# Patient Record
Sex: FEMALE | Race: WHITE | NOT HISPANIC OR LATINO | Employment: UNEMPLOYED | URBAN - NONMETROPOLITAN AREA
[De-identification: names, ages, dates, MRNs, and addresses within clinical notes are randomized per-mention and may not be internally consistent; named-entity substitution may affect disease eponyms.]

---

## 2023-09-11 ENCOUNTER — PREP FOR SURGERY (OUTPATIENT)
Dept: OTHER | Facility: HOSPITAL | Age: 78
End: 2023-09-11
Payer: MEDICARE

## 2023-09-11 ENCOUNTER — HOSPITAL ENCOUNTER (INPATIENT)
Facility: HOSPITAL | Age: 78
DRG: 056 | End: 2023-09-11
Attending: INTERNAL MEDICINE | Admitting: INTERNAL MEDICINE
Payer: MEDICARE

## 2023-09-11 PROBLEM — I63.9 CVA (CEREBRAL VASCULAR ACCIDENT): Status: ACTIVE | Noted: 2023-09-11

## 2023-09-11 LAB — GLUCOSE BLDC GLUCOMTR-MCNC: 140 MG/DL (ref 70–130)

## 2023-09-11 PROCEDURE — 25010000002 MEROPENEM PER 100 MG: Performed by: INTERNAL MEDICINE

## 2023-09-11 PROCEDURE — 99223 1ST HOSP IP/OBS HIGH 75: CPT | Performed by: INTERNAL MEDICINE

## 2023-09-11 PROCEDURE — 82948 REAGENT STRIP/BLOOD GLUCOSE: CPT

## 2023-09-11 RX ORDER — OXYBUTYNIN CHLORIDE 5 MG/1
10 TABLET, EXTENDED RELEASE ORAL DAILY
Status: DISPENSED | OUTPATIENT
Start: 2023-09-12

## 2023-09-11 RX ORDER — PRAVASTATIN SODIUM 40 MG
40 TABLET ORAL DAILY
Status: CANCELLED | OUTPATIENT
Start: 2023-09-12

## 2023-09-11 RX ORDER — FUROSEMIDE 20 MG/1
20 TABLET ORAL DAILY
Status: ON HOLD | COMMUNITY

## 2023-09-11 RX ORDER — PRAVASTATIN SODIUM 40 MG
40 TABLET ORAL DAILY
Status: ON HOLD | COMMUNITY

## 2023-09-11 RX ORDER — GLIPIZIDE 5 MG/1
5 TABLET ORAL
Status: DISCONTINUED | OUTPATIENT
Start: 2023-09-12 | End: 2023-09-13

## 2023-09-11 RX ORDER — MONTELUKAST SODIUM 10 MG/1
10 TABLET ORAL NIGHTLY
Status: DISPENSED | OUTPATIENT
Start: 2023-09-12

## 2023-09-11 RX ORDER — GLUCAGON 1 MG/ML
1 KIT INJECTION
Status: ACTIVE | OUTPATIENT
Start: 2023-09-11

## 2023-09-11 RX ORDER — LANSOPRAZOLE 30 MG/1
30 CAPSULE, DELAYED RELEASE ORAL DAILY
Status: ON HOLD | COMMUNITY

## 2023-09-11 RX ORDER — PANTOPRAZOLE SODIUM 40 MG/1
40 TABLET, DELAYED RELEASE ORAL
Status: CANCELLED | OUTPATIENT
Start: 2023-09-12

## 2023-09-11 RX ORDER — ISOSORBIDE MONONITRATE 30 MG/1
30 TABLET, EXTENDED RELEASE ORAL
Status: CANCELLED | OUTPATIENT
Start: 2023-09-12

## 2023-09-11 RX ORDER — METOPROLOL SUCCINATE 25 MG/1
25 TABLET, EXTENDED RELEASE ORAL
Status: DISPENSED | OUTPATIENT
Start: 2023-09-12

## 2023-09-11 RX ORDER — VENLAFAXINE HYDROCHLORIDE 75 MG/1
75 CAPSULE, EXTENDED RELEASE ORAL
Status: CANCELLED | OUTPATIENT
Start: 2023-09-12

## 2023-09-11 RX ORDER — BUPROPION HYDROCHLORIDE 150 MG/1
150 TABLET ORAL DAILY
Status: ON HOLD | COMMUNITY

## 2023-09-11 RX ORDER — VENLAFAXINE HYDROCHLORIDE 75 MG/1
225 CAPSULE, EXTENDED RELEASE ORAL DAILY
Status: ON HOLD | COMMUNITY

## 2023-09-11 RX ORDER — ACETAMINOPHEN 325 MG/1
650 TABLET ORAL EVERY 4 HOURS PRN
Status: CANCELLED | OUTPATIENT
Start: 2023-09-11

## 2023-09-11 RX ORDER — FLUTICASONE PROPIONATE 50 MCG
2 SPRAY, SUSPENSION (ML) NASAL DAILY PRN
Status: ON HOLD | COMMUNITY

## 2023-09-11 RX ORDER — ACETAMINOPHEN 325 MG/1
650 TABLET ORAL EVERY 4 HOURS PRN
Status: DISPENSED | OUTPATIENT
Start: 2023-09-11

## 2023-09-11 RX ORDER — ISOSORBIDE MONONITRATE 30 MG/1
30 TABLET, EXTENDED RELEASE ORAL
Status: DISPENSED | OUTPATIENT
Start: 2023-09-12

## 2023-09-11 RX ORDER — NICOTINE POLACRILEX 4 MG
15 LOZENGE BUCCAL
Status: CANCELLED | OUTPATIENT
Start: 2023-09-11

## 2023-09-11 RX ORDER — CETIRIZINE HYDROCHLORIDE 10 MG/1
5 TABLET ORAL DAILY
Status: DISPENSED | OUTPATIENT
Start: 2023-09-12

## 2023-09-11 RX ORDER — METFORMIN HYDROCHLORIDE 500 MG/1
500 TABLET, EXTENDED RELEASE ORAL 2 TIMES DAILY
Status: ON HOLD | COMMUNITY

## 2023-09-11 RX ORDER — NICOTINE POLACRILEX 4 MG
15 LOZENGE BUCCAL
Status: ACTIVE | OUTPATIENT
Start: 2023-09-11

## 2023-09-11 RX ORDER — PRAVASTATIN SODIUM 40 MG
40 TABLET ORAL DAILY
Status: DISPENSED | OUTPATIENT
Start: 2023-09-12

## 2023-09-11 RX ORDER — FUROSEMIDE 20 MG/1
20 TABLET ORAL DAILY
Status: CANCELLED | OUTPATIENT
Start: 2023-09-12

## 2023-09-11 RX ORDER — METOPROLOL SUCCINATE 25 MG/1
25 TABLET, EXTENDED RELEASE ORAL
Status: CANCELLED | OUTPATIENT
Start: 2023-09-12

## 2023-09-11 RX ORDER — GLUCAGON 1 MG/ML
1 KIT INJECTION
Status: CANCELLED | OUTPATIENT
Start: 2023-09-11

## 2023-09-11 RX ORDER — GLIPIZIDE 5 MG/1
5 TABLET ORAL
Status: CANCELLED | OUTPATIENT
Start: 2023-09-12

## 2023-09-11 RX ORDER — BUPROPION HYDROCHLORIDE 150 MG/1
150 TABLET ORAL DAILY
Status: DISPENSED | OUTPATIENT
Start: 2023-09-12

## 2023-09-11 RX ORDER — DEXTROSE MONOHYDRATE 25 G/50ML
25 INJECTION, SOLUTION INTRAVENOUS
Status: DISPENSED | OUTPATIENT
Start: 2023-09-11

## 2023-09-11 RX ORDER — AMLODIPINE BESYLATE 5 MG/1
5 TABLET ORAL
Status: CANCELLED | OUTPATIENT
Start: 2023-09-12

## 2023-09-11 RX ORDER — GABAPENTIN 800 MG/1
800 TABLET ORAL 2 TIMES DAILY
Status: ON HOLD | COMMUNITY

## 2023-09-11 RX ORDER — ISOSORBIDE MONONITRATE 30 MG/1
30 TABLET, EXTENDED RELEASE ORAL DAILY
Status: ON HOLD | COMMUNITY

## 2023-09-11 RX ORDER — AMLODIPINE BESYLATE 5 MG/1
5 TABLET ORAL
Status: DISCONTINUED | OUTPATIENT
Start: 2023-09-12 | End: 2023-09-14

## 2023-09-11 RX ORDER — POLYETHYLENE GLYCOL 3350 17 G/17G
17 POWDER, FOR SOLUTION ORAL DAILY
Status: DISCONTINUED | OUTPATIENT
Start: 2023-09-12 | End: 2023-09-14

## 2023-09-11 RX ORDER — AZELASTINE 1 MG/ML
2 SPRAY, METERED NASAL 2 TIMES DAILY PRN
Status: ON HOLD | COMMUNITY

## 2023-09-11 RX ORDER — IPRATROPIUM BROMIDE AND ALBUTEROL SULFATE 2.5; .5 MG/3ML; MG/3ML
3 SOLUTION RESPIRATORY (INHALATION) EVERY 6 HOURS PRN
Status: CANCELLED | OUTPATIENT
Start: 2023-09-11

## 2023-09-11 RX ORDER — MONTELUKAST SODIUM 10 MG/1
10 TABLET ORAL NIGHTLY
Status: CANCELLED | OUTPATIENT
Start: 2023-09-12

## 2023-09-11 RX ORDER — ESTRADIOL 1 MG/1
1 TABLET ORAL DAILY
Status: ON HOLD | COMMUNITY

## 2023-09-11 RX ORDER — FUROSEMIDE 20 MG/1
20 TABLET ORAL DAILY
Status: DISCONTINUED | OUTPATIENT
Start: 2023-09-12 | End: 2023-09-12

## 2023-09-11 RX ORDER — GLIPIZIDE 5 MG/1
5 TABLET ORAL
Status: ON HOLD | COMMUNITY

## 2023-09-11 RX ORDER — VENLAFAXINE HYDROCHLORIDE 75 MG/1
75 CAPSULE, EXTENDED RELEASE ORAL
Status: DISPENSED | OUTPATIENT
Start: 2023-09-12

## 2023-09-11 RX ORDER — BUPROPION HYDROCHLORIDE 150 MG/1
150 TABLET ORAL DAILY
Status: CANCELLED | OUTPATIENT
Start: 2023-09-12

## 2023-09-11 RX ORDER — POLYETHYLENE GLYCOL 3350 17 G/17G
17 POWDER, FOR SOLUTION ORAL DAILY
Status: CANCELLED | OUTPATIENT
Start: 2023-09-11

## 2023-09-11 RX ORDER — CETIRIZINE HYDROCHLORIDE 10 MG/1
5 TABLET ORAL DAILY
Status: CANCELLED | OUTPATIENT
Start: 2023-09-12

## 2023-09-11 RX ORDER — IPRATROPIUM BROMIDE AND ALBUTEROL SULFATE 2.5; .5 MG/3ML; MG/3ML
3 SOLUTION RESPIRATORY (INHALATION) EVERY 6 HOURS PRN
Status: DISCONTINUED | OUTPATIENT
Start: 2023-09-11 | End: 2023-09-18

## 2023-09-11 RX ORDER — DEXTROSE MONOHYDRATE 25 G/50ML
25 INJECTION, SOLUTION INTRAVENOUS
Status: CANCELLED | OUTPATIENT
Start: 2023-09-11

## 2023-09-11 RX ORDER — OXYBUTYNIN CHLORIDE 5 MG/1
10 TABLET, EXTENDED RELEASE ORAL DAILY
Status: CANCELLED | OUTPATIENT
Start: 2023-09-12

## 2023-09-11 RX ORDER — PANTOPRAZOLE SODIUM 40 MG/1
40 TABLET, DELAYED RELEASE ORAL
Status: DISPENSED | OUTPATIENT
Start: 2023-09-12

## 2023-09-11 RX ORDER — ALBUTEROL SULFATE 90 UG/1
2 AEROSOL, METERED RESPIRATORY (INHALATION) EVERY 4 HOURS PRN
Status: ON HOLD | COMMUNITY

## 2023-09-11 RX ORDER — METOPROLOL SUCCINATE 25 MG/1
25 TABLET, EXTENDED RELEASE ORAL DAILY
Status: ON HOLD | COMMUNITY

## 2023-09-11 RX ADMIN — MEROPENEM 1000 MG: 1 INJECTION, POWDER, FOR SOLUTION INTRAVENOUS at 21:30

## 2023-09-11 NOTE — H&P
[facility]    Patient Identification:  Name:  Ofelia Knox  Age:  78 y.o.  Sex:  female  :  1945  MRN:  [id number]   Visit Number:  [id number]  Room number:  102/2S  Primary Care Physician:  Provider, No Known     Subjective       Chief complaint: Follow-up on CVA      History of presenting illness:  78 y.o. female  This pt is seen today for post admission physician evaluation to the inpatient rehabilitation facility.  Patient is 78-year-old female with history of diabetes hypertension and home O2 dependent COPD was brought to emergency room with altered mental status.  This history was obtained from the patient's son as patient really cannot give a good history.  I did call the son on the phone.  Since the patient was  12 years ago, the patient's son calls her every day.  On a phone call on  in the evening patient's son states that the patient was somewhat confused on the phone and disoriented but thought she may just be tired.  He called back Friday and she was about the same.  On Saturday which would have been the th patient did not answer her phone all day which is very unusual for the patient.  He went over  morning after she did not answer and after pounding on the door she finally came to the door.  He states she was wearing a yellow shirt but no bottoms, she was confused, they walked through the house and he noted that she had defecated and urinated all over.  An ambulance was called at that point.  He states normally his mother is very oriented and memory is very good.  CT head done in work-up showed a hypoattenuation in the left periventricular basal ganglia white matter.  Subsequent MRI showed acute infarct in the left putamen and anterior limb of the internal capsule.  CTA head and neck was without significant stenosis.  She was started on high-dose statin and aspirin.  Apparently still has significant aphasia.  Reportedly  her diet per our  is regular thin.  She became increasingly confused while there and is found to have UTI, reportedly this is ESBL and she is to be on meropenem through .  Her confusion progressed to the point where she could not identify her children.  As of this morning apparently however she was able to remember their names reportedly.    Patient continued to progress medically.  Physical therapy and Occupational therapy evaluations were completed with recommended acute inpatient rehabilitation referral for continued functional mobility intervention and reeducation.  Acute rehab referral ordered for continued medical monitoring and management prolonged hospitalization, continued respiratory status monitoring with her home O2 dependency, lab monitoring, pain mgt needs, bowel/bladder care with any new medication education, skin monitoring and breakdown prevention along with ongoing medical comorbidities that require ongoing care.    The preadmission mini-FIM score as assessed by the referring facility as follows: Eating 6, grooming 3, bathing 2, upper body dressing 3, lower body dressing 2, toileting hygiene 3, transfers 3, toilet transfer 3, locomotion 0, bladder management 3, bowel management 3, comprehension 5, expression 5, social interaction 5, problem-solving 5, memory 5.    ---------------------------------------------------------------------------------------------------------------------   PMH:  Diabetes  Hypertension  Home O2 dependent COPD  Hysterectomy      Family history: Unobtainable at this time as patient is disoriented      Review of Systems: Unable to obtain but patient and son states she really has not had complaints up until the events above.    -------------------------------------------------------------------------------------------------------  PMH    Social History     Socioeconomic History    Marital status:       ---------------------------------------------------------------------------------------------------------------------   Allergies: Levaquin, Augmentin, Keflex, Amoxil, albuterol, I will cause a rash.  Codeine nausea only  -------------------------------------------------------------------------------------------------------         Objective     Vital Signs: 150/90, 20, 65, 98.1, 2 L saturation 90 to 91%.       ---------------------------------------------------------------------------------------------------------------------     Physical exam: Assisted by RT  Constitutional: Well-developed, no distress on oxygen  HEENT: Hearing appears to be intact, face appears symmetric, pupils equal hearing was intact  Neck:   No JVD or carotid bruit heard  Cardiovascular: Regular rate and rhythm without murmur rub or gallop  Pulmonary/Chest: Bilateral breath sounds are clear without rhonchi rales wheezing diminished at the bases  Abdominal:  .  Soft benign nondistended nontender  Musculoskeletal: Strength is about 4/5 in both arms, she can lift both arms off the bed, she can move her feet well follows commands.  Neurological: She is oriented to person, she knew she lived in [place] but stated she lived with her  who is now .  When asked about the president she acted like she knew what she wanted to stay, started to describe him but then went off on a tangent.  When I asked her to name my watch she could not come up with this word attempted to describe it but was somewhat nonsensical.  She could name a pen.  She stated the year was , when I asked her the month she said .  Skin: Warm and dry   Peripheral vascular: Trace edema  Genitourinary: No Knox catheter present  -------------------------------------------------------------------------------------------------------    --------------------------------------------------------------------------------------------------------------------  Labs: White  count is 12, hemoglobin 14.8, hematocrit 49.  Platelet count 42 sodium 137, potassium 3.6, chloride 101, CO2 30, BUN 37 creatinine 1.11 COVID-negative    Imaging by report as noted above in HPI    Assessment & Plan    Left putamen and internal capsule CVA, patient is on aspirin and statin.  Patient has probably a combination of some cognitive deficit from this as well as some expressive aphasia.  Patient will undergo physical therapy 1 to 2 hours a day 5 to 6 days a week for therapeutic exercise range of motion endurance, gait training, safety, stairs, strengthening.  Patient will undergo occupational therapy 1 to 2 hours a day 5 to 6 days a week for dressing, ADLs, feeding, home skills, safety, toileting techniques.  Patient undergo speech-language pathology 30 to 60 minutes a day 3 to 5 days a week for communication, expression, dysphagia and aspiration needs.  Expected functional improvement for safe discharge will be for the patient to be able to safely perform activities of daily living using adaptive equipment for improved functional mobility.  Be independent and safe with bowel and bladder function.  Be able to safely consume food and fluids without signs of aspiration.  Be able to navigate home and community territory safely without injury or falls.  Patient was previously independent at home, anticipated discharge be home with assistance, anticipated DME would be commode wheelchair and walker.    Diabetes, I am continuing on metformin and Glucotrol at this time, her A1c was 6.8.  Glucoses are controlled at present, will follow and adjust as needed.    Hypertension, patient is on amlodipine and metoprolol.  Follow for 24 more hours, if she remains at 150 systolic we will make further adjustments.    Constipation reported by son, continue senna and MiraLAX, adjust cathartics as needed    History of edema, she really did not have much edema, she is on Lasix, chronically, I did not have an old creatinine but  creatinine is borderline elevated now and I am going to hold her Lasix and will follow.  EF is unknown, will check echo.    Reported COPD, continue Spiriva, lungs are clear, it is interesting patient has never smoked,  has never smoked as well.    Mild polycythemia probably related to Lasix therapy, as above ongoing hold Lasix, will follow intermittently.    ESBL UTI, isolation, complete meropenem course.      VTE Prophylaxis:   Mechanical Order History:       SCUDs          Pharmalogical Order History:       None            Falls Risk Assessment with some confusion and history of CVA high risk of falls    Discussed with patient's son over the phone and entertained questions.  Discussed rehab process.      Jim Appiah MD  [facility] Hospitalist  09/11/23  16:12 EDT

## 2023-09-12 LAB
ALBUMIN SERPL-MCNC: 3.3 G/DL (ref 3.5–5.2)
ALBUMIN/GLOB SERPL: 0.9 G/DL
ALP SERPL-CCNC: 85 U/L (ref 39–117)
ALT SERPL W P-5'-P-CCNC: 10 U/L (ref 1–33)
ANION GAP SERPL CALCULATED.3IONS-SCNC: 7.2 MMOL/L (ref 5–15)
AST SERPL-CCNC: 13 U/L (ref 1–32)
BASOPHILS # BLD AUTO: 0.08 10*3/MM3 (ref 0–0.2)
BASOPHILS NFR BLD AUTO: 0.7 % (ref 0–1.5)
BILIRUB SERPL-MCNC: 0.5 MG/DL (ref 0–1.2)
BUN SERPL-MCNC: 29 MG/DL (ref 8–23)
BUN/CREAT SERPL: 28.4 (ref 7–25)
CALCIUM SPEC-SCNC: 9.2 MG/DL (ref 8.6–10.5)
CHLORIDE SERPL-SCNC: 98 MMOL/L (ref 98–107)
CO2 SERPL-SCNC: 29.8 MMOL/L (ref 22–29)
CREAT SERPL-MCNC: 1.02 MG/DL (ref 0.57–1)
DEPRECATED RDW RBC AUTO: 50.4 FL (ref 37–54)
EGFRCR SERPLBLD CKD-EPI 2021: 57.1 ML/MIN/1.73
EOSINOPHIL # BLD AUTO: 0.16 10*3/MM3 (ref 0–0.4)
EOSINOPHIL NFR BLD AUTO: 1.3 % (ref 0.3–6.2)
ERYTHROCYTE [DISTWIDTH] IN BLOOD BY AUTOMATED COUNT: 14.6 % (ref 12.3–15.4)
GLOBULIN UR ELPH-MCNC: 3.5 GM/DL
GLUCOSE BLDC GLUCOMTR-MCNC: 105 MG/DL (ref 70–130)
GLUCOSE BLDC GLUCOMTR-MCNC: 116 MG/DL (ref 70–130)
GLUCOSE BLDC GLUCOMTR-MCNC: 125 MG/DL (ref 70–130)
GLUCOSE BLDC GLUCOMTR-MCNC: 127 MG/DL (ref 70–130)
GLUCOSE SERPL-MCNC: 121 MG/DL (ref 65–99)
HBA1C MFR BLD: 6.8 % (ref 4.8–5.6)
HCT VFR BLD AUTO: 50.8 % (ref 34–46.6)
HGB BLD-MCNC: 14.9 G/DL (ref 12–15.9)
IMM GRANULOCYTES # BLD AUTO: 0.03 10*3/MM3 (ref 0–0.05)
IMM GRANULOCYTES NFR BLD AUTO: 0.3 % (ref 0–0.5)
LYMPHOCYTES # BLD AUTO: 2.89 10*3/MM3 (ref 0.7–3.1)
LYMPHOCYTES NFR BLD AUTO: 24.1 % (ref 19.6–45.3)
MCH RBC QN AUTO: 27.4 PG (ref 26.6–33)
MCHC RBC AUTO-ENTMCNC: 29.3 G/DL (ref 31.5–35.7)
MCV RBC AUTO: 93.6 FL (ref 79–97)
MONOCYTES # BLD AUTO: 1.43 10*3/MM3 (ref 0.1–0.9)
MONOCYTES NFR BLD AUTO: 11.9 % (ref 5–12)
NEUTROPHILS NFR BLD AUTO: 61.7 % (ref 42.7–76)
NEUTROPHILS NFR BLD AUTO: 7.38 10*3/MM3 (ref 1.7–7)
NRBC BLD AUTO-RTO: 0 /100 WBC (ref 0–0.2)
PLATELET # BLD AUTO: 312 10*3/MM3 (ref 140–450)
PMV BLD AUTO: 11.5 FL (ref 6–12)
POTASSIUM SERPL-SCNC: 3.8 MMOL/L (ref 3.5–5.2)
PROT SERPL-MCNC: 6.8 G/DL (ref 6–8.5)
RBC # BLD AUTO: 5.43 10*6/MM3 (ref 3.77–5.28)
SODIUM SERPL-SCNC: 135 MMOL/L (ref 136–145)
T4 FREE SERPL-MCNC: 1.15 NG/DL (ref 0.93–1.7)
TSH SERPL DL<=0.05 MIU/L-ACNC: 1.47 UIU/ML (ref 0.27–4.2)
WBC NRBC COR # BLD: 11.97 10*3/MM3 (ref 3.4–10.8)

## 2023-09-12 PROCEDURE — 82948 REAGENT STRIP/BLOOD GLUCOSE: CPT

## 2023-09-12 PROCEDURE — 97535 SELF CARE MNGMENT TRAINING: CPT | Performed by: OCCUPATIONAL THERAPIST

## 2023-09-12 PROCEDURE — 84443 ASSAY THYROID STIM HORMONE: CPT | Performed by: INTERNAL MEDICINE

## 2023-09-12 PROCEDURE — 97110 THERAPEUTIC EXERCISES: CPT | Performed by: OCCUPATIONAL THERAPIST

## 2023-09-12 PROCEDURE — 92523 SPEECH SOUND LANG COMPREHEN: CPT

## 2023-09-12 PROCEDURE — 83036 HEMOGLOBIN GLYCOSYLATED A1C: CPT | Performed by: INTERNAL MEDICINE

## 2023-09-12 PROCEDURE — 97112 NEUROMUSCULAR REEDUCATION: CPT

## 2023-09-12 PROCEDURE — 97116 GAIT TRAINING THERAPY: CPT

## 2023-09-12 PROCEDURE — 85025 COMPLETE CBC W/AUTO DIFF WBC: CPT | Performed by: INTERNAL MEDICINE

## 2023-09-12 PROCEDURE — 97112 NEUROMUSCULAR REEDUCATION: CPT | Performed by: OCCUPATIONAL THERAPIST

## 2023-09-12 PROCEDURE — 94799 UNLISTED PULMONARY SVC/PX: CPT

## 2023-09-12 PROCEDURE — 94664 DEMO&/EVAL PT USE INHALER: CPT

## 2023-09-12 PROCEDURE — 84439 ASSAY OF FREE THYROXINE: CPT | Performed by: INTERNAL MEDICINE

## 2023-09-12 PROCEDURE — 97161 PT EVAL LOW COMPLEX 20 MIN: CPT

## 2023-09-12 PROCEDURE — 80053 COMPREHEN METABOLIC PANEL: CPT | Performed by: INTERNAL MEDICINE

## 2023-09-12 PROCEDURE — 97167 OT EVAL HIGH COMPLEX 60 MIN: CPT | Performed by: OCCUPATIONAL THERAPIST

## 2023-09-12 PROCEDURE — 25010000002 MEROPENEM PER 100 MG: Performed by: INTERNAL MEDICINE

## 2023-09-12 PROCEDURE — 94640 AIRWAY INHALATION TREATMENT: CPT

## 2023-09-12 PROCEDURE — 97530 THERAPEUTIC ACTIVITIES: CPT

## 2023-09-12 PROCEDURE — 97110 THERAPEUTIC EXERCISES: CPT

## 2023-09-12 RX ORDER — ASPIRIN 81 MG/1
81 TABLET ORAL DAILY
Status: DISPENSED | OUTPATIENT
Start: 2023-09-12

## 2023-09-12 RX ORDER — NYSTATIN 100000 [USP'U]/G
POWDER TOPICAL EVERY 12 HOURS SCHEDULED
Status: DISCONTINUED | OUTPATIENT
Start: 2023-09-13 | End: 2023-09-12

## 2023-09-12 RX ORDER — AMOXICILLIN 250 MG
1 CAPSULE ORAL 2 TIMES DAILY
Status: DISCONTINUED | OUTPATIENT
Start: 2023-09-12 | End: 2023-09-14

## 2023-09-12 RX ORDER — NYSTATIN 100000 U/G
1 CREAM TOPICAL EVERY 12 HOURS SCHEDULED
Status: DISPENSED | OUTPATIENT
Start: 2023-09-12

## 2023-09-12 RX ADMIN — OXYBUTYNIN CHLORIDE 10 MG: 5 TABLET, EXTENDED RELEASE ORAL at 08:23

## 2023-09-12 RX ADMIN — BUPROPION HYDROCHLORIDE 150 MG: 150 TABLET, EXTENDED RELEASE ORAL at 08:22

## 2023-09-12 RX ADMIN — MEROPENEM 1000 MG: 1 INJECTION, POWDER, FOR SOLUTION INTRAVENOUS at 15:09

## 2023-09-12 RX ADMIN — CETIRIZINE HYDROCHLORIDE 5 MG: 10 TABLET, FILM COATED ORAL at 08:22

## 2023-09-12 RX ADMIN — PRAVASTATIN SODIUM 40 MG: 40 TABLET ORAL at 08:23

## 2023-09-12 RX ADMIN — POLYETHYLENE GLYCOL (3350) 17 G: 17 POWDER, FOR SOLUTION ORAL at 08:24

## 2023-09-12 RX ADMIN — DOCUSATE SODIUM 50 MG AND SENNOSIDES 8.6 MG 1 TABLET: 8.6; 5 TABLET, FILM COATED ORAL at 12:16

## 2023-09-12 RX ADMIN — ASPIRIN 81 MG: 81 TABLET, COATED ORAL at 15:08

## 2023-09-12 RX ADMIN — GLIPIZIDE 5 MG: 5 TABLET ORAL at 17:32

## 2023-09-12 RX ADMIN — TIOTROPIUM BROMIDE INHALATION SPRAY 2 PUFF: 3.12 SPRAY, METERED RESPIRATORY (INHALATION) at 07:24

## 2023-09-12 RX ADMIN — METOPROLOL SUCCINATE 25 MG: 25 TABLET, EXTENDED RELEASE ORAL at 08:23

## 2023-09-12 RX ADMIN — AMLODIPINE BESYLATE 5 MG: 5 TABLET ORAL at 08:22

## 2023-09-12 RX ADMIN — GLIPIZIDE 5 MG: 5 TABLET ORAL at 06:36

## 2023-09-12 RX ADMIN — ISOSORBIDE MONONITRATE 30 MG: 30 TABLET, EXTENDED RELEASE ORAL at 08:24

## 2023-09-12 RX ADMIN — METFORMIN HYDROCHLORIDE 500 MG: 500 TABLET ORAL at 08:22

## 2023-09-12 RX ADMIN — VENLAFAXINE HYDROCHLORIDE 75 MG: 75 CAPSULE, EXTENDED RELEASE ORAL at 08:22

## 2023-09-12 RX ADMIN — PANTOPRAZOLE SODIUM 40 MG: 40 TABLET, DELAYED RELEASE ORAL at 06:36

## 2023-09-12 RX ADMIN — DOCUSATE SODIUM 50 MG AND SENNOSIDES 8.6 MG 1 TABLET: 8.6; 5 TABLET, FILM COATED ORAL at 20:03

## 2023-09-12 RX ADMIN — MEROPENEM 1000 MG: 1 INJECTION, POWDER, FOR SOLUTION INTRAVENOUS at 05:08

## 2023-09-12 RX ADMIN — MONTELUKAST SODIUM 10 MG: 10 TABLET, COATED ORAL at 20:03

## 2023-09-12 RX ADMIN — MEROPENEM 1000 MG: 1 INJECTION, POWDER, FOR SOLUTION INTRAVENOUS at 21:30

## 2023-09-12 RX ADMIN — METFORMIN HYDROCHLORIDE 500 MG: 500 TABLET ORAL at 17:32

## 2023-09-12 RX ADMIN — FUROSEMIDE 20 MG: 20 TABLET ORAL at 08:23

## 2023-09-12 RX ADMIN — NYSTATIN 1 APPLICATION: 100000 CREAM TOPICAL at 23:30

## 2023-09-12 NOTE — PLAN OF CARE
Goal Outcome Evaluation:  Plan of Care Reviewed With: patient Admitted to rehab under the services of Dr. Appiah, oriented to call bell and unit.will continue to follow.

## 2023-09-12 NOTE — PROGRESS NOTES
Rehabilitation Nursing  Inpatient Rehabilitation Plan of Care Note    Plan of Care  Copy from [facility]    Toilet Transfers (Active)  Current Status (9/11/2023 7:00:00 PM): PATIENT WILL HAVE NO TRANSFER DIFFICULTY    Weekly Goal: NO DIFFICULT WITH TRANSFERS  Discharge Goal: TRANSFERS INDEPENDENTLY    Pain    Pain Management (Active)  Current Status (9/11/2023 7:00:00 PM): PATIENT WILL VOICE NO PAIN  Weekly Goal: PATIENT WILL BE PAIN FREE  Discharge Goal: FREE FROM PAIN    Safety    Potential for Injury (Active)  Current Status (9/11/2023 7:00:00 PM): PATIENT WILL HAVE NO INJURY THIS STAY  Weekly Goal: NO INJURY  Discharge Goal: PATIENT WILL DISCHARGE    Body Systems    Integumentary (Active)  Current Status (9/11/2023 7:00:00 PM): PATIENT WILL HAVE NO SKIN  BREAKDOWN THIS  STAY  Weekly Goal: SKIN WILL REMAIN INTACT.  Discharge Goal: NO SKIN ISSUES.    Signed by: Carolyn Ramirez RN

## 2023-09-12 NOTE — PROGRESS NOTES
Patient Assessment Instrument  Quality Indicators - Admission FY 2023    Section A. Ethnicity/ Race/Language  Ethnicity:  Race:  Preferred Language:    Section A. Transportation      Section B. Hearing and Vision        Section B. Health Literacy        Section C. Cognitive Patterns      Section C. Signs and Symptoms of Delirium (from CAM)      Section D. Mood      Section D. Social Isolation      Section DN2179. Prior Functioning      Section BP8280. Prior Device Use      Section YS1620. Self Care Performance      Section DU2196. Self Care Discharge Goals      Section GR7232. Mobility Performance     Roll Left and Right: [place] does less than half the effort. [place] lifts, holds  or supports trunk or limbs but provides less than half the effort.   Sit to Lying: [place] does less than half the effort. [place] lifts, holds or  supports trunk or limbs but provides less than half the effort.   Lying to Sitting on Side of Bed: [place] does less than half the effort. [place]  lifts, holds or supports trunk or limbs but provides less than half the effort.   Sit to Stand: [place] does less than half the effort. [place] lifts, holds or  supports trunk or limbs but provides less than half the effort.   Chair/Bed to Chair Transfer: [place] does less than half the effort. [place]  lifts, holds or supports trunk or limbs but provides less than half the effort.   Toilet Transfer [place] does less than half the effort. [place] lifts, holds or  supports trunk or limbs but provides less than half the effort.   Car Transfer: [place] does less than half the effort. [place] lifts, holds or  supports trunk or limbs but provides less than half the effort.   Walk 10 Feet:   [place] provides verbal cues and/or touching/steadying and/or  contact guard assistance as patient completes activity. Assistance may be  provided throughout the activity or intermittently.  Walk 50 Feet with 2 Turns:   [place] provides verbal cues and/or  touching/steadying and/or  contact guard assistance as patient completes  activity. Assistance may be provided throughout the activity or intermittently.  Walk 150 Feet:   [place] provides verbal cues and/or touching/steadying and/or  contact guard assistance as patient completes activity. Assistance may be  provided throughout the activity or intermittently.  Walking 10 Feet on Uneven Surfaces:   Not attempted due to medical or safety  concerns.  1 Step Over Curb or Up/Down Stair:   Not attempted due to medical or safety  concerns.  Picking up an Object:   Not attempted due to medical or safety concerns.  Uses Wheelchair/Scooter: No    Section [id number]. Mobility Discharge Goals     Sit to Stand: [place] provides verbal cues or touching/steadying assistance as  patient completes activity.   Chair/Bed to Chair Transfer: [place] provides verbal cues or touching/steadying  assistance as patient completes activity.   Walk Discharge Goals:   Walk 150 Feet: [place] provides verbal cues or touching/steadying assistance as  patient completes activity.    Section H. Bladder and Bowel      Section I. Active Diagnosis      Section J. Health Conditions      Section J. Health Conditions (Pain)      Section K. Swallowing/Nutritional Status    Nutritional Approaches on Admission:    Section M. Skin Conditions      Section N. Medication        Section O. Special Treatments, Procedures, and Programs    Signed by: Andra Stewart, Physical Therapist

## 2023-09-12 NOTE — PROGRESS NOTES
Rehabilitation Nursing  Inpatient Rehabilitation Plan of Care Note    Plan of Care  Copy from [facility]    Toilet Transfers (Active)  Current Status (9/11/2023 7:00:00 PM): PATIENT WILL HAVE NO TRANSFER DIFFICULTY    Weekly Goal: NO DIFFICULT WITH TRANSFERS  Discharge Goal: TRANSFERS INDEPENDENTLY    Pain    Pain Management (Active)  Current Status (9/11/2023 7:00:00 PM): PATIENT WILL VOICE NO PAIN  Weekly Goal: PATIENT WILL BE PAIN FREE  Discharge Goal: FREE FROM PAIN    Safety    Potential for Injury (Active)  Current Status (9/11/2023 7:00:00 PM): PATIENT WILL HAVE NO INJURY THIS STAY  Weekly Goal: NO INJURY  Discharge Goal: PATIENT WILL DISCHARGE    Body Systems    Integumentary (Active)  Current Status (9/11/2023 7:00:00 PM): PATIENT WILL HAVE NO SKIN  BREAKDOWN THIS  STAY  Weekly Goal: SKIN WILL REMAIN INTACT.  Discharge Goal: NO SKIN ISSUES.    Signed by: Carolyn Ramirez RN

## 2023-09-12 NOTE — PROGRESS NOTES
"Patient Assessment Instrument  Quality Indicators - Admission FY 2023    Section A. Ethnicity/ Race/Language  Ethnicity:  Not of , /a, [place] Origin  Race:  White  Preferred Language:  English  Requests  to Communicate:   No    Section A. Transportation      Section B. Hearing and Vision  Expression of Ideas and Wants: Frequently exhibits difficulty with expressing  needs and ideas.  Understanding Verbal and Non-Verbal Content: Sometimes Understands: Understands  only basic conversations or simple, direct phrases. Frequently requires cues to  understand.  Ability to Hear:  Moderate difficulty - speaker has to increase volume and speak  distinctly  Ability to See in Adequate Light:  Moderately impaired - limited vision; not  able to see newspaper headlines but can identify objects    Section B. Health Literacy  Frequency of Needing Assistance Reading:  Always      Section C. Cognitive Patterns  Brief Interview for Mental Status (BIMS) was conducted.  Repetition of Three Words: None.  Able to report correct year: Missed by more than 5 years or no answer  Able to report correct month: Missed by 6 days to 1 month  Able to report correct day of the week: Incorrect or no answer  Able to recall \"sock\": No, could not recall  Able to recall \"blue\": No, could not recall  Able to recall \"bed\": No, could not recall    BIMS SUMMARY SCORE: 1 Severe impairment Patient was able to complete the Brief  Interview for Mental Status    Section C. Signs and Symptoms of Delirium (from CAM)  Evidence of Acute Change in Mental Status:   Yes  Inattention: Behavior present, fluctuates (comes and goes, changes in severity)  Thinking Disorganized or Incoherent:   Behavior present, fluctuates (comes and  goes, changes in severity)  Altered Level of Consciousness:   Behavior not present    Section D. Mood  Presence of little interest or pleasure in doing things:   No  Frequency of having little interest or pleasure in " doing things:   Never or 1  day  Presence of feeling down, depressed, or hopeless:   No  Frequency of feeling down, depressed, or hopeless:   Never or 1 day   Interview Ended. Above responses do not meet criteria to continue.  Total Severity Score:   00    Section D. Social Isolation  Frequecy of Feeling Lonely or Isolated:  Rarely    Section HA3026. Prior Functioning      Section [id number]. Prior Device Use      Section [id number]. Self Care Performance      Section DA0926. Self Care Discharge Goals      Section AS7836. Mobility Performance      Section IZ2091. Mobility Discharge Goals      Section H. Bladder and Bowel      Section I. Active Diagnosis      Section J. Health Conditions  Patient has had two or more falls, or a fall with injury, in the past year.  Patient has not had major surgery during the 100 days prior to admission.    Section J. Health Conditions (Pain)  Pain Effect on Sleep:   Rarely or not at all  Pain Interference with Therapy Activities:   Frequently  Pain Interference with Day-to-Day Activities:   Almost constantly    Section K. Swallowing/Nutritional Status    Nutritional Approaches on Admission:    Section M. Skin Conditions  Unhealed Pressure Ulcer/Injuries at Stage 1 or Higher on Admission:  No.    Section N. Medication    Potential Clinically Significant Medication Issues: No issues found during  review  Patient is taking medications in the following pharmacological classification:  F. Antibiotic An indication is noted for all medications in the Antibiotic drug  class. J. Hypoglycemic (including insulin) An indication is noted for all  medications in the Hypoglycemic drug class.  Potential Clinically Significant Medication Issues: No issues found during  review    Section O. Special Treatments, Procedures, and Programs    Signed by: Carolyn Ramirez RN

## 2023-09-12 NOTE — PROGRESS NOTES
Rehabilitation Nursing  Inpatient Rehabilitation Plan of Care Note    Plan of Care  Copy from [facility]    Toilet Transfers (Active)  Current Status (9/11/2023 7:00:00 PM): PATIENT WILL HAVE NO TRANSFER DIFFICULTY    Weekly Goal: NO DIFFICULT WITH TRANSFERS  Discharge Goal: TRANSFERS INDEPENDENTLY    Pain    Pain Management (Active)  Current Status (9/11/2023 7:00:00 PM): PATIENT WILL VOICE NO PAIN  Weekly Goal: PATIENT WILL BE PAIN FREE  Discharge Goal: FREE FROM PAIN    Safety    Potential for Injury (Active)  Current Status (9/11/2023 7:00:00 PM): PATIENT WILL HAVE NO INJURY THIS STAY  Weekly Goal: NO INJURY  Discharge Goal: PATIENT WILL DISCHARGE    Body Systems    Integumentary (Active)  Current Status (9/11/2023 7:00:00 PM): PATIENT WILL HAVE NO SKIN  BREAKDOWN THIS  STAY  Weekly Goal: SKIN WILL REMAIN INTACT.  Discharge Goal: NO SKIN ISSUES.    Signed by: Felicity Burden, Nurse

## 2023-09-12 NOTE — THERAPY EVALUATION
Inpatient Rehabilitation - Occupational Therapy Initial Evaluation and Treatment Note    ABENA Richie     Patient Name: Ofelia Knox  : 1945  MRN: [id number]    Today's Date: 2023                 Admit Date: 2023       No diagnosis found.    Patient Active Problem List   Diagnosis    CVA (cerebral vascular accident)       Past Medical History:   Diagnosis Date    AMS (altered mental status)     Aphasia     CKD (chronic kidney disease)     COPD (chronic obstructive pulmonary disease)     CVA (cerebral vascular accident)     DM2 (diabetes mellitus, type 2)     HTN (hypertension)     Restrictive lung disease     Urinary tract infection due to ESBL Klebsiella        Past Surgical History:   Procedure Laterality Date    HYSTERECTOMY      JOINT REPLACEMENT               IRF OT ASSESSMENT FLOWSHEET (last 12 hours)       IRF OT Evaluation and Treatment       Row Name 23 1350          OT Time and Intention    Document Type initial evaluation;daily treatment  -BF     Mode of Treatment occupational therapy  -BF     Patient Effort adequate  -BF     Symptoms Noted During/After Treatment fatigue  -BF       Row Name 23 1357          General Information    Patient Profile Reviewed yes  -BF     Patient/Family/Caregiver Comments/Observations Pt sitting in w/c, agreeable to OT. Pt was brought to ED on 9/3/23 by her son due to AMS and was found to have acute CVA. Pt's family was present for end of OT PM session and reported that pt was oriented prior to CVA.  -BF     Existing Precautions/Restrictions fall  contact isolation-ESBL UTI, O2 prn, aphasia  -BF     Limitations/Impairments safety/cognitive  -BF       Row Name 23 135          Previous Level of Function/Home Environm    BADLs, Premorbid Functional Level independent  -BF       Row Name 23 1351          Living Environment    Current Living Arrangements home  -BF     People in Home alone  -BF       Row Name 23 1352           "Cognition/Psychosocial    Affect/Mental Status (Cognition) --  awake and alert, she appears to have aphasia, difficulty following commands  -     Orientation Status (Cognition) unable/difficult to assess  unable to verbalize; pt nodded when asked if she went by \"Ofelia\"  -BF     Follows Commands (Cognition) verbal cues/prompting required;repetition of directions required;physical/tactile prompts required  -       Row Name 09/12/23 [facility]          Range of Motion (ROM)    Range of Motion bilateral upper extremities  -BF       Row Name 09/12/23 [facility]          Range of Motion Comprehensive    Comment, General Range of Motion BUE shoulders > 75% AROM, remaining BUE AROM WFL  -BF       [facility] Name 09/12/23 [facility]          Strength (Manual Muscle Testing)    Left Hand, Setting 1 (Dynamometer Testing) 20 lbs  -BF     Right Hand, Setting 1 (Dynamometer Testing) 26 lbs  -BF     Additional Documentation Left Hand, Setting 1 (Dynamometer Testing) (Row);Right Hand, Setting 1 (Dynamometer Testing) (Row)  -BF       Row Name 09/12/23 [facility]          Strength Comprehensive (MMT)    Comment, General Manual Muscle Testing (MMT) Assessment BUE grossly 3/5  -[facility] Name 09/12/23 [facility]          Sensory    Additional Documentation Vision Assessment/Intervention (Group)  -       Row Name 09/12/23 [facility]          Vision Assessment/Intervention    Visual Impairment/Limitations corrective lenses for reading  -[facility] Name 09/12/23 [facility]          Basic Activities of Daily Living (BADLs)    Basic Activities of Daily Living upper body dressing;bathing;lower body dressing;toileting;grooming;self-feeding  -BF       Row Name 09/12/23 [facility]          Bathing    Comment (Bathing) Max A  -BF       Row Name 09/12/23 [facility]          Upper Body Dressing    [place] Level (Upper Body Dressing) minimum assist (75% or more patient effort);verbal cues;nonverbal cues (demo/gesture)  -     Comment (Upper Body Dressing) Min A  -BF       Row Name 09/12/23 " 1351          Lower Body Dressing    [place] Level (Lower Body Dressing) maximum assist (25% patient effort);verbal cues;nonverbal cues (demo/gesture)  -BF     Position (Lower Body Dressing) supported sitting  -BF     Comment (Lower Body Dressing) Max A  -BF       Row Name 09/12/23 1351          Grooming    [place] Level (Grooming) minimum assist (75% patient effort);verbal cues;nonverbal cues (demo/gesture)  -BF     Position (Grooming) supported sitting  -BF     Comment (Grooming) Min A  -BF       Row Name 09/12/23 1351          Toileting    Comment (Toileting) Total A  -BF       Row Name 09/12/23 1351          Self-Feeding    Comment (Self-Feeding) Set-up/supervision  -BF       Row Name 09/12/23 1351          Transfer Assessment/Treatment    Transfers chair-bed transfer  -BF       Row Name 09/12/23 1351          Chair-Bed Transfer    Chair-Bed [place] (Transfers) minimum assist (75% patient effort);verbal cues;nonverbal cues (demo/gesture)  -BF     Assistive Device (Chair-Bed Transfers) wheelchair  -BF       Row Name 09/12/23 1351          Motor Skills    Motor Skills coordination;motor control/coordination interventions  -     Coordination --  Unable to follow directions for 9-hole peg, Box and Blocks  -     Motor Control/Coordination Interventions fine motor manipulation/dexterity activities;gross motor coordination activities;therapeutic exercise/ROM  BUE GMC/FMC theract, light strengthening; BUE PRE 3 mins X2; frequent verbal cues required to use R hand for FMC theract  -BF       Row Name 09/12/23 1351          Positioning and Restraints    Post Treatment Position bed  -BF     In Bed call light within reach;encouraged to call for assist;exit alarm on;fowlers  In AM  -BF     In Wheelchair sitting;with family/caregiver  In PM  -BF       Row Name 09/12/23 1351          Therapy Assessment/Plan (OT)    Patient's Goals For Discharge return home;take care of myself at home  -       Row Name  09/12/23 [facility]          Therapy Assessment/Plan (OT)    OT Diagnosis CVA  -BF     Rehab Potential/Prognosis (OT) adequate, monitor progress closely;good, to achieve stated therapy goals  -BF     Frequency of Treatment (OT) 5 times per week  -BF     Estimated Duration of Therapy (OT) 2 weeks  -BF     Problem List (OT) problems related to;balance;cognition;mobility;strength;communication  -BF     Activity Limitations Related to Problem List (OT) unable to transfer safely;BADLs not performed adequately or safely  -BF     Planned Therapy Interventions (OT) activity tolerance training;adaptive equipment training;BADL retraining;neuromuscular control/coordination retraining;ROM/therapeutic exercise;strengthening exercise;transfer/mobility retraining;patient/caregiver education/training  -BF       Row Name 09/12/23 [facility]          Therapy Plan Review/Discharge Plan (OT)    Therapy Plan Review (OT) patient  -BF     Anticipated Equipment Needs At Discharge (OT) --  TBD  -BF     Anticipated Discharge Disposition (OT) --  TBD  -BF       Row Name 09/12/23 [facility]          IRF OT Goals    LB Dressing Goal Selection (OT-IRF) LB dressing, OT goal 1;LB dressing, OT goal 2  -BF     Toileting Goal Selection (OT-IRF) toileting, OT goal 1;toileting, OT goal 2  -BF       Row Name 09/12/23 [facility]          LB Dressing Goal 1 (OT-IRF)    Activity/Device (LB Dressing Goal 1, OT-IRF) lower body dressing  -BF     [place] (LB Dressing Goal 1, OT-IRF) moderate assist (50-74% patient effort)  -BF     Time Frame (LB Dressing Goal 1, OT-IRF) short-term goal (STG);1 week  -BF       Row Name 09/12/23 [facility]          LB Dressing Goal 2 (OT-IRF)    Activity/Device (LB Dressing Goal 2, OT-IRF) lower body dressing  -BF     [place] (LB Dressing Goal 2, OT-IRF) minimum assist (75% or more patient effort)  -BF     Time Frame (LB Dressing Goal 2, OT-IRF) long-term goal (LTG);by discharge  -BF       Row Name 09/12/23 [facility]          Toileting Goal 1 (OT-IRF)     Activity/Device (Toileting Goal 1, OT-IRF) toileting skills, all  -BF     [place] Level (Toileting Goal 1, OT-IRF) maximum assist (25-49% patient effort)  -BF     Time Frame (Toileting Goal 1, OT-IRF) short-term goal (STG);1 week  -BF       Row Name 09/12/23 1351          Toileting Goal 2 (OT-IRF)    Activity/Device (Toileting Goal 2, OT-IRF) toileting skills, all  -BF     [place] Level (Toileting Goal 2, OT-IRF) moderate assist (50-74% patient effort)  -BF     Time Frame (Toileting Goal 2, OT-IRF) long-term goal (LTG);by discharge  -BF               User Key  (r) = Recorded By, (t) = Taken By, (c) = Cosigned By      Initials Name Effective Dates    BF Pam Peck OT 07/11/23 -                              OT Recommendation and Plan    Planned Therapy Interventions (OT): activity tolerance training, adaptive equipment training, BADL retraining, neuromuscular control/coordination retraining, ROM/therapeutic exercise, strengthening exercise, transfer/mobility retraining, patient/caregiver education/training                    Time Calculation:      Time Calculation- OT       Row Name 09/12/23 1408 09/12/23 0915          Time Calculation- OT    OT Start Time 1300  -BF 0915  -BF     OT Stop Time 1355  -BF 1000  -BF     OT Time Calculation (min) 55 min  -BF 45 min  -BF     Total Timed Code Minutes- OT 55 minute(s)  -BF 45 minute(s)  -BF     OT Non-Billable Time (min) -- 30 min  -BF               User Key  (r) = Recorded By, (t) = Taken By, (c) = Cosigned By      Initials Name Provider Type    BF Pam Peck OT Occupational Therapist                  Therapy Charges for Today       Code Description Service Date Service Provider Modifiers Qty    36580617149 HC OT EVAL HIGH COMPLEXITY 3 9/12/2023 Pam Peck OT GO 1    29322343500 HC OT SELF CARE/MGMT/TRAIN EA 15 MIN 9/12/2023 Pam Peck OT GO 1    72329800528 HC OT THER PROC EA 15 MIN 9/12/2023 Pam Peck  Sri OT GO 1    [id number]  OT NEUROMUSC RE EDUCATION EA 15 MIN 9/12/2023 Pam Peck OT GO 2                     Pam Peck OT  9/12/2023

## 2023-09-12 NOTE — THERAPY EVALUATION
Inpatient Rehabilitation - Physical Therapy Initial Evaluation        Richie     Patient Name: Ofelia Knox  : 1945  MRN: [id number]    Today's Date: 2023                    Admit Date: 2023      Visit Dx:   No diagnosis found.    Patient Active Problem List   Diagnosis    CVA (cerebral vascular accident)       Past Medical History:   Diagnosis Date    AMS (altered mental status)     Aphasia     CKD (chronic kidney disease)     COPD (chronic obstructive pulmonary disease)     CVA (cerebral vascular accident)     DM2 (diabetes mellitus, type 2)     HTN (hypertension)     Restrictive lung disease     Urinary tract infection due to ESBL Klebsiella        Past Surgical History:   Procedure Laterality Date    HYSTERECTOMY      JOINT REPLACEMENT         PT ASSESSMENT (last 12 hours)       IRF PT Evaluation and Treatment       Row Name 23 1107          PT Time and Intention    Document Type initial evaluation;daily treatment  -LB     Mode of Treatment individual therapy;physical therapy  -LB       Row Name 23 110          General Information    Existing Precautions/Restrictions fall  contact isolation-ESBL UTI, O2 prn, aphasia  -LB       Row Name 23 110          Pain Scale: FACES Pre/Post-Treatment    Pain: FACES Scale, Pretreatment 0-->no hurt  -LB     Posttreatment Pain Rating 4-->hurts little more  -LB     Pain Location - --  she rubs R knee during ex  -LB     Pre/Posttreatment Pain Comment rest, repositioned  -LB       Row Name 23 110          Cognition/Psychosocial    Affect/Mental Status (Cognition) --  awake and alert, she appears to have aphasia, difficulty following commands  -LB     Follows Commands (Cognition) verbal cues/prompting required;physical/tactile prompts required;repetition of directions required  -LB     Personal Safety Interventions gait belt;nonskid shoes/slippers when out of bed;supervised activity  -LB       Row Name 23 110          Range of  Motion (ROM)    Range of Motion bilateral lower extremities;ROM is WFL  -LB       Row Name 09/12/23 1107          Strength (Manual Muscle Testing)    Strength (Manual Muscle Testing) --  BLE grossly 3/5, no asymmetries noted  -LB       Row Name 09/12/23 1107          Bed Mobility    Rolling Left [place] (Bed Mobility) moderate assist (50% patient effort);verbal cues;nonverbal cues (demo/gesture)  -LB     Rolling Right [place] (Bed Mobility) moderate assist (50% patient effort);verbal cues;nonverbal cues (demo/gesture)  -LB     Supine-Sit [place] (Bed Mobility) moderate assist (50% patient effort);verbal cues;nonverbal cues (demo/gesture)  -LB       Row Name 09/12/23 1107          Transfer Assessment/Treatment    Comment, (Transfers) increased cueing needed for all mobility and activity  -LB       Row Name 09/12/23 1107          Bed-Chair Transfer    Bed-Chair [place] (Transfers) minimum assist (75% patient effort);verbal cues;nonverbal cues (demo/gesture)  -LB     Assistive Device (Bed-Chair Transfers) wheelchair;walker, front-wheeled  -LB       Row Name 09/12/23 1107          Sit-Stand Transfer    Sit-Stand [place] (Transfers) minimum assist (75% patient effort);verbal cues;nonverbal cues (demo/gesture)  -LB     Assistive Device (Sit-Stand Transfers) walker, front-wheeled  -LB       Row Name 09/12/23 1107          Stand-Sit Transfer    Stand-Sit [place] (Transfers) minimum assist (75% patient effort);verbal cues;nonverbal cues (demo/gesture)  -LB     Assistive Device (Stand-Sit Transfers) walker, front-wheeled  -LB       Row Name 09/12/23 1107          Gait/Stairs (Locomotion)    [place] Level (Gait) contact guard;verbal cues;nonverbal cues (demo/gesture);1 person to manage equipment  -LB     Assistive Device (Gait) walker, front-wheeled  -LB     Distance in Feet (Gait) 160'  -LB     Deviations/Abnormal Patterns (Gait) [place] decreased;gait speed decreased;stride length decreased   -LB     Bilateral Gait Deviations forward flexed posture  -LB       Row Name 09/12/23 1107          Safety Issues, Functional Mobility    Safety Issues Affecting Function (Mobility) ability to follow commands  -LB     Impairments Affecting Function (Mobility) balance;cognition;endurance/activity tolerance;pain;strength  -LB       [facility] Name 09/12/23 1107          Balance    Static Sitting Balance --  SBA at EOB  -LB     Dynamic Sitting Balance --  sitting balloon tap-she can use both UEs but is R hand dominant  -LB     Static Standing Balance --  CGA with RW  -LB       [facility] Name 09/12/23 1107          Motor Skills    Therapeutic Exercise --  sitting ex 2 sets  -LB       Row Name 09/12/23 1107          Positioning and Restraints    Pre-Treatment Position in bed  -LB     In Wheelchair --  1st session-OT, 2nd session-in [place] at nursing station for lunch, has chair exit alarm  -[facility] Name 09/12/23 1107          Vital Signs    Pre SpO2 (%) 92  -LB     O2 Delivery Pre Treatment room air  -LB     Intra SpO2 (%) 93  after amb  -LB     O2 Delivery Intra Treatment room air  -LB       Row Name 09/12/23 1107          Therapy Assessment/Plan (PT)    Patient's Goals For Discharge return home  -LB       Row Name 09/12/23 1107          Therapy Assessment/Plan (PT)    PT Diagnosis (PT) CVA  -LB     Rehab Potential/Prognosis (PT) adequate, monitor progress closely  -LB     Frequency of Treatment (PT) 5 times per week  -LB     Estimated Duration of Therapy (PT) 2 weeks  -LB     Problem List (PT) balance;cognition;mobility;strength;pain;communication  -LB     Activity Limitations Related to Problem List (PT) unable to ambulate safely;unable to transfer safely  -       Row Name 09/12/23 1107          Therapy Plan Review/Discharge Plan (PT)    Therapy Plan Review (PT) evaluation/treatment results reviewed;care plan/treatment goals reviewed;risks/benefits reviewed;participants voiced agreement with care plan  -LB     Anticipated  Equipment Needs at Discharge (PT Eval) --  tbd  -LB     Anticipated Discharge Disposition (PT) home with assist;home with home health;home with outpatient therapy services  -LB       Row Name 09/12/23 1107          IRF PT Goals    Bed Mobility Goal Selection (PT-IRF) bed mobility, PT goal 1  -LB     Transfer Goal Selection (PT-IRF) transfers, PT goal 1  -LB     Gait (Walking Locomotion) Goal Selection (PT-IRF) gait, PT goal 1  -LB       Row Name 09/12/23 1107          Bed Mobility Goal 1 (PT-IRF)    Activity/Assistive Device (Bed Mobility Goal 1, PT-IRF) sit to supine/supine to sit  -LB     [place] Level (Bed Mobility Goal 1, PT-IRF) supervision required  -LB     Time Frame (Bed Mobility Goal 1, PT-IRF) by discharge  -LB       Row Name 09/12/23 1107          Transfer Goal 1 (PT-IRF)    Activity/Assistive Device (Transfer Goal 1, PT-IRF) sit-to-stand/stand-to-sit;bed-to-chair/chair-to-bed  -LB     [place] Level (Transfer Goal 1, PT-IRF) supervision required  -LB     Time Frame (Transfer Goal 1, PT-IRF) by discharge  -LB       Row Name 09/12/23 1107          Gait/Walking Locomotion Goal 1 (PT-IRF)    Activity/Assistive Device (Gait/Walking Locomotion Goal 1, PT-IRF) walker, rolling  -LB     Gait/Walking Locomotion Distance Goal 1 (PT-IRF) 300'  -LB     [place] Level (Gait/Walking Locomotion Goal 1, PT-IRF) supervision required  -LB     Time Frame (Gait/Walking Locomotion Goal 1, PT-IRF) by discharge  -LB               User Key  (r) = Recorded By, (t) = Taken By, (c) = Cosigned By      Initials Name Provider Type    Andra Lea, PT Physical Therapist                     Physical Therapy Education       Title: PT OT SLP Therapies (Done)       Topic: Physical Therapy (Done)       Point: Mobility training (Done)       Learning Progress Summary             Patient Acceptance, E, VU,NR by ERICK at 9/12/2023 113                         Point: Home exercise program (Done)       Learning Progress  Summary             Patient Acceptance, E, VU,NR by LB at 9/12/2023 1135                         Point: Body mechanics (Done)       Learning Progress Summary             Patient Acceptance, E, VU,NR by LB at 9/12/2023 1135                         Point: Precautions (Done)       Learning Progress Summary             Patient Acceptance, E, VU,NR by LB at 9/12/2023 1135                                         User Key       Initials Effective Dates Name Provider Type Discipline    LB 06/16/21 -  Andra Stewart, PT Physical Therapist PT                    PT Recommendation and Plan    Planned Therapy Interventions (PT): balance training, bed mobility training, gait training, neuromuscular re-education, patient/family education, strengthening, transfer training  Frequency of Treatment (PT): 5 times per week  Anticipated Equipment Needs at Discharge (PT Eval):  (tbd)                  Time Calculation:      PT Charges       Row Name 09/12/23 1136 09/12/23 1135          Time Calculation    Start Time 1045  -LB 0830  -LB     Stop Time 1130  -LB 0915  -LB     Time Calculation (min) 45 min  -LB 45 min  -LB     PT Received On -- 09/12/23  -LB     PT Goal Re-Cert Due Date -- 09/19/23  -LB               User Key  (r) = Recorded By, (t) = Taken By, (c) = Cosigned By      Initials Name Provider Type    LB Andra Stewart, PT Physical Therapist                    Therapy Charges for Today       Code Description Service Date Service Provider Modifiers Qty    25928145742 HC PT EVAL LOW COMPLEXITY 1 9/12/2023 Andra Stewart, PT GP 1    17789707787 HC GAIT TRAINING EA 15 MIN 9/12/2023 Andra Stewart, PT GP 1    63051577962 HC PT THER PROC EA 15 MIN 9/12/2023 Andra Stewart, PT GP 2    83406922335 HC PT NEUROMUSC RE EDUCATION EA 15 MIN 9/12/2023 Andra Stewart, PT GP 1    46624950480 HC PT THERAPEUTIC ACT EA 15 MIN 9/12/2023 Andra Stewart, PT GP 1                     Andra Stewart  PT  9/12/2023

## 2023-09-12 NOTE — THERAPY EVALUATION
"Inpatient Rehabilitation - Speech Language Pathology Initial Evaluation  [facility]  SPEECH LANGUAGE COGNITIVE EVALUATION     Patient Name: Ofelia Knox  : 1945  MRN: [id number]  Today's Date: 2023     Admit Date: 2023     Ofelia Knox  was seen this date on [facility]'s IPR unit to participate in s/l and cognitive assessment for safety/function in adls. She was positioned seated in wheelchair in locked position to cooperatively participate. All results and observations of this evaluation are felt to be representative of patient's current functional status. She has a medical hx significant for diabetes, HTN, and COPD w/ O2 dependence at premorbid baseline. She is unfamiliar to  department of [facility] prior to this admission.     Per EMR review, Ms Knox was reportedly, \"On a phone call on  in the evening patient's son states that the patient was somewhat confused on the phone and disoriented but thought she may just be tired.  He called back Friday and she was about the same.  On Saturday which would have been the th patient did not answer her phone all day which is very unusual for the patient.  He went over  morning after she did not answer and after pounding on the door she finally came to the door.  He states she was wearing a yellow shirt but no bottoms, she was confused, they walked through the house and he noted that she had defecated and urinated all over.  An ambulance was called at that point\". While admitted to an outside facility, \"CT head done in work-up showed a hypoattenuation in the left periventricular basal ganglia white matter.  Subsequent MRI showed acute infarct in the left putamen and anterior limb of the internal capsule.  CTA head and neck was without significant stenosis\". She additionally was found to have a UTI during admission.     She is noted w/ \"significant aphasia\".     Social History     Socioeconomic History    Marital status:    Tobacco Use    " "Smoking status: Never     Passive exposure: Never    Smokeless tobacco: Never   Vaping Use    Vaping Use: Unknown   Substance and Sexual Activity    Alcohol use: Never    Drug use: Never    Sexual activity: Never        Diet Orders (active) (From admission, onward)       Start     Ordered    09/11/23 2024  Diet: Cardiac Diets, Diabetic Diets; Healthy Heart (2-3 Na+); Consistent Carbohydrate; Texture: Regular Texture (IDDSI 7); Fluid Consistency: Thin (IDDSI 0)  Diet Effective Now         09/11/23 2023                    She was observed on room air w/o complications.     Receptive language skills were impaired. Ms Knox is able to ID common objects from a field of two and ID personal body parts. She is able to follow simple 1-step directives, however requires visual cues and/or models to perform multi-step directives. She participates in general, rote conversational exchanges w/o difficulty. She demonstrates limited understanding of complex \"wh\" questions and attempts to answer these are noted w/ randomized words being used in sentence/answer format. Receptive aphasia is noted.    Expressive language skills were impaired. Ms Knox was able to complete automatic speech tasks w/ SLP initiation of task. She is able to confrontation name various objects in room w/ leading sentences or phonemic cues provided. She is able to complete complex oe sentences. She is able to respond correctly to simple yes/no questions. Repetition of word and sentences is intact. No verbal apraxia or aphasia is noted. Anomia is noted.      She was independently oriented to person and place however does require phonemic cues to produce \"hospital\". LTM appeared WFL w/ patient recalling home location, family members names, and spouse and family activities w/o difficulty. Further cognitive tasks are deferred at this time per significance of receptive deficits impairing pt function.     Facial/oral structures were symmetrical upon observation. Oral " mucosa were moist, pink and clean. Secretions were clear, thin, and controlled. OROM/LUCAS was WFL w/o lingual deviation upon protrusion. Speech intensity, clarity, and intelligibility were WFL w/o dysarthria or oral apraxia noted.    Reading skills were intact. Patient was able to id isolated letters, simple, and moderate words, as well as simple sentences. Graphic skills were deferred this date.     Pragmatic skills were WFL w/ appropriate eye gaze patterns and visual tracking. Language was appropriate in context w/ topic initiation and maintenance independently. No neglect evidenced. Humor response was intact w/ appropriate affect.    Visit Dx:  No diagnosis found.  Patient Active Problem List   Diagnosis    CVA (cerebral vascular accident)     Past Medical History:   Diagnosis Date    AMS (altered mental status)     Aphasia     CKD (chronic kidney disease)     COPD (chronic obstructive pulmonary disease)     CVA (cerebral vascular accident)     DM2 (diabetes mellitus, type 2)     HTN (hypertension)     Restrictive lung disease     Urinary tract infection due to ESBL Klebsiella      Past Surgical History:   Procedure Laterality Date    HYSTERECTOMY      JOINT REPLACEMENT         Impression:      Ms Knox presents w/ receptive language deficits relating primarily to auditory comprehension deficits. She is noted w/ intact fluency and intact repetition and current aphasia most closely resembles transcortical sensory aphasia. She additionally presents w/ anomia requiring leading sentences or phonemic cues.     SLP Recommendation and Plan      She is felt to most benefit from formal language therapy to address noted deficits.    D/w RN results and recommendations w/ verbal understanding and agreement.     Thank you for allowing me to participate in the care of your patient-  Verona Sarmiento M.S., CCC-SLP        EDUCATION  The patient has been educated in the following areas:     Cognitive Impairment Communication  Impairment.      Time Calculation:      Time Calculation- SLP       Row Name 09/12/23 1308             Time Calculation- SLP    SLP Start Time 1230  -      SLP Stop Time 1300  -      SLP Time Calculation (min) 30 min  -      SLP Received On 09/12/23  -      SLP - Next Appointment 09/13/23  -                User Key  (r) = Recorded By, (t) = Taken By, (c) = Cosigned By      Initials Name Provider Type    Verona Pizarro MS CCC-SLP Speech and Language Pathologist                    Therapy Charges for Today       Code Description Service Date Service Provider Modifiers Qty    [id number]  ST EVAL SPEECH AND PROD W LANG  2 9/12/2023 Verona Sarmiento MS CCC-SLP GN 1                       Verona Sarmiento MS CCC-SLP  9/12/2023

## 2023-09-12 NOTE — PROGRESS NOTES
Rehabilitation Nursing  Inpatient Rehabilitation Plan of Care Note    Plan of Care  Copy from [facility]    Toilet Transfers (Active)  Current Status (9/11/2023 7:00:00 PM): PATIENT WILL HAVE NO TRANSFER DIFFICULTY    Weekly Goal: NO DIFFICULT WITH TRANSFERS  Discharge Goal: TRANSFERS INDEPENDENTLY    Pain    Pain Management (Active)  Current Status (9/11/2023 7:00:00 PM): PATIENT WILL VOICE NO PAIN  Weekly Goal: PATIENT WILL BE PAIN FREE  Discharge Goal: FREE FROM PAIN    Safety    Potential for Injury (Active)  Current Status (9/11/2023 7:00:00 PM): PATIENT WILL HAVE NO INJURY THIS STAY  Weekly Goal: NO INJURY  Discharge Goal: PATIENT WILL DISCHARGE    Body Systems    Integumentary (Active)  Current Status (9/11/2023 7:00:00 PM): PATIENT WILL HAVE NO SKIN  BREAKDOWN THIS  STAY  Weekly Goal: SKIN WILL REMAIN INTACT.  Discharge Goal: NO SKIN ISSUES.    Signed by: Carolyn Ramirez RN

## 2023-09-12 NOTE — PROGRESS NOTES
Rehabilitation Nursing  Inpatient Rehabilitation Plan of Care Note    Plan of Care  Copy from [facility]    Toilet Transfers (Active)  Current Status (9/11/2023 7:00:00 PM): PATIENT WILL HAVE NO TRANSFER DIFFICULTY    Weekly Goal: NO DIFFICULT WITH TRANSFERS  Discharge Goal: TRANSFERS INDEPENDENTLY    Pain    Pain Management (Active)  Current Status (9/11/2023 7:00:00 PM): PATIENT WILL VOICE NO PAIN  Weekly Goal: PATIENT WILL BE PAIN FREE  Discharge Goal: FREE FROM PAIN    Safety    Potential for Injury (Active)  Current Status (9/11/2023 7:00:00 PM): PATIENT WILL HAVE NO INJURY THIS STAY  Weekly Goal: NO INJURY  Discharge Goal: PATIENT WILL DISCHARGE    Body Systems    Integumentary (Active)  Current Status (9/11/2023 7:00:00 PM): PATIENT WILL HAVE NO SKIN  BREAKDOWN THIS  STAY  Weekly Goal: SKIN WILL REMAIN INTACT.  Discharge Goal: NO SKIN ISSUES.    Signed by: Carolyn Ramirez RN

## 2023-09-12 NOTE — PROGRESS NOTES
Inpatient Rehabilitation Functional Measures Assessment and Plan of Care    Plan of Care      Functional Measures  JOHN Eating:  JOHN Grooming:  JOHN Bathing:  JOHN Upper Body Dressing:  JOHN Lower Body Dressing:  JOHN Toileting:    JOHN Bladder Management  Level of Assistance:  Frequency/Number of Accidents this Shift:    JOHN Bowel Management  Level of Assistance:  Frequency/Number of Accidents this Shift:    JOHN Bed/Chair/Wheelchair Transfer:  Bed/chair/wheelchair Transfer Score = 3.  Patient performs 50-74% of effort and requires moderate assistance (some  lifting) for transferring to and from the bed/chair/wheelchair. Patient requires  the following assistive device(s): Walker. Seating system of wheelchair.  JOHN Toilet Transfer:  JOHN Tub/Shower Transfer:    Previously Documented Mode of Locomotion at Discharge:  JOHN Expected Mode of Locomotion at Discharge: The expected mode of most  frequently used locomotion, at discharge, is expected to be walking.  JOHN Walk/Wheelchair:  WHEELCHAIR OBSERVATION   Wheelchair did not occur.    WALK OBSERVATION   Walk Distance Scale = 3.  Distance walked is greater than 150 feet. Walk Score  = 4.  Patient performs 75% or more of effort and requires minimal assistance.  Incidental help/contact guard/steadying was provided. Patient walked a distance  of  160 feet. No assistive devices were required.  JOHN Stairs:  Stairs did not occur.    JOHN Comprehension:  JOHN Expression:  JOHN Social Interaction:  JOHN Problem Solving:  JOHN Memory:    Therapy Mode Minutes  Occupational Therapy:  Physical Therapy: Individual: 90 minutes.  Speech Language Pathology:    Discharge Functional Goals:  Bed, Chair, Wheelchair Transfers: 5  Walk: 5    Signed by: Andra Stewart, Physical Therapist

## 2023-09-12 NOTE — CONSULTS
Adult Nutrition  Assessment/PES    Patient Name:  Ofelia Knox  YOB: 1945  MRN: [id number]  Admit Date:  9/11/2023    Assessment Date:  9/12/2023    Comments:  Will start ONS BID until PO established.  Will monitor intakes, pertinent labs, weights and make appropriate recommendations/changes.     Reason for Assessment       Row Name 09/12/23 1234          Reason for Assessment    Reason For Assessment per organizational policy     Diagnosis neurologic conditions                      Labs/Tests/Procedures/Meds       Row Name 09/12/23 1234          Labs/Procedures/Meds    Lab Results Reviewed reviewed        Medications    Pertinent Medications Reviewed reviewed                    Physical Findings       Row Name 09/12/23 1235          Physical Findings    Overall Physical Appearance Paient presents with class 3 obesity and had a recent stroke with aphasia and a UTI.  Patient has trace edema. not eating well at this time.  Will start ONS BID.                    Estimated/Assessed Needs - Anthropometrics       Row Name 09/12/23 1238          Anthropometrics    Age for Calculations 78     Weight for Calculation 112 kg (246 lb 14.6 oz)     Additional Documentation Ideal Body Weight (IBW) (Group)        Ideal Body Weight (IBW)    Ideal Body Weight 59.3kg        Estimated/Assessed Needs    Additional Documentation Fluid Requirements (Group);[place]-St. Jeor Equation (Group);Protein Requirements (Group)        [place]-St. Jeor Equation    RMR ([place]-St. Jeor Equation) 1626.75     [place]-St. Jeor Activity Factors 1.4 - 1.5     Activity Factors ([place]-St. Jeor) 2277.45 - 2440.125        Protein Requirements    Weight Used For Protein Calculations 59.3 kg (130 lb 11.7 oz)     Est Protein Requirement Amount (gms/kg) 1.2 gm protein     Estimated Protein Requirements (gms/day) 71.16        Fluid Requirements    Fluid Requirements (mL/day) 2277  1 ml/kcal     RDA Method (mL) 2277                    Nutrition  Prescription Ordered       Row Name 09/12/23 1240          Nutrition Prescription PO    Current PO Diet Regular     Fluid Consistency Thin     Common Modifiers Cardiac;Consistent Carbohydrate                    Evaluation of Received Nutrient/Fluid Intake       Row Name 09/12/23 1242          Fluid Intake Evaluation    Total Fluid Target (mL) 2277     Oral Fluid (mL) 240     Enteral Fluid (mL) 0     Parenteral Fluid (mL) 0     Other Fluid (mL) 0     Total Fluid Intake (mL) 240     % Total Fluid Intake 10.54        PO Evaluation    Number of Days PO Intake Evaluated Insufficient Data                       Problem/Interventions:   Problem 1       Row Name 09/12/23 1243          Nutrition Diagnoses Problem 1    Problem 1 Predicted Suboptimal Intake     Etiology (related to) Medical Diagnosis     Cardiac HTN     Endocrine DM     Neurological CVA;Aphasia     Pulmonary/Critical Care COPD     Renal CKD     Signs/Symptoms (evidenced by) Report/Observation     Reported/Observed By RN;MD;Patient                          Intervention Goal       Row Name 09/12/23 1337          Intervention Goal    General Meet nutritional needs for age/condition     PO Establish PO                    Nutrition Intervention       Row Name 09/12/23 1338          Nutrition Intervention    RD/Tech Action Encourage intake;Recommend/ordered;Follow Tx progress     Recommended/Ordered Supplement                    Nutrition Prescription       Row Name 09/12/23 1338          Nutrition Prescription PO    PO Prescription Begin/change supplement     Fluid Consistency Thin     Supplement Boost Glucose Control     Supplement Frequency 2 times a day     Common Modifiers Cardiac;Consistent Carbohydrate                    Education/Evaluation       Row Name 09/12/23 1338          Education    Education Education not appropriate at this time     Please explain Other (comment)  aphasia        Monitor/Evaluation    Monitor Per protocol;PO intake;Supplement  intake;Pertinent labs;Weight;Skin status     Education Follow-up Other (comment)  will continue to follow                     Electronically signed by:  Randi Jimenez RD  09/12/23 13:40 EDT

## 2023-09-12 NOTE — PROGRESS NOTES
Inpatient Rehabilitation Functional Measures Assessment and Plan of Care    Plan of Care  Self Care    [OT] Dressing (Lower)(Active)  Current Status(09/12/2023): Max A  Weekly Goal(09/19/2023): Mod A  Discharge Goal: Min A    Performed Intervention(s)  ADL retraining, AE education, GMC/FMC theract, neuro re-ed, fxal mobility,  strengthening    Functional Measures  JOHN Eating:  JOHN Grooming:  JOHN Bathing:  JOHN Upper Body Dressing:  JOHN Lower Body Dressing:  JOHN Toileting:    JOHN Bladder Management  Level of Assistance:  Frequency/Number of Accidents this Shift:    JOHN Bowel Management  Level of Assistance:  Frequency/Number of Accidents this Shift:    JOHN Bed/Chair/Wheelchair Transfer:  JOHN Toilet Transfer:  JOHN Tub/Shower Transfer:    Previously Documented Mode of Locomotion at Discharge:  [facility] Expected Mode of Locomotion at Discharge:  JOHN Walk/Wheelchair:  JOHN Stairs:    JOHN Comprehension:  JOHN Expression:  JOHN Social Interaction:  JOHN Problem Solving:  JOHN Memory:    Therapy Mode Minutes  Occupational Therapy: Individual: 100 minutes.  Physical Therapy:  Speech Language Pathology:    Discharge Functional Goals:    Signed by: Pam Peck OT

## 2023-09-12 NOTE — PROGRESS NOTES
Rehabilitation Nursing  Inpatient Rehabilitation Plan of Care Note    Plan of Care  Copy from [facility]    Toilet Transfers (Active)  Current Status (9/11/2023 7:00:00 PM): PATIENT WILL HAVE NO TRANSFER DIFFICULTY    Weekly Goal: NO DIFFICULT WITH TRANSFERS  Discharge Goal: TRANSFERS INDEPENDENTLY    Pain    Pain Management (Active)  Current Status (9/11/2023 7:00:00 PM): PATIENT WILL VOICE NO PAIN  Weekly Goal: PATIENT WILL BE PAIN FREE  Discharge Goal: FREE FROM PAIN    Safety    Potential for Injury (Active)  Current Status (9/11/2023 7:00:00 PM): PATIENT WILL HAVE NO INJURY THIS STAY  Weekly Goal: NO INJURY  Discharge Goal: PATIENT WILL DISCHARGE    Body Systems    Integumentary (Active)  Current Status (9/11/2023 7:00:00 PM): PATIENT WILL HAVE NO SKIN  BREAKDOWN THIS  STAY  Weekly Goal: SKIN WILL REMAIN INTACT.  Discharge Goal: NO SKIN ISSUES.    Signed by: Carolyn Ramirez RN

## 2023-09-12 NOTE — PLAN OF CARE
Goal Outcome Evaluation:  Plan of Care Reviewed With: patient just admitted to unit, accompanied her daughter in law, she was historian. Patient with word garble, and difficulties finding conversation. Skin intact, simple yes and no question/ answers.  Introduced to unit call light and surroundings.Will continue to follow up.

## 2023-09-12 NOTE — PROGRESS NOTES
Patient Assessment Instrument  Quality Indicators - Admission FY 2023    Section A. Ethnicity/ Race/Language  Ethnicity:  Race:  Preferred Language:    Section A. Transportation      Section B. Hearing and Vision        Section B. Health Literacy        Section C. Cognitive Patterns      Section C. Signs and Symptoms of Delirium (from CAM)      Section D. Mood      Section D. Social Isolation      Section [id number]. Prior Functioning      Section [id number]. Prior Device Use      Section AM6931. Self Care Performance     Eating: [place] provides verbal cues and/or touching/steadying and/or contact  guard assistance as patient completes activity.   Oral Hygiene: [place] does less than half the effort. [place] lifts, holds or  supports trunk or limbs but provides less than half the effort.   Toileting Hygiene: [place] does all of the effort. Patient does none of the  effort to complete the activity. Or, the assistance of 2 or more helpers is  required for the patient to complete the activity.   Shower/Bathe Self: [place] does more than half the effort. [place] lifts or holds  trunk or limbs and provides more than half the effort.   Upper Body Dressing: [place] does less than half the effort. [place] lifts, holds  or supports trunk or limbs but provides less than half the effort.   Lower Body Dressing: [place] does more than half the effort. [place] lifts or  holds trunk or limbs and provides more than half the effort.   Putting On/Taking Off Footwear: [place] does more than half the effort. [place]  lifts or holds trunk or limbs and provides more than half the effort.    Section [id number]. Self Care Discharge Goals     Eating: [place] sets up or cleans up; patient completes activity. [place] assists  only prior to or following the activity.   Oral Hygiene: [place] provides verbal cues or touching/steadying assistance as  patient completes activity.   Toileting Hygiene: [place] does less than half the effort. [place] lifts, holds  or supports  trunk or limbs but provides less than half the effort.   Shower/Bathe Self: [place] does less than half the effort. [place] lifts, holds  or supports trunk or limbs but provides less than half the effort.   Upper Body Dressing: [place] provides verbal cues or touching/steadying  assistance as patient completes activity.   Lower Body Dressing: [place] does less than half the effort. [place] lifts, holds  or supports trunk or limbs but provides less than half the effort.   Putting On/Taking Off Footwear: [place] does less than half the effort. [place]  lifts, holds or supports trunk or limbs but provides less than half the effort.    Section [id number]. Mobility Performance      Section [id number]. Mobility Discharge Goals      Section H. Bladder and Bowel      Section I. Active Diagnosis      Section J. Health Conditions      Section J. Health Conditions (Pain)      Section K. Swallowing/Nutritional Status    Nutritional Approaches on Admission:    Section M. Skin Conditions      Section N. Medication        Section O. Special Treatments, Procedures, and Programs    Signed by: Pam Peck OT

## 2023-09-12 NOTE — PROGRESS NOTES
Rehabilitation Nursing  Inpatient Rehabilitation Plan of Care Note    Plan of Care  Copy from [facility]    Toilet Transfers (Active)  Current Status (9/11/2023 7:00:00 PM): PATIENT WILL HAVE NO TRANSFER DIFFICULTY    Weekly Goal: NO DIFFICULT WITH TRANSFERS  Discharge Goal: TRANSFERS INDEPENDENTLY    Pain    Pain Management (Active)  Current Status (9/11/2023 7:00:00 PM): PATIENT WILL VOICE NO PAIN  Weekly Goal: PATIENT WILL BE PAIN FREE  Discharge Goal: FREE FROM PAIN    Safety    Potential for Injury (Active)  Current Status (9/11/2023 7:00:00 PM): PATIENT WILL HAVE NO INJURY THIS STAY  Weekly Goal: NO INJURY  Discharge Goal: PATIENT WILL DISCHARGE    Body Systems    Integumentary (Active)  Current Status (9/11/2023 7:00:00 PM): PATIENT WILL HAVE NO SKIN  BREAKDOWN THIS  STAY  Weekly Goal: SKIN WILL REMAIN INTACT.  Discharge Goal: NO SKIN ISSUES.    Signed by: Carolyn Ramirez RN

## 2023-09-13 ENCOUNTER — APPOINTMENT (OUTPATIENT)
Dept: CARDIOLOGY | Facility: HOSPITAL | Age: 78
DRG: 056 | End: 2023-09-13
Payer: MEDICARE

## 2023-09-13 LAB
ANION GAP SERPL CALCULATED.3IONS-SCNC: 7.8 MMOL/L (ref 5–15)
BH CV ECHO MEAS - ACS: 2 CM
BH CV ECHO MEAS - AO MAX PG: 7.6 MMHG
BH CV ECHO MEAS - AO MEAN PG: 4 MMHG
BH CV ECHO MEAS - AO ROOT DIAM: 3.6 CM
BH CV ECHO MEAS - AO V2 MAX: 138 CM/SEC
BH CV ECHO MEAS - AO V2 VTI: 25.7 CM
BH CV ECHO MEAS - EDV(CUBED): 84.6 ML
BH CV ECHO MEAS - EDV(MOD-SP4): 61.4 ML
BH CV ECHO MEAS - EF(MOD-BP): 44 %
BH CV ECHO MEAS - EF(MOD-SP4): 44.6 %
BH CV ECHO MEAS - ESV(CUBED): 27.8 ML
BH CV ECHO MEAS - ESV(MOD-SP4): 34 ML
BH CV ECHO MEAS - FS: 31 %
BH CV ECHO MEAS - IVS/LVPW: 1.4 CM
BH CV ECHO MEAS - IVSD: 1.61 CM
BH CV ECHO MEAS - LA DIMENSION: 4.4 CM
BH CV ECHO MEAS - LV DIASTOLIC VOL/BSA (35-75): 28 CM2
BH CV ECHO MEAS - LV MASS(C)D: 234.3 GRAMS
BH CV ECHO MEAS - LV SYSTOLIC VOL/BSA (12-30): 15.5 CM2
BH CV ECHO MEAS - LVIDD: 4.4 CM
BH CV ECHO MEAS - LVIDS: 3 CM
BH CV ECHO MEAS - LVOT AREA: 2.8 CM2
BH CV ECHO MEAS - LVOT DIAM: 1.9 CM
BH CV ECHO MEAS - LVPWD: 1.15 CM
BH CV ECHO MEAS - MV A MAX VEL: 79.9 CM/SEC
BH CV ECHO MEAS - MV E MAX VEL: 37.2 CM/SEC
BH CV ECHO MEAS - MV E/A: 0.47
BH CV ECHO MEAS - PA ACC TIME: 0.15 SEC
BH CV ECHO MEAS - SI(MOD-SP4): 12.5 ML/M2
BH CV ECHO MEAS - SV(MOD-SP4): 27.4 ML
BUN SERPL-MCNC: 36 MG/DL (ref 8–23)
BUN/CREAT SERPL: 29.5 (ref 7–25)
CALCIUM SPEC-SCNC: 9.3 MG/DL (ref 8.6–10.5)
CHLORIDE SERPL-SCNC: 100 MMOL/L (ref 98–107)
CO2 SERPL-SCNC: 32.2 MMOL/L (ref 22–29)
CREAT SERPL-MCNC: 1.22 MG/DL (ref 0.57–1)
EGFRCR SERPLBLD CKD-EPI 2021: 46.1 ML/MIN/1.73
GLUCOSE BLDC GLUCOMTR-MCNC: 101 MG/DL (ref 70–130)
GLUCOSE BLDC GLUCOMTR-MCNC: 107 MG/DL (ref 70–130)
GLUCOSE BLDC GLUCOMTR-MCNC: 112 MG/DL (ref 70–130)
GLUCOSE BLDC GLUCOMTR-MCNC: 174 MG/DL (ref 70–130)
GLUCOSE BLDC GLUCOMTR-MCNC: 56 MG/DL (ref 70–130)
GLUCOSE BLDC GLUCOMTR-MCNC: 76 MG/DL (ref 70–130)
GLUCOSE SERPL-MCNC: 76 MG/DL (ref 65–99)
POTASSIUM SERPL-SCNC: 3.9 MMOL/L (ref 3.5–5.2)
SODIUM SERPL-SCNC: 140 MMOL/L (ref 136–145)

## 2023-09-13 PROCEDURE — 97110 THERAPEUTIC EXERCISES: CPT

## 2023-09-13 PROCEDURE — 93306 TTE W/DOPPLER COMPLETE: CPT

## 2023-09-13 PROCEDURE — 25010000002 SULFUR HEXAFLUORIDE MICROSPH 60.7-25 MG RECONSTITUTED SUSPENSION: Performed by: INTERNAL MEDICINE

## 2023-09-13 PROCEDURE — 94760 N-INVAS EAR/PLS OXIMETRY 1: CPT

## 2023-09-13 PROCEDURE — 25010000002 MEROPENEM PER 100 MG: Performed by: INTERNAL MEDICINE

## 2023-09-13 PROCEDURE — 97530 THERAPEUTIC ACTIVITIES: CPT

## 2023-09-13 PROCEDURE — 97116 GAIT TRAINING THERAPY: CPT

## 2023-09-13 PROCEDURE — 94799 UNLISTED PULMONARY SVC/PX: CPT

## 2023-09-13 PROCEDURE — 99232 SBSQ HOSP IP/OBS MODERATE 35: CPT | Performed by: INTERNAL MEDICINE

## 2023-09-13 PROCEDURE — 80048 BASIC METABOLIC PNL TOTAL CA: CPT | Performed by: INTERNAL MEDICINE

## 2023-09-13 PROCEDURE — 82948 REAGENT STRIP/BLOOD GLUCOSE: CPT

## 2023-09-13 PROCEDURE — 92610 EVALUATE SWALLOWING FUNCTION: CPT

## 2023-09-13 PROCEDURE — 93306 TTE W/DOPPLER COMPLETE: CPT | Performed by: INTERNAL MEDICINE

## 2023-09-13 PROCEDURE — 97112 NEUROMUSCULAR REEDUCATION: CPT

## 2023-09-13 PROCEDURE — 97535 SELF CARE MNGMENT TRAINING: CPT

## 2023-09-13 PROCEDURE — 94664 DEMO&/EVAL PT USE INHALER: CPT

## 2023-09-13 RX ORDER — BISACODYL 10 MG
10 SUPPOSITORY, RECTAL RECTAL ONCE
Status: COMPLETED | OUTPATIENT
Start: 2023-09-13 | End: 2023-09-13

## 2023-09-13 RX ADMIN — MONTELUKAST SODIUM 10 MG: 10 TABLET, COATED ORAL at 20:18

## 2023-09-13 RX ADMIN — OXYBUTYNIN CHLORIDE 10 MG: 5 TABLET, EXTENDED RELEASE ORAL at 09:16

## 2023-09-13 RX ADMIN — AMLODIPINE BESYLATE 5 MG: 5 TABLET ORAL at 09:16

## 2023-09-13 RX ADMIN — METOPROLOL SUCCINATE 25 MG: 25 TABLET, EXTENDED RELEASE ORAL at 09:16

## 2023-09-13 RX ADMIN — NYSTATIN 1 APPLICATION: 100000 CREAM TOPICAL at 13:17

## 2023-09-13 RX ADMIN — ISOSORBIDE MONONITRATE 30 MG: 30 TABLET, EXTENDED RELEASE ORAL at 09:16

## 2023-09-13 RX ADMIN — POLYETHYLENE GLYCOL (3350) 17 G: 17 POWDER, FOR SOLUTION ORAL at 09:15

## 2023-09-13 RX ADMIN — DEXTROSE MONOHYDRATE 25 G: 25 INJECTION, SOLUTION INTRAVENOUS at 05:59

## 2023-09-13 RX ADMIN — METFORMIN HYDROCHLORIDE 500 MG: 500 TABLET ORAL at 09:15

## 2023-09-13 RX ADMIN — TIOTROPIUM BROMIDE INHALATION SPRAY 2 PUFF: 3.12 SPRAY, METERED RESPIRATORY (INHALATION) at 07:25

## 2023-09-13 RX ADMIN — PANTOPRAZOLE SODIUM 40 MG: 40 TABLET, DELAYED RELEASE ORAL at 06:31

## 2023-09-13 RX ADMIN — ASPIRIN 81 MG: 81 TABLET, COATED ORAL at 09:16

## 2023-09-13 RX ADMIN — BUPROPION HYDROCHLORIDE 150 MG: 150 TABLET, EXTENDED RELEASE ORAL at 09:16

## 2023-09-13 RX ADMIN — MEROPENEM 1000 MG: 1 INJECTION, POWDER, FOR SOLUTION INTRAVENOUS at 22:54

## 2023-09-13 RX ADMIN — BISACODYL 10 MG: 10 SUPPOSITORY RECTAL at 17:32

## 2023-09-13 RX ADMIN — MEROPENEM 1000 MG: 1 INJECTION, POWDER, FOR SOLUTION INTRAVENOUS at 13:12

## 2023-09-13 RX ADMIN — NYSTATIN 1 APPLICATION: 100000 CREAM TOPICAL at 20:18

## 2023-09-13 RX ADMIN — PRAVASTATIN SODIUM 40 MG: 40 TABLET ORAL at 09:15

## 2023-09-13 RX ADMIN — GLIPIZIDE 5 MG: 5 TABLET ORAL at 06:31

## 2023-09-13 RX ADMIN — CETIRIZINE HYDROCHLORIDE 5 MG: 10 TABLET, FILM COATED ORAL at 09:16

## 2023-09-13 RX ADMIN — MEROPENEM 1000 MG: 1 INJECTION, POWDER, FOR SOLUTION INTRAVENOUS at 05:00

## 2023-09-13 RX ADMIN — DOCUSATE SODIUM 50 MG AND SENNOSIDES 8.6 MG 1 TABLET: 8.6; 5 TABLET, FILM COATED ORAL at 09:15

## 2023-09-13 RX ADMIN — VENLAFAXINE HYDROCHLORIDE 75 MG: 75 CAPSULE, EXTENDED RELEASE ORAL at 09:15

## 2023-09-13 RX ADMIN — SULFUR HEXAFLUORIDE 2 ML: KIT at 09:30

## 2023-09-13 RX ADMIN — DOCUSATE SODIUM 50 MG AND SENNOSIDES 8.6 MG 1 TABLET: 8.6; 5 TABLET, FILM COATED ORAL at 20:18

## 2023-09-13 RX ADMIN — METFORMIN HYDROCHLORIDE 500 MG: 500 TABLET ORAL at 17:32

## 2023-09-13 NOTE — PROGRESS NOTES
Inpatient Rehabilitation Functional Measures Assessment and Plan of Care    Plan of Care  Communication    [ST] Comprehension(Active)  Current Status(09/12/2023): Moderate to severe auditory comprehension deficits  Weekly Goal(09/18/2023): Receptive ID of verbally named object/picture/word  Discharge Goal: WFL communication and comprehension    [ST] Expression(Active)  Current Status(09/12/2023): Mild to moderate anomia  Weekly Goal(09/18/2023): Expressive ID of objects/pictures  Discharge Goal: WFL communication    Functional Measures  JOHN Eating:  JOHN Grooming:  JOHN Bathing:  JOHN Upper Body Dressing:  JOHN Lower Body Dressing:  JOHN Toileting:    JOHN Bladder Management  Level of Assistance:  Frequency/Number of Accidents this Shift:    JOHN Bowel Management  Level of Assistance:  Frequency/Number of Accidents this Shift:    JOHN Bed/Chair/Wheelchair Transfer:  JOHN Toilet Transfer:  JOHN Tub/Shower Transfer:    Previously Documented Mode of Locomotion at Discharge:  [facility] Expected Mode of Locomotion at Discharge:  JOHN Walk/Wheelchair:  JOHN Stairs:    JOHN Comprehension:  JOHN Expression:  JOHN Social Interaction:  [facility] Problem Solving:  JOHN Memory:    Therapy Mode Minutes  Occupational Therapy:  Physical Therapy:  Speech Language Pathology: Individual: 30 minutes.    Discharge Functional Goals:    Signed by: Verona Sarmiento, SLP

## 2023-09-13 NOTE — PROGRESS NOTES
Occupational Therapy: Individual: 90 minutes.    Physical Therapy:    Speech Language Pathology:    Signed by: Emmie Lamas OT

## 2023-09-13 NOTE — THERAPY EVALUATION
"Inpatient Rehabilitation - Speech Language Pathology   Swallow Initial Evaluation [facility]  CLINICAL DYSPHAGIA ASSESSMENT     Patient Name: Ofelia Knox  : 1945  MRN: [id number]  Today's Date: 2023     Admit Date: 2023    Ofelia Knox was seen this date on [facility]'s IPR to assess safety/efficacy of swallowing fnx, determine safest/least restrictive diet tolerance. She has a medical hx significant for diabetes, HTN, and COPD w/ O2 dependence at premorbid baseline. She is unfamiliar to ST department of [facility] prior to this admission.      Per EMR review, Ms Knox was reportedly, \"On a phone call on  in the evening patient's son states that the patient was somewhat confused on the phone and disoriented but thought she may just be tired.  He called back Friday and she was about the same.  On Saturday which would have been the th patient did not answer her phone all day which is very unusual for the patient.  He went over  morning after she did not answer and after pounding on the door she finally came to the door.  He states she was wearing a yellow shirt but no bottoms, she was confused, they walked through the house and he noted that she had defecated and urinated all over.  An ambulance was called at that point\". While admitted to an outside facility, \"CT head done in work-up showed a hypoattenuation in the left periventricular basal ganglia white matter.  Subsequent MRI showed acute infarct in the left putamen and anterior limb of the internal capsule.  CTA head and neck was without significant stenosis\". She additionally was found to have a UTI during admission.     She was admitted on a regular texture and thin liquid po diet which she has tolerated w/o complications since admission to this unit.     Social History     Socioeconomic History    Marital status:    Tobacco Use    Smoking status: Never     Passive exposure: Never    Smokeless tobacco: Never   Vaping Use    Vaping " "Use: Unknown   Substance and Sexual Activity    Alcohol use: Never    Drug use: Never    Sexual activity: Never        Imaging:  No recent chest imaging is available for review.     Labs:   Latest Reference Range & Units Most Recent   WBC 3.40 - 10.80 10*3/mm3 11.97 (H)  9/12/23 01:23   RBC 3.77 - 5.28 10*6/mm3 5.43 (H)  9/12/23 01:23   Hemoglobin 12.0 - 15.9 g/dL 14.9  9/12/23 01:23   Hematocrit 34.0 - 46.6 % 50.8 (H)  9/12/23 01:23   Platelets 140 - 450 10*3/mm3 312  9/12/23 01:23   RDW 12.3 - 15.4 % 14.6  9/12/23 01:23   MCV 79.0 - 97.0 fL 93.6  9/12/23 01:23   MCH 26.6 - 33.0 pg 27.4  9/12/23 01:23   MCHC 31.5 - 35.7 g/dL 29.3 (L)  9/12/23 01:23   MPV 6.0 - 12.0 fL 11.5  9/12/23 01:23   RDW-SD 37.0 - 54.0 fl 50.4  9/12/23 01:23   (H): Data is abnormally high  (L): Data is abnormally low  Diet Orders (active) (From admission, onward)       Start     Ordered    09/12/23 1800  Dietary Nutrition Supplements Other (See Comment); Boost Glucose Control  Daily With Lunch & Dinner      Comments: Please send Boost Glucose Control with lunch and dinner.  Thank you.    09/12/23 1428    09/11/23 2024  Diet: Cardiac Diets, Diabetic Diets; Healthy Heart (2-3 Na+); Consistent Carbohydrate; Texture: Regular Texture (IDDSI 7); Fluid Consistency: Thin (IDDSI 0)  Diet Effective Now         09/11/23 2023                    She is observed on room air w/o complications.     Ms Knox is positioned upright in wheelchair in locked position w/ lunch tray present in front of her on her bedside table. She is noted w/ selections of baked potato, chili, mandarin orange pieces, Boost, and thin water.     Dysphagia assessment was performed using a variety of these present selections and Ms Knox was able to self-provide most po trials however did require situational cues and verbal cues to do so. She repeatedly stirred chili using spoon w/o attempts to accept any po presentations. Verbal cues to \"take a bite\" are met w/ a verbal response of " "\"yeah we can do that\" and continued stirring. She did accept a po presentation of chili from SLP, however then did not continue to self-provide items from that selection. SLP provided initial presentation of baked potato, and subsequent presentations were self-provided w/ SLP providing conversation and having brought a cup of water to accept to provide situational/environmental cues/prompts. She then self-provides ~5 additional presentations of baked potato as well as alternation w/ Boost across conversational exchange and SLP taking sips from cup of water.     Facial/oral structures were symmetrical upon observation. Lingual protrusion revealed no deviation. Oral mucosa were moist, pink, and clean. Secretions were clear, thin, and well controlled. OROM/LUCAS was wfl to imitate oral postures. Gag is not assessed. Volitional cough was not assessed as she did not demonstrate despite verbal cues and model. Voice was adequate in intensity, clear in quality w/ intelligible speech.     Upon po presentations, adequate bolus anticipation and acceptance w/ good labial seal for bolus clearance via spoon bowl, cup rim stability and suction via straw. Bolus formation, manipulation and control were wfl w/ rotary mastication pattern. A-p transit was timely w/o significant oral residue appreciated. No overt s/s aspiration before the swallow.      Pharyngeal swallow was timely w/ adequate hyolaryngeal elevation per palpation. No overt s/s aspiration evidenced across this evaluation. No silent aspiration suspected. Patient denied odynophagia.    Visit Dx:   No diagnosis found.  Patient Active Problem List   Diagnosis    CVA (cerebral vascular accident)     Past Medical History:   Diagnosis Date    AMS (altered mental status)     Aphasia     CKD (chronic kidney disease)     COPD (chronic obstructive pulmonary disease)     CVA (cerebral vascular accident)     DM2 (diabetes mellitus, type 2)     HTN (hypertension)     Restrictive lung " disease     Urinary tract infection due to ESBL Klebsiella      Past Surgical History:   Procedure Laterality Date    HYSTERECTOMY      JOINT REPLACEMENT       Impression:     Ms Knox presented w/ wfl oropharyngeal swallow w/o s/s aspiration. No s/s indicative of silent aspiration. No odynophagia reported. Ms Knox is noted w/ situational/environmental and verbal cues provided intermittently to self-provide po intake. Per this assessment, she is felt to most benefit from verbal cues provided intermittently to increase po intake. Continue regular solids, thin liquids. She would additionally benefit from situational cues such as accepting po intake in the presence of others during meals.     SLP Recommendation and Plan      1. Regular solids, thin liquids.    2. Medications whole in puree/thins.   3. Upright and centered for all po intake  4. NASH precautions.  5. Oral care protocol.  6. Verbal cues provided intermittently to encourage po intake.   7. Social meals to provide environmental cues.     No further formal SLP f/u warranted/recommended for dysphagia at this time.    D/w RN results and recommendations w/ verbal agreement.    Thank you for allowing me to participate in the care of your patient-  Verona Sarmiento M.S., CCC-SLP        EDUCATION  The patient has been educated in the following areas:   Dysphagia (Swallowing Impairment).              Time Calculation:    Time Calculation- SLP       Row Name 09/13/23 1200             Time Calculation- SLP    SLP Start Time 1130  -JR      SLP Stop Time 1200  -      SLP Time Calculation (min) 30 min  -      SLP - Next Appointment 09/14/23  -                User Key  (r) = Recorded By, (t) = Taken By, (c) = Cosigned By      Initials Name Provider Type    Verona Pizarro MS CCC-SLP Speech and Language Pathologist                    Therapy Charges for Today       Code Description Service Date Service Provider Modifiers Qty    [id number]  ST EVAL SPEECH AND PROD W  LANG  2 9/12/2023 Verona Sarmiento, MS CCC-SLP GN 1    [id number]  ST EVAL ORAL PHARYNG SWALLOW 2 9/13/2023 Verona Sarmiento, MS CCC-SLP GN 1                 Verona Sarmiento MS CCC-SLP  9/13/2023

## 2023-09-13 NOTE — PROGRESS NOTES
Case Management  Inpatient Rehabilitation Plan of Care and Discharge Plan Note    Rehabilitation Diagnosis:  CVA  Date of Onset:  9-3-23    Medical Summary:  CVA, acute infarct involving the left putamen and anterior  limb of the internal capsule    Plan of Care      Expected Intensity:  Average of 3 hours of therapy 5 days/week.  Interdisciplinary Team:  Interdisciplinary Team: Medical Supervision and 24 Hour Rehabilitation Nursing.,  Physical Therapy:, Occupational Therapy:, Speech and Language Therapy:, Social  Work, Therapeutic Recreation.  Physical Therapy Intensity/Duration: PT 1-1.5 hours per day/5 days per week  Occupational Therapy Intensity/Duration: OT 1-1.5 hours per day/5 days per week  Speech Language Pathology  Intensity/Duration: SLP 30 mins-90 mins per day/5  days per week  Estimated Length of Stay/Anticipated Discharge Date: 14 days  Anticipated Discharge Destination:  Anticipated discharge destination from inpatient rehabilitation is community  discharge with assistance. Pt is agreeable to go to son and daughter-in-law's  home if needed at discharge.      Based on the patient's medical and functional status, their prognosis and  expected level of functional improvement is:  fair    Signed by: TONEY Griffin

## 2023-09-13 NOTE — PROGRESS NOTES
PPS CMG Coordinator  Inpatient Rehabilitation Admission    Ethnic Group: White.  Marital Status:  Marital Status: .    IRF Admission Date:  09/11/2023  Admission Class: Initial Rehab.  Admit From:  [facility]    Pre-Hospital Living: Home. Pre-Hospital Living  With: (1) Alone.    Payment Sources: Primary: Medicare - Medicare Advantage  Secondary: Not Listed.  Impairment Group: 01.4 No Paresis  Date of Onset of Impairment: 09/03/2023    Etiologic Diagnosis Code(s):  Rank Code      Description  1    I63.81    Other cerebral infarction due to occlusion or                 stenosis of small artery    Comorbidities:      Height on Admission: 66 inches.  Weight on Admission: 248 pounds.    Are there any arthritis conditions recorded for Impairment Group, Etiologic  Diagnosis, or Comorbid Conditions that meet all of the regulatory requirements  for IRF classification (in 42 .29(b)(2)(x), (xi), and xii))?    JOHN Bladder Accidents:  0 - Accidents.  Bladder Score = 1.  Five (5) or more  bladder accidents.  JOHN Bowel Accident: 0 -Accidents.  Bowel Score = 6. Patient has no accidents, but uses a device/medications.  medication    Signed by: Annalisa Garcia, Supervisor

## 2023-09-13 NOTE — PLAN OF CARE
Goal Outcome Evaluation:  Plan of Care Reviewed With: patient resting in bed,no complaints at present. Will continue to monitor.

## 2023-09-13 NOTE — PROGRESS NOTES
Patient Assessment Instrument  Quality Indicators - Admission FY 2023    Section A. Ethnicity/ Race/Language  Ethnicity:  Race:  Preferred Language:    Section A. Transportation  Issues Due to Lack of Transportation:   No    Section B. Hearing and Vision        Section B. Health Literacy        Section C. Cognitive Patterns      Section C. Signs and Symptoms of Delirium (from CAM)      Section D. Mood      Section D. Social Isolation      Section [id number]. Prior Functioning      Section UH9723. Prior Device Use  Patient does not use manual or motorized wheelchair or scooter, mechanical lift,  walker, or an orthotic/prosthesis.    Section EV4684. Self Care Performance      Section IO9991. Self Care Discharge Goals      Section ZM6000. Mobility Performance      Section TS0699. Mobility Discharge Goals      Section H. Bladder and Bowel      Section I. Active Diagnosis      Section J. Health Conditions      Section J. Health Conditions (Pain)      Section K. Swallowing/Nutritional Status    Nutritional Approaches on Admission:    Section M. Skin Conditions      Section N. Medication        Section O. Special Treatments, Procedures, and Programs    Signed by: TONEY Griffin

## 2023-09-13 NOTE — NURSING NOTE
Blood sugar low this am 53, re-checked noted 56. 1 amp D-50 administered. Will continue to monitor. Patient asymptomatic at this point.

## 2023-09-13 NOTE — PLAN OF CARE
[id number] THERAPY PLAN OF CARE:    Ofelia Knox will benefit from formal speech/language/cognitive therapy x3-5 days per week at 15-60 minute sessions, as functionally tolerated, for 7-21 days, to address:    Long Term Goal:  Patient will demonstrate adequate language skills to fully participate in adls at premorbid baseline level of function.      Short Term Goals:  1. Patient will match pictures/objects/words w/ 50% accuracy and min cues over three consecutive sessions.     2. Patient will receptively identify verbally named objects/pictures/words w/ 50% accuracy and min cues over three consecutive sessions.     3. Patient will answer yes/no questions of varying levels of difficulty w/ 75% accuracy and min cues over three consecutive sessions.     4. Patient will follow variable step directives w/ 50% accuracy and min cues over three consecutive sessions.     5. Patient will provide biographical information relating to friends/self/family members  names/ID w/ 80% accuracy and min cues over three consecutive sessions.     6. Patient will identify the appropriate object/picture/word following a verbal description w/ 50% accuracy over three consecutive sessions.     7. Patient will expressively ID objects/pictures w/ 50% accuracy and min cues over three consecutive sessions.     8. Patient will complete automatic oe sentences w/ 50% accuracy and min cues over three consecutive sessions.     9. Patient will provide verbal descriptors of an object/picture/word w/ 50% accuracy and min cues over three consecutive sessions.     *Additional goals to be added per pt progress  *Goals to be modified per pt progress    Thank you for allowing me to participate in the care of your patient-  Verona Sarmiento M.S., CCC-SLP

## 2023-09-13 NOTE — PROGRESS NOTES
Rehabilitation Nursing  Inpatient Rehabilitation Plan of Care Note    Plan of Care  Copy from [facility]    Toilet Transfers (Active)  Current Status (9/11/2023 7:00:00 PM): PATIENT WILL HAVE NO TRANSFER DIFFICULTY    Weekly Goal: NO DIFFICULT WITH TRANSFERS  Discharge Goal: TRANSFERS INDEPENDENTLY    Pain    Pain Management (Active)  Current Status (9/11/2023 7:00:00 PM): PATIENT WILL VOICE NO PAIN  Weekly Goal: PATIENT WILL BE PAIN FREE  Discharge Goal: FREE FROM PAIN    Safety    Potential for Injury (Active)  Current Status (9/11/2023 7:00:00 PM): PATIENT WILL HAVE NO INJURY THIS STAY  Weekly Goal: NO INJURY  Discharge Goal: PATIENT WILL DISCHARGE    Body Systems    Integumentary (Active)  Current Status (9/11/2023 7:00:00 PM): PATIENT WILL HAVE NO SKIN  BREAKDOWN THIS  STAY  Weekly Goal: SKIN WILL REMAIN INTACT.  Discharge Goal: NO SKIN ISSUES.    Signed by: Carolyn Ramirez RN

## 2023-09-13 NOTE — PROGRESS NOTES
Occupational Therapy:    Physical Therapy:    Speech Language Pathology: Individual: 30 minutes.    Signed by: JUSTO Schwartz

## 2023-09-13 NOTE — PROGRESS NOTES
Assisted By: Alisa HAMPTON    CC: Follow-up on CVA    Interview History/HPI: Patient states she is doing okay without acute complaints.  Nursing and inform me last night that patient had some excoriation in her abdominal folds.  Nystatin cream has been ordered.          Current Hospital Meds:  amLODIPine, 5 mg, Oral, Q24H  aspirin, 81 mg, Oral, Daily  bisacodyl, 10 mg, Rectal, Once  buPROPion XL, 150 mg, Oral, Daily  cetirizine, 5 mg, Oral, Daily  isosorbide mononitrate, 30 mg, Oral, Q24H  meropenem, 1,000 mg, Intravenous, Q8H  metFORMIN, 500 mg, Oral, BID With Meals  metoprolol succinate XL, 25 mg, Oral, Q24H  montelukast, 10 mg, Oral, Nightly  nystatin, 1 application , Topical, Q12H  oxybutynin XL, 10 mg, Oral, Daily  pantoprazole, 40 mg, Oral, QAM AC  polyethylene glycol, 17 g, Oral, Daily  pravastatin, 40 mg, Oral, Daily  senna-docusate sodium, 1 tablet, Oral, BID  tiotropium bromide monohydrate, 2 puff, Inhalation, Daily - RT  venlafaxine XR, 75 mg, Oral, Daily With Breakfast         Vitals:    09/13/23 1021   BP: 121/64   Pulse: 62   Resp: 18   Temp: 98 °F (36.7 °C)   SpO2: 97%         Intake/Output Summary (Last 24 hours) at 9/13/2023 1331  Last data filed at 9/13/2023 0900  Gross per 24 hour   Intake 580 ml   Output --   Net 580 ml       EXAM: In her abdominal folds there is some minimal excoriation but no skin breakdown, otherwise lungs are clear, heart regular rate and rhythm, still has some difficulty with her speech as mentioned in my H&P.  No significant edema is noted today.      Diet: Cardiac Diets, Diabetic Diets; Healthy Heart (2-3 Na+); Consistent Carbohydrate; Texture: Regular Texture (IDDSI 7); Fluid Consistency: Thin (IDDSI 0)        LABS:     Lab Results (last 48 hours)       Procedure Component Value Units Date/Time    POC Glucose Once [[id number]]  (Normal) Collected: 09/13/23 1058    Specimen: Blood Updated: 09/13/23 1105     Glucose 76 mg/dL     POC Glucose Once [[id number]]  (Abnormal) Collected:  09/13/23 0617    Specimen: Blood Updated: 09/13/23 0625     Glucose 174 mg/dL     POC Glucose Once [[id number]]  (Abnormal) Collected: 09/13/23 0555    Specimen: Blood Updated: 09/13/23 0602     Glucose 56 mg/dL     Basic Metabolic Panel [[id number]]  (Abnormal) Collected: 09/13/23 0108    Specimen: Blood Updated: 09/13/23 0157     Glucose 76 mg/dL      BUN 36 mg/dL      Creatinine 1.22 mg/dL      Sodium 140 mmol/L      Potassium 3.9 mmol/L      Comment: Slight hemolysis detected by analyzer. Results may be affected.        Chloride 100 mmol/L      CO2 32.2 mmol/L      Calcium 9.3 mg/dL      BUN/Creatinine Ratio 29.5     Anion Gap 7.8 mmol/L      eGFR 46.1 mL/min/1.73     Narrative:      GFR Normal >60  Chronic Kidney Disease <60  Kidney Failure <15    The GFR formula is only valid for adults with stable renal function between ages 18 and 70.    POC Glucose Once [[id number]]  (Normal) Collected: 09/12/23 1900    Specimen: Blood Updated: 09/12/23 1907     Glucose 105 mg/dL     POC Glucose Once [[id number]]  (Normal) Collected: 09/12/23 1602    Specimen: Blood Updated: 09/12/23 1618     Glucose 116 mg/dL     POC Glucose Once [[id number]]  (Normal) Collected: 09/12/23 1104    Specimen: Blood Updated: 09/12/23 1111     Glucose 125 mg/dL     POC Glucose Once [[id number]]  (Normal) Collected: 09/12/23 0614    Specimen: Blood Updated: 09/12/23 0621     Glucose 127 mg/dL     Comprehensive Metabolic Panel [[id number]]  (Abnormal) Collected: 09/12/23 0123    Specimen: Blood Updated: 09/12/23 0223     Glucose 121 mg/dL      BUN 29 mg/dL      Creatinine 1.02 mg/dL      Sodium 135 mmol/L      Potassium 3.8 mmol/L      Comment: Slight hemolysis detected by analyzer. Results may be affected.        Chloride 98 mmol/L      CO2 29.8 mmol/L      Calcium 9.2 mg/dL      Total Protein 6.8 g/dL      Albumin 3.3 g/dL      ALT (SGPT) 10 U/L      AST (SGOT) 13 U/L      Alkaline Phosphatase 85 U/L      Total Bilirubin 0.5 mg/dL      Globulin 3.5  gm/dL      A/G Ratio 0.9 g/dL      BUN/Creatinine Ratio 28.4     Anion Gap 7.2 mmol/L      eGFR 57.1 mL/min/1.73     Narrative:      GFR Normal >60  Chronic Kidney Disease <60  Kidney Failure <15    The GFR formula is only valid for adults with stable renal function between ages 18 and 70.    TSH [[id number]]  (Normal) Collected: 09/12/23 0123    Specimen: Blood Updated: 09/12/23 0215     TSH 1.470 uIU/mL     T4, Free [[id number]]  (Normal) Collected: 09/12/23 0123    Specimen: Blood Updated: 09/12/23 0215     Free T4 1.15 ng/dL     Narrative:      Results may be falsely increased if patient taking Biotin.      Hemoglobin A1c [[id number]]  (Abnormal) Collected: 09/12/23 0123    Specimen: Blood Updated: 09/12/23 0207     Hemoglobin A1C 6.80 %     Narrative:      Hemoglobin A1C Ranges:    Increased Risk for Diabetes  5.7% to 6.4%  Diabetes                     >= 6.5%  Diabetic Goal                < 7.0%    CBC & Differential [[id number]]  (Abnormal) Collected: 09/12/23 0123    Specimen: Blood Updated: 09/12/23 0147    Narrative:      The following orders were created for panel order CBC & Differential.  Procedure                               Abnormality         Status                     ---------                               -----------         ------                     CBC Auto Differential[[id number]]        Abnormal            Final result                 Please view results for these tests on the individual orders.    CBC Auto Differential [[id number]]  (Abnormal) Collected: 09/12/23 0123    Specimen: Blood Updated: 09/12/23 0147     WBC 11.97 10*3/mm3      RBC 5.43 10*6/mm3      Hemoglobin 14.9 g/dL      Hematocrit 50.8 %      MCV 93.6 fL      MCH 27.4 pg      MCHC 29.3 g/dL      RDW 14.6 %      RDW-SD 50.4 fl      MPV 11.5 fL      Platelets 312 10*3/mm3      Neutrophil % 61.7 %      Lymphocyte % 24.1 %      Monocyte % 11.9 %      Eosinophil % 1.3 %      Basophil % 0.7 %      Immature Grans % 0.3 %       Neutrophils, Absolute 7.38 10*3/mm3      Lymphocytes, Absolute 2.89 10*3/mm3      Monocytes, Absolute 1.43 10*3/mm3      Eosinophils, Absolute 0.16 10*3/mm3      Basophils, Absolute 0.08 10*3/mm3      Immature Grans, Absolute 0.03 10*3/mm3      nRBC 0.0 /100 WBC     POC Glucose Once [[id number]]  (Abnormal) Collected: 09/11/23 1938    Specimen: Blood Updated: 09/11/23 1944     Glucose 140 mg/dL                  Radiology:    Imaging Results (Last 72 Hours)       ** No results found for the last 72 hours. **            Results for orders placed during the hospital encounter of 09/11/23    Adult Transthoracic Echo Complete W/ Cont if Necessary Per Protocol    Interpretation Summary    Left ventricular systolic function is mildly decreased. Calculated left ventricular EF = 44% Left ventricular ejection fraction appears to be 41 - 45%.    Left ventricular wall thickness is consistent with mild eccentric hypertrophy.    Left ventricular diastolic function is consistent with (grade I) impaired relaxation.    The left atrial cavity is moderately dilated.      Assessment/Plan:   Status post left putamen and internal capsule CVA, on aspirin and statin.  Patient is undergoing speech occupational physical therapy.  Speech therapy feels like the patient has receptive language deficits relating to auditory comprehension deficits.  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  She also has some anomia.  With PT, patient was overall mod assist with bed mobility.  Prior verbal and nonverbal cues.  Patient had increased cueing needs for all mobility and activity.  For transfers she was min assist overall and was able to walk 160 feet front wheeled walker contact-guard.  With OT, patient is max assist for bathing, min assist for upper body dressing, max assist lower body dressing, min for grooming, total assist for toileting hygiene, set up/supervision for  self-feeding.  Patient requires all 3 modalities of therapy, continue current plans.    Diabetes, she has a tendency towards hypoglycemia on current combination, have held her metformin for the morning, I have discontinued her Glucotrol, will follow and adjust medication as needed but want to try to avoid hypoglycemia if possible.    Hypertension, improved control, continue current dose of amlodipine and metoprolol.    ESBL UTI, completing meropenem course.  No fever, white count 11.97 yesterday.    Elevated creatinine, creatinine was 1.1 at [facility], encourage p.o. intake, recheck in a.m.  Lasix has been held, she appears euvolemic.    Polycythemia possibly related to dehydration, recheck in a.m.    Constipation, on MiraLAX and senna, will give suppository today.    Intertrigo, continue nystatin cream for now, apparently the powder was not available    Jim Appiah MD

## 2023-09-13 NOTE — THERAPY TREATMENT NOTE
Inpatient Rehabilitation - Occupational Therapy Treatment Note    ABENA Quiroz     Patient Name: Ofelia Knox  : 1945  MRN: [id number]    Today's Date: 2023                 Admit Date: 2023       No diagnosis found.    Patient Active Problem List   Diagnosis    CVA (cerebral vascular accident)       Past Medical History:   Diagnosis Date    AMS (altered mental status)     Aphasia     CKD (chronic kidney disease)     COPD (chronic obstructive pulmonary disease)     CVA (cerebral vascular accident)     DM2 (diabetes mellitus, type 2)     HTN (hypertension)     Restrictive lung disease     Urinary tract infection due to ESBL Klebsiella        Past Surgical History:   Procedure Laterality Date    HYSTERECTOMY      JOINT REPLACEMENT               IRF OT ASSESSMENT FLOWSHEET (last 12 hours)       IRF OT Evaluation and Treatment       Row Name 23 1428          OT Time and Intention    Document Type daily treatment  -KP     Mode of Treatment individual therapy;occupational therapy  -KP     Total Minutes, Occupational Therapy 90  -KP     Patient Effort adequate  -KP     Symptoms Noted During/After Treatment none  -KP       Row Name 23 1428          General Information    Patient Profile Reviewed yes  -KP     General Observations of Patient Patient agreeable to therapy. Pleasantly confused, with cues for attention to task as needed.  -KP     Existing Precautions/Restrictions fall  -KP     Limitations/Impairments safety/cognitive  -KP       Row Name 23 1428          Pain Assessment    Pretreatment Pain Rating 0/10 - no pain  -KP       Row Name 23 1428          Cognition/Psychosocial    Affect/Mental Status (Cognition) confused  -KP     Orientation Status (Cognition) oriented to;person  -KP     Follows Commands (Cognition) verbal cues/prompting required;repetition of directions required;physical/tactile prompts required  -KP     Personal Safety Interventions gait belt;nonskid shoes/slippers  when out of bed  -       Row Name 09/13/23 1428          Self-Feeding    Morris Level (Self-Feeding) feeding skills  -     Comment (Self-Feeding) set-up  -       Row Name 09/13/23 1428          Chair-Bed Transfer    Chair-Bed Morris (Transfers) contact guard;minimum assist (75% patient effort)  -     Assistive Device (Chair-Bed Transfers) wheelchair  -       Row Name 09/13/23 1428          Motor Skills    Motor Control/Coordination Interventions fine motor manipulation/dexterity activities;gross motor coordination activities;therapeutic exercise/ROM  tabletop FMC/GMC task with functional reach and grasp; BUE PRE X15 minutes as tolerated with rest breaks; balloon volley for endurance, strength, and ROM; attention to task with [employer] game - maximal cues  -       Row Name 09/13/23 1428          Positioning and Restraints    Pre-Treatment Position sitting in chair/recliner  -     Post Treatment Position bed  -     In Bed fowlers;call light within reach;exit alarm on;encouraged to call for assist  -               User Key  (r) = Recorded By, (t) = Taken By, (c) = Cosigned By      Initials Name Effective Dates     Emmie Lamas OT 06/16/21 -                              OT Recommendation and Plan                         Time Calculation:      Time Calculation- OT       Row Name 09/13/23 1431             Time Calculation- OT    OT Start Time 1245  -      OT Stop Time 1415  -      OT Time Calculation (min) 90 min  -      Total Timed Code Minutes- OT 90 minute(s)  -      OT Received On 09/13/23  -                User Key  (r) = Recorded By, (t) = Taken By, (c) = Cosigned By      Initials Name Provider Type    Emmie Ortega OT Occupational Therapist                  Therapy Charges for Today       Code Description Service Date Service Provider Modifiers Qty    49930194627  OT SELF CARE/MGMT/TRAIN EA 15 MIN 9/13/2023 Emmie Lamas OT GO 1    [id number]  OT THERAPEUTIC ACT  EA 15 MIN 9/13/2023 Emmie Lamas, OT GO 2    [id number]  OT THER PROC EA 15 MIN 9/13/2023 Emmie Lamas, OT GO 1    [id number]  OT NEUROMUSC RE EDUCATION EA 15 MIN 9/13/2023 Emmie Lamas, OT GO 2                     Emmie Lamas OT  9/13/2023

## 2023-09-13 NOTE — THERAPY TREATMENT NOTE
Inpatient Rehabilitation - Physical Therapy Treatment Note        Richie     Patient Name: Ofelia Knox  : 1945  MRN: [id number]    Today's Date: 2023                    Admit Date: 2023      Visit Dx:   No diagnosis found.    Patient Active Problem List   Diagnosis    CVA (cerebral vascular accident)       Past Medical History:   Diagnosis Date    AMS (altered mental status)     Aphasia     CKD (chronic kidney disease)     COPD (chronic obstructive pulmonary disease)     CVA (cerebral vascular accident)     DM2 (diabetes mellitus, type 2)     HTN (hypertension)     Restrictive lung disease     Urinary tract infection due to ESBL Klebsiella        Past Surgical History:   Procedure Laterality Date    HYSTERECTOMY      JOINT REPLACEMENT         PT ASSESSMENT (last 12 hours)       IRF PT Evaluation and Treatment       Row Name 23 1516          PT Time and Intention    Document Type daily treatment  -RG     Mode of Treatment individual therapy;physical therapy  -RG     Patient/Family/Caregiver Comments/Observations Pt and nursing in agreement for skilled PT on this date.  -RG       Row Name 23 1516          General Information    Existing Precautions/Restrictions fall  contact isolation-ESBL UTI, O2 prn, aphasia  -RG       Row Name 23 1516          Pain Scale: FACES Pre/Post-Treatment    Pain: FACES Scale, Pretreatment 0-->no hurt  -RG     Posttreatment Pain Rating 4-->hurts little more  -RG       Row Name 23 1516          Cognition/Psychosocial    Affect/Mental Status (Cognition) --  awake and alert, she appears to have aphasia, difficulty following commands  -RG     Follows Commands (Cognition) verbal cues/prompting required;physical/tactile prompts required;repetition of directions required  -RG     Personal Safety Interventions gait belt;nonskid shoes/slippers when out of bed;fall prevention program maintained  -RG       Row Name 23 1516          Bed Mobility     Rolling Left [place] (Bed Mobility) moderate assist (50% patient effort);verbal cues;nonverbal cues (demo/gesture)  -RG     Rolling Right [place] (Bed Mobility) moderate assist (50% patient effort);verbal cues;nonverbal cues (demo/gesture)  -RG     Supine-Sit [place] (Bed Mobility) moderate assist (50% patient effort);verbal cues;nonverbal cues (demo/gesture)  -RG       Row Name 09/13/23 1516          Bed-Chair Transfer    Bed-Chair [place] (Transfers) minimum assist (75% patient effort);verbal cues;nonverbal cues (demo/gesture)  -RG     Assistive Device (Bed-Chair Transfers) wheelchair;walker, front-wheeled  -RG       Row Name 09/13/23 1516          Chair-Bed Transfer    Chair-Bed [place] (Transfers) verbal cues;nonverbal cues (demo/gesture);contact guard;minimum assist (75% patient effort)  -RG     Assistive Device (Chair-Bed Transfers) wheelchair  -RG       Row Name 09/13/23 1516          Sit-Stand Transfer    Sit-Stand [place] (Transfers) minimum assist (75% patient effort);verbal cues;nonverbal cues (demo/gesture)  -RG     Assistive Device (Sit-Stand Transfers) walker, front-wheeled  -RG       Row Name 09/13/23 1516          Stand-Sit Transfer    Stand-Sit [place] (Transfers) minimum assist (75% patient effort);verbal cues;nonverbal cues (demo/gesture)  -RG     Assistive Device (Stand-Sit Transfers) walker, front-wheeled  -RG       Row Name 09/13/23 1516          Gait/Stairs (Locomotion)    [place] Level (Gait) contact guard;verbal cues;nonverbal cues (demo/gesture);1 person to manage equipment  -RG     Assistive Device (Gait) walker, front-wheeled  -RG     Distance in Feet (Gait) 160'  -RG     Deviations/Abnormal Patterns (Gait) beatrice decreased;gait speed decreased;stride length decreased  -RG     Bilateral Gait Deviations forward flexed posture  -RG       Row Name 09/13/23 1516          Safety Issues, Functional Mobility    Impairments Affecting Function (Mobility)  balance;cognition;endurance/activity tolerance;pain;strength  -RG       Row Name 09/13/23 1516          Motor Skills    Therapeutic Exercise hip;knee;ankle  -       Row Name 09/13/23 1516          Hip (Therapeutic Exercise)    Hip (Therapeutic Exercise) strengthening exercise  -RG     Hip Strengthening (Therapeutic Exercise) bilateral;flexion;aBduction;aDduction;marching while seated;sitting;2 lb free weight;resistance band;green;10 repetitions;2 sets  -       Row Name 09/13/23 1516          Knee (Therapeutic Exercise)    Knee (Therapeutic Exercise) strengthening exercise  -RG     Knee Strengthening (Therapeutic Exercise) bilateral;flexion;extension;marching while seated;LAQ (long arc quad);sitting;2 lb free weight;resistance band;green;10 repetitions;2 sets  -RG       Row Name 09/13/23 1516          Ankle (Therapeutic Exercise)    Ankle (Therapeutic Exercise) strengthening exercise  -RG     Ankle Strengthening (Therapeutic Exercise) bilateral;dorsiflexion;plantarflexion;sitting;2 lb free weight;10 repetitions;2 sets  -       Row Name 09/13/23 1516          Positioning and Restraints    Pre-Treatment Position in bed  -RG     Post Treatment Position wheelchair  -RG     In Wheelchair notified nsg;sitting;legs elevated;patient within staff view  -       Row Name 09/13/23 1516          Therapy Assessment/Plan (PT)    Patient's Goals For Discharge return home  -       Row Name 09/13/23 1516          Therapy Assessment/Plan (PT)    Rehab Potential/Prognosis (PT) adequate, monitor progress closely  -RG     Frequency of Treatment (PT) 5 times per week  -RG     Estimated Duration of Therapy (PT) 2 weeks  -RG     Problem List (PT) balance;cognition;mobility;strength;pain;communication  -RG     Activity Limitations Related to Problem List (PT) unable to ambulate safely;unable to transfer safely  -       Row Name 09/13/23 1516          Therapy Plan Review/Discharge Plan (PT)    Anticipated Equipment Needs at  Discharge (PT Eval) --  tbd  -RG     Anticipated Discharge Disposition (PT) home with assist;home with home health;home with outpatient therapy services  -       Row Name 09/13/23 1516          IRF PT Goals    Bed Mobility Goal Selection (PT-IRF) bed mobility, PT goal 1  -RG     Transfer Goal Selection (PT-IRF) transfers, PT goal 1  -RG     Gait (Walking Locomotion) Goal Selection (PT-IRF) gait, PT goal 1  -RG       Row Name 09/13/23 1516          Bed Mobility Goal 1 (PT-IRF)    Activity/Assistive Device (Bed Mobility Goal 1, PT-IRF) sit to supine/supine to sit  -RG     [place] Level (Bed Mobility Goal 1, PT-IRF) supervision required  -RG     Time Frame (Bed Mobility Goal 1, PT-IRF) by discharge  -RG       Row Name 09/13/23 1516          Transfer Goal 1 (PT-IRF)    Activity/Assistive Device (Transfer Goal 1, PT-IRF) sit-to-stand/stand-to-sit;bed-to-chair/chair-to-bed  -RG     [place] Level (Transfer Goal 1, PT-IRF) supervision required  -RG     Time Frame (Transfer Goal 1, PT-IRF) by discharge  -       Row Name 09/13/23 1516          Gait/Walking Locomotion Goal 1 (PT-IRF)    Activity/Assistive Device (Gait/Walking Locomotion Goal 1, PT-IRF) walker, rolling  -RG     Gait/Walking Locomotion Distance Goal 1 (PT-IRF) 300'  -RG     [place] Level (Gait/Walking Locomotion Goal 1, PT-IRF) supervision required  -RG     Time Frame (Gait/Walking Locomotion Goal 1, PT-IRF) by discharge  -RG               User Key  (r) = Recorded By, (t) = Taken By, (c) = Cosigned By      Initials Name Provider Type    Twan Oliver PTA Physical Therapist Assistant                     Physical Therapy Education       Title: PT OT SLP Therapies (Done)       Topic: Physical Therapy (Done)       Point: Mobility training (Done)       Learning Progress Summary             Patient Acceptance, E,D, VU,NR by RG at 9/13/2023 1528    Acceptance, TB, NR by DL at 9/12/2023 2003    Acceptance, E, VU,NR by LB at 9/12/2023 1135                          Point: Home exercise program (Done)       Learning Progress Summary             Patient Acceptance, E,D, VU,NR by RG at 9/13/2023 1528    Acceptance, TB, NR by DL at 9/12/2023 2003    Acceptance, E, VU,NR by LB at 9/12/2023 1135                         Point: Body mechanics (Done)       Learning Progress Summary             Patient Acceptance, E,D, VU,NR by RG at 9/13/2023 1528    Acceptance, TB, NR by DL at 9/12/2023 2003    Acceptance, E, VU,NR by LB at 9/12/2023 1135                         Point: Precautions (Done)       Learning Progress Summary             Patient Acceptance, E,D, VU,NR by RG at 9/13/2023 1528    Acceptance, TB, NR by DL at 9/12/2023 2003    Acceptance, E, VU,NR by LB at 9/12/2023 1135                                         User Key       Initials Effective Dates Name Provider Type Discipline    LB 06/16/21 -  Andra Stewart, PT Physical Therapist PT    RG 06/16/21 -  Twan Cunningham PTA Physical Therapist Assistant PT    DL 03/16/22 -  Carolyn Ramirez RN Registered Nurse Nurse                    PT Recommendation and Plan    Frequency of Treatment (PT): 5 times per week  Anticipated Equipment Needs at Discharge (PT Eval):  (tbd)                  Time Calculation:      PT Charges       Row Name 09/13/23 1529 09/13/23 1528          Time Calculation    Start Time 1045  -RG 0745  -RG     Stop Time 1130  -RG 0830  -RG     Time Calculation (min) 45 min  -RG 45 min  -RG     PT Received On 09/13/23  -RG 09/13/23  -RG        Time Calculation- PT    Total Timed Code Minutes- PT 45 minute(s)  -RG 45 minute(s)  -RG               User Key  (r) = Recorded By, (t) = Taken By, (c) = Cosigned By      Initials Name Provider Type    RG Twan Cunningham PTA Physical Therapist Assistant                    Therapy Charges for Today       Code Description Service Date Service Provider Modifiers Qty    30441095642 HC GAIT TRAINING EA 15 MIN 9/13/2023 Twan Cunningham PTA GP, CQ 1     [id number]  PT THER PROC EA 15 MIN 9/13/2023 Twan Cunningham, ESSIE GP, CQ 2    30104586991 HC PT THERAPEUTIC ACT EA 15 MIN 9/13/2023 Twan Cunningham, ESSIE GP, CQ 3                     Twan Cunningham, ESSIE  9/13/2023

## 2023-09-13 NOTE — PROGRESS NOTES
Patient Assessment Instrument  Quality Indicators - Admission FY 2023    Section A. Ethnicity/ Race/Language  Ethnicity:  Race:  Preferred Language:    Section A. Transportation      Section B. Hearing and Vision        Section B. Health Literacy        Section C. Cognitive Patterns      Section C. Signs and Symptoms of Delirium (from CAM)      Section D. Mood      Section D. Social Isolation      Section [id number]. Prior Functioning    Self Care: Patient completed all the activities by themself, with or without an  assistive device, with no assistance from a helper.  Indoor Mobility: Patient completed the activities by themself, with or without  an assistive device, with no assistance from a helper.  Stairs: Patient completed the activities by themself, with or without an  assistive device, with no assistance from a helper.  Functional Cognition: Patient completed the activities by themself, with or  without an assistive device, with no assistance from a helper.    Section [id number]. Prior Device Use      Section [id number]. Self Care Performance      Section [id number]. Self Care Discharge Goals      Section [id number]. Mobility Performance      Section LE0742. Mobility Discharge Goals      Section H. Bladder and Bowel  Bladder Continence: Always incontinent.  Bowel Continence: Not rated (patient had an ostomy or did not have a bowel  movement for the entire 3 days).    Section I. Active Diagnosis  Comorbidities and Co-existing Conditions:   Diabetes Mellitus (DM) - e.g.,  diabetic retinopathy, nephropathy, and neuropathy).    Section J. Health Conditions  Patient has had two or more falls, or a fall with injury, in the past year.  Patient has not had major surgery during the 100 days prior to admission.    Section J. Health Conditions (Pain)      Section K. Swallowing/Nutritional Status  Regular food (solids and liquids swallowed safely without supervision or  modified food or liquid consistency).  Nutritional Approaches on  Admission:  Nutritional Approaches on Admission:  Therapeutic diet (e.g., low salt, diabetic, low cholesterol)    Section M. Skin Conditions      Section N. Medication    Potential Clinically Significant Medication Issues: No issues found during  review  Patient is taking medications in the following pharmacological classification:  J. Hypoglycemic (including insulin) An indication is noted for all medications  in the Hypoglycemic drug class. F. Antibiotic An indication is noted for all  medications in the Antibiotic drug class. I. Antiplatelet An indication is noted  for all medications in the Antiplatelet drug class.  Potential Clinically Significant Medication Issues: No issues found during  review    Section O. Special Treatments, Procedures, and Programs  Oxygen Therapy: Intermittent   IV Access: Peripheral   IV Medications: Antibiotics, Other    Signed by: Annalisa Garcia, Supervisor

## 2023-09-13 NOTE — PROGRESS NOTES
Rehabilitation Nursing  Inpatient Rehabilitation Plan of Care Note    Plan of Care  Copy from [facility]    Toilet Transfers (Active)  Current Status (9/11/2023 7:00:00 PM): PATIENT WILL HAVE NO TRANSFER DIFFICULTY    Weekly Goal: NO DIFFICULT WITH TRANSFERS  Discharge Goal: TRANSFERS INDEPENDENTLY    Pain    Pain Management (Active)  Current Status (9/11/2023 7:00:00 PM): PATIENT WILL VOICE NO PAIN  Weekly Goal: PATIENT WILL BE PAIN FREE  Discharge Goal: FREE FROM PAIN    Safety    Potential for Injury (Active)  Current Status (9/11/2023 7:00:00 PM): PATIENT WILL HAVE NO INJURY THIS STAY  Weekly Goal: NO INJURY  Discharge Goal: PATIENT WILL DISCHARGE    Body Systems    Integumentary (Active)  Current Status (9/11/2023 7:00:00 PM): PATIENT WILL HAVE NO SKIN  BREAKDOWN THIS  STAY  Weekly Goal: SKIN WILL REMAIN INTACT.  Discharge Goal: NO SKIN ISSUES.    Signed by: Vicki Daniel, Nurse

## 2023-09-13 NOTE — PROGRESS NOTES
Occupational Therapy:    Physical Therapy: Individual: 90 minutes.    Speech Language Pathology:    Signed by: Twan Cunningham PTA

## 2023-09-14 ENCOUNTER — APPOINTMENT (OUTPATIENT)
Dept: GENERAL RADIOLOGY | Facility: HOSPITAL | Age: 78
DRG: 056 | End: 2023-09-14
Payer: MEDICARE

## 2023-09-14 LAB
027 TOXIN: NORMAL
ANION GAP SERPL CALCULATED.3IONS-SCNC: 9.5 MMOL/L (ref 5–15)
ANISOCYTOSIS BLD QL: NORMAL
BASOPHILS # BLD AUTO: 0.09 10*3/MM3 (ref 0–0.2)
BASOPHILS NFR BLD AUTO: 0.6 % (ref 0–1.5)
BILIRUB UR QL STRIP: NEGATIVE
BUN SERPL-MCNC: 35 MG/DL (ref 8–23)
BUN/CREAT SERPL: 31.5 (ref 7–25)
C DIFF TOX GENS STL QL NAA+PROBE: NEGATIVE
CALCIUM SPEC-SCNC: 9.3 MG/DL (ref 8.6–10.5)
CHLORIDE SERPL-SCNC: 101 MMOL/L (ref 98–107)
CLARITY UR: CLEAR
CO2 SERPL-SCNC: 24.5 MMOL/L (ref 22–29)
COLOR UR: ABNORMAL
CREAT SERPL-MCNC: 1.11 MG/DL (ref 0.57–1)
CRP SERPL-MCNC: 1.02 MG/DL (ref 0–0.5)
DEPRECATED RDW RBC AUTO: 53.8 FL (ref 37–54)
EGFRCR SERPLBLD CKD-EPI 2021: 51.6 ML/MIN/1.73
EOSINOPHIL # BLD AUTO: 0.12 10*3/MM3 (ref 0–0.4)
EOSINOPHIL NFR BLD AUTO: 0.8 % (ref 0.3–6.2)
ERYTHROCYTE [DISTWIDTH] IN BLOOD BY AUTOMATED COUNT: 14.8 % (ref 12.3–15.4)
GLUCOSE BLDC GLUCOMTR-MCNC: 107 MG/DL (ref 70–130)
GLUCOSE BLDC GLUCOMTR-MCNC: 114 MG/DL (ref 70–130)
GLUCOSE BLDC GLUCOMTR-MCNC: 120 MG/DL (ref 70–130)
GLUCOSE BLDC GLUCOMTR-MCNC: 123 MG/DL (ref 70–130)
GLUCOSE BLDC GLUCOMTR-MCNC: 137 MG/DL (ref 70–130)
GLUCOSE SERPL-MCNC: 139 MG/DL (ref 65–99)
GLUCOSE UR STRIP-MCNC: NEGATIVE MG/DL
HCT VFR BLD AUTO: 53 % (ref 34–46.6)
HGB BLD-MCNC: 15.2 G/DL (ref 12–15.9)
HGB UR QL STRIP.AUTO: NEGATIVE
HYPOCHROMIA BLD QL: NORMAL
IMM GRANULOCYTES # BLD AUTO: 0.03 10*3/MM3 (ref 0–0.05)
IMM GRANULOCYTES NFR BLD AUTO: 0.2 % (ref 0–0.5)
KETONES UR QL STRIP: ABNORMAL
LEUKOCYTE ESTERASE UR QL STRIP.AUTO: NEGATIVE
LYMPHOCYTES # BLD AUTO: 1.86 10*3/MM3 (ref 0.7–3.1)
LYMPHOCYTES NFR BLD AUTO: 11.8 % (ref 19.6–45.3)
MCH RBC QN AUTO: 28.1 PG (ref 26.6–33)
MCHC RBC AUTO-ENTMCNC: 28.7 G/DL (ref 31.5–35.7)
MCV RBC AUTO: 98 FL (ref 79–97)
MONOCYTES # BLD AUTO: 2.04 10*3/MM3 (ref 0.1–0.9)
MONOCYTES NFR BLD AUTO: 13 % (ref 5–12)
NEUTROPHILS NFR BLD AUTO: 11.6 10*3/MM3 (ref 1.7–7)
NEUTROPHILS NFR BLD AUTO: 73.6 % (ref 42.7–76)
NITRITE UR QL STRIP: NEGATIVE
NRBC BLD AUTO-RTO: 0 /100 WBC (ref 0–0.2)
PH UR STRIP.AUTO: 6.5 [PH] (ref 5–8)
PLAT MORPH BLD: NORMAL
PLATELET # BLD AUTO: 290 10*3/MM3 (ref 140–450)
PMV BLD AUTO: 11.9 FL (ref 6–12)
POTASSIUM SERPL-SCNC: 4.4 MMOL/L (ref 3.5–5.2)
PROT UR QL STRIP: ABNORMAL
RBC # BLD AUTO: 5.41 10*6/MM3 (ref 3.77–5.28)
SODIUM SERPL-SCNC: 135 MMOL/L (ref 136–145)
SP GR UR STRIP: 1.02 (ref 1–1.03)
UROBILINOGEN UR QL STRIP: ABNORMAL
WBC NRBC COR # BLD: 15.74 10*3/MM3 (ref 3.4–10.8)

## 2023-09-14 PROCEDURE — 71045 X-RAY EXAM CHEST 1 VIEW: CPT

## 2023-09-14 PROCEDURE — 99232 SBSQ HOSP IP/OBS MODERATE 35: CPT | Performed by: INTERNAL MEDICINE

## 2023-09-14 PROCEDURE — 86140 C-REACTIVE PROTEIN: CPT | Performed by: INTERNAL MEDICINE

## 2023-09-14 PROCEDURE — 97112 NEUROMUSCULAR REEDUCATION: CPT | Performed by: OCCUPATIONAL THERAPIST

## 2023-09-14 PROCEDURE — 94799 UNLISTED PULMONARY SVC/PX: CPT

## 2023-09-14 PROCEDURE — 80048 BASIC METABOLIC PNL TOTAL CA: CPT | Performed by: INTERNAL MEDICINE

## 2023-09-14 PROCEDURE — 81003 URINALYSIS AUTO W/O SCOPE: CPT | Performed by: INTERNAL MEDICINE

## 2023-09-14 PROCEDURE — 94761 N-INVAS EAR/PLS OXIMETRY MLT: CPT

## 2023-09-14 PROCEDURE — 71045 X-RAY EXAM CHEST 1 VIEW: CPT | Performed by: RADIOLOGY

## 2023-09-14 PROCEDURE — 94664 DEMO&/EVAL PT USE INHALER: CPT

## 2023-09-14 PROCEDURE — 97535 SELF CARE MNGMENT TRAINING: CPT | Performed by: OCCUPATIONAL THERAPIST

## 2023-09-14 PROCEDURE — 97110 THERAPEUTIC EXERCISES: CPT

## 2023-09-14 PROCEDURE — 97530 THERAPEUTIC ACTIVITIES: CPT | Performed by: OCCUPATIONAL THERAPIST

## 2023-09-14 PROCEDURE — 82948 REAGENT STRIP/BLOOD GLUCOSE: CPT

## 2023-09-14 PROCEDURE — 87493 C DIFF AMPLIFIED PROBE: CPT | Performed by: INTERNAL MEDICINE

## 2023-09-14 PROCEDURE — 85025 COMPLETE CBC W/AUTO DIFF WBC: CPT | Performed by: INTERNAL MEDICINE

## 2023-09-14 PROCEDURE — 97116 GAIT TRAINING THERAPY: CPT

## 2023-09-14 PROCEDURE — 92507 TX SP LANG VOICE COMM INDIV: CPT

## 2023-09-14 PROCEDURE — 97530 THERAPEUTIC ACTIVITIES: CPT

## 2023-09-14 PROCEDURE — 85007 BL SMEAR W/DIFF WBC COUNT: CPT | Performed by: INTERNAL MEDICINE

## 2023-09-14 RX ORDER — VALSARTAN 80 MG/1
40 TABLET ORAL
Status: DISCONTINUED | OUTPATIENT
Start: 2023-09-15 | End: 2023-09-18

## 2023-09-14 RX ADMIN — ASPIRIN 81 MG: 81 TABLET, COATED ORAL at 08:31

## 2023-09-14 RX ADMIN — TIOTROPIUM BROMIDE INHALATION SPRAY 2 PUFF: 3.12 SPRAY, METERED RESPIRATORY (INHALATION) at 07:54

## 2023-09-14 RX ADMIN — PANTOPRAZOLE SODIUM 40 MG: 40 TABLET, DELAYED RELEASE ORAL at 06:33

## 2023-09-14 RX ADMIN — BUPROPION HYDROCHLORIDE 150 MG: 150 TABLET, EXTENDED RELEASE ORAL at 08:31

## 2023-09-14 RX ADMIN — METFORMIN HYDROCHLORIDE 500 MG: 500 TABLET ORAL at 17:02

## 2023-09-14 RX ADMIN — OXYBUTYNIN CHLORIDE 10 MG: 5 TABLET, EXTENDED RELEASE ORAL at 08:31

## 2023-09-14 RX ADMIN — METFORMIN HYDROCHLORIDE 500 MG: 500 TABLET ORAL at 08:31

## 2023-09-14 RX ADMIN — MONTELUKAST SODIUM 10 MG: 10 TABLET, COATED ORAL at 20:10

## 2023-09-14 RX ADMIN — METOPROLOL SUCCINATE 25 MG: 25 TABLET, EXTENDED RELEASE ORAL at 08:31

## 2023-09-14 RX ADMIN — VENLAFAXINE HYDROCHLORIDE 75 MG: 75 CAPSULE, EXTENDED RELEASE ORAL at 08:31

## 2023-09-14 RX ADMIN — NYSTATIN 1 APPLICATION: 100000 CREAM TOPICAL at 20:10

## 2023-09-14 RX ADMIN — AMLODIPINE BESYLATE 5 MG: 5 TABLET ORAL at 08:31

## 2023-09-14 RX ADMIN — ISOSORBIDE MONONITRATE 30 MG: 30 TABLET, EXTENDED RELEASE ORAL at 08:31

## 2023-09-14 RX ADMIN — CETIRIZINE HYDROCHLORIDE 5 MG: 10 TABLET, FILM COATED ORAL at 08:31

## 2023-09-14 RX ADMIN — NYSTATIN 1 APPLICATION: 100000 CREAM TOPICAL at 08:31

## 2023-09-14 RX ADMIN — PRAVASTATIN SODIUM 40 MG: 40 TABLET ORAL at 08:31

## 2023-09-14 NOTE — PLAN OF CARE
Goal Outcome Evaluation:  Plan of Care Reviewed With: patient        Progress: improving       Problem: Rehabilitation (IRF) Plan of Care  Goal: Plan of Care Review  Outcome: Ongoing, Progressing  Flowsheets (Taken 9/14/2023 0854)  Progress: improving  Plan of Care Reviewed With: patient  Goal: Patient-Specific Goal (Individualized)  Outcome: Ongoing, Progressing  Goal: Absence of New-Onset Illness or Injury  Outcome: Ongoing, Progressing  Intervention: Prevent Fall and Fall Injury  Recent Flowsheet Documentation  Taken 9/14/2023 0800 by Dl Gary RN  Safety Promotion/Fall Prevention: safety round/check completed  Intervention: Prevent Infection  Recent Flowsheet Documentation  Taken 9/14/2023 0800 by Dl Gary RN  Infection Prevention:   hand hygiene promoted   rest/sleep promoted  Intervention: Prevent VTE (Venous Thromboembolism)  Recent Flowsheet Documentation  Taken 9/14/2023 0800 by Dl Gary RN  VTE Prevention/Management: (for therapy)   bilateral   sequential compression devices off  Goal: Optimal Comfort and Wellbeing  Outcome: Ongoing, Progressing  Goal: Home and Community Transition Plan Established  Outcome: Ongoing, Progressing     Problem: Skin Injury Risk Increased  Goal: Skin Health and Integrity  Outcome: Ongoing, Progressing  Intervention: Optimize Skin Protection  Recent Flowsheet Documentation  Taken 9/14/2023 0800 by Dl Gary RN  Pressure Reduction Techniques:   frequent weight shift encouraged   weight shift assistance provided  Pressure Reduction Devices:   pressure-redistributing mattress utilized   heel offloading device utilized   positioning supports utilized  Skin Protection:   adhesive use limited   incontinence pads utilized     Problem: Fall Injury Risk  Goal: Absence of Fall and Fall-Related Injury  Outcome: Ongoing, Progressing  Intervention: Identify and Manage Contributors  Recent Flowsheet Documentation  Taken 9/14/2023 0800 by Dl Gary RN  Medication  Review/Management: medications reviewed  Intervention: Promote Injury-Free Environment  Recent Flowsheet Documentation  Taken 9/14/2023 0800 by Dl Gary RN  Safety Promotion/Fall Prevention: safety round/check completed

## 2023-09-14 NOTE — PLAN OF CARE
Continues to actively participate in all sessions with progress towards plan of care.

## 2023-09-14 NOTE — PROGRESS NOTES
Inpatient Rehabilitation Functional Measures Assessment    Functional Measures  JOHN Eating:  JOHN Grooming:  JOHN Bathing:  JOHN Upper Body Dressing:  JOHN Lower Body Dressing:  JOHN Toileting:    JOHN Bladder Management  Level of Assistance:  Frequency/Number of Accidents this Shift:    JOHN Bowel Management  Level of Assistance:  Frequency/Number of Accidents this Shift:    JOHN Bed/Chair/Wheelchair Transfer:  JOHN Toilet Transfer:  JOHN Tub/Shower Transfer:    Previously Documented Mode of Locomotion at Discharge:  [facility] Expected Mode of Locomotion at Discharge:  [facility] Walk/Wheelchair:  [facility] Stairs:    [facility] Comprehension:  [facility] Expression:  [facility] Social Interaction:  [facility] Problem Solving:  [facility] Memory:    Therapy Mode Minutes  Occupational Therapy:  Physical Therapy:  Speech Language Pathology: Individual: 45 minutes.    Discharge Functional Goals:    Signed by: Annalisa Calderón SLP

## 2023-09-14 NOTE — PROGRESS NOTES
Occupational Therapy: Individual: 90 minutes.    Physical Therapy:    Speech Language Pathology:    Signed by: Keli Downs, Occupational Therapist

## 2023-09-14 NOTE — THERAPY TREATMENT NOTE
Inpatient Rehabilitation - Speech Language Pathology Treatment Note  [facility]     Patient Name: Ofelia Knox  : 1945  MRN: [id number]  Today's Date: 2023               Admit Date: 2023     Ms. Knox was seen at bedside this pm in [facility] for continued speech/language/cognitive therapy. She was a/a and interactive, participative in all therapy tasks today. She was positioned upright and centered in bed.     Long Term Goal:  Patient will demonstrate adequate language skills to fully participate in adls at premorbid baseline level of function.       Short Term Goals:  1. Patient will match pictures/objects/words w/ 50% accuracy and min cues over three consecutive sessions.  Not addressed.      2. Patient will receptively identify verbally named objects/pictures/words w/ 50% accuracy and min cues over three consecutive sessions.   Not addressed.      3. Patient will answer yes/no questions of varying levels of difficulty w/ 75% accuracy and min cues over three consecutive sessions.   100% accuracy simple, mod, complex across today's session.      4. Patient will follow variable step directives w/ 50% accuracy and min cues over three consecutive sessions.   3-4 step directives with min cues at 75%.     5. Patient will provide biographical information relating to friends/self/family members  names/ID w/ 80% accuracy and min cues over three consecutive sessions.   100% in oe conversation. Named son and wife in appropriate connection. Named son's dog independently. Named all 4 grandchildren and identified 2 as nurses (correct per provided personal info sheet).      6. Patient will identify the appropriate object/picture/word following a verbal description w/ 50% accuracy over three consecutive sessions.   Not addressed.      7. Patient will expressively ID objects/pictures w/ 50% accuracy and min cues over three consecutive sessions.   Tangible objects 5/5 today from [employer] kit.      8. Patient will complete  automatic oe sentences w/ 50% accuracy and min cues over three consecutive sessions.   100% simple, 90% moderate with initial phonemic cue improved to 100%.     9. Patient will provide verbal descriptors of an object/picture/word w/ 50% accuracy and min cues over three consecutive sessions.   Not addressed.      *Additional goals to be added per pt progress  *Goals to be modified per pt progress     Patient was noted with fluent speech productions, Wernicke's like, with connected, fluent productions, out of context, jargon connections.     Visit Dx:  No diagnosis found.  Patient Active Problem List   Diagnosis    CVA (cerebral vascular accident)     Past Medical History:   Diagnosis Date    AMS (altered mental status)     Aphasia     CKD (chronic kidney disease)     COPD (chronic obstructive pulmonary disease)     CVA (cerebral vascular accident)     DM2 (diabetes mellitus, type 2)     HTN (hypertension)     Restrictive lung disease     Urinary tract infection due to ESBL Klebsiella      Past Surgical History:   Procedure Laterality Date    HYSTERECTOMY      JOINT REPLACEMENT       EDUCATION  The patient has been educated in the following areas:   Communication Impairment.    SLP Recommendation and Plan   Continue current plan of care to allow for maximal opportunities for improvement.     Thank you   LYNN Garcia.S., CCC/SLP                                                                                     Time Calculation:                        Annalisa Calderón, MS CCC-SLP  9/14/2023

## 2023-09-14 NOTE — THERAPY TREATMENT NOTE
Inpatient Rehabilitation - Physical Therapy Treatment Note        [facility]     Patient Name: Ofelia Knox  : 1945  MRN: [id number]    Today's Date: 2023                    Admit Date: 2023      Visit Dx:   No diagnosis found.    Patient Active Problem List   Diagnosis    CVA (cerebral vascular accident)       Past Medical History:   Diagnosis Date    AMS (altered mental status)     Aphasia     CKD (chronic kidney disease)     COPD (chronic obstructive pulmonary disease)     CVA (cerebral vascular accident)     DM2 (diabetes mellitus, type 2)     HTN (hypertension)     Restrictive lung disease     Urinary tract infection due to ESBL Klebsiella        Past Surgical History:   Procedure Laterality Date    HYSTERECTOMY      JOINT REPLACEMENT         PT ASSESSMENT (last 12 hours)       IRF PT Evaluation and Treatment       Row Name 23 1547          PT Time and Intention    Document Type daily treatment  -     Mode of Treatment individual therapy;physical therapy  -     Patient/Family/Caregiver Comments/Observations Pt reports fatigue with treatment BID.  -       Row Name 23 1547          General Information    Existing Precautions/Restrictions fall  contact isolation-ESBL UTI, O2 prn, aphasia  -RM     Limitations/Impairments aphasia;other (see comments)  processing/motor planning deficits.  -       Row Name 23 1547          Pain Scale: FACES Pre/Post-Treatment    Pain: FACES Scale, Pretreatment 0-->no hurt  -     Posttreatment Pain Rating 4-->hurts little more  -       Row Name 23 1547          Cognition/Psychosocial    Affect/Mental Status (Cognition) --  awake and alert, she appears to have aphasia, difficulty following commands  -RM     Follows Commands (Cognition) verbal cues/prompting required;physical/tactile prompts required;repetition of directions required  -     Personal Safety Interventions gait belt;nonskid shoes/slippers when out of bed;fall prevention  program maintained  -RM       Row Name 09/14/23 1547          Bed Mobility    Supine-Sit Baker (Bed Mobility) verbal cues;nonverbal cues (demo/gesture);contact guard  -RM     Sit-Supine Baker (Bed Mobility) minimum assist (75% patient effort)  -       Row Name 09/14/23 1547          Transfer Assessment/Treatment    Transfers toilet transfer  -       Row Name 09/14/23 1547          Bed-Chair Transfer    Bed-Chair Baker (Transfers) minimum assist (75% patient effort);verbal cues;nonverbal cues (demo/gesture);contact guard  -RM     Assistive Device (Bed-Chair Transfers) wheelchair;walker, front-wheeled  -RM       Row Name 09/14/23 1547          Chair-Bed Transfer    Chair-Bed Baker (Transfers) verbal cues;nonverbal cues (demo/gesture);contact guard;minimum assist (75% patient effort)  -RM     Assistive Device (Chair-Bed Transfers) wheelchair  -       Row Name 09/14/23 1547          Sit-Stand Transfer    Sit-Stand [place] (Transfers) minimum assist (75% patient effort);verbal cues;nonverbal cues (demo/gesture);contact guard  -RM     Assistive Device (Sit-Stand Transfers) walker, front-wheeled  -RM       Row Name 09/14/23 1547          Stand-Sit Transfer    Stand-Sit [place] (Transfers) minimum assist (75% patient effort);verbal cues;nonverbal cues (demo/gesture);contact guard  -RM     Assistive Device (Stand-Sit Transfers) walker, front-wheeled  -RM       Row Name 09/14/23 1547          Toilet Transfer    Type (Toilet Transfer) stand pivot/stand step  -RM     [place] Level (Toilet Transfer) minimum assist (75% patient effort);contact guard  -RM     Assistive Device (Toilet Transfer) wheelchair;grab bars/safety frame  -       Row Name 09/14/23 1547          Gait/Stairs (Locomotion)    [place] Level (Gait) contact guard;verbal cues;nonverbal cues (demo/gesture);1 person to manage equipment  -RM     Assistive Device (Gait) walker, front-wheeled  -RM     Distance in  Feet (Gait) 160X2  -RM     Deviations/Abnormal Patterns (Gait) beatrice decreased;gait speed decreased;stride length decreased  -RM     Bilateral Gait Deviations forward flexed posture  -RM     Comment, (Gait/Stairs) Assistance required for navigating obstacles and RW management.  -RM       Row Name 09/14/23 1547          Safety Issues, Functional Mobility    Impairments Affecting Function (Mobility) balance;cognition;endurance/activity tolerance;pain;strength  -RM       Row Name 09/14/23 1547          Motor Skills    Therapeutic Exercise aerobic  -RM       Row Name 09/14/23 1547          Hip (Therapeutic Exercise)    Hip Strengthening (Therapeutic Exercise) bilateral;flexion;extension;aBduction;aDduction;heel slides;marching while seated;sitting;resistance band;green;2 sets;10 repetitions  Verbal and tactile cuing required for completion and proper technique  -RM       Row Name 09/14/23 1547          Knee (Therapeutic Exercise)    Knee Strengthening (Therapeutic Exercise) bilateral;flexion;extension;heel slides;marching while seated;LAQ (long arc quad);hamstring curls;sitting;resistance band;green;2 sets;10 repetitions  Verbal and tactile cuing required for completion and proper technique  -RM       Row Name 09/14/23 1547          Ankle (Therapeutic Exercise)    Ankle Strengthening (Therapeutic Exercise) bilateral;dorsiflexion;plantarflexion;sitting;2 sets;10 repetitions  Verbal and tactile cuing required for completion and proper technique  -RM       Row Name 09/14/23 1547          Aerobic Exercise    Type (Aerobic Exercise) recumbent stationary bike  -RM     Time Performed (Aerobic Exercise) 5  -RM     Comment, Aerobic Exercise (Therapeutic Exercise) LVL 1.0  -RM       Row Name 09/14/23 1547          Positioning and Restraints    Pre-Treatment Position in bed  BID  -RM     In Bed supine;call light within reach;encouraged to call for assist;exit alarm on  PM  -RM     In Wheelchair sitting;call light within  reach;encouraged to call for assist;exit alarm on  AM  -       Row Name 09/14/23 1547          Therapy Assessment/Plan (PT)    Patient's Goals For Discharge return home  -       Row Name 09/14/23 1547          Therapy Assessment/Plan (PT)    Rehab Potential/Prognosis (PT) adequate, monitor progress closely  -RM     Frequency of Treatment (PT) 5 times per week  -RM     Estimated Duration of Therapy (PT) 2 weeks  -RM     Problem List (PT) balance;cognition;mobility;strength;pain;communication  -     Activity Limitations Related to Problem List (PT) unable to ambulate safely;unable to transfer safely  -       Row Name 09/14/23 1547          Daily Progress Summary (PT)    Functional Goal Overall Progress (PT) progressing toward functional goals as expected  -     Daily Progress Summary (PT) Fair functional mobility and ambulation quality noted this date; assistance required for safety. Pt demonstrates processing deficits and requires verbal and tactile cuing for completing tasks. Decreased activity tolerance observed. Conitnued skilled care required for further strengthening and improvements to ensure maximum safe function upon D/C.  -RM     Impairments Still Limiting Function (PT) communication deficit;functional activity tolerance impairment;strength deficit  -     Recommendations (PT) Continue per current POC  -       Row Name 09/14/23 1547          Therapy Plan Review/Discharge Plan (PT)    Anticipated Equipment Needs at Discharge (PT Eval) --  tbd  -RM     Anticipated Discharge Disposition (PT) home with assist;home with home health;home with outpatient therapy services  -       Row Name 09/14/23 1547          IRF PT Goals    Bed Mobility Goal Selection (PT-IRF) bed mobility, PT goal 1  -RM     Transfer Goal Selection (PT-IRF) transfers, PT goal 1  -RM     Gait (Walking Locomotion) Goal Selection (PT-IRF) gait, PT goal 1  -RM       Row Name 09/14/23 1547          Bed Mobility Goal 1 (PT-IRF)     Activity/Assistive Device (Bed Mobility Goal 1, PT-IRF) sit to supine/supine to sit  -RM     [place] Level (Bed Mobility Goal 1, PT-IRF) supervision required  -RM     Time Frame (Bed Mobility Goal 1, PT-IRF) by discharge  -RM       Row Name 09/14/23 1547          Transfer Goal 1 (PT-IRF)    Activity/Assistive Device (Transfer Goal 1, PT-IRF) sit-to-stand/stand-to-sit;bed-to-chair/chair-to-bed  -RM     [place] (Transfer Goal 1, PT-IRF) supervision required  -RM     Time Frame (Transfer Goal 1, PT-IRF) by discharge  -RM       Row Name 09/14/23 1547          Gait/Walking Locomotion Goal 1 (PT-IRF)    Activity/Assistive Device (Gait/Walking Locomotion Goal 1, PT-IRF) walker, rolling  -RM     Gait/Walking Locomotion Distance Goal 1 (PT-IRF) 300'  -RM     [place] (Gait/Walking Locomotion Goal 1, PT-IRF) supervision required  -RM     Time Frame (Gait/Walking Locomotion Goal 1, PT-IRF) by discharge  -RM               User Key  (r) = Recorded By, (t) = Taken By, (c) = Cosigned By      Initials Name Provider Type    Karis Paz, PTA Physical Therapist Assistant                     Physical Therapy Education       Title: PT OT SLP Therapies (Done)       Topic: Physical Therapy (Done)       Point: Mobility training (Done)       Learning Progress Summary             Patient Acceptance, E,TB, VU by RM at 9/14/2023 1555    Acceptance, E,D, VU,NR by RG at 9/13/2023 1528    Acceptance, TB, NR by DL at 9/12/2023 2003    Acceptance, E, VU,NR by LB at 9/12/2023 1135                         Point: Home exercise program (Done)       Learning Progress Summary             Patient Acceptance, E,TB, VU by RM at 9/14/2023 1555    Acceptance, E,D, VU,NR by RG at 9/13/2023 1528    Acceptance, TB, NR by DL at 9/12/2023 2003    Acceptance, E, VU,NR by LB at 9/12/2023 1135                         Point: Body mechanics (Done)       Learning Progress Summary             Patient Acceptance, E,TB, VU by RM at  9/14/2023 1555    Acceptance, E,D, VU,NR by RG at 9/13/2023 1528    Acceptance, TB, NR by DL at 9/12/2023 2003    Acceptance, E, VU,NR by LB at 9/12/2023 1135                         Point: Precautions (Done)       Learning Progress Summary             Patient Acceptance, E,TB, VU by RM at 9/14/2023 1555    Acceptance, E,D, VU,NR by RG at 9/13/2023 1528    Acceptance, TB, NR by DL at 9/12/2023 2003    Acceptance, E, VU,NR by LB at 9/12/2023 1135                                         User Key       Initials Effective Dates Name Provider Type Discipline    LB 06/16/21 -  Andra Stewart, PT Physical Therapist PT    RM 02/17/23 -  Karis Obrien, PTA Physical Therapist Assistant PT    RG 06/16/21 -  Twan Cunningham PTA Physical Therapist Assistant PT    DL 03/16/22 -  Carolyn Ramirez, RN Registered Nurse Nurse                    PT Recommendation and Plan    Frequency of Treatment (PT): 5 times per week  Anticipated Equipment Needs at Discharge (PT Eval):  (tbd)  Daily Progress Summary (PT)  Functional Goal Overall Progress (PT): progressing toward functional goals as expected  Daily Progress Summary (PT): Fair functional mobility and ambulation quality noted this date; assistance required for safety. Pt demonstrates processing deficits and requires verbal and tactile cuing for completing tasks. Decreased activity tolerance observed. Conitnued skilled care required for further strengthening and improvements to ensure maximum safe function upon D/C.  Impairments Still Limiting Function (PT): communication deficit, functional activity tolerance impairment, strength deficit  Recommendations (PT): Continue per current POC               Time Calculation:      PT Charges       Row Name 09/14/23 1558 09/14/23 1556          Time Calculation    Start Time 1245  -RM 1045  -RM     Stop Time 1330  -RM 1130  -RM     Time Calculation (min) 45 min  -RM 45 min  -RM     PT Received On 09/14/23  -RM 09/14/23  -RM     PT  - Next Appointment 09/15/23  -RM 09/14/23  -RM     PT Goal Re-Cert Due Date 09/19/23  -RM 09/19/23  -RM        Time Calculation- PT    Total Timed Code Minutes- PT 45 minute(s)  -RM 45 minute(s)  -RM               User Key  (r) = Recorded By, (t) = Taken By, (c) = Cosigned By      Initials Name Provider Type    Karis Paz PTA Physical Therapist Assistant                    Therapy Charges for Today       Code Description Service Date Service Provider Modifiers Qty    47167338598 HC GAIT TRAINING EA 15 MIN 9/14/2023 Karis Obrien PTA GP, CQ 2    28070300531 HC PT THER PROC EA 15 MIN 9/14/2023 Karis Obrien PTA GP, CQ 2    24475886239 HC PT THERAPEUTIC ACT EA 15 MIN 9/14/2023 Karis Obrien PTA GP, CQ 2                     Karis Obrien PTA  9/14/2023

## 2023-09-14 NOTE — THERAPY TREATMENT NOTE
Inpatient Rehabilitation - Occupational Therapy Treatment Note     [place]     Patient Name: Ofelia Knox  : 1945  MRN: [id number]    Today's Date: 2023                 Admit Date: 2023       No diagnosis found.    Patient Active Problem List   Diagnosis    CVA (cerebral vascular accident)       Past Medical History:   Diagnosis Date    AMS (altered mental status)     Aphasia     CKD (chronic kidney disease)     COPD (chronic obstructive pulmonary disease)     CVA (cerebral vascular accident)     DM2 (diabetes mellitus, type 2)     HTN (hypertension)     Restrictive lung disease     Urinary tract infection due to ESBL Klebsiella        Past Surgical History:   Procedure Laterality Date    HYSTERECTOMY      JOINT REPLACEMENT               IRF OT ASSESSMENT FLOWSHEET (last 12 hours)       IRF OT Evaluation and Treatment       Row Name 23 1400          OT Time and Intention    Document Type daily treatment  -     Mode of Treatment individual therapy;occupational therapy  -     Patient Effort good  -       Row Name 23 1400          General Information    Patient/Family/Caregiver Comments/Observations patient agreeable to therapy. patient tolerated therapy well with no complaints.  -     Existing Precautions/Restrictions fall  -       Row Name 23 1400          Cognition/Psychosocial    Affect/Mental Status (Cognition) confused  -     Orientation Status (Cognition) unable/difficult to assess  -     Follows Commands (Cognition) increased processing time needed;physical/tactile prompts required;repetition of directions required;verbal cues/prompting required  -       Row Name 23 1400          Bathing    [place] Level (Bathing) minimum assist (75% patient effort);verbal cues  -     Position (Bathing) edge of bed sitting  -       Row Name 23 1400          Upper Body Dressing    [place] Level (Upper Body Dressing) contact guard;supervision;verbal  cues  -     Position (Upper Body Dressing) edge of bed sitting  -       Row Name 09/14/23 1400          Lower Body Dressing    [place] Level (Lower Body Dressing) minimum assist (75% patient effort);verbal cues  -     Position (Lower Body Dressing) edge of bed sitting  -[facility] Name 09/14/23 1400          Grooming    [place] Level (Grooming) set up;supervision;verbal cues  -[facility] Name 09/14/23 1400          Bed-Chair Transfer    Bed-Chair [place] (Transfers) minimum assist (75% patient effort);verbal cues;contact guard  -[facility] Name 09/14/23 1400          Chair-Bed Transfer    Chair-Bed [place] (Transfers) minimum assist (75% patient effort);contact guard;verbal cues  -[facility] Name 09/14/23 1400          Motor Skills    Motor Skills functional endurance;coordination;neuro-muscular function  -     Therapeutic Exercise shoulder;elbow/forearm;wrist;hand  ROM, gmc,fmc, strengthening  -[facility] Name 09/14/23 1400          Positioning and Restraints    Post Treatment Position bed  -AH     In Bed supine;call light within reach;encouraged to call for assist;with family/caregiver  -               User Key  (r) = Recorded By, (t) = Taken By, (c) = Cosigned By      Initials Name Effective Dates     Althea Downs, OT 07/11/23 -                              OT Recommendation and Plan                         Time Calculation:      Time Calculation- OT       Row Name 09/14/23 1454             Time Calculation-     OT Start Time 0745  -      OT Stop Time 0915  -      OT Time Calculation (min) 90 min  -                User Key  (r) = Recorded By, (t) = Taken By, (c) = Cosigned By      Initials Name Provider Type     Althea Downs, OT Occupational Therapist                  Therapy Charges for Today       Code Description Service Date Service Provider Modifiers Qty    98255871217  OT SELF CARE/MGMT/TRAIN EA 15 MIN 9/14/2023 Althea Downs  Keli, OT GO 3    [id number]  OT NEUROMUSC RE EDUCATION EA 15 MIN 9/14/2023 Althea Downs OT GO 2    [id number]  OT THERAPEUTIC ACT EA 15 MIN 9/14/2023 Althea Downs OT GO 1                     Althea Downs OT  9/14/2023

## 2023-09-14 NOTE — PROGRESS NOTES
Occupational Therapy:    Physical Therapy: Individual: 90 minutes.    Speech Language Pathology:    Signed by: Karis Obrien PTA

## 2023-09-14 NOTE — PROGRESS NOTES
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    Comorbidities:  Rank Code      Description    1    N39.0     Urinary tract infection, site not specified  2    I63.9     Cerebral infarction, unspecified  3    R47.01    Aphasia  4    E11.9     Type 2 diabetes mellitus without complications  5    I10       Essential (primary) hypertension  6    D75.1     Secondary polycythemia  7    J44.9     Chronic obstructive pulmonary disease,                 unspecified  8    Z16.12    Extended spectrum beta lactamase (ESBL)                 resistance  9    R41.89    Other symptoms and signs involving cognitive                 functions and awareness  10   K59.00    Constipation, unspecified  11   T50.1X5A  Adverse effect of loop [high-ceiling]                 diuretics, initial encounter  12   Z99.81    Dependence on supplemental oxygen  13   Z79.82    Long term (current) use of aspirin    Height on Admission:  Weight on Admission:    Are there any arthritis conditions recorded for Impairment Group, Etiologic  Diagnosis, or Comorbid Conditions that meet all of the regulatory requirements  for IRF classification (in 42 .29(b)(2)(x), (xi), and xii))?  No    JOHN Bladder Accidents:   - Accidents.    JOHN Bowel Accident:  -Accidents.    Signed by: Althea Batista Nurse

## 2023-09-14 NOTE — PLAN OF CARE
Goal Outcome Evaluation:  Plan of Care Reviewed With: patient resting in bed, reports continues to participate in therapy. Will continue to monitor.

## 2023-09-14 NOTE — SIGNIFICANT NOTE
09/14/23 1024   Plan   Plan Team conference held today.  Spoke to son and pt about how pt is doing in therapy and recommendation for discharge on 10-3-23.  Pt and son aware this date can change if needed based on how she improves in therapy, how she is medically and with insurance approval.   Son says pt has the option to go to his home at discharge.  Will follow.   Patient/Family in Agreement with Plan yes

## 2023-09-14 NOTE — PROGRESS NOTES
"Assisted By: Patient seen while working with the occupational therapist    CC: Follow-up on CVA    Interview History/HPI: Patient is working with occupational therapy and mood appears good at current time.  She appears to be more lucid, she did remember her son's name.  She states breakfast was \"okay\".  No acute complaints, no abdominal pain no shortness of breath no cough.  She did have 4-5 bowel movements through the night that were large, nursing was concerned about the possibility of C. difficile, even the patient had been on cathartics, her white count gone up so I did check a C. difficile which was negative.  Completed her meropenem course for her ESBL UTI          Current Hospital Meds:  amLODIPine, 5 mg, Oral, Q24H  aspirin, 81 mg, Oral, Daily  buPROPion XL, 150 mg, Oral, Daily  cetirizine, 5 mg, Oral, Daily  isosorbide mononitrate, 30 mg, Oral, Q24H  metFORMIN, 500 mg, Oral, BID With Meals  metoprolol succinate XL, 25 mg, Oral, Q24H  montelukast, 10 mg, Oral, Nightly  nystatin, 1 application , Topical, Q12H  oxybutynin XL, 10 mg, Oral, Daily  pantoprazole, 40 mg, Oral, QAM AC  pravastatin, 40 mg, Oral, Daily  tiotropium bromide monohydrate, 2 puff, Inhalation, Daily - RT  venlafaxine XR, 75 mg, Oral, Daily With Breakfast         Vitals:    09/14/23 0729   BP: 142/84   Pulse: 84   Resp: 18   Temp: 98.5 °F (36.9 °C)   SpO2: 93%         Intake/Output Summary (Last 24 hours) at 9/14/2023 0926  Last data filed at 9/13/2023 2300  Gross per 24 hour   Intake 720 ml   Output --   Net 720 ml       EXAM: Lungs are clear anterior and posterior.  She is working with pegboard, heart regular rate and rhythm, she is in a chair but limited abdominal exam shows it to be soft and nontender.  She has no significant edema.  Skin warm and dry no distress      Diet: Cardiac Diets, Diabetic Diets; Healthy Heart (2-3 Na+); Consistent Carbohydrate; Texture: Regular Texture (IDDSI 7); Fluid Consistency: Thin (IDDSI 0)        LABS: "     Lab Results (last 48 hours)       Procedure Component Value Units Date/Time    C-reactive Protein [[id number]]  (Abnormal) Collected: 09/14/23 0344    Specimen: Blood Updated: 09/14/23 0801     C-Reactive Protein 1.02 mg/dL     Clostridioides difficile Toxin - Stool, Per Rectum [[id number]] Collected: 09/14/23 0517    Specimen: Stool from Per Rectum Updated: 09/14/23 0624    Narrative:      The following orders were created for panel order Clostridioides difficile Toxin - Stool, Per Rectum.  Procedure                               Abnormality         Status                     ---------                               -----------         ------                     Clostridioides difficile...[[id number]]                      Final result                 Please view results for these tests on the individual orders.    Clostridioides difficile Toxin, PCR - Stool, Per Rectum [[id number]] Collected: 09/14/23 0517    Specimen: Stool from Per Rectum Updated: 09/14/23 0624     Toxigenic C. difficile by PCR Negative     027 Toxin Presumptive Negative    Narrative:      The result indicates the absence of toxigenic C. difficile from stool specimen.     POC Glucose Once [[id number]]  (Normal) Collected: 09/14/23 0601    Specimen: Blood Updated: 09/14/23 0613     Glucose 120 mg/dL     Basic Metabolic Panel [[id number]]  (Abnormal) Collected: 09/14/23 0344    Specimen: Blood Updated: 09/14/23 0444     Glucose 139 mg/dL      BUN 35 mg/dL      Creatinine 1.11 mg/dL      Sodium 135 mmol/L      Potassium 4.4 mmol/L      Comment: Specimen hemolyzed.  Results may be affected.        Chloride 101 mmol/L      CO2 24.5 mmol/L      Calcium 9.3 mg/dL      BUN/Creatinine Ratio 31.5     Anion Gap 9.5 mmol/L      eGFR 51.6 mL/min/1.73     Narrative:      GFR Normal >60  Chronic Kidney Disease <60  Kidney Failure <15    The GFR formula is only valid for adults with stable renal function between ages 18 and 70.    CBC & Differential [[id number]]   (Abnormal) Collected: 09/14/23 0344    Specimen: Blood Updated: 09/14/23 0441    Narrative:      The following orders were created for panel order CBC & Differential.  Procedure                               Abnormality         Status                     ---------                               -----------         ------                     CBC Auto Differential[[id number]]        Abnormal            Final result               Scan Slide[[id number]]                                       Final result                 Please view results for these tests on the individual orders.    Scan Slide [[id number]] Collected: 09/14/23 0344    Specimen: Blood Updated: 09/14/23 0441     Anisocytosis Slight/1+     Hypochromia Mod/2+     Platelet Morphology Normal    CBC Auto Differential [[id number]]  (Abnormal) Collected: 09/14/23 0344    Specimen: Blood Updated: 09/14/23 0441     WBC 15.74 10*3/mm3      RBC 5.41 10*6/mm3      Hemoglobin 15.2 g/dL      Hematocrit 53.0 %      MCV 98.0 fL      MCH 28.1 pg      MCHC 28.7 g/dL      RDW 14.8 %      RDW-SD 53.8 fl      MPV 11.9 fL      Platelets 290 10*3/mm3      Neutrophil % 73.6 %      Lymphocyte % 11.8 %      Monocyte % 13.0 %      Eosinophil % 0.8 %      Basophil % 0.6 %      Immature Grans % 0.2 %      Neutrophils, Absolute 11.60 10*3/mm3      Lymphocytes, Absolute 1.86 10*3/mm3      Monocytes, Absolute 2.04 10*3/mm3      Eosinophils, Absolute 0.12 10*3/mm3      Basophils, Absolute 0.09 10*3/mm3      Immature Grans, Absolute 0.03 10*3/mm3      nRBC 0.0 /100 WBC     POC Glucose Once [[id number]]  (Normal) Collected: 09/14/23 0251    Specimen: Blood Updated: 09/14/23 0300     Glucose 123 mg/dL     POC Glucose Once [[id number]]  (Normal) Collected: 09/13/23 2344    Specimen: Blood Updated: 09/13/23 2352     Glucose 112 mg/dL     POC Glucose Once [[id number]]  (Normal) Collected: 09/13/23 1951    Specimen: Blood Updated: 09/13/23 2003     Glucose 107 mg/dL     POC Glucose Once [[id number]]   (Normal) Collected: 09/13/23 1604    Specimen: Blood Updated: 09/13/23 1619     Glucose 101 mg/dL     POC Glucose Once [[id number]]  (Normal) Collected: 09/13/23 1058    Specimen: Blood Updated: 09/13/23 1105     Glucose 76 mg/dL     POC Glucose Once [[id number]]  (Abnormal) Collected: 09/13/23 0617    Specimen: Blood Updated: 09/13/23 0625     Glucose 174 mg/dL     POC Glucose Once [[id number]]  (Abnormal) Collected: 09/13/23 0555    Specimen: Blood Updated: 09/13/23 0602     Glucose 56 mg/dL     Basic Metabolic Panel [[id number]]  (Abnormal) Collected: 09/13/23 0108    Specimen: Blood Updated: 09/13/23 0157     Glucose 76 mg/dL      BUN 36 mg/dL      Creatinine 1.22 mg/dL      Sodium 140 mmol/L      Potassium 3.9 mmol/L      Comment: Slight hemolysis detected by analyzer. Results may be affected.        Chloride 100 mmol/L      CO2 32.2 mmol/L      Calcium 9.3 mg/dL      BUN/Creatinine Ratio 29.5     Anion Gap 7.8 mmol/L      eGFR 46.1 mL/min/1.73     Narrative:      GFR Normal >60  Chronic Kidney Disease <60  Kidney Failure <15    The GFR formula is only valid for adults with stable renal function between ages 18 and 70.    POC Glucose Once [[id number]]  (Normal) Collected: 09/12/23 1900    Specimen: Blood Updated: 09/12/23 1907     Glucose 105 mg/dL     POC Glucose Once [[id number]]  (Normal) Collected: 09/12/23 1602    Specimen: Blood Updated: 09/12/23 1618     Glucose 116 mg/dL     POC Glucose Once [[id number]]  (Normal) Collected: 09/12/23 1104    Specimen: Blood Updated: 09/12/23 1111     Glucose 125 mg/dL                  Radiology:    Imaging Results (Last 72 Hours)       Procedure Component Value Units Date/Time    XR Chest 1 View [[id number]] Collected: 09/14/23 0754     Updated: 09/14/23 0756    Narrative:      XR CHEST 1 VW-     CLINICAL INDICATION: eval leukocytosis        COMPARISON: None available      TECHNIQUE: Single frontal view of the chest.     FINDINGS:      LUNGS: Lungs are adequately aerated.       HEART AND MEDIASTINUM: Heart and mediastinal contours are unremarkable        SKELETON: Bony and soft tissue structures are unremarkable.             Impression:      No radiographic evidence of acute cardiac or pulmonary disease.     This report was finalized on 9/14/2023 7:54 AM by Dr. Krunal Colvin MD.               Results for orders placed during the hospital encounter of 09/11/23    Adult Transthoracic Echo Complete W/ Cont if Necessary Per Protocol    Interpretation Summary    Left ventricular systolic function is mildly decreased. Calculated left ventricular EF = 44% Left ventricular ejection fraction appears to be 41 - 45%.    Left ventricular wall thickness is consistent with mild eccentric hypertrophy.    Left ventricular diastolic function is consistent with (grade I) impaired relaxation.    The left atrial cavity is moderately dilated.      Assessment/Plan:   Status post left putamen and internal capsule CVA, on aspirin and statin.  Patient continues to undergo speech, occupational and physical therapy sessions.  With PT, she is min assist for transfers and bed mobility, she walked 160 feet with a rolling walker, she is requiring maximum assistance with OT with lower body dressing, min for upper body dressing.  With speech therapy she continues to work with her dysarthria and cognitive.    Leukocytosis, work-up has been undertaken, I am repeating a urine, I did do a chest x-ray and reviewed the image, to my viewing it was clear, it was also clear by radiology read.  C. difficile negative, the diarrhea was probably from cathartics.  CRP was not that elevated, doubt significance of the leukocytosis but will monitor intermittently.    Hypertension, controlled, follow on metoprolol and amlodipine however due to reduced EF, I am going to adjust her medication.    Hypoglycemia, I stopped her sulfonylurea, she is on metformin alone now.  Glucoses remain controlled but noted further hypoglycemia    HFrEF with  a component of diastolic heart failure.  With an EF of 41 to 45%, appears compensated, patient will be started on low-dose valsartan, I am stopping Norvasc, if blood pressure and creatinine tolerate this could switch to Entresto.  Patient was on Lasix at home however she does not have edema at this time, creatinine was creeping up so I stopped this.  We will monitor fluid status, creatinine is improved today.    DVT prophylaxis, SCUDs    Polycythemia, check iron studies in the a.m.  Seems to be persistent.    ESBL UTI, treated    Constipation, relieved    Intertrigo, nystatin cream    Jim Appiah MD

## 2023-09-14 NOTE — PROGRESS NOTES
Rehabilitation Nursing  Inpatient Rehabilitation Plan of Care Note    Plan of Care  Mobility    Toilet Transfers (Active)  Current Status (9/11/2023 7:00:00 PM): PATIENT WILL HAVE NO TRANSFER DIFFICULTY    Weekly Goal: NO DIFFICULT WITH TRANSFERS  Discharge Goal: TRANSFERS INDEPENDENTLY    Pain    Pain Management (Active)  Current Status (9/11/2023 7:00:00 PM): PATIENT WILL VOICE NO PAIN  Weekly Goal: PATIENT WILL BE PAIN FREE  Discharge Goal: FREE FROM PAIN    Safety    Potential for Injury (Active)  Current Status (9/11/2023 7:00:00 PM): PATIENT WILL HAVE NO INJURY THIS STAY  Weekly Goal: NO INJURY  Discharge Goal: PATIENT WILL DISCHARGE    Body Systems    Integumentary (Active)  Current Status (9/11/2023 7:00:00 PM): PATIENT WILL HAVE NO SKIN  BREAKDOWN THIS  STAY  Weekly Goal: SKIN WILL REMAIN INTACT.  Discharge Goal: NO SKIN ISSUES.    Signed by: Dl Gary RN

## 2023-09-15 LAB
ANION GAP SERPL CALCULATED.3IONS-SCNC: 10 MMOL/L (ref 5–15)
BASOPHILS # BLD AUTO: 0.05 10*3/MM3 (ref 0–0.2)
BASOPHILS NFR BLD AUTO: 0.4 % (ref 0–1.5)
BUN SERPL-MCNC: 31 MG/DL (ref 8–23)
BUN/CREAT SERPL: 31 (ref 7–25)
CALCIUM SPEC-SCNC: 9.1 MG/DL (ref 8.6–10.5)
CHLORIDE SERPL-SCNC: 101 MMOL/L (ref 98–107)
CO2 SERPL-SCNC: 29 MMOL/L (ref 22–29)
CREAT SERPL-MCNC: 1 MG/DL (ref 0.57–1)
DEPRECATED RDW RBC AUTO: 50.8 FL (ref 37–54)
EGFRCR SERPLBLD CKD-EPI 2021: 58.5 ML/MIN/1.73
EOSINOPHIL # BLD AUTO: 0.26 10*3/MM3 (ref 0–0.4)
EOSINOPHIL NFR BLD AUTO: 2.2 % (ref 0.3–6.2)
ERYTHROCYTE [DISTWIDTH] IN BLOOD BY AUTOMATED COUNT: 14.7 % (ref 12.3–15.4)
FERRITIN SERPL-MCNC: 60.92 NG/ML (ref 13–150)
GLUCOSE BLDC GLUCOMTR-MCNC: 110 MG/DL (ref 70–130)
GLUCOSE BLDC GLUCOMTR-MCNC: 110 MG/DL (ref 70–130)
GLUCOSE BLDC GLUCOMTR-MCNC: 116 MG/DL (ref 70–130)
GLUCOSE BLDC GLUCOMTR-MCNC: 147 MG/DL (ref 70–130)
GLUCOSE SERPL-MCNC: 113 MG/DL (ref 65–99)
HCT VFR BLD AUTO: 48.5 % (ref 34–46.6)
HGB BLD-MCNC: 14.4 G/DL (ref 12–15.9)
IMM GRANULOCYTES # BLD AUTO: 0.04 10*3/MM3 (ref 0–0.05)
IMM GRANULOCYTES NFR BLD AUTO: 0.3 % (ref 0–0.5)
IRON 24H UR-MRATE: 48 MCG/DL (ref 37–145)
IRON SATN MFR SERPL: 15 % (ref 20–50)
LYMPHOCYTES # BLD AUTO: 2.2 10*3/MM3 (ref 0.7–3.1)
LYMPHOCYTES NFR BLD AUTO: 18.4 % (ref 19.6–45.3)
MCH RBC QN AUTO: 27.5 PG (ref 26.6–33)
MCHC RBC AUTO-ENTMCNC: 29.7 G/DL (ref 31.5–35.7)
MCV RBC AUTO: 92.7 FL (ref 79–97)
MONOCYTES # BLD AUTO: 1.41 10*3/MM3 (ref 0.1–0.9)
MONOCYTES NFR BLD AUTO: 11.8 % (ref 5–12)
NEUTROPHILS NFR BLD AUTO: 66.9 % (ref 42.7–76)
NEUTROPHILS NFR BLD AUTO: 8.02 10*3/MM3 (ref 1.7–7)
NRBC BLD AUTO-RTO: 0 /100 WBC (ref 0–0.2)
PLATELET # BLD AUTO: 294 10*3/MM3 (ref 140–450)
PMV BLD AUTO: 12 FL (ref 6–12)
POTASSIUM SERPL-SCNC: 3.6 MMOL/L (ref 3.5–5.2)
RBC # BLD AUTO: 5.23 10*6/MM3 (ref 3.77–5.28)
SODIUM SERPL-SCNC: 140 MMOL/L (ref 136–145)
TIBC SERPL-MCNC: 325 MCG/DL (ref 298–536)
TRANSFERRIN SERPL-MCNC: 218 MG/DL (ref 200–360)
WBC NRBC COR # BLD: 11.98 10*3/MM3 (ref 3.4–10.8)

## 2023-09-15 PROCEDURE — 97112 NEUROMUSCULAR REEDUCATION: CPT

## 2023-09-15 PROCEDURE — 97530 THERAPEUTIC ACTIVITIES: CPT

## 2023-09-15 PROCEDURE — 80048 BASIC METABOLIC PNL TOTAL CA: CPT | Performed by: INTERNAL MEDICINE

## 2023-09-15 PROCEDURE — 97535 SELF CARE MNGMENT TRAINING: CPT | Performed by: OCCUPATIONAL THERAPIST

## 2023-09-15 PROCEDURE — 99231 SBSQ HOSP IP/OBS SF/LOW 25: CPT | Performed by: INTERNAL MEDICINE

## 2023-09-15 PROCEDURE — 85025 COMPLETE CBC W/AUTO DIFF WBC: CPT | Performed by: INTERNAL MEDICINE

## 2023-09-15 PROCEDURE — 97116 GAIT TRAINING THERAPY: CPT

## 2023-09-15 PROCEDURE — 82948 REAGENT STRIP/BLOOD GLUCOSE: CPT

## 2023-09-15 PROCEDURE — 82728 ASSAY OF FERRITIN: CPT | Performed by: INTERNAL MEDICINE

## 2023-09-15 PROCEDURE — 94799 UNLISTED PULMONARY SVC/PX: CPT

## 2023-09-15 PROCEDURE — 97110 THERAPEUTIC EXERCISES: CPT

## 2023-09-15 PROCEDURE — 94760 N-INVAS EAR/PLS OXIMETRY 1: CPT

## 2023-09-15 PROCEDURE — 83540 ASSAY OF IRON: CPT | Performed by: INTERNAL MEDICINE

## 2023-09-15 PROCEDURE — 92507 TX SP LANG VOICE COMM INDIV: CPT

## 2023-09-15 PROCEDURE — 97530 THERAPEUTIC ACTIVITIES: CPT | Performed by: OCCUPATIONAL THERAPIST

## 2023-09-15 PROCEDURE — 97110 THERAPEUTIC EXERCISES: CPT | Performed by: OCCUPATIONAL THERAPIST

## 2023-09-15 PROCEDURE — 84466 ASSAY OF TRANSFERRIN: CPT | Performed by: INTERNAL MEDICINE

## 2023-09-15 RX ADMIN — PANTOPRAZOLE SODIUM 40 MG: 40 TABLET, DELAYED RELEASE ORAL at 05:55

## 2023-09-15 RX ADMIN — PRAVASTATIN SODIUM 40 MG: 40 TABLET ORAL at 08:58

## 2023-09-15 RX ADMIN — ISOSORBIDE MONONITRATE 30 MG: 30 TABLET, EXTENDED RELEASE ORAL at 08:58

## 2023-09-15 RX ADMIN — VENLAFAXINE HYDROCHLORIDE 75 MG: 75 CAPSULE, EXTENDED RELEASE ORAL at 08:57

## 2023-09-15 RX ADMIN — MONTELUKAST SODIUM 10 MG: 10 TABLET, COATED ORAL at 20:02

## 2023-09-15 RX ADMIN — CETIRIZINE HYDROCHLORIDE 5 MG: 10 TABLET, FILM COATED ORAL at 08:57

## 2023-09-15 RX ADMIN — ASPIRIN 81 MG: 81 TABLET, COATED ORAL at 08:57

## 2023-09-15 RX ADMIN — METFORMIN HYDROCHLORIDE 500 MG: 500 TABLET ORAL at 17:10

## 2023-09-15 RX ADMIN — VALSARTAN 40 MG: 80 TABLET, FILM COATED ORAL at 08:58

## 2023-09-15 RX ADMIN — METFORMIN HYDROCHLORIDE 500 MG: 500 TABLET ORAL at 08:58

## 2023-09-15 RX ADMIN — NYSTATIN 1 APPLICATION: 100000 CREAM TOPICAL at 20:03

## 2023-09-15 RX ADMIN — METOPROLOL SUCCINATE 25 MG: 25 TABLET, EXTENDED RELEASE ORAL at 08:58

## 2023-09-15 RX ADMIN — NYSTATIN 1 APPLICATION: 100000 CREAM TOPICAL at 08:58

## 2023-09-15 RX ADMIN — BUPROPION HYDROCHLORIDE 150 MG: 150 TABLET, EXTENDED RELEASE ORAL at 08:57

## 2023-09-15 RX ADMIN — OXYBUTYNIN CHLORIDE 10 MG: 5 TABLET, EXTENDED RELEASE ORAL at 08:58

## 2023-09-15 NOTE — PROGRESS NOTES
Assisted By: Speech therapy [place]    CC: Follow-up on CVA    Interview History/HPI: Patient denied any pain and stated her breathing was okay, she can answer yes/no questions with high accuracy.  Beyond that she may see 1 or 2 words towards the question asked but then goes off on a tangent.  Speech therapy feels this is both cognitive and some word finding.          Current Hospital Meds:  aspirin, 81 mg, Oral, Daily  buPROPion XL, 150 mg, Oral, Daily  cetirizine, 5 mg, Oral, Daily  isosorbide mononitrate, 30 mg, Oral, Q24H  metFORMIN, 500 mg, Oral, BID With Meals  metoprolol succinate XL, 25 mg, Oral, Q24H  montelukast, 10 mg, Oral, Nightly  nystatin, 1 application , Topical, Q12H  oxybutynin XL, 10 mg, Oral, Daily  pantoprazole, 40 mg, Oral, QAM AC  pravastatin, 40 mg, Oral, Daily  tiotropium bromide monohydrate, 2 puff, Inhalation, Daily - RT  valsartan, 40 mg, Oral, Q24H  venlafaxine XR, 75 mg, Oral, Daily With Breakfast         Vitals:    09/15/23 0750   BP:    Pulse:    Resp: 18   Temp:    SpO2:          Intake/Output Summary (Last 24 hours) at 9/15/2023 1227  Last data filed at 9/15/2023 0900  Gross per 24 hour   Intake 960 ml   Output 400 ml   Net 560 ml       EXAM: 125/72, 18, 65, 98.2, 92% saturated on room air.  She is in no distress, mood appears good, lungs are clear anterior and posterior, heart regular rate and rhythm.  Extremities are without edema.      Diet: Cardiac Diets, Diabetic Diets; Healthy Heart (2-3 Na+); Consistent Carbohydrate; Texture: Regular Texture (IDDSI 7); Fluid Consistency: Thin (IDDSI 0)        LABS:     Lab Results (last 48 hours)       Procedure Component Value Units Date/Time    POC Glucose Once [[id number]]  (Abnormal) Collected: 09/15/23 1057    Specimen: Blood Updated: 09/15/23 1103     Glucose 147 mg/dL     POC Glucose Once [[id number]]  (Normal) Collected: 09/15/23 0616    Specimen: Blood Updated: 09/15/23 0622     Glucose 110 mg/dL     Ferritin [[id number]]  (Normal)  Collected: 09/15/23 0456    Specimen: Blood Updated: 09/15/23 0536     Ferritin 60.92 ng/mL     Narrative:      Results may be falsely decreased if patient taking Biotin.      Basic Metabolic Panel [[id number]]  (Abnormal) Collected: 09/15/23 0456    Specimen: Blood Updated: 09/15/23 0531     Glucose 113 mg/dL      BUN 31 mg/dL      Creatinine 1.00 mg/dL      Sodium 140 mmol/L      Potassium 3.6 mmol/L      Chloride 101 mmol/L      CO2 29.0 mmol/L      Calcium 9.1 mg/dL      BUN/Creatinine Ratio 31.0     Anion Gap 10.0 mmol/L      eGFR 58.5 mL/min/1.73     Narrative:      GFR Normal >60  Chronic Kidney Disease <60  Kidney Failure <15    The GFR formula is only valid for adults with stable renal function between ages 18 and 70.    Iron Profile [[id number]]  (Abnormal) Collected: 09/15/23 0456    Specimen: Blood Updated: 09/15/23 0531     Iron 48 mcg/dL      Iron Saturation (TSAT) 15 %      Transferrin 218 mg/dL      TIBC 325 mcg/dL     CBC & Differential [[id number]]  (Abnormal) Collected: 09/15/23 0456    Specimen: Blood Updated: 09/15/23 0515    Narrative:      The following orders were created for panel order CBC & Differential.  Procedure                               Abnormality         Status                     ---------                               -----------         ------                     CBC Auto Differential[[id number]]        Abnormal            Final result                 Please view results for these tests on the individual orders.    CBC Auto Differential [[id number]]  (Abnormal) Collected: 09/15/23 0456    Specimen: Blood Updated: 09/15/23 0515     WBC 11.98 10*3/mm3      RBC 5.23 10*6/mm3      Hemoglobin 14.4 g/dL      Hematocrit 48.5 %      MCV 92.7 fL      MCH 27.5 pg      MCHC 29.7 g/dL      RDW 14.7 %      RDW-SD 50.8 fl      MPV 12.0 fL      Platelets 294 10*3/mm3      Neutrophil % 66.9 %      Lymphocyte % 18.4 %      Monocyte % 11.8 %      Eosinophil % 2.2 %      Basophil % 0.4 %       Immature Grans % 0.3 %      Neutrophils, Absolute 8.02 10*3/mm3      Lymphocytes, Absolute 2.20 10*3/mm3      Monocytes, Absolute 1.41 10*3/mm3      Eosinophils, Absolute 0.26 10*3/mm3      Basophils, Absolute 0.05 10*3/mm3      Immature Grans, Absolute 0.04 10*3/mm3      nRBC 0.0 /100 WBC     POC Glucose Once [[id number]]  (Normal) Collected: 09/15/23 0246    Specimen: Blood Updated: 09/15/23 0302     Glucose 110 mg/dL     Urinalysis With Culture If Indicated - Straight Cath [[id number]]  (Abnormal) Collected: 09/14/23 1935    Specimen: Urine from Straight Cath Updated: 09/14/23 2000     Color, UA Dark Yellow     Appearance, UA Clear     pH, UA 6.5     Specific Gravity, UA 1.023     Glucose, UA Negative     Ketones, UA Trace     Bilirubin, UA Negative     Blood, UA Negative     Protein, UA Trace     Leuk Esterase, UA Negative     Nitrite, UA Negative     Urobilinogen, UA 1.0 E.U./dL    Narrative:      In absence of clinical symptoms, the presence of pyuria, bacteria, and/or nitrites on the urinalysis result does not correlate with infection.  Urine microscopic not indicated.    POC Glucose Once [[id number]]  (Normal) Collected: 09/14/23 1937    Specimen: Blood Updated: 09/14/23 1943     Glucose 107 mg/dL     POC Glucose Once [[id number]]  (Normal) Collected: 09/14/23 1614    Specimen: Blood Updated: 09/14/23 1620     Glucose 114 mg/dL     POC Glucose Once [[id number]]  (Abnormal) Collected: 09/14/23 1106    Specimen: Blood Updated: 09/14/23 1119     Glucose 137 mg/dL     C-reactive Protein [[id number]]  (Abnormal) Collected: 09/14/23 0344    Specimen: Blood Updated: 09/14/23 0801     C-Reactive Protein 1.02 mg/dL     Clostridioides difficile Toxin - Stool, Per Rectum [[id number]] Collected: 09/14/23 0517    Specimen: Stool from Per Rectum Updated: 09/14/23 0624    Narrative:      The following orders were created for panel order Clostridioides difficile Toxin - Stool, Per Rectum.  Procedure                                Abnormality         Status                     ---------                               -----------         ------                     Clostridioides difficile...[[id number]]                      Final result                 Please view results for these tests on the individual orders.    Clostridioides difficile Toxin, PCR - Stool, Per Rectum [[id number]] Collected: 09/14/23 0517    Specimen: Stool from Per Rectum Updated: 09/14/23 0624     Toxigenic C. difficile by PCR Negative     027 Toxin Presumptive Negative    Narrative:      The result indicates the absence of toxigenic C. difficile from stool specimen.     POC Glucose Once [[id number]]  (Normal) Collected: 09/14/23 0601    Specimen: Blood Updated: 09/14/23 0613     Glucose 120 mg/dL     Basic Metabolic Panel [[id number]]  (Abnormal) Collected: 09/14/23 0344    Specimen: Blood Updated: 09/14/23 0444     Glucose 139 mg/dL      BUN 35 mg/dL      Creatinine 1.11 mg/dL      Sodium 135 mmol/L      Potassium 4.4 mmol/L      Comment: Specimen hemolyzed.  Results may be affected.        Chloride 101 mmol/L      CO2 24.5 mmol/L      Calcium 9.3 mg/dL      BUN/Creatinine Ratio 31.5     Anion Gap 9.5 mmol/L      eGFR 51.6 mL/min/1.73     Narrative:      GFR Normal >60  Chronic Kidney Disease <60  Kidney Failure <15    The GFR formula is only valid for adults with stable renal function between ages 18 and 70.    CBC & Differential [[id number]]  (Abnormal) Collected: 09/14/23 0344    Specimen: Blood Updated: 09/14/23 0441    Narrative:      The following orders were created for panel order CBC & Differential.  Procedure                               Abnormality         Status                     ---------                               -----------         ------                     CBC Auto Differential[[id number]]        Abnormal            Final result               Scan Slide[[id number]]                                       Final result                 Please view results for  these tests on the individual orders.    Scan Slide [[id number]] Collected: 09/14/23 0344    Specimen: Blood Updated: 09/14/23 0441     Anisocytosis Slight/1+     Hypochromia Mod/2+     Platelet Morphology Normal    CBC Auto Differential [[id number]]  (Abnormal) Collected: 09/14/23 0344    Specimen: Blood Updated: 09/14/23 0441     WBC 15.74 10*3/mm3      RBC 5.41 10*6/mm3      Hemoglobin 15.2 g/dL      Hematocrit 53.0 %      MCV 98.0 fL      MCH 28.1 pg      MCHC 28.7 g/dL      RDW 14.8 %      RDW-SD 53.8 fl      MPV 11.9 fL      Platelets 290 10*3/mm3      Neutrophil % 73.6 %      Lymphocyte % 11.8 %      Monocyte % 13.0 %      Eosinophil % 0.8 %      Basophil % 0.6 %      Immature Grans % 0.2 %      Neutrophils, Absolute 11.60 10*3/mm3      Lymphocytes, Absolute 1.86 10*3/mm3      Monocytes, Absolute 2.04 10*3/mm3      Eosinophils, Absolute 0.12 10*3/mm3      Basophils, Absolute 0.09 10*3/mm3      Immature Grans, Absolute 0.03 10*3/mm3      nRBC 0.0 /100 WBC     POC Glucose Once [[id number]]  (Normal) Collected: 09/14/23 0251    Specimen: Blood Updated: 09/14/23 0300     Glucose 123 mg/dL     POC Glucose Once [[id number]]  (Normal) Collected: 09/13/23 2344    Specimen: Blood Updated: 09/13/23 2352     Glucose 112 mg/dL     POC Glucose Once [[id number]]  (Normal) Collected: 09/13/23 1951    Specimen: Blood Updated: 09/13/23 2003     Glucose 107 mg/dL     POC Glucose Once [[id number]]  (Normal) Collected: 09/13/23 1604    Specimen: Blood Updated: 09/13/23 1619     Glucose 101 mg/dL                  Radiology:    Imaging Results (Last 72 Hours)       Procedure Component Value Units Date/Time    XR Chest 1 View [[id number]] Collected: 09/14/23 0754     Updated: 09/14/23 0756    Narrative:      XR CHEST 1 VW-     CLINICAL INDICATION: eval leukocytosis        COMPARISON: None available      TECHNIQUE: Single frontal view of the chest.     FINDINGS:      LUNGS: Lungs are adequately aerated.      HEART AND MEDIASTINUM: Heart  and mediastinal contours are unremarkable        SKELETON: Bony and soft tissue structures are unremarkable.             Impression:      No radiographic evidence of acute cardiac or pulmonary disease.     This report was finalized on 9/14/2023 7:54 AM by Dr. Krunal Colvin MD.               Results for orders placed during the hospital encounter of 09/11/23    Adult Transthoracic Echo Complete W/ Cont if Necessary Per Protocol    Interpretation Summary    Left ventricular systolic function is mildly decreased. Calculated left ventricular EF = 44% Left ventricular ejection fraction appears to be 41 - 45%.    Left ventricular wall thickness is consistent with mild eccentric hypertrophy.    Left ventricular diastolic function is consistent with (grade I) impaired relaxation.    The left atrial cavity is moderately dilated.      Assessment/Plan:   Status post CVA, left putamen internal capsule, on aspirin and statin.  Patient is working with all 3 modalities of therapy.  I observed one of her speech sessions this morning.  Still some word finding and probable some cognitive as well.  With OT, patient is contact-guard to min assist bed mobility, min assist contact-guard for bed to chair and chair to bed.  Patient is min assist contact-guard sit to stand stand to sit.  Min assist contact-guard with toilet transfer, patient walked 160 feet x 2 yesterday contact-guard with a front wheeled walker, needed 1 person to manage equipment..  Patient is min assist with bathing, contact-guard to supervision with upper body dressing, min assist lower body dressing, set up for grooming and min assist contact-guard bed to chair and chair to bed.    Leukocytosis, improved, UA was negative, she has completed treatment of her ESBL, C. difficile is negative, chest x-ray negative.  Further  intervention, no infectious process found.      Hypoglycemia, improved with stopping the sulfonylurea and glucoses controlled.    Iron studies did not  suggest polycythemia vera.  Her polycythemia appears to be improved at this time.    HFrEF, creatinine actually is improved, started low-dose valsartan yesterday and stopped her Norvasc.  Blood pressure thus far appears to be controlled.  In 48 hours or so if blood pressure will tolerate it and creatinine stable may consider changing valsartan to Entresto.    Jim Appiah MD

## 2023-09-15 NOTE — PLAN OF CARE
Goal Outcome Evaluation:  Plan of Care Reviewed With: patient        Progress: improving       Problem: Rehabilitation (IRF) Plan of Care  Goal: Plan of Care Review  Outcome: Ongoing, Progressing  Flowsheets (Taken 9/15/2023 0919)  Progress: improving  Plan of Care Reviewed With: patient  Goal: Patient-Specific Goal (Individualized)  Outcome: Ongoing, Progressing  Goal: Absence of New-Onset Illness or Injury  Outcome: Ongoing, Progressing  Intervention: Prevent Fall and Fall Injury  Recent Flowsheet Documentation  Taken 9/15/2023 0800 by Dl Gary RN  Safety Promotion/Fall Prevention: safety round/check completed  Intervention: Prevent Infection  Recent Flowsheet Documentation  Taken 9/15/2023 0800 by Dl Gary RN  Infection Prevention:   hand hygiene promoted   rest/sleep promoted  Intervention: Prevent VTE (Venous Thromboembolism)  Recent Flowsheet Documentation  Taken 9/15/2023 0800 by Dl Gary RN  VTE Prevention/Management: (for therapy) sequential compression devices off  Goal: Optimal Comfort and Wellbeing  Outcome: Ongoing, Progressing  Goal: Home and Community Transition Plan Established  Outcome: Ongoing, Progressing     Problem: Skin Injury Risk Increased  Goal: Skin Health and Integrity  Outcome: Ongoing, Progressing  Intervention: Promote and Optimize Oral Intake  Recent Flowsheet Documentation  Taken 9/15/2023 0800 by Dl Gary RN  Oral Nutrition Promotion: physical activity promoted  Intervention: Optimize Skin Protection  Recent Flowsheet Documentation  Taken 9/15/2023 0800 by Dl Gary RN  Pressure Reduction Techniques:   frequent weight shift encouraged   heels elevated off bed   weight shift assistance provided  Pressure Reduction Devices:   pressure-redistributing mattress utilized   heel offloading device utilized   positioning supports utilized  Skin Protection:   adhesive use limited   incontinence pads utilized     Problem: Fall Injury Risk  Goal: Absence of Fall and  Fall-Related Injury  Outcome: Ongoing, Progressing  Intervention: Identify and Manage Contributors  Recent Flowsheet Documentation  Taken 9/15/2023 0800 by Dl Gary RN  Medication Review/Management: medications reviewed  Intervention: Promote Injury-Free Environment  Recent Flowsheet Documentation  Taken 9/15/2023 0800 by Dl Gary, RN  Safety Promotion/Fall Prevention: safety round/check completed     Problem: Communication Impairment  Goal: Effective Communication Skills  Outcome: Ongoing, Progressing

## 2023-09-15 NOTE — PROGRESS NOTES
Physical Medicine and Rehabilitation  Inpatient Rehabilitation Interdisciplinary Plan of Care    Demographics            Age: 78Y            Gender: Female    Admission Date: 9/11/2023 6:31:00 PM  Rehabilitation Diagnosis:  CVA    Plan of Care  Anticipated Discharge Date/Estimated Length of Stay: 10-3-23  Anticipated Discharge Destination: Community discharge with assistance  Discharge Plan : Pt is agreeable to go to son and daughter-in-law's home if  needed at discharge.  Medical Necessity Expected Level Rationale: good  Intensity and Duration: an average of 3 hours/5 days per week  Medical Supervision and 24 Hour Rehab Nursing: x  Physical Therapy: x  PT Intensity/Duration: PT 1-1.5 hours per day/5 days per week  Occupational Therapy: x  OT Intensity/Duration: OT 1-1.5 hours per day/5 days per week  Speech and Language Therapy: x  SLP Intensity/Duration: SLP 30 mins-90 mins per day/5 days per week  Social Work: x  Therapeutic Recreation: x  Updated (if changes indicated)  No changes to plan.    Based on the patient's medical and functional status, their prognosis and  expected level of functional improvement is: good    Interdisciplinary Problem/Goals/Status  Team Interventions    Mobility -  PT: bed mobility, transfers, gait, balance, strengthening, safety, neuro [PT]    Self Care -  OT: ADL retraining, AE education, GMC/FMC theract, neuro re-ed, fxal mobility,  strengthening [OT]    Body Systems -  RN: pressure relief, barrier cream. skin assessments. monitor skin pressure  relief [RN]    Pain -  RN: give meds per order, monitor effectiveness, notifiy dr if pain meds do not  work [RN]    Safety -  RN: hourly safety rounds, callbell w/i reach, encourage to call for assistance,  personal items w/i reach, non-skid socks, gait belt [RN]  RN: BED ALARM [RN]    Communication -  Team: Respond appropriately to varying difficulties of yes/no questions [ST]  Team: Identify the correct object/picture/word folloiwng verbal  description [ST]    Mobility    [PT] Bed/Chair/Wheelchair (Active)  Current Status (9/12/2023 12:00:00 AM): min-mod A  Weekly Goal: Sup  Discharge Goal: Sup    [PT] Walk (Active)  Current Status (9/12/2023 12:00:00 AM): amb 160' RW CGA  Weekly Goal: amb 300' RW Sup  Discharge Goal: amb 300' RW Sup    [RN] Toilet Transfers (Active)  Current Status (9/11/2023 7:00:00 PM): PATIENT WILL HAVE NO TRANSFER DIFFICULTY    Weekly Goal: NO DIFFICULT WITH TRANSFERS  Discharge Goal: TRANSFERS INDEPENDENTLY    Self Care    [OT] Dressing (Lower) (Active)  Current Status (9/12/2023 12:00:00 AM): Max A  Weekly Goal: Mod A  Discharge Goal: Min A    Communication    [ST] Comprehension (Active)  Current Status (9/12/2023 12:00:00 AM): Moderate to severe auditory  comprehension deficits  Weekly Goal: Receptive ID of verbally named object/picture/word  Discharge Goal: WFL communication and comprehension    [ST] Expression (Active)  Current Status (9/12/2023 12:00:00 AM): Mild to moderate anomia  Weekly Goal: Expressive ID of objects/pictures  Discharge Goal: WFL communication    Pain    [RN] Pain Management (Active)  Current Status (9/11/2023 7:00:00 PM): PATIENT WILL VOICE NO PAIN  Weekly Goal: PATIENT WILL BE PAIN FREE  Discharge Goal: FREE FROM PAIN    Safety    [RN] Potential for Injury (Active)  Current Status (9/11/2023 7:00:00 PM): PATIENT WILL HAVE NO INJURY THIS STAY  Weekly Goal: NO INJURY  Discharge Goal: PATIENT WILL DISCHARGE    Body Systems    [RN] Integumentary (Active)  Current Status (9/11/2023 7:00:00 PM): PATIENT WILL HAVE NO SKIN  BREAKDOWN THIS  STAY  Weekly Goal: SKIN WILL REMAIN INTACT.  Discharge Goal: NO SKIN ISSUES.    Medical Problems  UTI, HTN, Leukocytosis    Comments: Pt is agreeable to go home with son and daughter-in-law if needed at  discharge.    Signed by: Jim Appiah MD

## 2023-09-15 NOTE — THERAPY TREATMENT NOTE
Inpatient Rehabilitation - Physical Therapy Treatment Note        [facility]     Patient Name: Ofelia Knox  : 1945  MRN: [id number]    Today's Date: 9/15/2023                    Admit Date: 2023      Visit Dx:   No diagnosis found.    Patient Active Problem List   Diagnosis    CVA (cerebral vascular accident)       Past Medical History:   Diagnosis Date    AMS (altered mental status)     Aphasia     CKD (chronic kidney disease)     COPD (chronic obstructive pulmonary disease)     CVA (cerebral vascular accident)     DM2 (diabetes mellitus, type 2)     HTN (hypertension)     Restrictive lung disease     Urinary tract infection due to ESBL Klebsiella        Past Surgical History:   Procedure Laterality Date    HYSTERECTOMY      JOINT REPLACEMENT         PT ASSESSMENT (last 12 hours)       IRF PT Evaluation and Treatment       Row Name 09/15/23 1520          PT Time and Intention    Document Type daily treatment  -     Mode of Treatment individual therapy;physical therapy  -     Patient/Family/Caregiver Comments/Observations C/o difficult to assess at this time. Distress noted with standing this date; pt noted to be hypotensive. RN notified.  -       Row Name 09/15/23 1524          General Information    Existing Precautions/Restrictions fall  contact isolation-ESBL UTI, O2 prn, aphasia  -RM     Limitations/Impairments aphasia;other (see comments)  processing/motor planning deficits.  -       Row Name 09/15/23 1529          Cognition/Psychosocial    Affect/Mental Status (Cognition) --  awake and alert, she appears to have aphasia, difficulty following commands  -RM     Follows Commands (Cognition) verbal cues/prompting required;physical/tactile prompts required;repetition of directions required;other (see comments)  Pt requires verbal and tactile cuing for participation and proper technique.  -     Personal Safety Interventions gait belt;nonskid shoes/slippers when out of bed;fall prevention  program maintained  -RM       Row Name 09/15/23 1520          Bed Mobility    Sit-Supine [place] (Bed Mobility) minimum assist (75% patient effort);contact guard  -RM     Assistive Device (Bed Mobility) bed rails  -RM       Row Name 09/15/23 1520          Transfer Assessment/Treatment    Transfers toilet transfer  -RM       Row Name 09/15/23 1520          Bed-Chair Transfer    Bed-Chair [place] (Transfers) minimum assist (75% patient effort);contact guard;nonverbal cues (demo/gesture);verbal cues  -RM     Assistive Device (Bed-Chair Transfers) wheelchair;walker, front-wheeled  -RM       Row Name 09/15/23 1520          Chair-Bed Transfer    Chair-Bed [place] (Transfers) verbal cues;nonverbal cues (demo/gesture);contact guard;minimum assist (75% patient effort)  -RM     Assistive Device (Chair-Bed Transfers) wheelchair  -RM       Row Name 09/15/23 1520          Sit-Stand Transfer    Sit-Stand [place] (Transfers) minimum assist (75% patient effort);verbal cues;nonverbal cues (demo/gesture);contact guard  -RM     Assistive Device (Sit-Stand Transfers) walker, front-wheeled  -RM       Row Name 09/15/23 1520          Stand-Sit Transfer    Stand-Sit [place] (Transfers) minimum assist (75% patient effort);verbal cues;nonverbal cues (demo/gesture);contact guard  -RM     Assistive Device (Stand-Sit Transfers) walker, front-wheeled  -RM       Row Name 09/15/23 1520          Gait/Stairs (Locomotion)    [place] Level (Gait) contact guard;verbal cues;nonverbal cues (demo/gesture);1 person to manage equipment  -RM     Assistive Device (Gait) walker, front-wheeled  -RM     Distance in Feet (Gait) 160X2  -RM     Deviations/Abnormal Patterns (Gait) beatrice decreased;gait speed decreased;stride length decreased  -RM     Bilateral Gait Deviations forward flexed posture  -RM       Row Name 09/15/23 1520          Safety Issues, Functional Mobility    Impairments Affecting Function (Mobility)  balance;cognition;endurance/activity tolerance;pain;strength  -RM       Row Name 09/15/23 1520          Hip (Therapeutic Exercise)    Hip Strengthening (Therapeutic Exercise) bilateral;flexion;extension;aBduction;aDduction;heel slides;marching while seated;sitting;2 lb free weight;resistance band;green;2 sets;10 repetitions  performed BID  -RM       Row Name 09/15/23 1520          Knee (Therapeutic Exercise)    Knee Strengthening (Therapeutic Exercise) bilateral;flexion;extension;marching while seated;heel slides;LAQ (long arc quad);hamstring curls;sitting;2 lb free weight;resistance band;green;2 sets;10 repetitions  Performed BID  -RM       Row Name 09/15/23 1520          Ankle (Therapeutic Exercise)    Ankle Strengthening (Therapeutic Exercise) bilateral;dorsiflexion;plantarflexion;sitting;standing;2 lb free weight;2 sets;10 repetitions  Performed sitting BID  -RM       Row Name 09/15/23 1520          Positioning and Restraints    Pre-Treatment Position in bed  chair in PM  -RM     In Bed supine;call light within reach;encouraged to call for assist;exit alarm on  PM  -RM     In Wheelchair sitting;call light within reach;encouraged to call for assist;exit alarm on  AM  -RM       Row Name 09/15/23 1520          Vital Signs    Intra Systolic BP Rehab 81  standing  -RM     Intra Treatment Diastolic BP 46  -RM     Post Systolic BP Rehab 115  sitting reclined  -RM     Post Treatment Diastolic BP 58  -RM       Row Name 09/15/23 1520          Therapy Assessment/Plan (PT)    Patient's Goals For Discharge return home  -RM       Row Name 09/15/23 1520          Therapy Assessment/Plan (PT)    Rehab Potential/Prognosis (PT) adequate, monitor progress closely  -RM     Frequency of Treatment (PT) 5 times per week  -RM     Estimated Duration of Therapy (PT) 2 weeks  -RM     Problem List (PT) balance;cognition;mobility;strength;pain;communication  -RM     Activity Limitations Related to Problem List (PT) unable to ambulate  safely;unable to transfer safely  -       Row Name 09/15/23 1520          Daily Progress Summary (PT)    Functional Goal Overall Progress (PT) progressing toward functional goals as expected  -RM     Daily Progress Summary (PT) Fair functional mobility and ambulation quality noted this date. Pt noted to be hypotensive with standing TE this date. Processing delays affect safety and proper technique. Continued skilled care required for further improvements to ensure maximum safe function upon D/C.  -RM     Impairments Still Limiting Function (PT) communication deficit;functional activity tolerance impairment;strength deficit  -     Recommendations (PT) Continue per current POC  -RM       Row Name 09/15/23 1520          Therapy Plan Review/Discharge Plan (PT)    Anticipated Equipment Needs at Discharge (PT Eval) --  tbd  -RM     Anticipated Discharge Disposition (PT) home with assist;home with home health;home with outpatient therapy services  -       Row Name 09/15/23 1520          IRF PT Goals    Bed Mobility Goal Selection (PT-IRF) bed mobility, PT goal 1  -RM     Transfer Goal Selection (PT-IRF) transfers, PT goal 1  -RM     Gait (Walking Locomotion) Goal Selection (PT-IRF) gait, PT goal 1  -RM       Row Name 09/15/23 1520          Bed Mobility Goal 1 (PT-IRF)    Activity/Assistive Device (Bed Mobility Goal 1, PT-IRF) sit to supine/supine to sit  -RM     [place] Level (Bed Mobility Goal 1, PT-IRF) supervision required  -RM     Time Frame (Bed Mobility Goal 1, PT-IRF) by discharge  -RM       Row Name 09/15/23 1520          Transfer Goal 1 (PT-IRF)    Activity/Assistive Device (Transfer Goal 1, PT-IRF) sit-to-stand/stand-to-sit;bed-to-chair/chair-to-bed  -RM     [place] Level (Transfer Goal 1, PT-IRF) supervision required  -RM     Time Frame (Transfer Goal 1, PT-IRF) by discharge  -RM       Row Name 09/15/23 1520          Gait/Walking Locomotion Goal 1 (PT-IRF)    Activity/Assistive Device  (Gait/Walking Locomotion Goal 1, PT-IRF) walker, rolling  -RM     Gait/Walking Locomotion Distance Goal 1 (PT-IRF) 300'  -RM     [place] Level (Gait/Walking Locomotion Goal 1, PT-IRF) supervision required  -RM     Time Frame (Gait/Walking Locomotion Goal 1, PT-IRF) by discharge  -RM               User Key  (r) = Recorded By, (t) = Taken By, (c) = Cosigned By      Initials Name Provider Type    Karis Paz, PTA Physical Therapist Assistant                     Physical Therapy Education       Title: PT OT SLP Therapies (Done)       Topic: Physical Therapy (Done)       Point: Mobility training (Done)       Learning Progress Summary             Patient Acceptance, E,TB, VU by RM at 9/15/2023 1532    Acceptance, E,TB, VU by RM at 9/14/2023 1555    Acceptance, E,D, VU,NR by RG at 9/13/2023 1528    Acceptance, TB, NR by DL at 9/12/2023 2003    Acceptance, E, VU,NR by LB at 9/12/2023 1135                         Point: Home exercise program (Done)       Learning Progress Summary             Patient Acceptance, E,TB, VU by RM at 9/15/2023 1532    Acceptance, E,TB, VU by RM at 9/14/2023 1555    Acceptance, E,D, VU,NR by RG at 9/13/2023 1528    Acceptance, TB, NR by DL at 9/12/2023 2003    Acceptance, E, VU,NR by LB at 9/12/2023 1135                         Point: Body mechanics (Done)       Learning Progress Summary             Patient Acceptance, E,TB, VU by RM at 9/15/2023 1532    Acceptance, E,TB, VU by RM at 9/14/2023 1555    Acceptance, E,D, VU,NR by RG at 9/13/2023 1528    Acceptance, TB, NR by DL at 9/12/2023 2003    Acceptance, E, VU,NR by LB at 9/12/2023 1135                         Point: Precautions (Done)       Learning Progress Summary             Patient Acceptance, E,TB, VU by RM at 9/15/2023 1532    Acceptance, E,TB, VU by RM at 9/14/2023 1555    Acceptance, E,D, VU,NR by RG at 9/13/2023 1528    Acceptance, TB, NR by DL at 9/12/2023 2003    Acceptance, E, CASTRO,NR by LB at 9/12/2023 1135                                          User Key       Initials Effective Dates Name Provider Type Discipline    LB 06/16/21 -  Andra Stewart, PT Physical Therapist PT    RM 02/17/23 -  Karis Obrien PTA Physical Therapist Assistant PT    RG 06/16/21 -  Twan Cunningham PTA Physical Therapist Assistant PT    DL 03/16/22 -  Carolyn Ramirez RN Registered Nurse Nurse                    PT Recommendation and Plan    Frequency of Treatment (PT): 5 times per week  Anticipated Equipment Needs at Discharge (PT Eval):  (tbd)  Daily Progress Summary (PT)  Functional Goal Overall Progress (PT): progressing toward functional goals as expected  Daily Progress Summary (PT): Fair functional mobility and ambulation quality noted this date. Pt noted to be hypotensive with standing TE this date. Processing delays affect safety and proper technique. Continued skilled care required for further improvements to ensure maximum safe function upon D/C.  Impairments Still Limiting Function (PT): communication deficit, functional activity tolerance impairment, strength deficit  Recommendations (PT): Continue per current POC               Time Calculation:      PT Charges       Row Name 09/15/23 1534 09/15/23 1533          Time Calculation    Start Time 1245  -RM 1045  -RM     Stop Time 1330  -RM 1130  -RM     Time Calculation (min) 45 min  -RM 45 min  -RM     PT Received On 09/15/23  -RM 09/15/23  -RM     PT - Next Appointment 09/16/23  -RM 09/15/23  -RM     PT Goal Re-Cert Due Date 09/19/23  -RM 09/19/23  -RM        Time Calculation- PT    Total Timed Code Minutes- PT 45 minute(s)  -RM 45 minute(s)  -RM               User Key  (r) = Recorded By, (t) = Taken By, (c) = Cosigned By      Initials Name Provider Type    RM Karis Obrien, ESSIE Physical Therapist Assistant                    Therapy Charges for Today       Code Description Service Date Service Provider Modifiers Qty    03503337376 HC GAIT TRAINING EA 15 MIN  9/14/2023 Karis Obrien, PTA GP, CQ 2    84016979879 HC PT THER PROC EA 15 MIN 9/14/2023 Karis Obrien, PTA GP, CQ 2    45085175367 HC PT THERAPEUTIC ACT EA 15 MIN 9/14/2023 Karis Obrien, PTA GP, CQ 2    42187287398 HC GAIT TRAINING EA 15 MIN 9/15/2023 Karis Obrien, PTA GP, CQ 1    74880316560 HC PT NEUROMUSC RE EDUCATION EA 15 MIN 9/15/2023 Karis Obrien, PTA GP, CQ 1    79875011814 HC PT THER PROC EA 15 MIN 9/15/2023 Karis Obrien, PTA GP, CQ 3    65393593707 HC PT THERAPEUTIC ACT EA 15 MIN 9/15/2023 Karis Obrien, PTA GP, CQ 1                     Karis Obrien, PTA  9/15/2023

## 2023-09-15 NOTE — THERAPY TREATMENT NOTE
Inpatient Rehabilitation - Occupational Therapy Treatment Note     [facility]ie     Patient Name: Ofelia Knox  : 1945  MRN: [id number]    Today's Date: 9/15/2023                 Admit Date: 2023       No diagnosis found.    Patient Active Problem List   Diagnosis    CVA (cerebral vascular accident)       Past Medical History:   Diagnosis Date    AMS (altered mental status)     Aphasia     CKD (chronic kidney disease)     COPD (chronic obstructive pulmonary disease)     CVA (cerebral vascular accident)     DM2 (diabetes mellitus, type 2)     HTN (hypertension)     Restrictive lung disease     Urinary tract infection due to ESBL Klebsiella        Past Surgical History:   Procedure Laterality Date    HYSTERECTOMY      JOINT REPLACEMENT               IRF OT ASSESSMENT FLOWSHEET (last 12 hours)       IRF OT Evaluation and Treatment       Row Name 09/15/23 1232          OT Time and Intention    Document Type daily treatment  -BF     Mode of Treatment occupational therapy  -BF     Patient Effort adequate  -BF     Symptoms Noted During/After Treatment fatigue;shortness of breath  RN notified, O2 98%  -BF       Row Name 09/15/23 1232          General Information    Existing Precautions/Restrictions fall  O2 prn, aphasia  -BF     Limitations/Impairments aphasia;safety/cognitive  -BF       Row Name 09/15/23 1232          Cognition/Psychosocial    Affect/Mental Status (Cognition) --  awake and alert, she appears to have aphasia, difficulty following commands  -BF     Orientation Status (Cognition) unable/difficult to assess  -BF     Follows Commands (Cognition) verbal cues/prompting required;repetition of directions required;physical/tactile prompts required  -BF       Row Name 09/15/23 1232          Bathing    [place] Level (Bathing) minimum assist (75% patient effort);verbal cues;nonverbal cues (demo/gesture)  -BF     Position (Bathing) edge of bed sitting  -BF     Comment (Bathing) Min A  -BF       Row Name  09/15/23 1232          Upper Body Dressing    [place]ow Level (Upper Body Dressing) set up assistance;supervision;verbal cues  -BF     Position (Upper Body Dressing) edge of bed sitting  -BF     Comment (Upper Body Dressing) Set-up/supervision  -BF       Row Name 09/15/23 1232          Lower Body Dressing    [place] Level (Lower Body Dressing) minimum assist (75% patient effort);verbal cues;nonverbal cues (demo/gesture)  -BF     Position (Lower Body Dressing) edge of bed sitting  -BF     Comment (Lower Body Dressing) Min A  -BF       Row Name 09/15/23 1232          Grooming    Pillow Level (Grooming) set up;supervision;verbal cues  -BF     Position (Grooming) supported sitting  -BF     Comment (Grooming) Set-up/supervision  -BF       Row Name 09/15/23 1232          Bed-Chair Transfer    Bed-Chair Pillow (Transfers) contact guard;verbal cues;nonverbal cues (demo/gesture)  -BF     Assistive Device (Bed-Chair Transfers) wheelchair  -BF       Row Name 09/15/23 1232          Motor Skills    Motor Control/Coordination Interventions fine motor manipulation/dexterity activities;gross motor coordination activities;therapeutic exercise/ROM  BUE GMC/FMC theract, light strengthening; pushbox theract  -BF       Row Name 09/15/23 1232          Positioning and Restraints    In Wheelchair sitting;with SLP  -BF               User Key  (r) = Recorded By, (t) = Taken By, (c) = Cosigned By      Initials Name Effective Dates    Pam Salinas OT 07/11/23 -                      Occupational Therapy Education       Title: PT OT SLP Therapies (Done)       Topic: Occupational Therapy (Done)       Point: ADL training (Done)       Description:   Instruct learner(s) on proper safety adaptation and remediation techniques during self care or transfers.   Instruct in proper use of assistive devices.                  Learning Progress Summary             Patient Acceptance, E, VU,NR by  at 9/15/2023 1232                          Point: Precautions (Done)       Description:   Instruct learner(s) on prescribed precautions during self-care and functional transfers.                  Learning Progress Summary             Patient Acceptance, E, VU,NR by  at 9/15/2023 1232                                         User Key       Initials Effective Dates Name Provider Type Discipline     07/11/23 -  Pam Peck OT Occupational Therapist OT                        OT Recommendation and Plan    Planned Therapy Interventions (OT): activity tolerance training, adaptive equipment training, BADL retraining, neuromuscular control/coordination retraining, ROM/therapeutic exercise, strengthening exercise, transfer/mobility retraining, patient/caregiver education/training                    Time Calculation:      Time Calculation- OT       Row Name 09/15/23 1238             Time Calculation- OT    OT Start Time 0805  -      OT Stop Time 1000  -      OT Time Calculation (min) 115 min  -      Total Timed Code Minutes-  minute(s)  -      OT Non-Billable Time (min) 10 min  -                User Key  (r) = Recorded By, (t) = Taken By, (c) = Cosigned By      Initials Name Provider Type     Pam Peck OT Occupational Therapist                  Therapy Charges for Today       Code Description Service Date Service Provider Modifiers Qty    28626982914 HC OT SELF CARE/MGMT/TRAIN EA 15 MIN 9/15/2023 Pam Peck OT GO 4    32862202798 HC OT THERAPEUTIC ACT EA 15 MIN 9/15/2023 Pam Peck OT GO 3    15241049664 HC OT THER PROC EA 15 MIN 9/15/2023 Pam Peck OT GO 1                     Pam Peck OT  9/15/2023

## 2023-09-15 NOTE — PLAN OF CARE
Goal Outcome Evaluation:  Plan of Care Reviewed With: patient        Progress: improving       Problem: Rehabilitation (IRF) Plan of Care  Goal: Plan of Care Review  9/15/2023 1923 by Dl Gary RN  Outcome: Ongoing, Progressing  Flowsheets (Taken 9/15/2023 1923)  Progress: improving  Plan of Care Reviewed With: patient  9/15/2023 0919 by Dl Gary RN  Outcome: Ongoing, Progressing  Flowsheets (Taken 9/15/2023 0919)  Progress: improving  Plan of Care Reviewed With: patient  Goal: Patient-Specific Goal (Individualized)  9/15/2023 1923 by Dl Gary RN  Outcome: Ongoing, Progressing  9/15/2023 0919 by Dl Gary RN  Outcome: Ongoing, Progressing  Goal: Absence of New-Onset Illness or Injury  9/15/2023 1923 by Dl aGry RN  Outcome: Ongoing, Progressing  9/15/2023 0919 by Dl Gary RN  Outcome: Ongoing, Progressing  Intervention: Prevent Fall and Fall Injury  Recent Flowsheet Documentation  Taken 9/15/2023 1919 by Dl Gary RN  Safety Promotion/Fall Prevention: safety round/check completed  Taken 9/15/2023 1802 by Dl Gary RN  Safety Promotion/Fall Prevention: safety round/check completed  Taken 9/15/2023 1600 by Dl Gary RN  Safety Promotion/Fall Prevention: safety round/check completed  Taken 9/15/2023 1402 by Dl Gary RN  Safety Promotion/Fall Prevention: safety round/check completed  Taken 9/15/2023 1200 by Dl Gary RN  Safety Promotion/Fall Prevention: safety round/check completed  Taken 9/15/2023 1001 by Dl Gary RN  Safety Promotion/Fall Prevention: safety round/check completed  Taken 9/15/2023 0800 by Dl Gary RN  Safety Promotion/Fall Prevention: safety round/check completed  Intervention: Prevent Infection  Recent Flowsheet Documentation  Taken 9/15/2023 1919 by Dl Gary RN  Infection Prevention:   hand hygiene promoted   rest/sleep promoted  Taken 9/15/2023 0800 by Dl Gary RN  Infection Prevention:   hand hygiene promoted    rest/sleep promoted  Intervention: Prevent VTE (Venous Thromboembolism)  Recent Flowsheet Documentation  Taken 9/15/2023 1919 by Dl Gary RN  VTE Prevention/Management:   bilateral   sequential compression devices on  Taken 9/15/2023 0800 by Dl Gary RN  VTE Prevention/Management: (for therapy) sequential compression devices off  Goal: Optimal Comfort and Wellbeing  9/15/2023 1923 by Dl Gary RN  Outcome: Ongoing, Progressing  9/15/2023 0919 by Dl Gary RN  Outcome: Ongoing, Progressing  Goal: Home and Community Transition Plan Established  9/15/2023 1923 by Dl Gary RN  Outcome: Ongoing, Progressing  9/15/2023 0919 by Dl Gary RN  Outcome: Ongoing, Progressing     Problem: Skin Injury Risk Increased  Goal: Skin Health and Integrity  9/15/2023 1923 by Dl Gary RN  Outcome: Ongoing, Progressing  9/15/2023 0919 by Dl Gary RN  Outcome: Ongoing, Progressing  Intervention: Promote and Optimize Oral Intake  Recent Flowsheet Documentation  Taken 9/15/2023 1919 by Dl Gary RN  Oral Nutrition Promotion: physical activity promoted  Taken 9/15/2023 0800 by Dl Gary RN  Oral Nutrition Promotion: physical activity promoted  Intervention: Optimize Skin Protection  Recent Flowsheet Documentation  Taken 9/15/2023 1919 by Dl Gary RN  Pressure Reduction Techniques:   frequent weight shift encouraged   heels elevated off bed   weight shift assistance provided  Pressure Reduction Devices:   pressure-redistributing mattress utilized   heel offloading device utilized   positioning supports utilized  Skin Protection:   adhesive use limited   incontinence pads utilized  Taken 9/15/2023 0800 by Dl Gary RN  Pressure Reduction Techniques:   frequent weight shift encouraged   heels elevated off bed   weight shift assistance provided  Pressure Reduction Devices:   pressure-redistributing mattress utilized   heel offloading device utilized   positioning supports  utilized  Skin Protection:   adhesive use limited   incontinence pads utilized     Problem: Fall Injury Risk  Goal: Absence of Fall and Fall-Related Injury  9/15/2023 1923 by Dl Gary RN  Outcome: Ongoing, Progressing  9/15/2023 0919 by Dl Gary RN  Outcome: Ongoing, Progressing  Intervention: Identify and Manage Contributors  Recent Flowsheet Documentation  Taken 9/15/2023 1919 by Dl Gary RN  Medication Review/Management: medications reviewed  Taken 9/15/2023 0800 by Dl Gary RN  Medication Review/Management: medications reviewed  Intervention: Promote Injury-Free Environment  Recent Flowsheet Documentation  Taken 9/15/2023 1919 by Dl Gary RN  Safety Promotion/Fall Prevention: safety round/check completed  Taken 9/15/2023 1802 by Dl Gary RN  Safety Promotion/Fall Prevention: safety round/check completed  Taken 9/15/2023 1600 by Dl Gary RN  Safety Promotion/Fall Prevention: safety round/check completed  Taken 9/15/2023 1402 by Dl Gary RN  Safety Promotion/Fall Prevention: safety round/check completed  Taken 9/15/2023 1200 by Dl Gary RN  Safety Promotion/Fall Prevention: safety round/check completed  Taken 9/15/2023 1001 by Dl Gary RN  Safety Promotion/Fall Prevention: safety round/check completed  Taken 9/15/2023 0800 by Dl Gary RN  Safety Promotion/Fall Prevention: safety round/check completed     Problem: Communication Impairment  Goal: Effective Communication Skills  9/15/2023 1923 by Dl Gary RN  Outcome: Ongoing, Progressing  9/15/2023 0919 by Dl Gary RN  Outcome: Ongoing, Progressing

## 2023-09-15 NOTE — PROGRESS NOTES
Case Management  Inpatient Rehabilitation Team Conference    Conference Date/Time: 9/14/2023 6:24:43 AM    Team Conference Attendees:  MD Viviana Pickett, TONEY Garcia RN,   MARIA LUISA Velasquez, PT  Pam Peck, OT  Annalisa Calderón, JUSTO    Demographics            Age: 78Y            Gender: Female    Admission Date: 9/11/2023 6:31:00 PM  Rehabilitation Diagnosis:  CVA  Comorbidities:      Plan of Care  Anticipated Discharge Date/Estimated Length of Stay: 14 days  Anticipated Discharge Destination: Community discharge with assistance  Discharge Plan : Pt is agreeable to go to son and daughter-in-law's home if  needed at discharge.  Medical Necessity Expected Level Rationale: fair  Intensity and Duration: an average of 3 hours/5 days per week  Medical Supervision and 24 Hour Rehab Nursing: x  Physical Therapy: x  PT Intensity/Duration: PT 1-1.5 hours per day/5 days per week  Occupational Therapy: x  OT Intensity/Duration: OT 1-1.5 hours per day/5 days per week  Speech and Language Therapy: x  SLP Intensity/Duration: SLP 30 mins-90 mins per day/5 days per week  Social Work: x  Therapeutic Recreation: x  Updated (if changes indicated)    Anticipated Discharge Date/Estimated Length of Stay:   10-3-23      Discharge Plan of Care:    Based on the patient's medical and functional status, their prognosis and  expected level of functional improvement is: good      Interdisciplinary Problem/Goals/Status  Mobility    [RN] Toilet Transfers(Active)  Current Status(09/11/2023): PATIENT WILL HAVE NO TRANSFER DIFFICULTY  Weekly Goal(09/26/2023): NO DIFFICULT WITH TRANSFERS  Discharge Goal: TRANSFERS INDEPENDENTLY    [PT] Bed/Chair/Wheelchair(Active)  Current Status(09/12/2023): min-mod A  Weekly Goal(09/19/2023): Sup  Discharge Goal: Sup    [PT] Walk(Active)  Current Status(09/12/2023): amb 160' RW CGA  Weekly Goal(09/19/2023): amb 300' RW Sup  Discharge Goal: amb 300' RW  Sup        Safety    [RN] Potential for Injury(Active)  Current Status(09/11/2023): PATIENT WILL HAVE NO INJURY THIS STAY  Weekly Goal(10/03/2023): NO INJURY  Discharge Goal: PATIENT WILL DISCHARGE        Pain    [RN] Pain Management(Active)  Current Status(09/11/2023): PATIENT WILL VOICE NO PAIN  Weekly Goal(10/10/2023): PATIENT WILL BE PAIN FREE  Discharge Goal: FREE FROM PAIN        Body Systems    [RN] Integumentary(Active)  Current Status(09/11/2023): PATIENT WILL HAVE NO SKIN  BREAKDOWN THIS STAY  Weekly Goal(10/10/2023): SKIN WILL REMAIN INTACT.  Discharge Goal: NO SKIN ISSUES.        Self Care    [OT] Dressing (Lower)(Active)  Current Status(09/12/2023): Max A  Weekly Goal(09/19/2023): Mod A  Discharge Goal: Min A        Communication    [ST] Comprehension(Active)  Current Status(09/12/2023): Moderate to severe auditory comprehension deficits  Weekly Goal(09/18/2023): Receptive ID of verbally named object/picture/word  Discharge Goal: WFL communication and comprehension    [ST] Expression(Active)  Current Status(09/12/2023): Mild to moderate anomia  Weekly Goal(09/18/2023): Expressive ID of objects/pictures  Discharge Goal: WFL communication    Comments: Pt is agreeable to go home with son and daughter-in-law if needed at  discharge.    Signed by: TONEY Griffin    Physician CoSigned By: Jim Appiah 09/15/2023 07:07:04    Physician CoSigned By: Jim Appiah 09/15/2023 07:07:05

## 2023-09-15 NOTE — PROGRESS NOTES
Rehabilitation Nursing  Inpatient Rehabilitation Plan of Care Note    Plan of Care  Copy from POC    Mobility    Toilet Transfers (Active)  Current Status (9/11/2023 7:00:00 PM): PATIENT WILL HAVE NO TRANSFER DIFFICULTY    Weekly Goal: NO DIFFICULT WITH TRANSFERS  Discharge Goal: TRANSFERS INDEPENDENTLY    Pain    Pain Management (Active)  Current Status (9/11/2023 7:00:00 PM): PATIENT WILL VOICE NO PAIN  Weekly Goal: PATIENT WILL BE PAIN FREE  Discharge Goal: FREE FROM PAIN    Safety    Potential for Injury (Active)  Current Status (9/11/2023 7:00:00 PM): PATIENT WILL HAVE NO INJURY THIS STAY  Weekly Goal: NO INJURY  Discharge Goal: PATIENT WILL DISCHARGE    Body Systems    Integumentary (Active)  Current Status (9/11/2023 7:00:00 PM): PATIENT WILL HAVE NO SKIN  BREAKDOWN THIS  STAY  Weekly Goal: SKIN WILL REMAIN INTACT.  Discharge Goal: NO SKIN ISSUES.    Signed by: Kimberly Kumar Nurse

## 2023-09-15 NOTE — THERAPY TREATMENT NOTE
Inpatient Rehabilitation - Speech Language Pathology Treatment Note  [facility]     Patient Name: Ofelia Knox  : 1945  MRN: [id number]  Today's Date: 9/15/2023               Admit Date: 2023     Ms. Knox was seen this am in the speech therapy office of [facility]. She is s/p participation w/ other tx modalities this am. She continues w/ rote, vague, and generalized responses to most conversational exchange. When a more detailed response is required she is noted w/ fluent, connected, jargon which is often nonsensical. Comprehension continues to be impaired however she does often repeat sentences or directives produced by SLP prior to verbal response or task. She does demonstrate difficultly w/ repetitions when directed to do so.     Long Term Goal:  Patient will demonstrate adequate language skills to fully participate in adls at premorbid baseline level of function.       Short Term Goals:  1. Patient will match pictures/objects/words w/ 50% accuracy and min cues over three consecutive sessions.  She is able to match pictures to single words w/ 100% accuracy following SLP model.  opp.      2. Patient will receptively identify verbally named objects/pictures/words w/ 50% accuracy and min cues over three consecutive sessions.   Not addressed.      3. Patient will answer yes/no questions of varying levels of difficulty w/ 75% accuracy and min cues over three consecutive sessions.   100% accuracy simple and moderate levels. Complex yes/no questions w/ 50% accuracy. (2 of 3 consecutive sessions for simple and mod levels).      4. Patient will follow variable step directives w/ 50% accuracy and min cues over three consecutive sessions.   3-4 step directives related to familiar task with min cues at 100%. (2 of 3 consecutive sessions for familiar tasks)  Unfamiliar task 2-3 step directives w/ SLP model for initial task, then 75% accuracy.      5. Patient will provide biographical information relating to  friends/self/family members  names/ID w/ 80% accuracy and min cues over three consecutive sessions.   Pt name: 100% (2 of 3 consecutive sessions)  : 100% accuracy w/ SLP initiation of month and graphic production by SLP. She is independently able to produce year following SLP model for month and day.   Son and DIL names: 100% (2 of 3 consecutive sessions)  Grandchildren: 75% w/ pt requiring phonemic and graphic cues to relate names.     6. Patient will identify the appropriate object/picture/word following a verbal description w/ 50% accuracy over three consecutive sessions.   Not addressed.      7. Patient will expressively ID objects/pictures w/ 50% accuracy and min cues over three consecutive sessions.   Tangible objects 7/7 today from LARK kit. (2 of 3 consecutive sessions)  Pictured objects (LARK kit) 75% accuracy w/ phonemic cue provided to improve to 100%     8. Patient will complete automatic oe sentences w/ 50% accuracy and min cues over three consecutive sessions.   Deferred this date.      9. Patient will provide verbal descriptors of an object/picture/word w/ 50% accuracy and min cues over three consecutive sessions.   25% accuracy w/ max cues to elicit object use.      *Additional goals to be added per pt progress  *Goals to be modified per pt progress       Visit Dx:  No diagnosis found.  Patient Active Problem List   Diagnosis    CVA (cerebral vascular accident)     Past Medical History:   Diagnosis Date    AMS (altered mental status)     Aphasia     CKD (chronic kidney disease)     COPD (chronic obstructive pulmonary disease)     CVA (cerebral vascular accident)     DM2 (diabetes mellitus, type 2)     HTN (hypertension)     Restrictive lung disease     Urinary tract infection due to ESBL Klebsiella      Past Surgical History:   Procedure Laterality Date    HYSTERECTOMY      JOINT REPLACEMENT       EDUCATION  The patient has been educated in the following areas:   Communication Impairment.    SLP  Recommendation and Plan   Continue current plan of care to allow for maximal opportunities for improvement.     Thank you   Verona Sarmiento M.S., CCC/SLP        Time Calculation:      Time Calculation- SLP       Row Name 09/15/23 1058             Time Calculation- SLP    SLP Start Time 1000  -JR      SLP Stop Time 1045  -JR      SLP Time Calculation (min) 45 min  -      SLP - Next Appointment 09/16/23  -                User Key  (r) = Recorded By, (t) = Taken By, (c) = Cosigned By      Initials Name Provider Type    Verona Pizarro MS CCC-SLP Speech and Language Pathologist                    Therapy Charges for Today       Code Description Service Date Service Provider Modifiers Qty    [id number]  ST TREATMENT SPEECH 3 9/15/2023 Verona Sarmiento MS CCC-SLP GN 1                       Verona Sarmiento MS CCC-JUSTO  9/15/2023

## 2023-09-16 LAB
GLUCOSE BLDC GLUCOMTR-MCNC: 117 MG/DL (ref 70–130)
GLUCOSE BLDC GLUCOMTR-MCNC: 118 MG/DL (ref 70–130)
GLUCOSE BLDC GLUCOMTR-MCNC: 138 MG/DL (ref 70–130)
GLUCOSE BLDC GLUCOMTR-MCNC: 88 MG/DL (ref 70–130)

## 2023-09-16 PROCEDURE — 97530 THERAPEUTIC ACTIVITIES: CPT

## 2023-09-16 PROCEDURE — 94760 N-INVAS EAR/PLS OXIMETRY 1: CPT

## 2023-09-16 PROCEDURE — 92507 TX SP LANG VOICE COMM INDIV: CPT

## 2023-09-16 PROCEDURE — 99231 SBSQ HOSP IP/OBS SF/LOW 25: CPT | Performed by: INTERNAL MEDICINE

## 2023-09-16 PROCEDURE — 97116 GAIT TRAINING THERAPY: CPT

## 2023-09-16 PROCEDURE — 97535 SELF CARE MNGMENT TRAINING: CPT

## 2023-09-16 PROCEDURE — 97110 THERAPEUTIC EXERCISES: CPT

## 2023-09-16 PROCEDURE — 97112 NEUROMUSCULAR REEDUCATION: CPT

## 2023-09-16 PROCEDURE — 94799 UNLISTED PULMONARY SVC/PX: CPT

## 2023-09-16 PROCEDURE — 82948 REAGENT STRIP/BLOOD GLUCOSE: CPT

## 2023-09-16 RX ADMIN — METFORMIN HYDROCHLORIDE 500 MG: 500 TABLET ORAL at 09:44

## 2023-09-16 RX ADMIN — METOPROLOL SUCCINATE 25 MG: 25 TABLET, EXTENDED RELEASE ORAL at 09:42

## 2023-09-16 RX ADMIN — NYSTATIN 1 APPLICATION: 100000 CREAM TOPICAL at 20:52

## 2023-09-16 RX ADMIN — METFORMIN HYDROCHLORIDE 500 MG: 500 TABLET ORAL at 17:22

## 2023-09-16 RX ADMIN — CETIRIZINE HYDROCHLORIDE 5 MG: 10 TABLET, FILM COATED ORAL at 09:43

## 2023-09-16 RX ADMIN — ISOSORBIDE MONONITRATE 30 MG: 30 TABLET, EXTENDED RELEASE ORAL at 09:44

## 2023-09-16 RX ADMIN — PANTOPRAZOLE SODIUM 40 MG: 40 TABLET, DELAYED RELEASE ORAL at 09:45

## 2023-09-16 RX ADMIN — ASPIRIN 81 MG: 81 TABLET, COATED ORAL at 09:45

## 2023-09-16 RX ADMIN — VENLAFAXINE HYDROCHLORIDE 75 MG: 75 CAPSULE, EXTENDED RELEASE ORAL at 09:44

## 2023-09-16 RX ADMIN — VALSARTAN 40 MG: 80 TABLET, FILM COATED ORAL at 09:46

## 2023-09-16 RX ADMIN — BUPROPION HYDROCHLORIDE 150 MG: 150 TABLET, EXTENDED RELEASE ORAL at 09:47

## 2023-09-16 RX ADMIN — NYSTATIN 1 APPLICATION: 100000 CREAM TOPICAL at 09:48

## 2023-09-16 RX ADMIN — PRAVASTATIN SODIUM 40 MG: 40 TABLET ORAL at 09:48

## 2023-09-16 RX ADMIN — MONTELUKAST SODIUM 10 MG: 10 TABLET, COATED ORAL at 20:52

## 2023-09-16 RX ADMIN — OXYBUTYNIN CHLORIDE 10 MG: 5 TABLET, EXTENDED RELEASE ORAL at 09:46

## 2023-09-16 NOTE — PLAN OF CARE
Goal Outcome Evaluation:  Plan of Care Reviewed With: patient        Progress: improving            Problem: Rehabilitation (IRF) Plan of Care  Goal: Plan of Care Review  Outcome: Ongoing, Progressing  Flowsheets (Taken 9/16/2023 1233)  Progress: improving  Plan of Care Reviewed With: patient  Goal: Patient-Specific Goal (Individualized)  Outcome: Ongoing, Progressing  Goal: Absence of New-Onset Illness or Injury  Outcome: Ongoing, Progressing  Intervention: Prevent Fall and Fall Injury  Recent Flowsheet Documentation  Taken 9/16/2023 1404 by Pavel Armenta, RN  Safety Promotion/Fall Prevention: safety round/check completed  Taken 9/16/2023 1203 by Pavel Armenta, RN  Safety Promotion/Fall Prevention: safety round/check completed  Taken 9/16/2023 1002 by Pavel Armenta, RN  Safety Promotion/Fall Prevention: safety round/check completed  Taken 9/16/2023 0745 by Pavel Armenta, RN  Safety Promotion/Fall Prevention:   safety round/check completed   nonskid shoes/slippers when out of bed   gait belt   fall prevention program maintained   clutter free environment maintained   assistive device/personal items within reach  Intervention: Prevent VTE (Venous Thromboembolism)  Recent Flowsheet Documentation  Taken 9/16/2023 0745 by Pavel Armenta, RN  VTE Prevention/Management:   bilateral   sequential compression devices off   patient refused intervention  Goal: Optimal Comfort and Wellbeing  Outcome: Ongoing, Progressing  Goal: Home and Community Transition Plan Established  Outcome: Ongoing, Progressing     Problem: Skin Injury Risk Increased  Goal: Skin Health and Integrity  Outcome: Ongoing, Progressing  Intervention: Promote and Optimize Oral Intake  Recent Flowsheet Documentation  Taken 9/16/2023 0745 by Pavel Armenta, RN  Oral Nutrition Promotion: physical activity promoted  Intervention: Optimize Skin Protection  Recent Flowsheet Documentation  Taken 9/16/2023 0745 by Pavel Armenta, RN  Pressure  Reduction Techniques:   pressure points protected   heels elevated off bed   frequent weight shift encouraged  Pressure Reduction Devices: pressure-redistributing mattress utilized  Skin Protection: incontinence pads utilized     Problem: Fall Injury Risk  Goal: Absence of Fall and Fall-Related Injury  Outcome: Ongoing, Progressing  Intervention: Identify and Manage Contributors  Recent Flowsheet Documentation  Taken 9/16/2023 0745 by Pavel Armenta, RN  Medication Review/Management: medications reviewed  Intervention: Promote Injury-Free Environment  Recent Flowsheet Documentation  Taken 9/16/2023 1404 by Pavel Armenta, RN  Safety Promotion/Fall Prevention: safety round/check completed  Taken 9/16/2023 1203 by Pavel Armenta, RN  Safety Promotion/Fall Prevention: safety round/check completed  Taken 9/16/2023 1002 by Pavel Armenta, RN  Safety Promotion/Fall Prevention: safety round/check completed  Taken 9/16/2023 0745 by Pavel Armenta, RN  Safety Promotion/Fall Prevention:   safety round/check completed   nonskid shoes/slippers when out of bed   gait belt   fall prevention program maintained   clutter free environment maintained   assistive device/personal items within reach     Problem: Communication Impairment  Goal: Effective Communication Skills  Outcome: Ongoing, Progressing  Intervention: Optimize Communication Skills  Recent Flowsheet Documentation  Taken 9/16/2023 0745 by Pavel Armenta, RN  Communication Enhancement Strategies: call light answered in person

## 2023-09-16 NOTE — THERAPY TREATMENT NOTE
Inpatient Rehabilitation - Speech Language Pathology   Swallow Treatment Note [facility]     Patient Name: Ofelia Knox  : 1945  MRN: [id number]  Today's Date: 2023               Admit Date: 2023    Ms. Knox was seen in the speech office this am for continued speech/language therapy. She was a/a and interactive, reporting poor sleep overnight. She was interactive and cooperative to participate in all tasks.     Long Term Goal:  Patient will demonstrate adequate language skills to fully participate in adls at premorbid baseline level of function.       Short Term Goals:  1. Patient will match pictures/objects/words w/ 50% accuracy and min cues over three consecutive sessions.  100% independently.      2. Patient will receptively identify verbally named objects/pictures/words w/ 50% accuracy and min cues over three consecutive sessions.   100% pictures and objects.      3. Patient will answer yes/no questions of varying levels of difficulty w/ 75% accuracy and min cues over three consecutive sessions.   100% accuracy simple and moderate levels. Complex yes/no questions w/ 50% accuracy. (3 of 3 consecutive sessions for simple and mod levels).      4. Patient will follow variable step directives w/ 50% accuracy and min cues over three consecutive sessions.   3-4 step directives related to familiar task with min cues at 100%. (3 of 3 consecutive sessions for familiar tasks)  Unfamiliar task 2-3 step directives w/ SLP model for initial task, then 75% accuracy.      5. Patient will provide biographical information relating to friends/self/family members  names/ID w/ 80% accuracy and min cues over three consecutive sessions.   Pt name: 100% (3 of 3 consecutive sessions)  : 100% accuracy w/ SLP initiation of month and graphic production by SLP. She is independently able to produce year following SLP model for month and day.   Son and DIL names: 100% (3 of 3 consecutive sessions)  Grandchildren: 75% w/ pt  requiring phonemic and graphic cues to relate names.     6. Patient will identify the appropriate object/picture/word following a verbal description w/ 50% accuracy over three consecutive sessions.   Not addressed.      7. Patient will expressively ID objects/pictures w/ 50% accuracy and min cues over three consecutive sessions.   Tangible objects 7/7 today from [employer] kit. (3 of 3 consecutive sessions)  Pictured objects (LARK kit) 100% accuracy w/ phonemic cue provided to improve to 100%  She was able to graphically produce object name 100%.     8. Patient will complete automatic oe sentences w/ 50% accuracy and min cues over three consecutive sessions.   Simple and moderate at 80% independnetly, 100% initial phonemic cues.      9. Patient will provide verbal descriptors of an object/picture/word w/ 50% accuracy and min cues over three consecutive sessions.   Not addressed today.      *Additional goals to be added per pt progress  *Goals to be modified per pt progress       Visit Dx:   No diagnosis found.  Patient Active Problem List   Diagnosis    CVA (cerebral vascular accident)     Past Medical History:   Diagnosis Date    AMS (altered mental status)     Aphasia     CKD (chronic kidney disease)     COPD (chronic obstructive pulmonary disease)     CVA (cerebral vascular accident)     DM2 (diabetes mellitus, type 2)     HTN (hypertension)     Restrictive lung disease     Urinary tract infection due to ESBL Klebsiella      Past Surgical History:   Procedure Laterality Date    HYSTERECTOMY      JOINT REPLACEMENT       EDUCATION  The patient has been educated in the following areas:   Communication Impairment.     SLP Recommendation and Plan   Continue current plan of care to allow for maximal opportunities for improvement.      Thank you   Annalisa Calderón M.S., CCC/SLP                                                                                                   Time Calculation:    Time Calculation- SLP        Time Calculation- SLP       Row Name 09/16/23 0917    SLP Start Time 0830  -Recorded by: MILES    SLP Stop Time 0915  -Recorded by: MILES    SLP Time Calculation (min) 45 min  -Recorded by: MILES    SLP - Next Appointment 09/18/23  -Recorded by: MILES                      User Key       Initials Name Provider Type    Annalisa Schulte, MS CCC-SLP Speech and Language Pathologist                             Annalisa Calderón, MS MIRANDA-SLP  9/16/2023

## 2023-09-16 NOTE — PROGRESS NOTES
Rehabilitation Nursing  Inpatient Rehabilitation Plan of Care Note    Plan of Care  Copy from [facility]    Toilet Transfers (Active)  Current Status (9/11/2023 7:00:00 PM): PATIENT WILL HAVE NO TRANSFER DIFFICULTY    Weekly Goal: NO DIFFICULT WITH TRANSFERS  Discharge Goal: TRANSFERS INDEPENDENTLY    Pain    Pain Management (Active)  Current Status (9/11/2023 7:00:00 PM): PATIENT WILL VOICE NO PAIN  Weekly Goal: PATIENT WILL BE PAIN FREE  Discharge Goal: FREE FROM PAIN    Safety    Potential for Injury (Active)  Current Status (9/11/2023 7:00:00 PM): PATIENT WILL HAVE NO INJURY THIS STAY  Weekly Goal: NO INJURY  Discharge Goal: PATIENT WILL DISCHARGE    Body Systems    Integumentary (Active)  Current Status (9/11/2023 7:00:00 PM): PATIENT WILL HAVE NO SKIN  BREAKDOWN THIS  STAY  Weekly Goal: SKIN WILL REMAIN INTACT.  Discharge Goal: NO SKIN ISSUES.    Signed by: Pavel Armenta RN

## 2023-09-16 NOTE — PROGRESS NOTES
Assisted By: Xena PURCELL    CC: Follow-up on CVA    Interview History/HPI: Patient was while doing OT, she was mostly self-feeding.  Does follow commands and overall  strength was symmetric but about 4/5.  She still has word finding problems.          Current Hospital Meds:  aspirin, 81 mg, Oral, Daily  buPROPion XL, 150 mg, Oral, Daily  cetirizine, 5 mg, Oral, Daily  isosorbide mononitrate, 30 mg, Oral, Q24H  metFORMIN, 500 mg, Oral, BID With Meals  metoprolol succinate XL, 25 mg, Oral, Q24H  montelukast, 10 mg, Oral, Nightly  nystatin, 1 application , Topical, Q12H  oxybutynin XL, 10 mg, Oral, Daily  pantoprazole, 40 mg, Oral, QAM AC  pravastatin, 40 mg, Oral, Daily  tiotropium bromide monohydrate, 2 puff, Inhalation, Daily - RT  valsartan, 40 mg, Oral, Q24H  venlafaxine XR, 75 mg, Oral, Daily With Breakfast         Vitals:    09/15/23 1906   BP: 127/56   Pulse: 71   Resp: 18   Temp: 97.4 °F (36.3 °C)   SpO2: 93%         Intake/Output Summary (Last 24 hours) at 9/16/2023 0922  Last data filed at 9/16/2023 0900  Gross per 24 hour   Intake 840 ml   Output --   Net 840 ml       EXAM: Her mood is good skin warm and dry morbidly obese by BMI of 40, lungs have bilateral breath sounds that are clear, heart regular rate and rhythm not murmur, extremities trace edema some venous stasis changes of her lower extremities.      Diet: Cardiac Diets, Diabetic Diets; Healthy Heart (2-3 Na+); Consistent Carbohydrate; Texture: Regular Texture (IDDSI 7); Fluid Consistency: Thin (IDDSI 0)        LABS:     Lab Results (last 48 hours)       Procedure Component Value Units Date/Time    POC Glucose Once [[id number]]  (Normal) Collected: 09/16/23 0705    Specimen: Blood Updated: 09/16/23 0711     Glucose 117 mg/dL     POC Glucose Once [[id number]]  (Normal) Collected: 09/15/23 1600    Specimen: Blood Updated: 09/15/23 1614     Glucose 116 mg/dL     POC Glucose Once [[id number]]  (Abnormal) Collected: 09/15/23 1057    Specimen: Blood  Updated: 09/15/23 1103     Glucose 147 mg/dL     POC Glucose Once [[id number]]  (Normal) Collected: 09/15/23 0616    Specimen: Blood Updated: 09/15/23 0622     Glucose 110 mg/dL     Ferritin [[id number]]  (Normal) Collected: 09/15/23 0456    Specimen: Blood Updated: 09/15/23 0536     Ferritin 60.92 ng/mL     Narrative:      Results may be falsely decreased if patient taking Biotin.      Basic Metabolic Panel [[id number]]  (Abnormal) Collected: 09/15/23 0456    Specimen: Blood Updated: 09/15/23 0531     Glucose 113 mg/dL      BUN 31 mg/dL      Creatinine 1.00 mg/dL      Sodium 140 mmol/L      Potassium 3.6 mmol/L      Chloride 101 mmol/L      CO2 29.0 mmol/L      Calcium 9.1 mg/dL      BUN/Creatinine Ratio 31.0     Anion Gap 10.0 mmol/L      eGFR 58.5 mL/min/1.73     Narrative:      GFR Normal >60  Chronic Kidney Disease <60  Kidney Failure <15    The GFR formula is only valid for adults with stable renal function between ages 18 and 70.    Iron Profile [[id number]]  (Abnormal) Collected: 09/15/23 0456    Specimen: Blood Updated: 09/15/23 0531     Iron 48 mcg/dL      Iron Saturation (TSAT) 15 %      Transferrin 218 mg/dL      TIBC 325 mcg/dL     CBC & Differential [[id number]]  (Abnormal) Collected: 09/15/23 0456    Specimen: Blood Updated: 09/15/23 0515    Narrative:      The following orders were created for panel order CBC & Differential.  Procedure                               Abnormality         Status                     ---------                               -----------         ------                     CBC Auto Differential[[id number]]        Abnormal            Final result                 Please view results for these tests on the individual orders.    CBC Auto Differential [[id number]]  (Abnormal) Collected: 09/15/23 0456    Specimen: Blood Updated: 09/15/23 0515     WBC 11.98 10*3/mm3      RBC 5.23 10*6/mm3      Hemoglobin 14.4 g/dL      Hematocrit 48.5 %      MCV 92.7 fL      MCH 27.5 pg      MCHC 29.7 g/dL       RDW 14.7 %      RDW-SD 50.8 fl      MPV 12.0 fL      Platelets 294 10*3/mm3      Neutrophil % 66.9 %      Lymphocyte % 18.4 %      Monocyte % 11.8 %      Eosinophil % 2.2 %      Basophil % 0.4 %      Immature Grans % 0.3 %      Neutrophils, Absolute 8.02 10*3/mm3      Lymphocytes, Absolute 2.20 10*3/mm3      Monocytes, Absolute 1.41 10*3/mm3      Eosinophils, Absolute 0.26 10*3/mm3      Basophils, Absolute 0.05 10*3/mm3      Immature Grans, Absolute 0.04 10*3/mm3      nRBC 0.0 /100 WBC     POC Glucose Once [[id number]]  (Normal) Collected: 09/15/23 0246    Specimen: Blood Updated: 09/15/23 0302     Glucose 110 mg/dL     Urinalysis With Culture If Indicated - Straight Cath [[id number]]  (Abnormal) Collected: 09/14/23 1935    Specimen: Urine from Straight Cath Updated: 09/14/23 2000     Color, UA Dark Yellow     Appearance, UA Clear     pH, UA 6.5     Specific Gravity, UA 1.023     Glucose, UA Negative     Ketones, UA Trace     Bilirubin, UA Negative     Blood, UA Negative     Protein, UA Trace     Leuk Esterase, UA Negative     Nitrite, UA Negative     Urobilinogen, UA 1.0 E.U./dL    Narrative:      In absence of clinical symptoms, the presence of pyuria, bacteria, and/or nitrites on the urinalysis result does not correlate with infection.  Urine microscopic not indicated.    POC Glucose Once [[id number]]  (Normal) Collected: 09/14/23 1937    Specimen: Blood Updated: 09/14/23 1943     Glucose 107 mg/dL     POC Glucose Once [[id number]]  (Normal) Collected: 09/14/23 1614    Specimen: Blood Updated: 09/14/23 1620     Glucose 114 mg/dL     POC Glucose Once [[id number]]  (Abnormal) Collected: 09/14/23 1106    Specimen: Blood Updated: 09/14/23 1119     Glucose 137 mg/dL                  Radiology:    Imaging Results (Last 72 Hours)       Procedure Component Value Units Date/Time    XR Chest 1 View [[id number]] Collected: 09/14/23 0754     Updated: 09/14/23 0756    Narrative:      XR CHEST 1 VW-     CLINICAL INDICATION: eval  leukocytosis        COMPARISON: None available      TECHNIQUE: Single frontal view of the chest.     FINDINGS:      LUNGS: Lungs are adequately aerated.      HEART AND MEDIASTINUM: Heart and mediastinal contours are unremarkable        SKELETON: Bony and soft tissue structures are unremarkable.             Impression:      No radiographic evidence of acute cardiac or pulmonary disease.     This report was finalized on 9/14/2023 7:54 AM by Dr. Krunal Colvin MD.               Results for orders placed during the hospital encounter of 09/11/23    Adult Transthoracic Echo Complete W/ Cont if Necessary Per Protocol    Interpretation Summary    Left ventricular systolic function is mildly decreased. Calculated left ventricular EF = 44% Left ventricular ejection fraction appears to be 41 - 45%.    Left ventricular wall thickness is consistent with mild eccentric hypertrophy.    Left ventricular diastolic function is consistent with (grade I) impaired relaxation.    The left atrial cavity is moderately dilated.      Assessment/Plan:   Status post CVA, continue aspirin and statin.  Patient continues to work with all 3 modes of therapy.  Speech continues to work on cognitive and her expressive aphasia.  For her ADLs, with OT, patient min assist with bathing, set up for upper body dressing, min assist lower body dressing, set up for grooming.  With PT, patient is min assist with bed mobility, min assist with bed to chair and min assist to contact-guard with chair to bed.  Min assist to contact-guard for sit to stand and stand to sit.  Patient was able to walk 160 feet x 2 contact-guard front wheeled walker 1 person, patient is progressing well, continue current therapy plan.    HFrEF, patient is on valsartan, will follow her blood pressure and recheck her creatinine in a.m., if acceptable we will change valsartan to Entresto tomorrow.    Diabetes, on metformin alone, her sulfonylurea was stopped earlier in the week due to  hypoglycemia.  Glucose remains controlled on this.    Leukocytosis without infectious source and by labs this has improved.    Home O2 dependent COPD, she is on oxygen with adequate saturations and appears in no distress.  She does not appear to be in an exacerbation at this time.    Mild polycythemia improved.    Jim Appiah MD

## 2023-09-16 NOTE — PROGRESS NOTES
Inpatient Rehabilitation Functional Measures Assessment    Functional Measures  JOHN Eating:  JOHN Grooming:  JOHN Bathing:  JOHN Upper Body Dressing:  JOHN Lower Body Dressing:  JOHN Toileting:    JOHN Bladder Management  Level of Assistance:  Frequency/Number of Accidents this Shift:    JOHN Bowel Management  Level of Assistance:  Frequency/Number of Accidents this Shift:    JOHN Bed/Chair/Wheelchair Transfer:  JOHN Toilet Transfer:  JOHN Tub/Shower Transfer:    Previously Documented Mode of Locomotion at Discharge:  [facility] Expected Mode of Locomotion at Discharge:  [facility] Walk/Wheelchair:  [facility] Stairs:    [facility] Comprehension:  [facility] Expression:  [facility] Social Interaction:  [facility] Problem Solving:  [facility] Memory:    Therapy Mode Minutes  Occupational Therapy:  Physical Therapy:  Speech Language Pathology: Individual: 45 minutes.    Discharge Functional Goals:    Signed by: Annalisa Calderón SLP

## 2023-09-16 NOTE — THERAPY TREATMENT NOTE
Inpatient Rehabilitation - Physical Therapy Treatment Note        Richie     Patient Name: Ofelia Knox  : 1945  MRN: [id number]    Today's Date: 2023                    Admit Date: 2023      Visit Dx:   No diagnosis found.    Patient Active Problem List   Diagnosis    CVA (cerebral vascular accident)       Past Medical History:   Diagnosis Date    AMS (altered mental status)     Aphasia     CKD (chronic kidney disease)     COPD (chronic obstructive pulmonary disease)     CVA (cerebral vascular accident)     DM2 (diabetes mellitus, type 2)     HTN (hypertension)     Restrictive lung disease     Urinary tract infection due to ESBL Klebsiella        Past Surgical History:   Procedure Laterality Date    HYSTERECTOMY      JOINT REPLACEMENT         PT ASSESSMENT (last 12 hours)       IRF PT Evaluation and Treatment       Row Name 23          PT Time and Intention    Mode of Treatment physical therapy  -       Row Name 23          General Information    General Observations of Patient Pt seen sitting in W/C after speech session. Pleasant and cooperative with therapy.  -     Limitations/Impairments aphasia;safety/cognitive  -       Row Name 23          Bed Mobility    Comment, (Bed Mobility) Not observed as pt was seen sitting in W/C and did not want to lie down after therapy session.  -       Row Name 23          Transfer Assessment/Treatment    Transfers sit-stand transfer;stand-sit transfer  -       Row Name 23          Sit-Stand Transfer    Sit-Stand [place] (Transfers) contact guard;minimum assist (75% patient effort);verbal cues;1 person assist  -     Assistive Device (Sit-Stand Transfers) walker, front-wheeled;wheelchair  -       Row Name 23          Stand-Sit Transfer    Stand-Sit [place] (Transfers) contact guard;verbal cues  -     Assistive Device (Stand-Sit Transfers) walker, front-wheeled;wheelchair   -       Row Name 09/16/23 0917          Gait/Stairs (Locomotion)    Gait/Stairs Locomotion gait/ambulation assistive device  -     [place] Level (Gait) contact guard;1 person assist  -     Assistive Device (Gait) walker, front-wheeled  -     Distance in Feet (Gait) 1x100, 1x60  -     Bilateral Gait Deviations forward flexed posture  -     Comment, (Gait/Stairs) Pt demosntrates good ambulation stepping with heel to toe progression with PT recommendation, pt given suggestion to ambulate with AD closer to her. Pt demonstrates some fatigue and encouarged to rest.  -       Row Name 09/16/23 0917          Balance    Comment, Balance Pt worked on unsupported sitting with bean bag toss activity for core stability/strength, endurance. Pt also stood on foam squar to challenge LE musculature with diffiulty tolerating this activity with sxs of dizziness/vertigo, pt encouraged to sit down after 2 short trials. Pt encouraged to keep eyes open to avoid exacerbation of dizziness.  -       Row Name 09/16/23 0917          Motor Skills    Therapeutic Exercise hip;knee;ankle  Please see flow sheet for detailed LE TE, all main muscle groups worked  -       Row Name 09/16/23 0917          Positioning and Restraints    Pre-Treatment Position sitting in chair/recliner  -     Post Treatment Position wheelchair  -     In Wheelchair sitting;call light within reach  legs in leg rest  -       Row Name 09/16/23 0917          Daily Progress Summary (PT)    Daily Progress Summary (PT) Pt demonstrates some fatigue and kept eyes closed during portion of session. Pt may benefit from continued therapy services.  -               User Key  (r) = Recorded By, (t) = Taken By, (c) = Cosigned By      Initials Name Provider Type    Talisha Jewell PT Physical Therapist                     Physical Therapy Education       Title: PT OT SLP Therapies (Done)       Topic: Physical Therapy (Done)       Point: Mobility training  (Done)       Learning Progress Summary             Patient Acceptance, E,TB, VU by RM at 9/15/2023 1532    Acceptance, E,TB, VU by RM at 9/14/2023 1555    Acceptance, E,D, VU,NR by RG at 9/13/2023 1528    Acceptance, TB, NR by DL at 9/12/2023 2003    Acceptance, E, VU,NR by LB at 9/12/2023 1135                         Point: Home exercise program (Done)       Learning Progress Summary             Patient Acceptance, E,TB, VU by RM at 9/15/2023 1532    Acceptance, E,TB, VU by RM at 9/14/2023 1555    Acceptance, E,D, VU,NR by RG at 9/13/2023 1528    Acceptance, TB, NR by DL at 9/12/2023 2003    Acceptance, E, VU,NR by LB at 9/12/2023 1135                         Point: Body mechanics (Done)       Learning Progress Summary             Patient Acceptance, E,TB, VU by RM at 9/15/2023 1532    Acceptance, E,TB, VU by RM at 9/14/2023 1555    Acceptance, E,D, VU,NR by RG at 9/13/2023 1528    Acceptance, TB, NR by DL at 9/12/2023 2003    Acceptance, E, VU,NR by LB at 9/12/2023 1135                         Point: Precautions (Done)       Learning Progress Summary             Patient Acceptance, E,TB, VU by RM at 9/15/2023 1532    Acceptance, E,TB, VU by RM at 9/14/2023 1555    Acceptance, E,D, VU,NR by RG at 9/13/2023 1528    Acceptance, TB, NR by DL at 9/12/2023 2003    Acceptance, E, VU,NR by LB at 9/12/2023 1135                                         User Key       Initials Effective Dates Name Provider Type Discipline    LB 06/16/21 -  Andra Stewart, PT Physical Therapist PT    RM 02/17/23 -  Karis Obrien, PTA Physical Therapist Assistant PT    RG 06/16/21 -  Twna Cunningham PTA Physical Therapist Assistant PT    DL 03/16/22 -  Carolyn Ramirez, RN Registered Nurse Nurse                    PT Recommendation and Plan          Daily Progress Summary (PT)  Daily Progress Summary (PT): Pt demonstrates some fatigue and kept eyes closed during portion of session. Pt may benefit from continued therapy  services.               Time Calculation:      PT Charges       Row Name 09/16/23 1228             Time Calculation    Start Time 0917  -      Stop Time 1047  -      Time Calculation (min) 90 min  -      PT Received On 09/16/23  -                User Key  (r) = Recorded By, (t) = Taken By, (c) = Cosigned By      Initials Name Provider Type    Talisha Jewell, PT Physical Therapist                    Therapy Charges for Today       Code Description Service Date Service Provider Modifiers Qty    68985812364 HC PT THER PROC EA 15 MIN 9/16/2023 Talisha Teague, PT GP 2    96870644155 HC PT THERAPEUTIC ACT EA 15 MIN 9/16/2023 Talisha Teague, PT GP 1    16180464770 HC PT THERAPEUTIC ACT EA 15 MIN 9/16/2023 Talisha Teague, PT GP 1    93151816766 HC PT THER PROC EA 15 MIN 9/16/2023 Talisha Teague, PT GP 1    52780849014 HC GAIT TRAINING EA 15 MIN 9/16/2023 Talisha Teague, PT GP 1                     Talisha Teague, PT  9/16/2023

## 2023-09-16 NOTE — THERAPY TREATMENT NOTE
Inpatient Rehabilitation - Occupational Therapy Treatment Note     Richie     Patient Name: Ofelia Knox  : 1945  MRN: [id number]    Today's Date: 2023                 Admit Date: 2023       No diagnosis found.    Patient Active Problem List   Diagnosis    CVA (cerebral vascular accident)       Past Medical History:   Diagnosis Date    AMS (altered mental status)     Aphasia     CKD (chronic kidney disease)     COPD (chronic obstructive pulmonary disease)     CVA (cerebral vascular accident)     DM2 (diabetes mellitus, type 2)     HTN (hypertension)     Restrictive lung disease     Urinary tract infection due to ESBL Klebsiella        Past Surgical History:   Procedure Laterality Date    HYSTERECTOMY      JOINT REPLACEMENT               IRF OT ASSESSMENT FLOWSHEET (last 12 hours)       IRF OT Evaluation and Treatment       Row Name 23 1139          OT Time and Intention    Document Type daily treatment  -HB     Mode of Treatment individual therapy;occupational therapy  -HB     Total Minutes, Occupational Therapy 90  -HB     Patient Effort adequate  -HB     Symptoms Noted During/After Treatment none  -HB       Row Name 23 1139          General Information    Patient Profile Reviewed yes  -HB     Patient/Family/Caregiver Comments/Observations Patient and RN okd OT this date.  -HB     General Observations of Patient Patient tolerated OT well with no adverse reactions.  -HB     Existing Precautions/Restrictions fall  -HB     Limitations/Impairments aphasia;other (see comments)  -HB       Row Name 23 1139          Pain Assessment    Pretreatment Pain Rating 0/10 - no pain  -HB     Posttreatment Pain Rating 0/10 - no pain  -HB       Row Name 23 1139          Cognition/Psychosocial    Orientation Status (Cognition) unable/difficult to assess  -HB     Follows Commands (Cognition) verbal cues/prompting required;physical/tactile prompts required;repetition of directions  required;other (see comments);follows one-step commands  -       Row Name 09/16/23 1139          Lower Body Dressing    [place] Level (Lower Body Dressing) doff;don;shoes/slippers;minimum assist (75% patient effort)  -     Position (Lower Body Dressing) edge of bed sitting  -       Row Name 09/16/23 1139          Grooming    [place] Level (Grooming) set up;supervision;verbal cues;grooming skills  -     Position (Grooming) supported sitting  -       Row Name 09/16/23 1139          Self-Feeding    [place] Level (Self-Feeding) feeding skills;set up  -     Position (Self-Feeding) supported sitting  -[facility] Name 09/16/23 1139          Bed Mobility    Supine-Sit [place] (Bed Mobility) minimum assist (75% patient effort);nonverbal cues (demo/gesture);verbal cues  -[facility] Name 09/16/23 1139          Bed-Chair Transfer    Bed-Chair [place] (Transfers) minimum assist (75% patient effort);nonverbal cues (demo/gesture);verbal cues  -     Assistive Device (Bed-Chair Transfers) wheelchair  -[facility] Name 09/16/23 1139          Sit-Stand Transfer    Sit-Stand [place] (Transfers) minimum assist (75% patient effort);verbal cues;nonverbal cues (demo/gesture);contact guard  -[facility] Name 09/16/23 1139          Stand-Sit Transfer    Stand-Sit [place] (Transfers) minimum assist (75% patient effort);verbal cues;nonverbal cues (demo/gesture);contact guard  -[facility] Name 09/16/23 1139          Motor Skills    Motor Skills coordination;functional endurance;neuro-muscular function;motor control/coordination interventions  -     Motor Control/Coordination Interventions fine motor manipulation/dexterity activities;gross motor coordination activities;therapeutic exercise/ROM;neuro-muscular re-education  -     Therapeutic Exercise elbow/forearm;shoulder;wrist;hand  -     Additional Documentation --  BUE TA to increase ADL status/ endurance;rest between tasks-  mod-max verbal/tactile/visual cues required for completion.  -HB       Row Name 09/16/23 1139          Positioning and Restraints    Pre-Treatment Position in bed  -HB     Post Treatment Position wheelchair  -HB     In Bed with SLP  -HB       Row Name 09/16/23 1139          LB Dressing Goal 1 (OT-IRF)    Activity/Device (LB Dressing Goal 1, OT-IRF) lower body dressing  -HB     [place] (LB Dressing Goal 1, OT-IRF) moderate assist (50-74% patient effort)  -HB     Time Frame (LB Dressing Goal 1, OT-IRF) short-term goal (STG);1 week  -HB     Progress/Outcomes (LB Dressing Goal 1, OT-IRF) goal met  -HB       Row Name 09/16/23 1139          LB Dressing Goal 2 (OT-IRF)    Activity/Device (LB Dressing Goal 2, OT-IRF) lower body dressing  -HB     [place] (LB Dressing Goal 2, OT-IRF) minimum assist (75% or more patient effort)  -HB     Time Frame (LB Dressing Goal 2, OT-IRF) long-term goal (LTG);by discharge  -HB     Progress/Outcomes (LB Dressing Goal 2, OT-IRF) good progress toward goal  -HB               User Key  (r) = Recorded By, (t) = Taken By, (c) = Cosigned By      Initials Name Effective Dates    HB Xena Chinchilla, OT 05/25/21 -                      Occupational Therapy Education       Title: PT OT SLP Therapies (Done)       Topic: Occupational Therapy (Done)       Point: ADL training (Done)       Description:   Instruct learner(s) on proper safety adaptation and remediation techniques during self care or transfers.   Instruct in proper use of assistive devices.                  Learning Progress Summary             Patient Acceptance, E, VU,NR by  at 9/16/2023 1145    Acceptance, E, VU,NR by BF at 9/15/2023 1232                         Point: Precautions (Done)       Description:   Instruct learner(s) on prescribed precautions during self-care and functional transfers.                  Learning Progress Summary             Patient Acceptance, E, VU,NR by  at 9/16/2023 1145    Acceptance, E, VU,NR by BF  at 9/15/2023 1232                                         User Key       Initials Effective Dates Name Provider Type Discipline     07/11/23 -  Pam Peck OT Occupational Therapist OT     05/25/21 -  Xena Chinchilla OT Occupational Therapist OT                        OT Recommendation and Plan                         Time Calculation:      Time Calculation- OT       Row Name 09/16/23 1145             Time Calculation- OT    OT Start Time 0700  -HB      OT Stop Time 0830  -HB      OT Time Calculation (min) 90 min  -HB      Total Timed Code Minutes- OT 90 minute(s)  -                User Key  (r) = Recorded By, (t) = Taken By, (c) = Cosigned By      Initials Name Provider Type     Xena Chinchilla OT Occupational Therapist                  Therapy Charges for Today       Code Description Service Date Service Provider Modifiers Qty    55947030902 HC OT SELF CARE/MGMT/TRAIN EA 15 MIN 9/16/2023 Xena Chinchilla OT GO 2    65373959126 HC OT THERAPEUTIC ACT EA 15 MIN 9/16/2023 Xena Chinchilla OT GO 2    24099581899 HC OT NEUROMUSC RE EDUCATION EA 15 MIN 9/16/2023 Xena Chinchilla OT GO 2                     Xena Chinchilla OT  9/16/2023

## 2023-09-16 NOTE — PROGRESS NOTES
Occupational Therapy: Individual: 90 minutes.    Physical Therapy:    Speech Language Pathology:    Signed by: Xena Chinchilla OT

## 2023-09-17 LAB
ANION GAP SERPL CALCULATED.3IONS-SCNC: 9.3 MMOL/L (ref 5–15)
BUN SERPL-MCNC: 47 MG/DL (ref 8–23)
BUN/CREAT SERPL: 40.2 (ref 7–25)
CALCIUM SPEC-SCNC: 9.8 MG/DL (ref 8.6–10.5)
CHLORIDE SERPL-SCNC: 103 MMOL/L (ref 98–107)
CO2 SERPL-SCNC: 30.7 MMOL/L (ref 22–29)
CREAT SERPL-MCNC: 1.17 MG/DL (ref 0.57–1)
EGFRCR SERPLBLD CKD-EPI 2021: 48.5 ML/MIN/1.73
GLUCOSE BLDC GLUCOMTR-MCNC: 109 MG/DL (ref 70–130)
GLUCOSE BLDC GLUCOMTR-MCNC: 112 MG/DL (ref 70–130)
GLUCOSE BLDC GLUCOMTR-MCNC: 114 MG/DL (ref 70–130)
GLUCOSE BLDC GLUCOMTR-MCNC: 130 MG/DL (ref 70–130)
GLUCOSE SERPL-MCNC: 104 MG/DL (ref 65–99)
POTASSIUM SERPL-SCNC: 3.6 MMOL/L (ref 3.5–5.2)
SODIUM SERPL-SCNC: 143 MMOL/L (ref 136–145)

## 2023-09-17 PROCEDURE — 80048 BASIC METABOLIC PNL TOTAL CA: CPT | Performed by: INTERNAL MEDICINE

## 2023-09-17 PROCEDURE — 82948 REAGENT STRIP/BLOOD GLUCOSE: CPT

## 2023-09-17 PROCEDURE — 94664 DEMO&/EVAL PT USE INHALER: CPT

## 2023-09-17 PROCEDURE — 94799 UNLISTED PULMONARY SVC/PX: CPT

## 2023-09-17 PROCEDURE — 94760 N-INVAS EAR/PLS OXIMETRY 1: CPT

## 2023-09-17 PROCEDURE — 94761 N-INVAS EAR/PLS OXIMETRY MLT: CPT

## 2023-09-17 RX ADMIN — VENLAFAXINE HYDROCHLORIDE 75 MG: 75 CAPSULE, EXTENDED RELEASE ORAL at 08:29

## 2023-09-17 RX ADMIN — METOPROLOL SUCCINATE 25 MG: 25 TABLET, EXTENDED RELEASE ORAL at 08:28

## 2023-09-17 RX ADMIN — NYSTATIN 1 APPLICATION: 100000 CREAM TOPICAL at 20:35

## 2023-09-17 RX ADMIN — OXYBUTYNIN CHLORIDE 10 MG: 5 TABLET, EXTENDED RELEASE ORAL at 08:28

## 2023-09-17 RX ADMIN — MONTELUKAST SODIUM 10 MG: 10 TABLET, COATED ORAL at 20:35

## 2023-09-17 RX ADMIN — METFORMIN HYDROCHLORIDE 500 MG: 500 TABLET ORAL at 08:28

## 2023-09-17 RX ADMIN — VALSARTAN 40 MG: 80 TABLET, FILM COATED ORAL at 08:28

## 2023-09-17 RX ADMIN — CETIRIZINE HYDROCHLORIDE 5 MG: 10 TABLET, FILM COATED ORAL at 08:28

## 2023-09-17 RX ADMIN — ASPIRIN 81 MG: 81 TABLET, COATED ORAL at 08:27

## 2023-09-17 RX ADMIN — PANTOPRAZOLE SODIUM 40 MG: 40 TABLET, DELAYED RELEASE ORAL at 08:27

## 2023-09-17 RX ADMIN — ISOSORBIDE MONONITRATE 30 MG: 30 TABLET, EXTENDED RELEASE ORAL at 08:28

## 2023-09-17 RX ADMIN — BUPROPION HYDROCHLORIDE 150 MG: 150 TABLET, EXTENDED RELEASE ORAL at 08:27

## 2023-09-17 RX ADMIN — PRAVASTATIN SODIUM 40 MG: 40 TABLET ORAL at 08:28

## 2023-09-17 RX ADMIN — TIOTROPIUM BROMIDE INHALATION SPRAY 2 PUFF: 3.12 SPRAY, METERED RESPIRATORY (INHALATION) at 07:12

## 2023-09-17 RX ADMIN — METFORMIN HYDROCHLORIDE 500 MG: 500 TABLET ORAL at 17:28

## 2023-09-17 RX ADMIN — NYSTATIN 1 APPLICATION: 100000 CREAM TOPICAL at 08:29

## 2023-09-17 NOTE — PROGRESS NOTES
Apparently nursing overnight had mentioned about the patient being confused however I saw the patient with Felicity GLASS.  She appears to be at her baseline at this time.  I have talked to nursing about trying to encourage p.o. fluids.  Patient's creatinine and BUN are rising.  I do not prefer to give IV fluids as he does have reduced EF.  I am not stopping the Diovan as her creatinine is close to baseline and is more BUN rising.  We will recheck in a.m., if creatinine were to rise could consider stopping the Diovan.  Holding on switching to Entresto as of yet.

## 2023-09-17 NOTE — PLAN OF CARE
Goal Outcome Evaluation:   Progressing medically and physically with all therapies. Continue current POC.

## 2023-09-17 NOTE — PROGRESS NOTES
Rehabilitation Nursing  Inpatient Rehabilitation Plan of Care Note    Plan of Care  Mobility    Toilet Transfers (Active)  Current Status (9/11/2023 7:00:00 PM): PATIENT WILL HAVE NO TRANSFER DIFFICULTY    Weekly Goal: NO DIFFICULT WITH TRANSFERS  Discharge Goal: TRANSFERS INDEPENDENTLY    Pain    Pain Management (Active)  Current Status (9/11/2023 7:00:00 PM): PATIENT WILL VOICE NO PAIN  Weekly Goal: PATIENT WILL BE PAIN FREE  Discharge Goal: FREE FROM PAIN    Safety    Potential for Injury (Active)  Current Status (9/11/2023 7:00:00 PM): PATIENT WILL HAVE NO INJURY THIS STAY  Weekly Goal: NO INJURY  Discharge Goal: PATIENT WILL DISCHARGE    Body Systems    Integumentary (Active)  Current Status (9/11/2023 7:00:00 PM): PATIENT WILL HAVE NO SKIN  BREAKDOWN THIS  STAY  Weekly Goal: SKIN WILL REMAIN INTACT.  Discharge Goal: NO SKIN ISSUES.    Signed by: Annalisa Garcia, Supervisor

## 2023-09-18 VITALS
SYSTOLIC BLOOD PRESSURE: 143 MMHG | OXYGEN SATURATION: 92 % | TEMPERATURE: 98.3 F | RESPIRATION RATE: 16 BRPM | WEIGHT: 248 LBS | DIASTOLIC BLOOD PRESSURE: 66 MMHG | HEART RATE: 65 BPM | HEIGHT: 66 IN | BODY MASS INDEX: 39.86 KG/M2

## 2023-09-18 LAB
ANION GAP SERPL CALCULATED.3IONS-SCNC: 11.3 MMOL/L (ref 5–15)
BUN SERPL-MCNC: 38 MG/DL (ref 8–23)
BUN/CREAT SERPL: 36.5 (ref 7–25)
CALCIUM SPEC-SCNC: 9.3 MG/DL (ref 8.6–10.5)
CHLORIDE SERPL-SCNC: 105 MMOL/L (ref 98–107)
CO2 SERPL-SCNC: 27.7 MMOL/L (ref 22–29)
CREAT SERPL-MCNC: 1.04 MG/DL (ref 0.57–1)
EGFRCR SERPLBLD CKD-EPI 2021: 55.8 ML/MIN/1.73
GLUCOSE BLDC GLUCOMTR-MCNC: 106 MG/DL (ref 70–130)
GLUCOSE BLDC GLUCOMTR-MCNC: 108 MG/DL (ref 70–130)
GLUCOSE BLDC GLUCOMTR-MCNC: 110 MG/DL (ref 70–130)
GLUCOSE BLDC GLUCOMTR-MCNC: 127 MG/DL (ref 70–130)
GLUCOSE SERPL-MCNC: 112 MG/DL (ref 65–99)
POTASSIUM SERPL-SCNC: 3.8 MMOL/L (ref 3.5–5.2)
SODIUM SERPL-SCNC: 144 MMOL/L (ref 136–145)

## 2023-09-18 PROCEDURE — 92523 SPEECH SOUND LANG COMPREHEN: CPT

## 2023-09-18 PROCEDURE — 94799 UNLISTED PULMONARY SVC/PX: CPT

## 2023-09-18 PROCEDURE — 82948 REAGENT STRIP/BLOOD GLUCOSE: CPT

## 2023-09-18 PROCEDURE — 97535 SELF CARE MNGMENT TRAINING: CPT | Performed by: OCCUPATIONAL THERAPIST

## 2023-09-18 PROCEDURE — 97112 NEUROMUSCULAR REEDUCATION: CPT | Performed by: OCCUPATIONAL THERAPIST

## 2023-09-18 PROCEDURE — 94664 DEMO&/EVAL PT USE INHALER: CPT

## 2023-09-18 PROCEDURE — 97530 THERAPEUTIC ACTIVITIES: CPT | Performed by: OCCUPATIONAL THERAPIST

## 2023-09-18 PROCEDURE — 97110 THERAPEUTIC EXERCISES: CPT

## 2023-09-18 PROCEDURE — 80048 BASIC METABOLIC PNL TOTAL CA: CPT | Performed by: INTERNAL MEDICINE

## 2023-09-18 PROCEDURE — 99231 SBSQ HOSP IP/OBS SF/LOW 25: CPT | Performed by: FAMILY MEDICINE

## 2023-09-18 PROCEDURE — 97530 THERAPEUTIC ACTIVITIES: CPT

## 2023-09-18 PROCEDURE — 97116 GAIT TRAINING THERAPY: CPT

## 2023-09-18 RX ADMIN — CETIRIZINE HYDROCHLORIDE 5 MG: 10 TABLET, FILM COATED ORAL at 09:04

## 2023-09-18 RX ADMIN — TIOTROPIUM BROMIDE INHALATION SPRAY 2 PUFF: 3.12 SPRAY, METERED RESPIRATORY (INHALATION) at 07:13

## 2023-09-18 RX ADMIN — MONTELUKAST SODIUM 10 MG: 10 TABLET, COATED ORAL at 21:24

## 2023-09-18 RX ADMIN — NYSTATIN 1 APPLICATION: 100000 CREAM TOPICAL at 21:24

## 2023-09-18 RX ADMIN — PANTOPRAZOLE SODIUM 40 MG: 40 TABLET, DELAYED RELEASE ORAL at 06:30

## 2023-09-18 RX ADMIN — METOPROLOL SUCCINATE 25 MG: 25 TABLET, EXTENDED RELEASE ORAL at 09:05

## 2023-09-18 RX ADMIN — VALSARTAN 40 MG: 80 TABLET, FILM COATED ORAL at 09:05

## 2023-09-18 RX ADMIN — VENLAFAXINE HYDROCHLORIDE 75 MG: 75 CAPSULE, EXTENDED RELEASE ORAL at 09:05

## 2023-09-18 RX ADMIN — PRAVASTATIN SODIUM 40 MG: 40 TABLET ORAL at 09:05

## 2023-09-18 RX ADMIN — ASPIRIN 81 MG: 81 TABLET, COATED ORAL at 09:04

## 2023-09-18 RX ADMIN — ACETAMINOPHEN 650 MG: 325 TABLET ORAL at 21:26

## 2023-09-18 RX ADMIN — OXYBUTYNIN CHLORIDE 10 MG: 5 TABLET, EXTENDED RELEASE ORAL at 09:05

## 2023-09-18 RX ADMIN — ISOSORBIDE MONONITRATE 30 MG: 30 TABLET, EXTENDED RELEASE ORAL at 09:05

## 2023-09-18 RX ADMIN — METFORMIN HYDROCHLORIDE 500 MG: 500 TABLET ORAL at 17:24

## 2023-09-18 RX ADMIN — METFORMIN HYDROCHLORIDE 500 MG: 500 TABLET ORAL at 09:05

## 2023-09-18 RX ADMIN — NYSTATIN 1 APPLICATION: 100000 CREAM TOPICAL at 09:07

## 2023-09-18 RX ADMIN — BUPROPION HYDROCHLORIDE 150 MG: 150 TABLET, EXTENDED RELEASE ORAL at 09:04

## 2023-09-18 NOTE — PROGRESS NOTES
Rehabilitation Nursing  Inpatient Rehabilitation Plan of Care Note    Plan of Care  Copy from [facility]    Toilet Transfers (Active)  Current Status (9/11/2023 7:00:00 PM): PATIENT WILL HAVE NO TRANSFER DIFFICULTY    Weekly Goal: NO DIFFICULT WITH TRANSFERS  Discharge Goal: TRANSFERS INDEPENDENTLY    Pain    Pain Management (Active)  Current Status (9/11/2023 7:00:00 PM): PATIENT WILL VOICE NO PAIN  Weekly Goal: PATIENT WILL BE PAIN FREE  Discharge Goal: FREE FROM PAIN    Safety    Potential for Injury (Active)  Current Status (9/11/2023 7:00:00 PM): PATIENT WILL HAVE NO INJURY THIS STAY  Weekly Goal: NO INJURY  Discharge Goal: PATIENT WILL DISCHARGE    Body Systems    Integumentary (Active)  Current Status (9/11/2023 7:00:00 PM): PATIENT WILL HAVE NO SKIN  BREAKDOWN THIS  STAY  Weekly Goal: SKIN WILL REMAIN INTACT.  Discharge Goal: NO SKIN ISSUES.    Signed by: Felicity Burden, Nurse

## 2023-09-18 NOTE — THERAPY RE-EVALUATION
"Inpatient Rehabilitation - Speech Language Pathology Re-Evaluation  [facility]  SPEECH LANGUAGE COGNITIVE RE-EVALUATION     Patient Name: Ofelia Knox  : 1945  MRN: [id number]  Today's Date: 2023     Admit Date: 2023     Ofelia Knox  was seen this am on in the  office of [facility]'s IPR to participate in s/l and cognitive assessment for safety/function in adls. Patient was positioned in wheelchair in locked position to cooperatively participate. All results and observations of this evaluation are felt to be representative of patient's current functional status.    Per significance of improvement w/ current goals and overall improvement in pt status, she is felt to most benefit from formal re-evaluation this date.     Social History     Socioeconomic History    Marital status:    Tobacco Use    Smoking status: Never     Passive exposure: Never    Smokeless tobacco: Never   Vaping Use    Vaping Use: Unknown   Substance and Sexual Activity    Alcohol use: Never    Drug use: Never    Sexual activity: Never        Diet Orders (active) (From admission, onward)       Start     Ordered    23 1800  Dietary Nutrition Supplements Other (See Comment); Boost Glucose Control  Daily With Lunch & Dinner      Comments: Please send Boost Glucose Control with lunch and dinner.  Thank you.    23 1428    23  Diet: Cardiac Diets, Diabetic Diets; Healthy Heart (2-3 Na+); Consistent Carbohydrate; Texture: Regular Texture (IDDSI 7); Fluid Consistency: Thin (IDDSI 0)  Diet Effective Now         23                    She is observed on room air w/o complications.     She is noted w/ a congestive cough this date and she reports generally not feeling well this date.     Receptive language skills were intact. Ms Knox was able to id common objects and personal body parts, follow simple directives. Multi-step directives often required visual cues. She was able to understand complex \"wh\" " "questions and participate in conversational exchange w/ difficulties related to word-finding and Wernicke's like jargon with greater than 2 -3 word phrases.    Expressive language skills were impaired but fluent. She was able to complete automatic speech tasks, confrontation naming tasks, and simple and moderate oe sentences. She was able to respond to complete oe \"wh\" questions when the answer required only 1-2 words. She demonstrated intermittent difficulty w/ repetition and was able to repeat words w/o complications, however was unable to repeat longer phrases or sentences. Unable to repeat greater than 3 digits. Participation in complex conversational exchanges are impaired when multi-word verbal responses are required. No evidence of verbal apraxia is noted. Concerns are noted for expressive, fluent aphasia and anomia.     She was independently oriented to person, place, and time. Immediate, STM, and LTM were WFL w/ patient recalling recent adls and providing personal information w/ some word finding difficulties and fluent aphasic jargon. Convergent thinking skills are wfl. Divergent thinking skills are impaired per concerns for anomia. Thought organization and processing appear mildly impaired w/ mild delay for responses primarily when longer utterances are required felt to be related to anomia and fluent aphasia.      Facial/oral structures were symmetrical upon observation. Oral mucosa were moist, pink and clean. Secretions were clear, thin, and controlled. OROM/LUCAS was WFL w/o lingual deviation upon protrusion. Speech intensity, clarity, and intelligibility were WFL w/o dysarthria or oral apraxia noted.    Graphic and reading skills were intact, premorbid baseline status. She was able to id isolated letters, simple, and moderate words, as well as sentences and small paragraphs. Signature was legible.    Pragmatic skills were WFL w/ appropriate eye gaze patterns and visual tracking. Language was appropriate " in context w/ topic initiation and maintenance independently. No neglect evidenced. Humor response was intact w/ appropriate affect.      Visit Dx:  No diagnosis found.  Patient Active Problem List   Diagnosis    CVA (cerebral vascular accident)     Past Medical History:   Diagnosis Date    AMS (altered mental status)     Aphasia     CKD (chronic kidney disease)     COPD (chronic obstructive pulmonary disease)     CVA (cerebral vascular accident)     DM2 (diabetes mellitus, type 2)     HTN (hypertension)     Restrictive lung disease     Urinary tract infection due to ESBL Klebsiella      Past Surgical History:   Procedure Laterality Date    HYSTERECTOMY      JOINT REPLACEMENT       Impression:      Ms Knox presents w/ a significant change from initial evaluation this date. She evidences w/ what is felt to be a trace receptive deficit w/ continued difficulties w/ multi-step directives when not provided visual cues. She additionally presents w/ features representative of a fluent aphasia as well as anomia w/ noted impairments primarily evidenced w/ phrases/sentences of >3 words. Cognitive skills appear to be representative of her premorbid baseline status w/ impairments noted felt to be related to anomia and fluent aphasia.     SLP Recommendation and Plan      She is felt to most benefit from continued speech/language therapy w/ goals added/modified per additional data from this re-evaluation.     D/w patient results and recommendations w/ verbal understanding and agreement.    D/w RN results and recommendations w/ verbal understanding and agreement.     Thank you for allowing me to participate in the care of your patient-  Verona Sarmiento M.S., CCC-SLP        EDUCATION  The patient has been educated in the following areas:     Cognitive Impairment Communication Impairment.      Time Calculation:      Time Calculation- SLP       Row Name 09/18/23 1105             Time Calculation- SLP    SLP Start Time 0915  -       SLP Stop Time 1000  -JR      SLP Time Calculation (min) 45 min  -JR                User Key  (r) = Recorded By, (t) = Taken By, (c) = Cosigned By      Initials Name Provider Type    Verona Pizarro MS CCC-SLP Speech and Language Pathologist                    Therapy Charges for Today       Code Description Service Date Service Provider Modifiers Qty    51436065886  ST EVAL SPEECH AND PROD W LANG  3 9/18/2023 Verona Sarmiento MS CCC-SLP GN 1                       Verona Sarmiento MS CCC-JUSTO  9/18/2023

## 2023-09-18 NOTE — PROGRESS NOTES
Rehabilitation Nursing  Inpatient Rehabilitation Plan of Care Note    Plan of Care  Copy from [facility]    Toilet Transfers (Active)  Current Status (9/11/2023 7:00:00 PM): PATIENT WILL HAVE NO TRANSFER DIFFICULTY    Weekly Goal: NO DIFFICULT WITH TRANSFERS  Discharge Goal: TRANSFERS INDEPENDENTLY    Pain    Pain Management (Active)  Current Status (9/11/2023 7:00:00 PM): PATIENT WILL VOICE NO PAIN  Weekly Goal: PATIENT WILL BE PAIN FREE  Discharge Goal: FREE FROM PAIN    Safety    Potential for Injury (Active)  Current Status (9/11/2023 7:00:00 PM): PATIENT WILL HAVE NO INJURY THIS STAY  Weekly Goal: NO INJURY  Discharge Goal: PATIENT WILL DISCHARGE    Body Systems    Integumentary (Active)  Current Status (9/11/2023 7:00:00 PM): PATIENT WILL HAVE NO SKIN  BREAKDOWN THIS  STAY  Weekly Goal: SKIN WILL REMAIN INTACT.  Discharge Goal: NO SKIN ISSUES.    Signed by: Felicity Burden, Nurse

## 2023-09-18 NOTE — PROGRESS NOTES
Inpatient Rehabilitation Functional Measures Assessment and Plan of Care    Plan of Care  Self Care    [OT] Dressing (Lower)(Active)  Current Status(09/18/2023): Mod  Weekly Goal(09/19/2023): Min  Discharge Goal: Min A    Functional Measures  JOHN Eating:  JOHN Grooming:  JOHN Bathing:  JOHN Upper Body Dressing:  JOHN Lower Body Dressing:  JOHN Toileting:    JOHN Bladder Management  Level of Assistance:  Frequency/Number of Accidents this Shift:    JOHN Bowel Management  Level of Assistance:  Frequency/Number of Accidents this Shift:    JOHN Bed/Chair/Wheelchair Transfer:  JOHN Toilet Transfer:  JOHN Tub/Shower Transfer:    Previously Documented Mode of Locomotion at Discharge:  [facility] Expected Mode of Locomotion at Discharge:  JOHN Walk/Wheelchair:  JOHN Stairs:    JOHN Comprehension:  JOHN Expression:  JOHN Social Interaction:  JOHN Problem Solving:  JOHN Memory:    Therapy Mode Minutes  Occupational Therapy: Individual: 80 minutes.  Physical Therapy:  Speech Language Pathology:    Discharge Functional Goals:    Signed by: Keli Downs, Occupational Therapist

## 2023-09-18 NOTE — THERAPY TREATMENT NOTE
Inpatient Rehabilitation - Physical Therapy Treatment Note       ABENA Quiroz     Patient Name: Ofelia Knox  : 1945  MRN: [id number]    Today's Date: 2023                    Admit Date: 2023      Visit Dx:   No diagnosis found.    Patient Active Problem List   Diagnosis    CVA (cerebral vascular accident)       Past Medical History:   Diagnosis Date    AMS (altered mental status)     Aphasia     CKD (chronic kidney disease)     COPD (chronic obstructive pulmonary disease)     CVA (cerebral vascular accident)     DM2 (diabetes mellitus, type 2)     HTN (hypertension)     Restrictive lung disease     Urinary tract infection due to ESBL Klebsiella        Past Surgical History:   Procedure Laterality Date    HYSTERECTOMY      JOINT REPLACEMENT         PT ASSESSMENT (last 12 hours)       IRF PT Evaluation and Treatment       Row Name 23 1330          PT Time and Intention    Document Type daily treatment  -     Mode of Treatment physical therapy  -     Patient/Family/Caregiver Comments/Observations Pt and nursing in agreement for skilled PT on this date.  Pt has had reports of hypotension so a abd. binder was placed on pt for PT session.  BP in sitting was 132/79, standing 147/75.  -       Row Name 23 1330          General Information    Limitations/Impairments aphasia;safety/cognitive  -       Row Name 23 1330          Cognition/Psychosocial    Personal Safety Interventions gait belt;fall prevention program maintained;nonskid shoes/slippers when out of bed  -       Row Name 23 133          Transfer Assessment/Treatment    Transfers sit-stand transfer;stand-sit transfer  -       Row Name 23 133          Bed-Chair Transfer    Bed-Chair [place] (Transfers) verbal cues;nonverbal cues (demo/gesture);minimum assist (75% patient effort)  -     Assistive Device (Bed-Chair Transfers) wheelchair  -       Row Name 23 1330          Sit-Stand Transfer     Sit-Stand [place] (Transfers) contact guard;minimum assist (75% patient effort);verbal cues;1 person assist  -RG     Assistive Device (Sit-Stand Transfers) walker, front-wheeled;wheelchair  -RG       Row Name 09/18/23 1330          Stand-Sit Transfer    Stand-Sit Lulu (Transfers) contact guard;verbal cues  -RG     Assistive Device (Stand-Sit Transfers) walker, front-wheeled;wheelchair  -RG       Row Name 09/18/23 1330          Gait/Stairs (Locomotion)    Gait/Stairs Locomotion gait/ambulation assistive device  -RG     [place]ulu Level (Gait) contact guard;1 person assist  -RG     Assistive Device (Gait) walker, front-wheeled  -RG     Distance in Feet (Gait) 160'  -RG     Deviations/Abnormal Patterns (Gait) beatrice decreased;gait speed decreased;stride length decreased  -RG     Bilateral Gait Deviations forward flexed posture  -RG     Comment, (Gait/Stairs) Pt c/o faituge after gait. Verbal and tactile cues given to keep RW closer to her.  -RG       Row Name 09/18/23 1330          Balance    Dynamic Sitting Balance verbal cues;non-verbal cues (demo/gesture);supervision;contact guard  -RG     Comment, Balance seated bean bag toss, Pt was observed to get up without locking her brakes on wc.  Demonstrates poor safety awareness.  -RG       Row Name 09/18/23 1330          Hip (Therapeutic Exercise)    Hip Strengthening (Therapeutic Exercise) bilateral;flexion;aBduction;aDduction;marching while seated;marching while standing;sitting;standing;2 lb free weight;resistance band;green;10 repetitions;2 sets  -RG       Row Name 09/18/23 1330          Knee (Therapeutic Exercise)    Knee Strengthening (Therapeutic Exercise) bilateral;flexion;extension;marching while seated;marching while standing;LAQ (long arc quad);sitting;standing;2 lb free weight;resistance band;green;10 repetitions;2 sets  -RG       Row Name 09/18/23 1330          Ankle (Therapeutic Exercise)    Ankle Strengthening (Therapeutic Exercise)  bilateral;dorsiflexion;plantarflexion;sitting;standing;10 repetitions;2 sets  -RG       Row Name 09/18/23 1330          Positioning and Restraints    Pre-Treatment Position in bed  -RG     Post Treatment Position wheelchair  -RG     In Bed notified nsg;sitting;with OT  -RG               User Key  (r) = Recorded By, (t) = Taken By, (c) = Cosigned By      Initials Name Provider Type    Twan Oliver PTA Physical Therapist Assistant                     Physical Therapy Education       Title: PT OT SLP Therapies (Done)       Topic: Physical Therapy (Done)       Point: Mobility training (Done)       Learning Progress Summary             Patient Acceptance, E,D, VU,NR by RG at 9/18/2023 1338    Acceptance, E,TB, VU by RM at 9/15/2023 1532    Acceptance, E,TB, VU by RM at 9/14/2023 1555    Acceptance, E,D, VU,NR by RG at 9/13/2023 1528    Acceptance, TB, NR by DL at 9/12/2023 2003    Acceptance, E, VU,NR by LB at 9/12/2023 1135                         Point: Home exercise program (Done)       Learning Progress Summary             Patient Acceptance, E,D, VU,NR by RG at 9/18/2023 1338    Acceptance, E,TB, VU by RM at 9/15/2023 1532    Acceptance, E,TB, VU by RM at 9/14/2023 1555    Acceptance, E,D, VU,NR by RG at 9/13/2023 1528    Acceptance, TB, NR by DL at 9/12/2023 2003    Acceptance, E, VU,NR by LB at 9/12/2023 1135                         Point: Body mechanics (Done)       Learning Progress Summary             Patient Acceptance, E,D, VU,NR by RG at 9/18/2023 1338    Acceptance, E,TB, VU by RM at 9/15/2023 1532    Acceptance, E,TB, VU by RM at 9/14/2023 1555    Acceptance, E,D, VU,NR by RG at 9/13/2023 1528    Acceptance, TB, NR by DL at 9/12/2023 2003    Acceptance, E, VU,NR by LB at 9/12/2023 1135                         Point: Precautions (Done)       Learning Progress Summary             Patient Acceptance, E,D, VU,NR by RG at 9/18/2023 1338    Acceptance, E,TB, VU by RM at 9/15/2023 1532    Acceptance, E,TB,  VU by RM at 9/14/2023 1555    Acceptance, E,D, VU,NR by RG at 9/13/2023 1528    Acceptance, TB, NR by DL at 9/12/2023 2003    Acceptance, E, VU,NR by LB at 9/12/2023 1135                                         User Key       Initials Effective Dates Name Provider Type Discipline    LB 06/16/21 -  Andra Stewart, PT Physical Therapist PT    RM 02/17/23 -  Karis Obrien, PTA Physical Therapist Assistant PT    RG 06/16/21 -  Twan Cunningham PTA Physical Therapist Assistant PT    DL 03/16/22 -  Carolyn Ramirez, RN Registered Nurse Nurse                    PT Recommendation and Plan    Frequency of Treatment (PT): 5 times per week  Anticipated Equipment Needs at Discharge (PT Eval):  (tbd)                  Time Calculation:      PT Charges       Row Name 09/18/23 1338             Time Calculation    Start Time 0745  -RG      Stop Time 0915  -RG      Time Calculation (min) 90 min  -RG      PT Received On 09/18/23  -RG         Time Calculation- PT    Total Timed Code Minutes- PT 90 minute(s)  -RG                User Key  (r) = Recorded By, (t) = Taken By, (c) = Cosigned By      Initials Name Provider Type    RG Twan Cunningham PTA Physical Therapist Assistant                    Therapy Charges for Today       Code Description Service Date Service Provider Modifiers Qty    11051116864 HC GAIT TRAINING EA 15 MIN 9/18/2023 Twan Cunningham PTA GP, CQ 1    07236775089 HC PT THERAPEUTIC ACT EA 15 MIN 9/18/2023 Twan Cunningham PTA GP, CQ 2    22688450337 HC PT THER PROC EA 15 MIN 9/18/2023 Twan Cunningham PTA GP, CQ 3                     Twan Cunningham PTA  9/18/2023

## 2023-09-18 NOTE — PLAN OF CARE
Goal Outcome Evaluation:  Plan of Care Reviewed With: patient resting in bed at present, reports continues to participate in therapy, will continue to monitor.

## 2023-09-18 NOTE — PLAN OF CARE
[id number] THERAPY PLAN OF CARE:    Ofelia Knox will benefit from formal speech/language/cognitive therapy x3-5 days per week at 15-60 minute sessions, as functionally tolerated, for 7-21 days, to address:    Long Term Goal:  Patient will demonstrate adequate language skills to fully participate in adls at premorbid baseline level of function.      Short Term Goals:  1. GOAL MET:      2. GOAL MET:      3. GOAL MET:      4. GOAL MODIFIED: Patient will follow 3+ step directives w/ 80% accuracy and min cues over three consecutive sessions.     5. GOAL MET:   6. GOAL MODIFIED: Patient will identify the appropriate object/picture/word following a verbal description w/ 80% accuracy over three consecutive sessions.     7. GOAL MET:   8. GOAL MODIFIED: Patient will complete oe sentences w/ 80% accuracy and min cues over three consecutive sessions.     9. GOAL MODIFIED: Patient will provide verbal 3+ descriptors of an object/picture/word w/ 80% accuracy and min cues over three consecutive sessions.     10. NEW GOAL: Patient will perform divergent naming tasks w/ 5+ objects named of a given category in < 2 min over three consecutive sessions.       *Additional goals to be added per pt progress  *Goals to be modified per pt progress    Thank you for allowing me to participate in the care of your patient-  Verona Sarmiento M.S., CCC-SLP

## 2023-09-18 NOTE — PROGRESS NOTES
[facility]  PROGRESS NOTE     Patient Identification:  Name:  Ofelia Knox  Age:  78 y.o.  Sex:  female  :  1945  MRN:  [id number]  Visit Number:  [id number]  ROOM: [facility]     Primary Care Provider:  Provider, No Known    Length of stay in inpatient status:  7    Subjective     Chief Compliant:  No chief complaint on file.      History of Presenting Illness: 78-year-old female who is status post CVA.  Patient is pleasant this morning and having no difficulties.  Patient is working with therapy at this time.  Apparently does have some generalized weakness and is at times has some cognitive issues as well as expressive aphasia.  Patient has no other acute complaints at this time    Objective     Current Hospital Meds:aspirin, 81 mg, Oral, Daily  buPROPion XL, 150 mg, Oral, Daily  cetirizine, 5 mg, Oral, Daily  isosorbide mononitrate, 30 mg, Oral, Q24H  metFORMIN, 500 mg, Oral, BID With Meals  metoprolol succinate XL, 25 mg, Oral, Q24H  montelukast, 10 mg, Oral, Nightly  nystatin, 1 application , Topical, Q12H  oxybutynin XL, 10 mg, Oral, Daily  pantoprazole, 40 mg, Oral, QAM AC  pravastatin, 40 mg, Oral, Daily  tiotropium bromide monohydrate, 2 puff, Inhalation, Daily - RT  valsartan, 40 mg, Oral, Q24H  venlafaxine XR, 75 mg, Oral, Daily With Breakfast       ----------------------------------------------------------------------------------------------------------------------  Vital Signs:  Temp:  [98 °F (36.7 °C)-98.1 °F (36.7 °C)] 98 °F (36.7 °C)  Heart Rate:  [61-69] 61  Resp:  [16-18] 16  BP: (112-136)/(60-65) 136/65  SpO2:  [90 %-94 %] 90 %  on   ;   Device (Oxygen Therapy): room air  Body mass index is 40.03 kg/m².    Wt Readings from Last 3 Encounters:   23 112 kg (248 lb)     Intake & Output (last 3 days)         09/15 0701   07 07 07 07 07 07 0700    P.O. 840 1440 1680     Total Intake(mL/kg) 840 (7.5) 1440 (12.9) 1680 (15)      Urine (mL/kg/hr)        Stool        Total Output        Net +840 +1440 +1680             Urine Unmeasured Occurrence 3 x 3 x 5 x     Stool Unmeasured Occurrence 1 x             Diet: Cardiac Diets, Diabetic Diets; Healthy Heart (2-3 Na+); Consistent Carbohydrate; Texture: Regular Texture (IDDSI 7); Fluid Consistency: Thin (IDDSI 0)  ----------------------------------------------------------------------------------------------------------------------  Physical exam:  Constitutional: Elderly female in no distress  HEENT: Normocephalic atraumatic  Neck: Supple  Cardiovascular: Regular rate and rhythm  Pulmonary/Chest: Clear to auscultation  Abdominal:  .  Positive bowel sounds soft  Musculoskeletal: No arthropathy  Neurological: 4 out of 5 strength in the extremities with slight decrease in strength in the right upper extremity compared to the left upper extremity.  Skin: No rash  Peripheral vascular: No edema  Genitourinary:  ----------------------------------------------------------------------------------------------------------------------    Last echocardiogram:  Results for orders placed during the hospital encounter of 09/11/23    Adult Transthoracic Echo Complete W/ Cont if Necessary Per Protocol    Interpretation Summary    Left ventricular systolic function is mildly decreased. Calculated left ventricular EF = 44% Left ventricular ejection fraction appears to be 41 - 45%.    Left ventricular wall thickness is consistent with mild eccentric hypertrophy.    Left ventricular diastolic function is consistent with (grade I) impaired relaxation.    The left atrial cavity is moderately dilated.    ----------------------------------------------------------------------------------------------------------------------  Results from last 7 days   Lab Units 09/15/23  0456 09/14/23  0344 09/12/23  0123   CRP mg/dL  --  1.02*  --    WBC 10*3/mm3 11.98* 15.74* 11.97*   HEMOGLOBIN g/dL 14.4 15.2 14.9   HEMATOCRIT % 48.5*  53.0* 50.8*   MCV fL 92.7 98.0* 93.6   MCHC g/dL 29.7* 28.7* 29.3*   PLATELETS 10*3/mm3 294 290 312         Results from last 7 days   Lab Units 09/18/23  0217 09/17/23  0114 09/15/23  0456 09/13/23  0108 09/12/23  0123   SODIUM mmol/L 144 143 140   < > 135*   POTASSIUM mmol/L 3.8 3.6 3.6   < > 3.8   CHLORIDE mmol/L 105 103 101   < > 98   CO2 mmol/L 27.7 30.7* 29.0   < > 29.8*   BUN mg/dL 38* 47* 31*   < > 29*   CREATININE mg/dL 1.04* 1.17* 1.00   < > 1.02*   CALCIUM mg/dL 9.3 9.8 9.1   < > 9.2   GLUCOSE mg/dL 112* 104* 113*   < > 121*   ALBUMIN g/dL  --   --   --   --  3.3*   BILIRUBIN mg/dL  --   --   --   --  0.5   ALK PHOS U/L  --   --   --   --  85   AST (SGOT) U/L  --   --   --   --  13   ALT (SGPT) U/L  --   --   --   --  10    < > = values in this interval not displayed.   Estimated Creatinine Clearance: 58.4 mL/min (A) (by C-G formula based on SCr of 1.04 mg/dL (H)).  No results found for: AMMONIA              Glucose   Date/Time Value Ref Range Status   09/18/2023 0600 108 70 - 130 mg/dL Final   09/17/2023 2016 109 70 - 130 mg/dL Final   09/17/2023 1622 130 70 - 130 mg/dL Final   09/17/2023 1050 112 70 - 130 mg/dL Final   09/17/2023 0607 114 70 - 130 mg/dL Final   09/16/2023 2004 88 70 - 130 mg/dL Final   09/16/2023 1600 118 70 - 130 mg/dL Final   09/16/2023 1052 138 (H) 70 - 130 mg/dL Final     Lab Results   Component Value Date    TSH 1.470 09/12/2023    FREET4 1.15 09/12/2023     No results found for: PREGTESTUR, PREGSERUM, HCG, HCGQUANT  Pain Management Panel           No data to display              Brief Urine Lab Results  (Last result in the past 365 days)        Color   Clarity   Blood   Leuk Est   Nitrite   Protein   CREAT   Urine HCG        09/14/23 1935 Dark Yellow   Clear   Negative   Negative   Negative   Trace                 No results found for: BLOODCX  Results from last 7 days   Lab Units 09/14/23 1935   NITRITE UA  Negative     No results found for: URINECX  No results found for:  WOUNDCX  No results found for: STOOLCX  Results from last 7 days   Lab Units 09/14/23  0344   CRP mg/dL 1.02*       I have personally looked at the labs and they are summarized above.  ----------------------------------------------------------------------------------------------------------------------  Detailed radiology reports for the last 24 hours:    Imaging Results (Last 24 Hours)       ** No results found for the last 24 hours. **          Final impressions for the last 30 days of radiology reports:    XR Chest 1 View    Result Date: 9/14/2023  No radiographic evidence of acute cardiac or pulmonary disease.  This report was finalized on 9/14/2023 7:54 AM by Dr. Krunal Colvin MD.     I have personally looked at the radiology images and read the final radiology report.    Assessment & Plan    Status post left putamen internal capsule CVA--currently on aspirin and statin therapy.  Has some degree of cognitive deficit and some expressive aphasia.  Patient requiring contact-guard for transfers; ambulated 100 feet and 60 feet with front wheel walker and contact-guard one-person assistance.  Continues to work on balance exercises as well.  He is requiring set up for grooming; requires minimum assistance for bed mobility; minimum assist for transfers.  Minimum assist for lower body dressing.  He is working with speech therapy regarding expressive aphasia and cognitive deficits.    CHF reduced EF--has a EF of 40 to 45%.  Currently on valsartan.  We will give patient trial of Entresto tomorrow.    Diabetes mellitus--well-controlled.    Leukocytosis improved    COPD continue O2.  Patient is home O2 dependent.  This time appears to be stable    VTE Prophylaxis:   Mechanical Order History:        Ordered        09/11/23 2023  Place Sequential Compression Device  Once            09/11/23 2023  Maintain Sequential Compression Device  Continuous                          Pharmalogical Order History:       None                 Abdullahi Sharpe MD  [facility] Hospitalist  09/18/23  09:42 EDT

## 2023-09-18 NOTE — PROGRESS NOTES
Occupational Therapy:    Physical Therapy:    Speech Language Pathology: Individual: 45 minutes.    Signed by: Annalisa Garcia, Supervisor

## 2023-09-18 NOTE — PROGRESS NOTES
Inpatient Rehabilitation Functional Measures Assessment and Plan of Care    Plan of Care  Communication    [ST] Comprehension(Active)  Current Status(09/19/2023): Trace to mild auditory comprehension deficits  Weekly Goal(09/25/2023): receptive ID of object following verbal description  Discharge Goal: WFL communication and comprehension    [ST] Expression(Active)  Current Status(09/19/2023): Fluent aphasia w/ anomia  Weekly Goal(09/25/2023): Provide 3+ verbal descriptors of an object/pic/word  Discharge Goal: WFL communication    Functional Measures  JOHN Eating:  JOHN Grooming:  JOHN Bathing:  JOHN Upper Body Dressing:  JOHN Lower Body Dressing:  JOHN Toileting:    JOHN Bladder Management  Level of Assistance:  Frequency/Number of Accidents this Shift:    JOHN Bowel Management  Level of Assistance:  Frequency/Number of Accidents this Shift:    JOHN Bed/Chair/Wheelchair Transfer:  JOHN Toilet Transfer:  JOHN Tub/Shower Transfer:    Previously Documented Mode of Locomotion at Discharge:  JOHN Expected Mode of Locomotion at Discharge:  JOHN Walk/Wheelchair:  JOHN Stairs:    JOHN Comprehension:  JOHN Expression:  JOHN Social Interaction:  JONH Problem Solving:  JOHN Memory:    Therapy Mode Minutes  Occupational Therapy:  Physical Therapy:  Speech Language Pathology: Individual: 45 minutes.    Discharge Functional Goals:    Signed by: JUSTO Schwartz

## 2023-09-18 NOTE — THERAPY TREATMENT NOTE
Inpatient Rehabilitation - Occupational Therapy Treatment Note     Richie     Patient Name: Ofelia Knox  : 1945  MRN: [id number]    Today's Date: 2023                 Admit Date: 2023       No diagnosis found.    Patient Active Problem List   Diagnosis    CVA (cerebral vascular accident)       Past Medical History:   Diagnosis Date    AMS (altered mental status)     Aphasia     CKD (chronic kidney disease)     COPD (chronic obstructive pulmonary disease)     CVA (cerebral vascular accident)     DM2 (diabetes mellitus, type 2)     HTN (hypertension)     Restrictive lung disease     Urinary tract infection due to ESBL Klebsiella        Past Surgical History:   Procedure Laterality Date    HYSTERECTOMY      JOINT REPLACEMENT               IRF OT ASSESSMENT FLOWSHEET (last 12 hours)       IRF OT Evaluation and Treatment       Row Name 23 1400          OT Time and Intention    Document Type daily treatment  -     Mode of Treatment individual therapy;occupational therapy  -     Patient Effort good  -       Row Name 23 1400          General Information    Patient/Family/Caregiver Comments/Observations patient agreeable to therapy. patient tolerated therapy well with some fatigue noted at end of session.  -     Existing Precautions/Restrictions fall  aphasia  -       Row Name 23 1400          Cognition/Psychosocial    Orientation Status (Cognition) unable/difficult to assess  -     Follows Commands (Cognition) verbal cues/prompting required;repetition of directions required  -       Row Name 23 1400          Chair-Bed Transfer    Chair-Bed Winkler (Transfers) contact guard;verbal cues;minimum assist (75% patient effort)  -       Row Name 23 1400          Motor Skills    Motor Skills coordination;functional endurance;neuro-muscular function  -     Therapeutic Exercise shoulder;elbow/forearm;wrist;hand  ROM, gmc,fmc, strengthening, reaching  -        Row Name 09/18/23 1400          Positioning and Restraints    Post Treatment Position bed  -     In Bed supine;call light within reach;encouraged to call for assist  -               User Key  (r) = Recorded By, (t) = Taken By, (c) = Cosigned By      Initials Name Effective Dates    Althea Das OT 07/11/23 -                      Occupational Therapy Education       Title: PT OT SLP Therapies (Done)       Topic: Occupational Therapy (Done)       Point: ADL training (Done)       Description:   Instruct learner(s) on proper safety adaptation and remediation techniques during self care or transfers.   Instruct in proper use of assistive devices.                  Learning Progress Summary             Patient Acceptance, E, VU,NR by  at 9/16/2023 1145    Acceptance, E, VU,NR by  at 9/15/2023 1232                         Point: Precautions (Done)       Description:   Instruct learner(s) on prescribed precautions during self-care and functional transfers.                  Learning Progress Summary             Patient Acceptance, E, VU,NR by  at 9/16/2023 1145    Acceptance, E, VU,NR by  at 9/15/2023 1232                                         User Key       Initials Effective Dates Name Provider Type [facility] 07/11/23 -  Pam Peck OT Occupational Therapist OT     05/25/21 -  Xena Chinchilla OT Occupational Therapist OT                        OT Recommendation and Plan                         Time Calculation:      Time Calculation- OT       Row Name 09/18/23 1433             Time Calculation- OT    OT Start Time 1000  -      OT Stop Time 1120  -      OT Time Calculation (min) 80 min  -                User Key  (r) = Recorded By, (t) = Taken By, (c) = Cosigned By      Initials Name Provider Type    Althea Das OT Occupational Therapist                  Therapy Charges for Today       Code Description Service Date Service Provider Modifiers Qty     19039454923 HC OT SELF CARE/MGMT/TRAIN EA 15 MIN 9/18/2023 Althea Downs, OT GO 1    15356274327 HC OT THERAPEUTIC ACT EA 15 MIN 9/18/2023 Althea Downs, OT GO 1    67125946733 HC OT NEUROMUSC RE EDUCATION EA 15 MIN 9/18/2023 Althea Downs, OT GO 3                     Althea Downs OT  9/18/2023

## 2023-09-19 LAB
GLUCOSE BLDC GLUCOMTR-MCNC: 112 MG/DL (ref 70–130)
GLUCOSE BLDC GLUCOMTR-MCNC: 115 MG/DL (ref 70–130)
GLUCOSE BLDC GLUCOMTR-MCNC: 122 MG/DL (ref 70–130)
GLUCOSE BLDC GLUCOMTR-MCNC: 94 MG/DL (ref 70–130)

## 2023-09-19 PROCEDURE — 94761 N-INVAS EAR/PLS OXIMETRY MLT: CPT

## 2023-09-19 PROCEDURE — 97530 THERAPEUTIC ACTIVITIES: CPT

## 2023-09-19 PROCEDURE — 97530 THERAPEUTIC ACTIVITIES: CPT | Performed by: OCCUPATIONAL THERAPIST

## 2023-09-19 PROCEDURE — 97110 THERAPEUTIC EXERCISES: CPT

## 2023-09-19 PROCEDURE — 97112 NEUROMUSCULAR REEDUCATION: CPT | Performed by: OCCUPATIONAL THERAPIST

## 2023-09-19 PROCEDURE — 94799 UNLISTED PULMONARY SVC/PX: CPT

## 2023-09-19 PROCEDURE — 97535 SELF CARE MNGMENT TRAINING: CPT | Performed by: OCCUPATIONAL THERAPIST

## 2023-09-19 PROCEDURE — 97116 GAIT TRAINING THERAPY: CPT

## 2023-09-19 PROCEDURE — 82948 REAGENT STRIP/BLOOD GLUCOSE: CPT

## 2023-09-19 PROCEDURE — 94664 DEMO&/EVAL PT USE INHALER: CPT

## 2023-09-19 PROCEDURE — 92507 TX SP LANG VOICE COMM INDIV: CPT

## 2023-09-19 PROCEDURE — 99231 SBSQ HOSP IP/OBS SF/LOW 25: CPT | Performed by: FAMILY MEDICINE

## 2023-09-19 RX ADMIN — PRAVASTATIN SODIUM 40 MG: 40 TABLET ORAL at 08:51

## 2023-09-19 RX ADMIN — CETIRIZINE HYDROCHLORIDE 5 MG: 10 TABLET, FILM COATED ORAL at 08:47

## 2023-09-19 RX ADMIN — NYSTATIN 1 APPLICATION: 100000 CREAM TOPICAL at 20:25

## 2023-09-19 RX ADMIN — BUPROPION HYDROCHLORIDE 150 MG: 150 TABLET, EXTENDED RELEASE ORAL at 08:46

## 2023-09-19 RX ADMIN — METFORMIN HYDROCHLORIDE 500 MG: 500 TABLET ORAL at 08:46

## 2023-09-19 RX ADMIN — ASPIRIN 81 MG: 81 TABLET, COATED ORAL at 08:46

## 2023-09-19 RX ADMIN — VENLAFAXINE HYDROCHLORIDE 75 MG: 75 CAPSULE, EXTENDED RELEASE ORAL at 08:51

## 2023-09-19 RX ADMIN — TIOTROPIUM BROMIDE INHALATION SPRAY 2 PUFF: 3.12 SPRAY, METERED RESPIRATORY (INHALATION) at 07:16

## 2023-09-19 RX ADMIN — METOPROLOL SUCCINATE 25 MG: 25 TABLET, EXTENDED RELEASE ORAL at 08:50

## 2023-09-19 RX ADMIN — NYSTATIN 1 APPLICATION: 100000 CREAM TOPICAL at 08:52

## 2023-09-19 RX ADMIN — SACUBITRIL AND VALSARTAN 1 TABLET: 24; 26 TABLET, FILM COATED ORAL at 20:25

## 2023-09-19 RX ADMIN — ACETAMINOPHEN 650 MG: 325 TABLET ORAL at 08:45

## 2023-09-19 RX ADMIN — SACUBITRIL AND VALSARTAN 1 TABLET: 24; 26 TABLET, FILM COATED ORAL at 08:51

## 2023-09-19 RX ADMIN — PANTOPRAZOLE SODIUM 40 MG: 40 TABLET, DELAYED RELEASE ORAL at 06:33

## 2023-09-19 RX ADMIN — MONTELUKAST SODIUM 10 MG: 10 TABLET, COATED ORAL at 20:25

## 2023-09-19 RX ADMIN — ISOSORBIDE MONONITRATE 30 MG: 30 TABLET, EXTENDED RELEASE ORAL at 08:48

## 2023-09-19 RX ADMIN — OXYBUTYNIN CHLORIDE 10 MG: 5 TABLET, EXTENDED RELEASE ORAL at 08:50

## 2023-09-19 RX ADMIN — METFORMIN HYDROCHLORIDE 500 MG: 500 TABLET ORAL at 18:30

## 2023-09-19 NOTE — PLAN OF CARE
Problem: Rehabilitation (IRF) Plan of Care  Goal: Absence of New-Onset Illness or Injury  Outcome: Ongoing, Progressing     Problem: Rehabilitation (IRF) Plan of Care  Goal: Optimal Comfort and Wellbeing  Outcome: Ongoing, Progressing     Problem: Skin Injury Risk Increased  Goal: Skin Health and Integrity  Outcome: Ongoing, Progressing     Problem: Fall Injury Risk  Goal: Absence of Fall and Fall-Related Injury  Outcome: Ongoing, Progressing   Goal Outcome Evaluation:  Plan of Care Reviewed With: patient        Progress: improving

## 2023-09-19 NOTE — PROGRESS NOTES
Occupational Therapy:    Physical Therapy:    Speech Language Pathology: Individual: 45 minutes.    Signed by: JUSTO Schwartz

## 2023-09-19 NOTE — THERAPY TREATMENT NOTE
"Inpatient Rehabilitation - Speech Language Pathology Treatment Note  [facility]  SPEECH LANGUAGE THERAPY TREATMENT NOTE     Patient Name: Ofelia Knox  : 1945  MRN: [id number]  Today's Date: 2023     Admit Date: 2023     Ofelia Knox was seen this am in the speech therapy office of [facility]'s [facility]. She reports \"feeling better\" this date as compared to yesterday. She continues w/ fluent aphasia jargon in conversational exchanges or in response to oe questions.      Long Term Goal:  Patient will demonstrate adequate language skills to fully participate in adls at premorbid baseline level of function.       Short Term Goals:  1. GOAL MET:       2. GOAL MET:       3. GOAL MET:       4. Patient will follow 3+ step directives w/ 80% accuracy and min cues over three consecutive sessions.   Not directly addressed this date.      5. GOAL MET:    6. Patient will identify the appropriate object/picture/word following a verbal description w/ 80% accuracy over three consecutive sessions.   ID the appropriate word following verbal description w/o picture cues w/ 70% accuracy. This improves to 100% w/ picture cues.      7. GOAL MET:    8. Patient will complete oe sentences w/ 80% accuracy and min cues over three consecutive sessions.   100% w/ one and two word oe sentence completion.   50% accuracy w/ greater than two word responses required. Pt demonstrates extended, rambling, Wernicke's like responses.      9. Patient will provide verbal 3+ descriptors of an object/picture/word w/ 80% accuracy and min cues over three consecutive sessions.   Deferred this date.      10. Patient will perform divergent naming tasks w/ 5+ objects named of a given category in < 2 min over three consecutive sessions.   Able to provide 2 items related to a given picture w/ visual and verbal cues. When given verbal descriptions and initial phonemic cues this increases to 4 items named.         *Additional goals to be added per pt " progress  *Goals to be modified per pt progress     Visit Dx:  No diagnosis found.  Patient Active Problem List   Diagnosis    CVA (cerebral vascular accident)     Past Medical History:   Diagnosis Date    AMS (altered mental status)     Aphasia     CKD (chronic kidney disease)     COPD (chronic obstructive pulmonary disease)     CVA (cerebral vascular accident)     DM2 (diabetes mellitus, type 2)     HTN (hypertension)     Restrictive lung disease     Urinary tract infection due to ESBL Klebsiella      Past Surgical History:   Procedure Laterality Date    HYSTERECTOMY      JOINT REPLACEMENT         SLP Recommendation and Plan    Continue formal speech/language therapy per poc.       Thank you for allowing me to participate in the care of your patient-  Verona Sarmiento M.S., CCC-SLP        EDUCATION  The patient has been educated in the following areas:     Communication Impairment.        Time Calculation:         Therapy Charges for Today       Code Description Service Date Service Provider Modifiers Qty    24444174729  ST EVAL SPEECH AND PROD W LANG  3 9/18/2023 Verona Sarmiento MS CCC-SLP GN 1                       Verona Sarmiento MS CCC-SLP  9/19/2023

## 2023-09-19 NOTE — PROGRESS NOTES
[facility]  PROGRESS NOTE     Patient Identification:  Name:  Ofelia Knox  Age:  78 y.o.  Sex:  female  :  1945  MRN:  [id number]  Visit Number:  [id number]  ROOM: [facility]     Primary Care Provider:  Provider, No Known    Length of stay in inpatient status:  8    Subjective     Chief Compliant:  No chief complaint on file.      History of Presenting Illness: 78-year-old female who is status post left putamen/left internal capsule CVA.  Patient also has history of CHF, diabetes mellitus and COPD.  Patient states she is doing well this morning and was able to do better with her therapy yesterday.  Patient does have some degree of expressive aphasia although seems to be understanding speech much better than at the time of admission.    Objective     Current Hospital Meds:aspirin, 81 mg, Oral, Daily  buPROPion XL, 150 mg, Oral, Daily  cetirizine, 5 mg, Oral, Daily  isosorbide mononitrate, 30 mg, Oral, Q24H  metFORMIN, 500 mg, Oral, BID With Meals  metoprolol succinate XL, 25 mg, Oral, Q24H  montelukast, 10 mg, Oral, Nightly  nystatin, 1 application , Topical, Q12H  oxybutynin XL, 10 mg, Oral, Daily  pantoprazole, 40 mg, Oral, QAM AC  pravastatin, 40 mg, Oral, Daily  sacubitril-valsartan, 1 tablet, Oral, Q12H  tiotropium bromide monohydrate, 2 puff, Inhalation, Daily - RT  venlafaxine XR, 75 mg, Oral, Daily With Breakfast       ----------------------------------------------------------------------------------------------------------------------  Vital Signs:  Temp:  [98.3 øF (36.8 øC)] 98.3 øF (36.8 øC)  Heart Rate:  [64-65] 64  Resp:  [16-18] 18  BP: (116-143)/(64-66) 116/64  SpO2:  [92 %] 92 %  on  Flow (L/min):  [2] 2;   Device (Oxygen Therapy): room air  Body mass index is 40.03 kg/mý.    Wt Readings from Last 3 Encounters:   23 112 kg (248 lb)     Intake & Output (last 3 days)          0701   07 0701   07 07 07 07 0700    P.O. 1440  1920 360     Total Intake(mL/kg) 1440 (12.9) 1920 (17.1) 360 (3.2)     Net +1440 +1920 +360             Urine Unmeasured Occurrence 3 x 5 x 2 x           Diet: Cardiac Diets, Diabetic Diets; Healthy Heart (2-3 Na+); Consistent Carbohydrate; Texture: Regular Texture (IDDSI 7); Fluid Consistency: Thin (IDDSI 0)  ----------------------------------------------------------------------------------------------------------------------  Physical exam:  Constitutional: No acute distress  HEENT: Normocephalic atraumatic  Neck: Supple  Cardiovascular: Regular rate and rhythm  Pulmonary/Chest: Clear to auscultation  Abdominal:  .  Positive bowel sounds soft  Musculoskeletal: No arthropathy  Neurological: 4 out of 5 strength with slight decrease in strength in the right upper extremity compared to left.  Skin: No rash  Peripheral vascular: No edema  Genitourinary:  ----------------------------------------------------------------------------------------------------------------------    Last echocardiogram:  Results for orders placed during the hospital encounter of 09/11/23    Adult Transthoracic Echo Complete W/ Cont if Necessary Per Protocol    Interpretation Summary    Left ventricular systolic function is mildly decreased. Calculated left ventricular EF = 44% Left ventricular ejection fraction appears to be 41 - 45%.    Left ventricular wall thickness is consistent with mild eccentric hypertrophy.    Left ventricular diastolic function is consistent with (grade I) impaired relaxation.    The left atrial cavity is moderately dilated.    ----------------------------------------------------------------------------------------------------------------------  Results from last 7 days   Lab Units 09/15/23  0456 09/14/23  0344   CRP mg/dL  --  1.02*   WBC 10*3/mm3 11.98* 15.74*   HEMOGLOBIN g/dL 14.4 15.2   HEMATOCRIT % 48.5* 53.0*   MCV fL 92.7 98.0*   MCHC g/dL 29.7* 28.7*   PLATELETS 10*3/mm3 294 290         Results from last 7  days   Lab Units 09/18/23  0217 09/17/23  0114 09/15/23  0456   SODIUM mmol/L 144 143 140   POTASSIUM mmol/L 3.8 3.6 3.6   CHLORIDE mmol/L 105 103 101   CO2 mmol/L 27.7 30.7* 29.0   BUN mg/dL 38* 47* 31*   CREATININE mg/dL 1.04* 1.17* 1.00   CALCIUM mg/dL 9.3 9.8 9.1   GLUCOSE mg/dL 112* 104* 113*   Estimated Creatinine Clearance: 58.4 mL/min (A) (by C-G formula based on SCr of 1.04 mg/dL (H)).  No results found for: AMMONIA              Glucose   Date/Time Value Ref Range Status   09/19/2023 0626 122 70 - 130 mg/dL Final   09/18/2023 1937 110 70 - 130 mg/dL Final   09/18/2023 1635 106 70 - 130 mg/dL Final   09/18/2023 1123 127 70 - 130 mg/dL Final   09/18/2023 0600 108 70 - 130 mg/dL Final   09/17/2023 2016 109 70 - 130 mg/dL Final   09/17/2023 1622 130 70 - 130 mg/dL Final   09/17/2023 1050 112 70 - 130 mg/dL Final     Lab Results   Component Value Date    TSH 1.470 09/12/2023    FREET4 1.15 09/12/2023     No results found for: PREGTESTUR, PREGSERUM, HCG, HCGQUANT  Pain Management Panel           No data to display              Brief Urine Lab Results  (Last result in the past 365 days)        Color   Clarity   Blood   Leuk Est   Nitrite   Protein   CREAT   Urine HCG        09/14/23 1935 Dark Yellow   Clear   Negative   Negative   Negative   Trace                 No results found for: BLOODCX  Results from last 7 days   Lab Units 09/14/23 1935   NITRITE UA  Negative     No results found for: URINECX  No results found for: WOUNDCX  No results found for: STOOLCX  Results from last 7 days   Lab Units 09/14/23  0344   CRP mg/dL 1.02*       I have personally looked at the labs and they are summarized above.  ----------------------------------------------------------------------------------------------------------------------  Detailed radiology reports for the last 24 hours:    Imaging Results (Last 24 Hours)       ** No results found for the last 24 hours. **          Final impressions for the last 30 days of  radiology reports:    XR Chest 1 View    Result Date: 9/14/2023  No radiographic evidence of acute cardiac or pulmonary disease.  This report was finalized on 9/14/2023 7:54 AM by Dr. Krunal Colvin MD.     I have personally looked at the radiology images and read the final radiology report.    Assessment & Plan    Status post left putamen/left internal capsule CVA continue aspirin and statin therapy.  Patient seems to be doing better from a cognition standpoint.  However does have some degree of expressive aphasia with some anomia at times.  Overall doing some better.  Patient requiring contact-guard assistance for transfers and was able to ambulate 160 feet with rolling walker and contact-guard yesterday.  Requiring minimum assistance for ADLs at this time    CHF reduced EF has EF of 40 to 45% continue valsartan.  We will start Entresto today and will monitor closely.    Diabetes mellitus controlled    COPD continue O2 and is stable    VTE Prophylaxis:   Mechanical Order History:        Ordered        09/11/23 2023  Place Sequential Compression Device  Once            09/11/23 2023  Maintain Sequential Compression Device  Continuous                          Pharmalogical Order History:       None                Abdullahi Sharpe MD  [facility] Hospitalist  09/19/23  09:55 EDT

## 2023-09-19 NOTE — SIGNIFICANT NOTE
09/19/23 1125   Plan   Plan Team conference held today.  Spoke to pt and son about how pt is doing in therapy and plans for discharge on 10-3-23.  Son went to [facility] assisted living facility yesterday and received a packet to fill out to have pt added to their waiting list.  Pt has to be evaluated in-person for assisted living.  Son is interested in this option for pt.  SS spoke to Vannesa at [facility] [phone] who says their monthly rate is a little less than [facility] and they have a second floor apartment available.  Left message for Courtney to contact SS.  Reviewed this with pt/son.  Son plans to add a bedroom and bathroom to his home for pt to move into to.  Pt is aware of the option of SNF for short-term rehab.  Discharge plans to be determined based on how pt is doing in therapy.  Will follow and assist with discharge planning.   Patient/Family in Agreement with Plan yes

## 2023-09-19 NOTE — PROGRESS NOTES
Rehabilitation Nursing  Inpatient Rehabilitation Plan of Care Note    Plan of Care  Copy from [facility]    Toilet Transfers (Active)  Current Status (9/11/2023 7:00:00 PM): PATIENT WILL HAVE NO TRANSFER DIFFICULTY    Weekly Goal: NO DIFFICULT WITH TRANSFERS  Discharge Goal: TRANSFERS INDEPENDENTLY    Pain    Pain Management (Active)  Current Status (9/11/2023 7:00:00 PM): PATIENT WILL VOICE NO PAIN  Weekly Goal: PATIENT WILL BE PAIN FREE  Discharge Goal: FREE FROM PAIN    Safety    Potential for Injury (Active)  Current Status (9/11/2023 7:00:00 PM): PATIENT WILL HAVE NO INJURY THIS STAY  Weekly Goal: NO INJURY  Discharge Goal: PATIENT WILL DISCHARGE    Body Systems    Integumentary (Active)  Current Status (9/11/2023 7:00:00 PM): PATIENT WILL HAVE NO SKIN  BREAKDOWN THIS  STAY  Weekly Goal: SKIN WILL REMAIN INTACT.  Discharge Goal: NO SKIN ISSUES.    Signed by: Carolyn Ramirez RN

## 2023-09-19 NOTE — THERAPY TREATMENT NOTE
Inpatient Rehabilitation - Occupational Therapy Treatment Note     Richie     Patient Name: Ofelia Knox  : 1945  MRN: [id number]    Today's Date: 2023                 Admit Date: 2023       No diagnosis found.    Patient Active Problem List   Diagnosis    CVA (cerebral vascular accident)       Past Medical History:   Diagnosis Date    AMS (altered mental status)     Aphasia     CKD (chronic kidney disease)     COPD (chronic obstructive pulmonary disease)     CVA (cerebral vascular accident)     DM2 (diabetes mellitus, type 2)     HTN (hypertension)     Restrictive lung disease     Urinary tract infection due to ESBL Klebsiella        Past Surgical History:   Procedure Laterality Date    HYSTERECTOMY      JOINT REPLACEMENT               IRF OT ASSESSMENT FLOWSHEET (last 12 hours)       IRF OT Evaluation and Treatment       Row Name 23 1400          OT Time and Intention    Document Type daily treatment  -     Mode of Treatment individual therapy;occupational therapy  -     Patient Effort good  -       Row Name 23 1400          General Information    Patient/Family/Caregiver Comments/Observations patient agreeable to therapy. patient son present for session today. patient tolerated therapy well rest breaks provided.  -     Existing Precautions/Restrictions fall  -       Row Name 23 1400          Cognition/Psychosocial    Orientation Status (Cognition) unable/difficult to assess  due to aphasia  -     Follows Commands (Cognition) physical/tactile prompts required;repetition of directions required;verbal cues/prompting required  -       Row Name 23 1400          Self-Feeding    [place] Level (Self-Feeding) supervision;verbal cues  -       Row Name 23 1400          Motor Skills    Motor Skills coordination;functional endurance  BUE ROM, gmc,fmc, reaching, UE bike  -       Row Name 23 1400          Positioning and Restraints    Post Treatment  Position wheelchair  -     In Wheelchair sitting;with family/caregiver  eating lunch  -               User Key  (r) = Recorded By, (t) = Taken By, (c) = Cosigned By      Initials Name Effective Dates    Althea Das OT 07/11/23 -                      Occupational Therapy Education       Title: PT OT SLP Therapies (Done)       Topic: Occupational Therapy (Done)       Point: ADL training (Done)       Description:   Instruct learner(s) on proper safety adaptation and remediation techniques during self care or transfers.   Instruct in proper use of assistive devices.                  Learning Progress Summary             Patient Acceptance, E, VU,NR by  at 9/16/2023 1145    Acceptance, E, VU,NR by  at 9/15/2023 1232                         Point: Precautions (Done)       Description:   Instruct learner(s) on prescribed precautions during self-care and functional transfers.                  Learning Progress Summary             Patient Acceptance, E, VU,NR by  at 9/16/2023 1145    Acceptance, E, VU,NR by  at 9/15/2023 1232                                         User Key       Initials Effective Dates Name Provider Type Discipline     07/11/23 -  Pam Peck, OT Occupational Therapist OT     05/25/21 -  Xena Chinchilla OT Occupational Therapist OT                        OT Recommendation and Plan                         Time Calculation:      Time Calculation- OT       Row Name 09/19/23 1454             Time Calculation- OT    OT Start Time 1000  -      OT Stop Time 1130  -      OT Time Calculation (min) 90 min  -                User Key  (r) = Recorded By, (t) = Taken By, (c) = Cosigned By      Initials Name Provider Type     Althea Downs OT Occupational Therapist                  Therapy Charges for Today       Code Description Service Date Service Provider Modifiers Qty    11697551742  OT SELF CARE/MGMT/TRAIN EA 15 MIN 9/18/2023 Althea Downs, OT GO 1     11437137259 HC OT THERAPEUTIC ACT EA 15 MIN 9/18/2023 Althea Downs, OT GO 1    53508913040 HC OT NEUROMUSC RE EDUCATION EA 15 MIN 9/18/2023 Althea Downs, OT GO 3    03757173245 HC OT SELF CARE/MGMT/TRAIN EA 15 MIN 9/19/2023 Althea Downs, OT GO 1    26181101262 HC OT NEUROMUSC RE EDUCATION EA 15 MIN 9/19/2023 Althea Downs, OT GO 3    66324077272 HC OT THERAPEUTIC ACT EA 15 MIN 9/19/2023 Althea Downs OT GO 2                     Althea Downs OT  9/19/2023

## 2023-09-19 NOTE — THERAPY PROGRESS REPORT/RE-CERT
Inpatient Rehabilitation - Physical Therapy Progress Note        Richie     Patient Name: Ofelia Knox  : 1945  MRN: [id number]    Today's Date: 2023                    Admit Date: 2023      Visit Dx:   No diagnosis found.    Patient Active Problem List   Diagnosis    CVA (cerebral vascular accident)       Past Medical History:   Diagnosis Date    AMS (altered mental status)     Aphasia     CKD (chronic kidney disease)     COPD (chronic obstructive pulmonary disease)     CVA (cerebral vascular accident)     DM2 (diabetes mellitus, type 2)     HTN (hypertension)     Restrictive lung disease     Urinary tract infection due to ESBL Klebsiella        Past Surgical History:   Procedure Laterality Date    HYSTERECTOMY      JOINT REPLACEMENT         PT ASSESSMENT (last 12 hours)       IRF PT Evaluation and Treatment       Row Name 23 1254          PT Time and Intention    Document Type daily treatment;progress note  -     Mode of Treatment physical therapy  -     Patient/Family/Caregiver Comments/Observations Pt and nursing in agreement for skilled PT on this date.  -       Row Name 23 1254          General Information    Limitations/Impairments aphasia;safety/cognitive  -       Row Name 23 1254          Transfer Assessment/Treatment    Transfers sit-stand transfer;stand-sit transfer  -       Row Name 23 1254          Sit-Stand Transfer    Sit-Stand [place] (Transfers) contact guard;minimum assist (75% patient effort);verbal cues;1 person assist  -     Assistive Device (Sit-Stand Transfers) walker, front-wheeled;wheelchair  -       Row Name 23 1254          Stand-Sit Transfer    Stand-Sit [place] (Transfers) contact guard;verbal cues  -     Assistive Device (Stand-Sit Transfers) walker, front-wheeled;wheelchair  -       Row Name 23 1254          Gait/Stairs (Locomotion)    Gait/Stairs Locomotion gait/ambulation assistive device  -      [place] Level (Gait) contact guard;1 person assist  -RG     Assistive Device (Gait) walker, front-wheeled  -RG     Distance in Feet (Gait) 160' x 2  -RG     Deviations/Abnormal Patterns (Gait) beatrice decreased;gait speed decreased;stride length decreased  -RG     Bilateral Gait Deviations forward flexed posture  -RG     Comment, (Gait/Stairs) 1 rest break required due to fatigue.  -RG       Row Name 09/19/23 1254          Balance    Dynamic Standing Balance verbal cues;non-verbal cues (demo/gesture);contact guard  -RG     Comment, Balance standing bean bag toss  -RG       Row Name 09/19/23 1254          Hip (Therapeutic Exercise)    Hip Strengthening (Therapeutic Exercise) bilateral;flexion;aBduction;aDduction;marching while seated;sitting;2 lb free weight;resistance band;green;10 repetitions;2 sets  -RG       Row Name 09/19/23 1254          Knee (Therapeutic Exercise)    Knee Strengthening (Therapeutic Exercise) bilateral;flexion;extension;marching while seated;LAQ (long arc quad);sitting;2 lb free weight;resistance band;green;10 repetitions;2 sets  -RG       Row Name 09/19/23 1254          Ankle (Therapeutic Exercise)    Ankle Strengthening (Therapeutic Exercise) bilateral;plantarflexion;dorsiflexion;sitting;10 repetitions;2 sets  -RG       Row Name 09/19/23 1254          Positioning and Restraints    Pre-Treatment Position sitting in chair/recliner  -RG     Post Treatment Position wheelchair  -RG     In Wheelchair notified nsg;sitting;with SLP  -RG       Row Name 09/19/23 1254          Bed Mobility Goal 1 (PT-IRF)    Progress/Outcomes (Bed Mobility Goal 1, PT-IRF) goal ongoing  -RG       Row Name 09/19/23 1254          Transfer Goal 1 (PT-IRF)    Progress/Outcomes (Transfer Goal 1, PT-IRF) goal ongoing  -RG       Row Name 09/19/23 1254          Gait/Walking Locomotion Goal 1 (PT-IRF)    Progress/Outcomes (Gait/Walking Locomotion Goal 1, PT-IRF) goal ongoing  -RG               User Key  (r) = Recorded By,  (t) = Taken By, (c) = Cosigned By      Initials Name Provider Type    Twan Oliver PTA Physical Therapist Assistant                     Physical Therapy Education       Title: PT OT SLP Therapies (Done)       Topic: Physical Therapy (Done)       Point: Mobility training (Done)       Learning Progress Summary             Patient Acceptance, E,D, VU,NR by RG at 9/19/2023 1258    Acceptance, E,D, VU,NR by RG at 9/18/2023 1338    Acceptance, E,TB, VU by RM at 9/15/2023 1532    Acceptance, E,TB, VU by RM at 9/14/2023 1555    Acceptance, E,D, VU,NR by RG at 9/13/2023 1528    Acceptance, TB, NR by DL at 9/12/2023 2003    Acceptance, E, VU,NR by LB at 9/12/2023 1135                         Point: Home exercise program (Done)       Learning Progress Summary             Patient Acceptance, E,D, VU,NR by RG at 9/19/2023 1258    Acceptance, E,D, VU,NR by RG at 9/18/2023 1338    Acceptance, E,TB, VU by RM at 9/15/2023 1532    Acceptance, E,TB, VU by RM at 9/14/2023 1555    Acceptance, E,D, VU,NR by RG at 9/13/2023 1528    Acceptance, TB, NR by DL at 9/12/2023 2003    Acceptance, E, VU,NR by LB at 9/12/2023 1135                         Point: Body mechanics (Done)       Learning Progress Summary             Patient Acceptance, E,D, VU,NR by RG at 9/19/2023 1258    Acceptance, E,D, VU,NR by RG at 9/18/2023 1338    Acceptance, E,TB, VU by RM at 9/15/2023 1532    Acceptance, E,TB, VU by RM at 9/14/2023 1555    Acceptance, E,D, VU,NR by RG at 9/13/2023 1528    Acceptance, TB, NR by DL at 9/12/2023 2003    Acceptance, E, VU,NR by LB at 9/12/2023 1135                         Point: Precautions (Done)       Learning Progress Summary             Patient Acceptance, E,D, VU,NR by RG at 9/19/2023 1258    Acceptance, E,D, VU,NR by RG at 9/18/2023 1338    Acceptance, E,TB, VU by RM at 9/15/2023 1532    Acceptance, E,TB, VU by RM at 9/14/2023 1555    Acceptance, E,D, VU,NR by RG at 9/13/2023 1528    Acceptance, TB, NR by DL at 9/12/2023  2003    Acceptance, E, VU,NR by  at 9/12/2023 1135                                         User Key       Initials Effective Dates Name Provider Type Discipline    LB 06/16/21 -  Andra Stewart, PT Physical Therapist PT    RM 02/17/23 -  Karis Obrien, PTA Physical Therapist Assistant PT    RG 06/16/21 -  Twan Cunningham PTA Physical Therapist Assistant PT    DL 03/16/22 -  Carolyn Ramirez, RN Registered Nurse Nurse                    PT Recommendation and Plan    Frequency of Treatment (PT): 5 times per week  Anticipated Equipment Needs at Discharge (PT Eval):  (tbd)                  Time Calculation:      PT Charges       Row Name 09/19/23 1258             Time Calculation    Start Time 0745  -RG      Stop Time 0915  -RG      Time Calculation (min) 90 min  -RG      PT Received On 09/19/23  -RG         Time Calculation- PT    Total Timed Code Minutes- PT 90 minute(s)  -RG                User Key  (r) = Recorded By, (t) = Taken By, (c) = Cosigned By      Initials Name Provider Type    RG Twan Cunningham PTA Physical Therapist Assistant                    Therapy Charges for Today       Code Description Service Date Service Provider Modifiers Qty    53579973702 HC GAIT TRAINING EA 15 MIN 9/18/2023 Twan Cunningham PTA GP, CQ 1    61117884191 HC PT THERAPEUTIC ACT EA 15 MIN 9/18/2023 Twan Cunningham PTA GP, CQ 2    47397735098 HC PT THER PROC EA 15 MIN 9/18/2023 Twan Cunningham PTA GP, CQ 3    41892362635 HC GAIT TRAINING EA 15 MIN 9/19/2023 Twan Cunningham PTA GP, CQ 1    52854195513 HC PT THERAPEUTIC ACT EA 15 MIN 9/19/2023 Twan Cunningham PTA GP, CQ 2    32224042143 HC PT THER PROC EA 15 MIN 9/19/2023 Twan Cunningham PTA GP, CQ 3                     Twan Cunningham PTA  9/19/2023

## 2023-09-20 ENCOUNTER — APPOINTMENT (OUTPATIENT)
Dept: GENERAL RADIOLOGY | Facility: HOSPITAL | Age: 78
DRG: 056 | End: 2023-09-20
Payer: MEDICARE

## 2023-09-20 LAB
A-A DO2: 56.5 MMHG (ref 0–300)
ANION GAP SERPL CALCULATED.3IONS-SCNC: 12 MMOL/L (ref 5–15)
ARTERIAL PATENCY WRIST A: POSITIVE
ATMOSPHERIC PRESS: 730 MMHG
BASE EXCESS BLDA CALC-SCNC: 0.9 MMOL/L (ref 0–2)
BASOPHILS # BLD AUTO: 0.06 10*3/MM3 (ref 0–0.2)
BASOPHILS NFR BLD AUTO: 0.5 % (ref 0–1.5)
BDY SITE: ABNORMAL
BUN SERPL-MCNC: 32 MG/DL (ref 8–23)
BUN/CREAT SERPL: 31.7 (ref 7–25)
CALCIUM SPEC-SCNC: 9.4 MG/DL (ref 8.6–10.5)
CHLORIDE SERPL-SCNC: 105 MMOL/L (ref 98–107)
CO2 BLDA-SCNC: 22.4 MMOL/L (ref 22–33)
CO2 SERPL-SCNC: 22 MMOL/L (ref 22–29)
COHGB MFR BLD: 1 % (ref 0–5)
CREAT SERPL-MCNC: 1.01 MG/DL (ref 0.57–1)
DEPRECATED RDW RBC AUTO: 49.6 FL (ref 37–54)
EGFRCR SERPLBLD CKD-EPI 2021: 57.8 ML/MIN/1.73
EOSINOPHIL # BLD AUTO: 0.58 10*3/MM3 (ref 0–0.4)
EOSINOPHIL NFR BLD AUTO: 4.6 % (ref 0.3–6.2)
ERYTHROCYTE [DISTWIDTH] IN BLOOD BY AUTOMATED COUNT: 14.3 % (ref 12.3–15.4)
FLUAV RNA RESP QL NAA+PROBE: NOT DETECTED
FLUBV RNA RESP QL NAA+PROBE: NOT DETECTED
GAS FLOW AIRWAY: 2 LPM
GEN 5 2HR TROPONIN T REFLEX: 13 NG/L
GLUCOSE BLDC GLUCOMTR-MCNC: 119 MG/DL (ref 70–130)
GLUCOSE BLDC GLUCOMTR-MCNC: 129 MG/DL (ref 70–130)
GLUCOSE BLDC GLUCOMTR-MCNC: 132 MG/DL (ref 70–130)
GLUCOSE BLDC GLUCOMTR-MCNC: 82 MG/DL (ref 70–130)
GLUCOSE BLDC GLUCOMTR-MCNC: 95 MG/DL (ref 70–130)
GLUCOSE SERPL-MCNC: 150 MG/DL (ref 65–99)
HCO3 BLDA-SCNC: 21.6 MMOL/L (ref 20–26)
HCT VFR BLD AUTO: 47.9 % (ref 34–46.6)
HCT VFR BLD CALC: 48.2 % (ref 38–51)
HGB BLD-MCNC: 14.1 G/DL (ref 12–15.9)
HGB BLDA-MCNC: 15.7 G/DL (ref 13.5–17.5)
IMM GRANULOCYTES # BLD AUTO: 0.03 10*3/MM3 (ref 0–0.05)
IMM GRANULOCYTES NFR BLD AUTO: 0.2 % (ref 0–0.5)
INHALED O2 CONCENTRATION: 28 %
LYMPHOCYTES # BLD AUTO: 4.12 10*3/MM3 (ref 0.7–3.1)
LYMPHOCYTES NFR BLD AUTO: 32.4 % (ref 19.6–45.3)
Lab: ABNORMAL
MCH RBC QN AUTO: 27.6 PG (ref 26.6–33)
MCHC RBC AUTO-ENTMCNC: 29.4 G/DL (ref 31.5–35.7)
MCV RBC AUTO: 93.7 FL (ref 79–97)
METHGB BLD QL: <-0.1 % (ref 0–3)
MODALITY: ABNORMAL
MONOCYTES # BLD AUTO: 1.1 10*3/MM3 (ref 0.1–0.9)
MONOCYTES NFR BLD AUTO: 8.7 % (ref 5–12)
NEUTROPHILS NFR BLD AUTO: 53.6 % (ref 42.7–76)
NEUTROPHILS NFR BLD AUTO: 6.81 10*3/MM3 (ref 1.7–7)
NOTIFIED WHO: ABNORMAL
NRBC BLD AUTO-RTO: 0 /100 WBC (ref 0–0.2)
OXYHGB MFR BLDV: 98.9 % (ref 94–99)
PCO2 BLDA: 25.1 MM HG (ref 35–45)
PCO2 TEMP ADJ BLD: ABNORMAL MM[HG]
PH BLDA: 7.54 PH UNITS (ref 7.35–7.45)
PH, TEMP CORRECTED: ABNORMAL
PLATELET # BLD AUTO: 325 10*3/MM3 (ref 140–450)
PMV BLD AUTO: 12.4 FL (ref 6–12)
PO2 BLDA: 107 MM HG (ref 83–108)
PO2 TEMP ADJ BLD: ABNORMAL MM[HG]
POTASSIUM SERPL-SCNC: 3.7 MMOL/L (ref 3.5–5.2)
RBC # BLD AUTO: 5.11 10*6/MM3 (ref 3.77–5.28)
SAO2 % BLDCOA: 99.2 % (ref 94–99)
SARS-COV-2 RNA RESP QL NAA+PROBE: NOT DETECTED
SODIUM SERPL-SCNC: 139 MMOL/L (ref 136–145)
TROPONIN T DELTA: -5 NG/L
TROPONIN T SERPL HS-MCNC: 18 NG/L
VENTILATOR MODE: ABNORMAL
WBC NRBC COR # BLD: 12.7 10*3/MM3 (ref 3.4–10.8)

## 2023-09-20 PROCEDURE — 97530 THERAPEUTIC ACTIVITIES: CPT | Performed by: OCCUPATIONAL THERAPIST

## 2023-09-20 PROCEDURE — 82375 ASSAY CARBOXYHB QUANT: CPT

## 2023-09-20 PROCEDURE — 87636 SARSCOV2 & INF A&B AMP PRB: CPT | Performed by: FAMILY MEDICINE

## 2023-09-20 PROCEDURE — 97530 THERAPEUTIC ACTIVITIES: CPT

## 2023-09-20 PROCEDURE — 84484 ASSAY OF TROPONIN QUANT: CPT | Performed by: FAMILY MEDICINE

## 2023-09-20 PROCEDURE — 71045 X-RAY EXAM CHEST 1 VIEW: CPT

## 2023-09-20 PROCEDURE — 97116 GAIT TRAINING THERAPY: CPT

## 2023-09-20 PROCEDURE — 92507 TX SP LANG VOICE COMM INDIV: CPT

## 2023-09-20 PROCEDURE — 82948 REAGENT STRIP/BLOOD GLUCOSE: CPT

## 2023-09-20 PROCEDURE — 83050 HGB METHEMOGLOBIN QUAN: CPT

## 2023-09-20 PROCEDURE — 80048 BASIC METABOLIC PNL TOTAL CA: CPT | Performed by: FAMILY MEDICINE

## 2023-09-20 PROCEDURE — 94799 UNLISTED PULMONARY SVC/PX: CPT

## 2023-09-20 PROCEDURE — 93005 ELECTROCARDIOGRAM TRACING: CPT | Performed by: FAMILY MEDICINE

## 2023-09-20 PROCEDURE — 82805 BLOOD GASES W/O2 SATURATION: CPT

## 2023-09-20 PROCEDURE — 71045 X-RAY EXAM CHEST 1 VIEW: CPT | Performed by: RADIOLOGY

## 2023-09-20 PROCEDURE — 25010000002 FUROSEMIDE PER 20 MG: Performed by: FAMILY MEDICINE

## 2023-09-20 PROCEDURE — 93010 ELECTROCARDIOGRAM REPORT: CPT | Performed by: INTERNAL MEDICINE

## 2023-09-20 PROCEDURE — 36600 WITHDRAWAL OF ARTERIAL BLOOD: CPT

## 2023-09-20 PROCEDURE — 97112 NEUROMUSCULAR REEDUCATION: CPT | Performed by: OCCUPATIONAL THERAPIST

## 2023-09-20 PROCEDURE — 99231 SBSQ HOSP IP/OBS SF/LOW 25: CPT | Performed by: FAMILY MEDICINE

## 2023-09-20 PROCEDURE — 97110 THERAPEUTIC EXERCISES: CPT

## 2023-09-20 PROCEDURE — 94761 N-INVAS EAR/PLS OXIMETRY MLT: CPT

## 2023-09-20 PROCEDURE — 97535 SELF CARE MNGMENT TRAINING: CPT | Performed by: OCCUPATIONAL THERAPIST

## 2023-09-20 PROCEDURE — 85025 COMPLETE CBC W/AUTO DIFF WBC: CPT | Performed by: FAMILY MEDICINE

## 2023-09-20 RX ORDER — FUROSEMIDE 10 MG/ML
20 INJECTION INTRAMUSCULAR; INTRAVENOUS ONCE
Status: COMPLETED | OUTPATIENT
Start: 2023-09-20 | End: 2023-09-20

## 2023-09-20 RX ORDER — BENZONATATE 100 MG/1
200 CAPSULE ORAL EVERY 4 HOURS PRN
Status: DISCONTINUED | OUTPATIENT
Start: 2023-09-20 | End: 2023-10-12 | Stop reason: HOSPADM

## 2023-09-20 RX ADMIN — PANTOPRAZOLE SODIUM 40 MG: 40 TABLET, DELAYED RELEASE ORAL at 06:30

## 2023-09-20 RX ADMIN — SACUBITRIL AND VALSARTAN 1 TABLET: 24; 26 TABLET, FILM COATED ORAL at 08:20

## 2023-09-20 RX ADMIN — NYSTATIN 1 APPLICATION: 100000 CREAM TOPICAL at 21:21

## 2023-09-20 RX ADMIN — VENLAFAXINE HYDROCHLORIDE 75 MG: 75 CAPSULE, EXTENDED RELEASE ORAL at 08:20

## 2023-09-20 RX ADMIN — BUPROPION HYDROCHLORIDE 150 MG: 150 TABLET, EXTENDED RELEASE ORAL at 08:20

## 2023-09-20 RX ADMIN — SACUBITRIL AND VALSARTAN 1 TABLET: 24; 26 TABLET, FILM COATED ORAL at 21:22

## 2023-09-20 RX ADMIN — OXYBUTYNIN CHLORIDE 10 MG: 5 TABLET, EXTENDED RELEASE ORAL at 08:20

## 2023-09-20 RX ADMIN — PRAVASTATIN SODIUM 40 MG: 40 TABLET ORAL at 08:20

## 2023-09-20 RX ADMIN — FUROSEMIDE 20 MG: 10 INJECTION, SOLUTION INTRAMUSCULAR; INTRAVENOUS at 19:39

## 2023-09-20 RX ADMIN — METOPROLOL SUCCINATE 25 MG: 25 TABLET, EXTENDED RELEASE ORAL at 08:20

## 2023-09-20 RX ADMIN — MONTELUKAST SODIUM 10 MG: 10 TABLET, COATED ORAL at 21:22

## 2023-09-20 RX ADMIN — TIOTROPIUM BROMIDE INHALATION SPRAY 2 PUFF: 3.12 SPRAY, METERED RESPIRATORY (INHALATION) at 07:38

## 2023-09-20 RX ADMIN — IPRATROPIUM BROMIDE 0.5 MG: 0.5 SOLUTION RESPIRATORY (INHALATION) at 18:35

## 2023-09-20 RX ADMIN — CETIRIZINE HYDROCHLORIDE 5 MG: 10 TABLET, FILM COATED ORAL at 08:20

## 2023-09-20 RX ADMIN — ISOSORBIDE MONONITRATE 30 MG: 30 TABLET, EXTENDED RELEASE ORAL at 08:20

## 2023-09-20 RX ADMIN — METFORMIN HYDROCHLORIDE 500 MG: 500 TABLET ORAL at 16:31

## 2023-09-20 RX ADMIN — ASPIRIN 81 MG: 81 TABLET, COATED ORAL at 08:20

## 2023-09-20 RX ADMIN — METFORMIN HYDROCHLORIDE 500 MG: 500 TABLET ORAL at 08:20

## 2023-09-20 RX ADMIN — NYSTATIN 1 APPLICATION: 100000 CREAM TOPICAL at 08:20

## 2023-09-20 NOTE — PLAN OF CARE
Goal Outcome Evaluation:  Plan of Care Reviewed With: patient resting in bed, reports continues to participate in therapy.

## 2023-09-20 NOTE — NURSING NOTE
SPOKE WITH YARI, HOUSE SUPERVISOR, AT [facility] AND INFORMED HIM OF DR MULTANI'S REQUEST FOR PATIENT'S MOST RECENT EKG REPORTS. FAX NUMBER TAKEN DOWN. FACE SHEET TO BE FAXED.

## 2023-09-20 NOTE — THERAPY PROGRESS REPORT/RE-CERT
Inpatient Rehabilitation - Occupational Therapy Progress Note     [facility]     Patient Name: Ofelia Knox  : 1945  MRN: [id number]    Today's Date: 2023                 Admit Date: 2023       No diagnosis found.    Patient Active Problem List   Diagnosis    CVA (cerebral vascular accident)       Past Medical History:   Diagnosis Date    AMS (altered mental status)     Aphasia     CKD (chronic kidney disease)     COPD (chronic obstructive pulmonary disease)     CVA (cerebral vascular accident)     DM2 (diabetes mellitus, type 2)     HTN (hypertension)     Restrictive lung disease     Urinary tract infection due to ESBL Klebsiella        Past Surgical History:   Procedure Laterality Date    HYSTERECTOMY      JOINT REPLACEMENT               IRF OT ASSESSMENT FLOWSHEET (last 12 hours)       IRF OT Evaluation and Treatment       Row Name 23 1400          OT Time and Intention    Document Type daily treatment  -     Mode of Treatment individual therapy;occupational therapy  -     Patient Effort good  -       Row Name 23 1400          General Information    Patient/Family/Caregiver Comments/Observations patient agreeable to therapy. patient stated she did not feel well in am, therapy completed in room prior to lunch. patient stated she felt better in pm and seen up in chair for therapy. patient tolerated therapy with rest breaks provided.  -     Existing Precautions/Restrictions fall  aphasia  -       Row Name 23 1400          Cognition/Psychosocial    Affect/Mental Status (Cognition) WFL  -     Orientation Status (Cognition) unable/difficult to assess  aphasia  -     Follows Commands (Cognition) physical/tactile prompts required;verbal cues/prompting required  -       Row Name 23 1400          Bathing    [place] Level (Bathing) minimum assist (75% patient effort)  -       Row Name 23 1400          Upper Body Dressing    [place] Level (Upper Body  Dressing) supervision  -AH       Row Name 09/20/23 1400          Lower Body Dressing    [place] Level (Lower Body Dressing) minimum assist (75% patient effort)  -AH       Row Name 09/20/23 1400          Grooming    [place] Level (Grooming) set up  -AH       Row Name 09/20/23 1400          Toileting    [place] Level (Toileting) moderate assist (50% patient effort)  -AH       Row Name 09/20/23 1400          Self-Feeding    [place] Level (Self-Feeding) supervision;set up  -AH       Row Name 09/20/23 1400          Bed-Chair Transfer    Bed-Chair [place] (Transfers) contact guard;verbal cues  -     Assistive Device (Bed-Chair Transfers) wheelchair  -[facility] Name 09/20/23 1400          Chair-Bed Transfer    Chair-Bed [place] (Transfers) contact guard;verbal cues  -     Assistive Device (Chair-Bed Transfers) wheelchair  -[facility] Name 09/20/23 1400          Motor Skills    Motor Skills coordination;functional endurance;neuro-muscular function  -     Therapeutic Exercise shoulder;wrist;elbow/forearm;hand  BUE ROM, gmc,fmc, strength, reaching  -AH       Row Name 09/20/23 1400          Positioning and Restraints    Post Treatment Position bed  -AH     In Bed supine;call light within reach;encouraged to call for assist;exit alarm on  -AH       Row Name 09/20/23 1400          LB Dressing Goal 1 (OT-IRF)    Progress/Outcomes (LB Dressing Goal 1, OT-IRF) goal met  -AH       Row Name 09/20/23 1400          LB Dressing Goal 2 (OT-IRF)    Progress/Outcomes (LB Dressing Goal 2, OT-IRF) goal partially met  -AH       Row Name 09/20/23 1400          Toileting Goal 1 (OT-IRF)    Progress/Outcomes (Toileting Goal 1, OT-IRF) goal met  -AH       Row Name 09/20/23 1400          Toileting Goal 2 (OT-IRF)    Progress/Outcomes (Toileting Goal 2, OT-IRF) goal met  -               User Key  (r) = Recorded By, (t) = Taken By, (c) = Cosigned By      Initials Name Effective Dates    Althea Das  BINH Dickey 07/11/23 -                      Occupational Therapy Education       Title: PT OT SLP Therapies (Done)       Topic: Occupational Therapy (Done)       Point: ADL training (Done)       Description:   Instruct learner(s) on proper safety adaptation and remediation techniques during self care or transfers.   Instruct in proper use of assistive devices.                  Learning Progress Summary             Patient Acceptance, E, VU,NR by  at 9/16/2023 1145    Acceptance, E, VU,NR by  at 9/15/2023 1232                         Point: Precautions (Done)       Description:   Instruct learner(s) on prescribed precautions during self-care and functional transfers.                  Learning Progress Summary             Patient Acceptance, E, VU,NR by  at 9/16/2023 1145    Acceptance, E, VU,NR by  at 9/15/2023 1232                                         User Key       Initials Effective Dates Name Provider Type [facility] 07/11/23 -  Pam Peck, OT Occupational Therapist OT     05/25/21 -  Xena Chinchilla OT Occupational Therapist OT                        OT Recommendation and Plan                         Time Calculation:      Time Calculation- OT       Row Name 09/20/23 1438 09/20/23 1437          Time Calculation- OT    OT Start Time 1245  - 1045  -     OT Stop Time 1340  - 1130  -     OT Time Calculation (min) 55 min  - 45 min  -               User Key  (r) = Recorded By, (t) = Taken By, (c) = Cosigned By      Initials Name Provider Type     Althea Downs, BINH Occupational Therapist                  Therapy Charges for Today       Code Description Service Date Service Provider Modifiers Qty    26261314638 HC OT SELF CARE/MGMT/TRAIN EA 15 MIN 9/19/2023 Althea Downs OT GO 1    26299086462 HC OT NEUROMUSC RE EDUCATION EA 15 MIN 9/19/2023 Althea Downs OT GO 3    08367293870 HC OT THERAPEUTIC ACT EA 15 MIN 9/19/2023 Althea Downs OT GO 2     24746748749 HC OT THERAPEUTIC ACT EA 15 MIN 9/20/2023 Althea Downs, OT GO 2    28930092712 HC OT SELF CARE/MGMT/TRAIN EA 15 MIN 9/20/2023 Althea Downs OT GO 2    80114229312 HC OT NEUROMUSC RE EDUCATION EA 15 MIN 9/20/2023 Althea Downs, OT GO 3                     Althea Downs OT  9/20/2023

## 2023-09-20 NOTE — PLAN OF CARE
Goal Outcome Evaluation:  Plan of Care Reviewed With: patient        Progress: no change       Problem: Rehabilitation (IRF) Plan of Care  Goal: Plan of Care Review  Outcome: Ongoing, Progressing  Flowsheets (Taken 9/20/2023 1049)  Progress: no change  Plan of Care Reviewed With: patient  Goal: Patient-Specific Goal (Individualized)  Outcome: Ongoing, Progressing  Goal: Absence of New-Onset Illness or Injury  Outcome: Ongoing, Progressing  Intervention: Prevent Fall and Fall Injury  Recent Flowsheet Documentation  Taken 9/20/2023 0800 by Dl Gary RN  Safety Promotion/Fall Prevention: safety round/check completed  Intervention: Prevent Infection  Recent Flowsheet Documentation  Taken 9/20/2023 0800 by Dl Gary RN  Infection Prevention:   hand hygiene promoted   rest/sleep promoted  Intervention: Prevent VTE (Venous Thromboembolism)  Recent Flowsheet Documentation  Taken 9/20/2023 0800 by Dl Gary RN  VTE Prevention/Management: (for therapy)   bilateral   sequential compression devices off  Goal: Optimal Comfort and Wellbeing  Outcome: Ongoing, Progressing  Goal: Home and Community Transition Plan Established  Outcome: Ongoing, Progressing     Problem: Skin Injury Risk Increased  Goal: Skin Health and Integrity  Outcome: Ongoing, Progressing  Intervention: Promote and Optimize Oral Intake  Recent Flowsheet Documentation  Taken 9/20/2023 0800 by Dl Gary RN  Oral Nutrition Promotion: physical activity promoted  Intervention: Optimize Skin Protection  Recent Flowsheet Documentation  Taken 9/20/2023 0800 by Dl Gary RN  Pressure Reduction Techniques:   frequent weight shift encouraged   heels elevated off bed   weight shift assistance provided  Pressure Reduction Devices:   pressure-redistributing mattress utilized   heel offloading device utilized   positioning supports utilized  Skin Protection:   adhesive use limited   incontinence pads utilized     Problem: Fall Injury Risk  Goal:  Absence of Fall and Fall-Related Injury  Outcome: Ongoing, Progressing  Intervention: Identify and Manage Contributors  Recent Flowsheet Documentation  Taken 9/20/2023 0800 by Dl Gary RN  Medication Review/Management: medications reviewed  Intervention: Promote Injury-Free Environment  Recent Flowsheet Documentation  Taken 9/20/2023 0800 by Dl Gary, RN  Safety Promotion/Fall Prevention: safety round/check completed     Problem: Communication Impairment  Goal: Effective Communication Skills  Outcome: Ongoing, Progressing

## 2023-09-20 NOTE — PHARMACY PATIENT ASSISTANCE
Pharmacy checked on cost of new medication Entresto. Per patient's insurance, current copay would be $129.24. Patient is currently in the coverage gap (a.k.a. donut hole) with her insurance so once she pays a pre-determined amount set by her insurance, the copay will be lower. I spoke with patient's son Damion, who reported this would be affordable. If it becomes unaffordable in the future, can assess patient's eligibility for the heart failure rach program.    Thank you,  Bella Tate, PharmD

## 2023-09-20 NOTE — PROGRESS NOTES
Occupational Therapy: Individual: 100 minutes.    Physical Therapy:    Speech Language Pathology:    Signed by: Keli Downs, Occupational Therapist

## 2023-09-20 NOTE — NURSING NOTE
DR MULTANI NOTIFIED ME VIA SECURE CHAT THAT HE WANTS TO SEE THE MOST RECENT EKG REPORTS FROM PATIENT'S PREVIOUS HOSPITAL WHICH IS [facility]

## 2023-09-20 NOTE — PROGRESS NOTES
Inpatient Rehabilitation Functional Measures Assessment    Functional Measures  JOHN Eating:  JOHN Grooming:  JOHN Bathing:  JOHN Upper Body Dressing:  JOHN Lower Body Dressing:  JOHN Toileting:    JOHN Bladder Management  Level of Assistance:  Frequency/Number of Accidents this Shift:    JOHN Bowel Management  Level of Assistance:  Frequency/Number of Accidents this Shift:    JOHN Bed/Chair/Wheelchair Transfer:  JOHN Toilet Transfer:  JOHN Tub/Shower Transfer:    Previously Documented Mode of Locomotion at Discharge:  [facility] Expected Mode of Locomotion at Discharge:  [facility] Walk/Wheelchair:  [facility] Stairs:    [facility] Comprehension:  [facility] Expression:  [facility] Social Interaction:  [facility] Problem Solving:  [facility] Memory:    Therapy Mode Minutes  Occupational Therapy:  Physical Therapy:  Speech Language Pathology: Individual: 45 minutes.    Discharge Functional Goals:    Signed by: Annalisa Calderón SLP

## 2023-09-20 NOTE — THERAPY TREATMENT NOTE
Inpatient Rehabilitation - Speech Language Pathology Treatment Note  [facility]     Patient Name: Ofelia Knox  : 1945  MRN: [id number]  Today's Date: 2023             Admit Date: 2023    Ms. Knox was seen this pm in [facility] for continued speech therapy. She was a/a and interactive, sitting upright in her bed. She actively participated all tx tasks.      Long Term Goal:  Patient will demonstrate adequate language skills to fully participate in adls at premorbid baseline level of function.       Short Term Goals:  1. GOAL MET:       2. GOAL MET:       3. GOAL MET:       4. Patient will follow 3+ step directives w/ 80% accuracy and min cues over three consecutive sessions.   80% w/ min cues with tangible objects common in adls.      5. GOAL MET:    6. Patient will identify the appropriate object/picture/word following a verbal description w/ 80% accuracy over three consecutive sessions.   ID the appropriate word following verbal description w/o picture cues w/ 70% accuracy. This improves to 100% w/ picture cues.      7. GOAL MET:    8. Patient will complete oe sentences w/ 80% accuracy and min cues over three consecutive sessions.   100% w/ one and two word oe sentence completion.   70% accuracy w/ greater than two word responses required.     9. Patient will provide verbal 3+ descriptors of an object/picture/word w/ 80% accuracy and min cues over three consecutive sessions.   3-4 word descriptors for common, tangible nouns 60% trials. 80% with cues.      10. Patient will perform divergent naming tasks w/ 5+ objects named of a given category in < 2 min over three consecutive sessions.   Provides 3 responses in narrow categories with cues, 4 responses in broad categories with cues.      Visit Dx:  No diagnosis found.  Patient Active Problem List   Diagnosis    CVA (cerebral vascular accident)     Past Medical History:   Diagnosis Date    AMS (altered mental status)     Aphasia     CKD (chronic kidney  disease)     COPD (chronic obstructive pulmonary disease)     CVA (cerebral vascular accident)     DM2 (diabetes mellitus, type 2)     HTN (hypertension)     Restrictive lung disease     Urinary tract infection due to ESBL Klebsiella      Past Surgical History:   Procedure Laterality Date    HYSTERECTOMY      JOINT REPLACEMENT       EDUCATION  The patient has been educated in the following areas:   Cognitive Impairment Communication Impairment.    SLP Recommendation and Plan   Continue current plan of care.     Thank you   Annalisa Calderón M.S., CCC/SLP                                                                                     Time Calculation:      Time Calculation- SLP       Time Calculation- SLP       Row Name 09/20/23 1507    SLP - Next Appointment 09/21/23  -Recorded by: MILES                      User Key       Initials Name Provider Type    Annalisa Schulte, MS CCC-SLP Speech and Language Pathologist                    Therapy Charges for Today       Code Description Service Date Service Provider Modifiers Qty    93688705286  ST TREATMENT SPEECH 3 9/20/2023 Annalisa Calderón, MS CCC-SLP GN 1                       Annalisa Calderón MS CCC-SLP  9/20/2023

## 2023-09-20 NOTE — PROGRESS NOTES
I was called late this afternoon patient complaining of shortness of breath and apparently was tachypneic.  Patient was given Atrovent neb treatment.  Patient claims that she has an allergy to albuterol.  Patient did have chest x-ray performed which showed possibly some pulmonary venous congestion.  ABG shows findings consistent with hyperventilation.  Patient's PO2 was 107.  Will give Lasix 20 mg IV x1.  We will also check troponin level and EKG at this time.  Patient does not appear to be in acute distress at this moment.    EKG--right bundle branch block.  Nonspecific ST-T wave changes.  Troponin pending.  Will contact [facility] to see if we can get old EKG for comparison sake.  Patient not having any chest pain at this time

## 2023-09-20 NOTE — PROGRESS NOTES
[facility]  PROGRESS NOTE     Patient Identification:  Name:  Ofelia Knox  Age:  78 y.o.  Sex:  female  :  1945  MRN:  [id number]  Visit Number:  [id number]  ROOM: [facility]     Primary Care Provider:  Provider, No Known    Length of stay in inpatient status:  9    Subjective     Chief Compliant:  No chief complaint on file.      History of Presenting Illness: 78-year-old female who is status post left putamen/left internal capsule CVA.  Patient developed a cough this morning is nonproductive.  Patient states otherwise she does not feel poorly.  Is participating in therapy at this time.    Objective     Current Hospital Meds:aspirin, 81 mg, Oral, Daily  buPROPion XL, 150 mg, Oral, Daily  cetirizine, 5 mg, Oral, Daily  isosorbide mononitrate, 30 mg, Oral, Q24H  metFORMIN, 500 mg, Oral, BID With Meals  metoprolol succinate XL, 25 mg, Oral, Q24H  montelukast, 10 mg, Oral, Nightly  nystatin, 1 application , Topical, Q12H  oxybutynin XL, 10 mg, Oral, Daily  pantoprazole, 40 mg, Oral, QAM AC  pravastatin, 40 mg, Oral, Daily  sacubitril-valsartan, 1 tablet, Oral, Q12H  tiotropium bromide monohydrate, 2 puff, Inhalation, Daily - RT  venlafaxine XR, 75 mg, Oral, Daily With Breakfast       ----------------------------------------------------------------------------------------------------------------------  Vital Signs:  Temp:  [98 øF (36.7 øC)-98.1 øF (36.7 øC)] 98.1 øF (36.7 øC)  Heart Rate:  [68-73] 73  Resp:  [18-20] 20  BP: (140-148)/(77-78) 148/77  SpO2:  [93 %-95 %] 95 %  on   ;   Device (Oxygen Therapy): room air  Body mass index is 40.03 kg/mý.    Wt Readings from Last 3 Encounters:   23 112 kg (248 lb)     Intake & Output (last 3 days)          07 07 07 07 07 07 07 0700    P.O. 0495 833 2470     Total Intake(mL/kg) 1920 (17.1) 360 (3.2) 1080 (9.6)     Net +1920 +360 +1080             Urine Unmeasured Occurrence 5 x 2 x 5 x            Diet: Cardiac Diets, Diabetic Diets; Healthy Heart (2-3 Na+); Consistent Carbohydrate; Texture: Regular Texture (IDDSI 7); Fluid Consistency: Thin (IDDSI 0)  ----------------------------------------------------------------------------------------------------------------------  Physical exam:  Constitutional: No acute distress  HEENT: Normocephalic atraumatic  Neck:   Supple  Cardiovascular: Regular rate and rhythm  Pulmonary/Chest: Fairly clear to auscultation, does have mild nonproductive cough  Abdominal:  .  Positive bowel sounds soft  Musculoskeletal: No arthropathy  Neurological: 4 out of 5 strength in the right upper extremity with 5 out of 5 on the left.  Skin: No rash  Peripheral vascular: No edema  Genitourinary:  ----------------------------------------------------------------------------------------------------------------------    Last echocardiogram:  Results for orders placed during the hospital encounter of 09/11/23    Adult Transthoracic Echo Complete W/ Cont if Necessary Per Protocol    Interpretation Summary    Left ventricular systolic function is mildly decreased. Calculated left ventricular EF = 44% Left ventricular ejection fraction appears to be 41 - 45%.    Left ventricular wall thickness is consistent with mild eccentric hypertrophy.    Left ventricular diastolic function is consistent with (grade I) impaired relaxation.    The left atrial cavity is moderately dilated.    ----------------------------------------------------------------------------------------------------------------------  Results from last 7 days   Lab Units 09/20/23  0006 09/15/23  0456 09/14/23  0344   CRP mg/dL  --   --  1.02*   WBC 10*3/mm3 12.70* 11.98* 15.74*   HEMOGLOBIN g/dL 14.1 14.4 15.2   HEMATOCRIT % 47.9* 48.5* 53.0*   MCV fL 93.7 92.7 98.0*   MCHC g/dL 29.4* 29.7* 28.7*   PLATELETS 10*3/mm3 325 294 290         Results from last 7 days   Lab Units 09/20/23  0006 09/18/23  0217 09/17/23  0114   SODIUM  mmol/L 139 144 143   POTASSIUM mmol/L 3.7 3.8 3.6   CHLORIDE mmol/L 105 105 103   CO2 mmol/L 22.0 27.7 30.7*   BUN mg/dL 32* 38* 47*   CREATININE mg/dL 1.01* 1.04* 1.17*   CALCIUM mg/dL 9.4 9.3 9.8   GLUCOSE mg/dL 150* 112* 104*   Estimated Creatinine Clearance: 60.1 mL/min (A) (by C-G formula based on SCr of 1.01 mg/dL (H)).  No results found for: AMMONIA              Glucose   Date/Time Value Ref Range Status   09/20/2023 0607 95 70 - 130 mg/dL Final   09/19/2023 2003 112 70 - 130 mg/dL Final   09/19/2023 1618 94 70 - 130 mg/dL Final   09/19/2023 1126 115 70 - 130 mg/dL Final   09/19/2023 0626 122 70 - 130 mg/dL Final   09/18/2023 1937 110 70 - 130 mg/dL Final   09/18/2023 1635 106 70 - 130 mg/dL Final   09/18/2023 1123 127 70 - 130 mg/dL Final     Lab Results   Component Value Date    TSH 1.470 09/12/2023    FREET4 1.15 09/12/2023     No results found for: PREGTESTUR, PREGSERUM, HCG, HCGQUANT  Pain Management Panel           No data to display              Brief Urine Lab Results  (Last result in the past 365 days)        Color   Clarity   Blood   Leuk Est   Nitrite   Protein   CREAT   Urine HCG        09/14/23 1935 Dark Yellow   Clear   Negative   Negative   Negative   Trace                 No results found for: BLOODCX  Results from last 7 days   Lab Units 09/14/23  1935   NITRITE UA  Negative     No results found for: URINECX  No results found for: WOUNDCX  No results found for: STOOLCX  Results from last 7 days   Lab Units 09/14/23  0344   CRP mg/dL 1.02*       I have personally looked at the labs and they are summarized above.  ----------------------------------------------------------------------------------------------------------------------  Detailed radiology reports for the last 24 hours:    Imaging Results (Last 24 Hours)       ** No results found for the last 24 hours. **          Final impressions for the last 30 days of radiology reports:    XR Chest 1 View    Result Date: 9/14/2023  No  radiographic evidence of acute cardiac or pulmonary disease.  This report was finalized on 9/14/2023 7:54 AM by Dr. Krunal Colvin MD.     I have personally looked at the radiology images and read the final radiology report.    Assessment & Plan    Status post left putamen/left internal capsule CVA.  Patient is currently on aspirin and statin therapy.  Ambulated 160 feet x 2 with front wheel walker and 1 person assistance yesterday.  Required contact-guard to minimum assistance for transfers.  Has some degree of expressive aphasia and is doing better with speech therapy.    Cough--because there has been some increased incidence of COVID-19 I will check COVID-19 test at this time.  Patient in no respiratory distress.  Will place on cough suppressant at this time      CHF with reduced EF patient was started on Entresto yesterday.  We will recheck BMP in the a.m.    Diabetes mellitus continue current treatment    COPD with chronic respiratory failure continue O2.  Patient stable      VTE Prophylaxis:   Mechanical Order History:        Ordered        09/11/23 2023  Place Sequential Compression Device  Once            09/11/23 2023  Maintain Sequential Compression Device  Continuous                          Pharmalogical Order History:       None                Abdullahi Sharpe MD  [facility] Hospitalist  09/20/23  09:55 EDT

## 2023-09-20 NOTE — PROGRESS NOTES
Nutrition Services    Patient Name:  Ofelia Knox  YOB: 1945  MRN: [id number]  Admit Date:  9/11/2023    Patient intakes have improved this week and eating around 68% of meals for past 3 days.  Patient does have ONS ordered BID.Will continue to follow and monitor intakes of meals and ONS.    Electronically signed by:  Randi Jimenez RD  09/20/23 12:49 EDT

## 2023-09-20 NOTE — SIGNIFICANT NOTE
09/20/23 0900   Plan   Plan SS received call from son who says he will be completing applications for [email or web address] and [email or web address].  Son had questions about SNF benefits, [place] Medicaid and private pay.  Son wants pt to continue inpatient rehab stay and continue making improvements.  Will follow and assist with discharge planning.

## 2023-09-20 NOTE — PROGRESS NOTES
Case Management  Inpatient Rehabilitation Team Conference    Conference Date/Time: 9/19/2023 6:38:03 AM    Team Conference Attendees:  MD Viviana Nash, TONEY Garcia, MARIA LUISA,   Pavel Armenta, MARIA LUISA Stewart, PT  Keli Downs, OT  Verona Sarmiento, JUSTO    Demographics            Age: 78Y            Gender: Female    Admission Date: 9/11/2023 6:31:00 PM  Rehabilitation Diagnosis:  CVA  Comorbidities: N39.0 Urinary tract infection, site not specified  I63.9 Cerebral infarction, unspecified  R47.01 Aphasia  E11.9 Type 2 diabetes mellitus without complications  I10 Essential (primary) hypertension  D75.1 Secondary polycythemia  J44.9 Chronic obstructive pulmonary disease, unspecified  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  Medical Necessity Expected Level Rationale: good  Intensity and Duration: an average of 3 hours/5 days per week  Medical Supervision and 24 Hour Rehab Nursing: x  Physical Therapy: x  PT Intensity/Duration: PT 1-1.5 hours per day/5 days per week  Occupational Therapy: x  OT Intensity/Duration: OT 1-1.5 hours per day/5 days per week  Speech and Language Therapy: x  SLP Intensity/Duration: SLP 30 mins-90 mins per day/5 days per week  Social Work: x  Therapeutic Recreation: x  Updated (if changes indicated)    Anticipated Discharge Date/Estimated Length of Stay:   10-3-23      Discharge Plan of Care:    Based on the patient's medical and  functional status, their prognosis and  expected level of functional improvement is: good      Interdisciplinary Problem/Goals/Status  Mobility    [RN] Toilet Transfers(Active)  Current Status(09/11/2023): PATIENT WILL HAVE NO TRANSFER DIFFICULTY  Weekly Goal(09/26/2023): NO DIFFICULT WITH TRANSFERS  Discharge Goal: TRANSFERS INDEPENDENTLY    [PT] Bed/Chair/Wheelchair(Active)  Current Status(09/12/2023): min-mod A  Weekly Goal(09/19/2023): Sup  Discharge Goal: Sup    [PT] Walk(Active)  Current Status(09/12/2023): amb 160' RW CGA  Weekly Goal(09/19/2023): amb 300' RW Sup  Discharge Goal: amb 300' RW Sup        Safety    [RN] Potential for Injury(Active)  Current Status(09/11/2023): PATIENT WILL HAVE NO INJURY THIS STAY  Weekly Goal(10/03/2023): NO INJURY  Discharge Goal: PATIENT WILL DISCHARGE        Pain    [RN] Pain Management(Active)  Current Status(09/11/2023): PATIENT WILL VOICE NO PAIN  Weekly Goal(10/10/2023): PATIENT WILL BE PAIN FREE  Discharge Goal: FREE FROM PAIN        Body Systems    [RN] Integumentary(Active)  Current Status(09/11/2023): PATIENT WILL HAVE NO SKIN  BREAKDOWN THIS STAY  Weekly Goal(10/10/2023): SKIN WILL REMAIN INTACT.  Discharge Goal: NO SKIN ISSUES.        Self Care    [OT] Dressing (Lower)(Active)  Current Status(09/18/2023): Mod  Weekly Goal(09/19/2023): Min  Discharge Goal: Min A        Communication    [ST] Comprehension(Active)  Current Status(09/19/2023): Trace to mild auditory comprehension deficits  Weekly Goal(09/25/2023): receptive ID of object following verbal description  Discharge Goal: WFL communication and comprehension    [ST] Expression(Active)  Current Status(09/19/2023): Fluent aphasia w/ anomia  Weekly Goal(09/25/2023): Provide 3+ verbal descriptors of an object/pic/word  Discharge Goal: WFL communication    Comments: Pt is agreeable to go home with son and daughter-in-law if needed at  discharge.    Signed by: TONEY Griffin    Physician CoSigned By: Abdullahi Sharpe  09/20/2023 08:30:49

## 2023-09-20 NOTE — THERAPY TREATMENT NOTE
Inpatient Rehabilitation - Physical Therapy Treatment Note       ABENA Quiroz     Patient Name: Ofelia Knox  : 1945  MRN: [id number]    Today's Date: 2023                    Admit Date: 2023      Visit Dx:   No diagnosis found.    Patient Active Problem List   Diagnosis    CVA (cerebral vascular accident)       Past Medical History:   Diagnosis Date    AMS (altered mental status)     Aphasia     CKD (chronic kidney disease)     COPD (chronic obstructive pulmonary disease)     CVA (cerebral vascular accident)     DM2 (diabetes mellitus, type 2)     HTN (hypertension)     Restrictive lung disease     Urinary tract infection due to ESBL Klebsiella        Past Surgical History:   Procedure Laterality Date    HYSTERECTOMY      JOINT REPLACEMENT         PT ASSESSMENT (last 12 hours)       IRF PT Evaluation and Treatment       Row Name 23 1254          PT Time and Intention    Document Type daily treatment  -RG     Mode of Treatment physical therapy  -RG     Patient/Family/Caregiver Comments/Observations Pt and nursing in agreement for skilled PT on this date.  -       Row Name 23 1254          General Information    Limitations/Impairments aphasia;safety/cognitive  -       Row Name 23 1254          Cognition/Psychosocial    Personal Safety Interventions gait belt;nonskid shoes/slippers when out of bed;fall prevention program maintained  -       Row Name 23 1254          Transfer Assessment/Treatment    Transfers sit-stand transfer;stand-sit transfer  -       Row Name 23 1254          Bed-Chair Transfer    Bed-Chair [place] (Transfers) verbal cues;nonverbal cues (demo/gesture);contact guard  -     Assistive Device (Bed-Chair Transfers) wheelchair  -       Row Name 23 1254          Chair-Bed Transfer    Chair-Bed [place] (Transfers) verbal cues;nonverbal cues (demo/gesture);contact guard  -RG     Assistive Device (Chair-Bed Transfers) wheelchair  -        Row Name 09/20/23 1254          Sit-Stand Transfer    Sit-Stand [place] (Transfers) contact guard;minimum assist (75% patient effort);verbal cues;1 person assist  -RG     Assistive Device (Sit-Stand Transfers) walker, front-wheeled;wheelchair  -RG       Row Name 09/20/23 1254          Stand-Sit Transfer    Stand-Sit [place] (Transfers) contact guard;verbal cues  -RG     Assistive Device (Stand-Sit Transfers) walker, front-wheeled;wheelchair  -RG       Row Name 09/20/23 1254          Gait/Stairs (Locomotion)    Gait/Stairs Locomotion gait/ambulation assistive device  -RG     [place] Level (Gait) contact guard;1 person assist  -RG     Assistive Device (Gait) walker, front-wheeled  -RG     Distance in Feet (Gait) 160' x 2  -RG     Deviations/Abnormal Patterns (Gait) beatrice decreased;gait speed decreased;stride length decreased  -RG     Bilateral Gait Deviations forward flexed posture  -RG       Row Name 09/20/23 1254          Hip (Therapeutic Exercise)    Hip Strengthening (Therapeutic Exercise) bilateral;flexion;aBduction;aDduction;marching while seated;marching while standing;sitting;standing;2 lb free weight;resistance band;green;10 repetitions;2 sets  -RG       Row Name 09/20/23 1254          Knee (Therapeutic Exercise)    Knee Strengthening (Therapeutic Exercise) flexion;bilateral;extension;marching while seated;marching while standing;LAQ (long arc quad);sitting;standing;2 lb free weight;resistance band;green;10 repetitions;2 sets  -RG       Row Name 09/20/23 1254          Ankle (Therapeutic Exercise)    Ankle Strengthening (Therapeutic Exercise) bilateral;dorsiflexion;plantarflexion;sitting;10 repetitions;2 sets;standing  -RG       Row Name 09/20/23 1251          Positioning and Restraints    Pre-Treatment Position in bed  -RG     Post Treatment Position bed  -RG     In Bed notified nsg;supine;call light within reach;encouraged to call for assist  -RG       Row Name 09/20/23 125           Bed Mobility Goal 1 (PT-IRF)    Progress/Outcomes (Bed Mobility Goal 1, PT-IRF) goal ongoing  -       Row Name 09/20/23 1254          Transfer Goal 1 (PT-IRF)    Progress/Outcomes (Transfer Goal 1, PT-IRF) goal ongoing  -       Row Name 09/20/23 1254          Gait/Walking Locomotion Goal 1 (PT-IRF)    Progress/Outcomes (Gait/Walking Locomotion Goal 1, PT-IRF) goal ongoing  -               User Key  (r) = Recorded By, (t) = Taken By, (c) = Cosigned By      Initials Name Provider Type    Twan Oliver PTA Physical Therapist Assistant                     Physical Therapy Education       Title: PT OT SLP Therapies (Done)       Topic: Physical Therapy (Done)       Point: Mobility training (Done)       Learning Progress Summary             Patient Acceptance, E,D, VU,NR by RG at 9/20/2023 1258    Acceptance, E,D, VU,NR by RG at 9/19/2023 1258    Acceptance, E,D, VU,NR by RG at 9/18/2023 1338    Acceptance, E,TB, VU by  at 9/15/2023 1532    Acceptance, E,TB, VU by  at 9/14/2023 1555    Acceptance, E,D, VU,NR by RG at 9/13/2023 1528    Acceptance, TB, NR by DL at 9/12/2023 2003    Acceptance, E, VU,NR by LB at 9/12/2023 1135                         Point: Home exercise program (Done)       Learning Progress Summary             Patient Acceptance, E,D, VU,NR by RG at 9/20/2023 1258    Acceptance, E,D, VU,NR by RG at 9/19/2023 1258    Acceptance, E,D, VU,NR by RG at 9/18/2023 1338    Acceptance, E,TB, VU by RM at 9/15/2023 1532    Acceptance, E,TB, VU by RM at 9/14/2023 1555    Acceptance, E,D, VU,NR by RG at 9/13/2023 1528    Acceptance, TB, NR by DL at 9/12/2023 2003    Acceptance, E, VU,NR by LB at 9/12/2023 1135                         Point: Body mechanics (Done)       Learning Progress Summary             Patient Acceptance, E,D, VU,NR by RG at 9/20/2023 1258    Acceptance, E,D, VU,NR by RG at 9/19/2023 1258    Acceptance, E,D, VU,NR by RG at 9/18/2023 1338    Acceptance, E,TB, VU by RM at 9/15/2023 1532     Acceptance, E,TB, VU by RM at 9/14/2023 1555    Acceptance, E,D, VU,NR by RG at 9/13/2023 1528    Acceptance, TB, NR by DL at 9/12/2023 2003    Acceptance, E, VU,NR by LB at 9/12/2023 1135                         Point: Precautions (Done)       Learning Progress Summary             Patient Acceptance, E,D, VU,NR by RG at 9/20/2023 1258    Acceptance, E,D, VU,NR by RG at 9/19/2023 1258    Acceptance, E,D, VU,NR by RG at 9/18/2023 1338    Acceptance, E,TB, VU by RM at 9/15/2023 1532    Acceptance, E,TB, VU by RM at 9/14/2023 1555    Acceptance, E,D, VU,NR by RG at 9/13/2023 1528    Acceptance, TB, NR by DL at 9/12/2023 2003    Acceptance, E, VU,NR by LB at 9/12/2023 1135                                         User Key       Initials Effective Dates Name Provider Type Discipline    LB 06/16/21 -  Andra Stewart, PT Physical Therapist PT    RM 02/17/23 -  Karis Obrien, ESSIE Physical Therapist Assistant PT    RG 06/16/21 -  Twan Cunningham PTA Physical Therapist Assistant PT    DL 03/16/22 -  Carolyn Ramirez, RN Registered Nurse Nurse                    PT Recommendation and Plan    Frequency of Treatment (PT): 5 times per week  Anticipated Equipment Needs at Discharge (PT Eval):  (tbd)                  Time Calculation:      PT Charges       Row Name 09/20/23 1259             Time Calculation    Start Time 0745  -RG      Stop Time 0915  -RG      Time Calculation (min) 90 min  -      PT Received On 09/20/23  -         Time Calculation- PT    Total Timed Code Minutes- PT 90 minute(s)  -                User Key  (r) = Recorded By, (t) = Taken By, (c) = Cosigned By      Initials Name Provider Type     Twan Cunningham PTA Physical Therapist Assistant                    Therapy Charges for Today       Code Description Service Date Service Provider Modifiers Qty    48151207145 HC GAIT TRAINING EA 15 MIN 9/19/2023 Twan Cunningham PTA GP, CQ 1    86836526397 HC PT THERAPEUTIC ACT EA 15 MIN 9/19/2023  Twan Cunningham, ESSIE GP, CQ 2    62524410532 HC PT THER PROC EA 15 MIN 9/19/2023 Twan Cunningham PTA GP, CQ 3    22529393071 HC GAIT TRAINING EA 15 MIN 9/20/2023 Twan Cunningham, ESSIE GP, CQ 1    44037180322 HC PT THERAPEUTIC ACT EA 15 MIN 9/20/2023 Twan Cunningham PTA GP, CQ 2    72098074182 HC PT THER PROC EA 15 MIN 9/20/2023 Twan Cunningham PTA GP, CQ 3                     Twan Cunningham PTA  9/20/2023

## 2023-09-21 LAB
ANION GAP SERPL CALCULATED.3IONS-SCNC: 10.9 MMOL/L (ref 5–15)
BASOPHILS # BLD AUTO: 0.06 10*3/MM3 (ref 0–0.2)
BASOPHILS NFR BLD AUTO: 0.4 % (ref 0–1.5)
BUN SERPL-MCNC: 31 MG/DL (ref 8–23)
BUN/CREAT SERPL: 30.4 (ref 7–25)
CALCIUM SPEC-SCNC: 9.8 MG/DL (ref 8.6–10.5)
CHLORIDE SERPL-SCNC: 105 MMOL/L (ref 98–107)
CO2 SERPL-SCNC: 25.1 MMOL/L (ref 22–29)
CREAT SERPL-MCNC: 1.02 MG/DL (ref 0.57–1)
DEPRECATED RDW RBC AUTO: 50.2 FL (ref 37–54)
EGFRCR SERPLBLD CKD-EPI 2021: 57.1 ML/MIN/1.73
EOSINOPHIL # BLD AUTO: 0.36 10*3/MM3 (ref 0–0.4)
EOSINOPHIL NFR BLD AUTO: 2.4 % (ref 0.3–6.2)
ERYTHROCYTE [DISTWIDTH] IN BLOOD BY AUTOMATED COUNT: 14.5 % (ref 12.3–15.4)
GLUCOSE BLDC GLUCOMTR-MCNC: 116 MG/DL (ref 70–130)
GLUCOSE BLDC GLUCOMTR-MCNC: 120 MG/DL (ref 70–130)
GLUCOSE BLDC GLUCOMTR-MCNC: 126 MG/DL (ref 70–130)
GLUCOSE BLDC GLUCOMTR-MCNC: 139 MG/DL (ref 70–130)
GLUCOSE SERPL-MCNC: 142 MG/DL (ref 65–99)
HCT VFR BLD AUTO: 49.4 % (ref 34–46.6)
HGB BLD-MCNC: 14.7 G/DL (ref 12–15.9)
IMM GRANULOCYTES # BLD AUTO: 0.06 10*3/MM3 (ref 0–0.05)
IMM GRANULOCYTES NFR BLD AUTO: 0.4 % (ref 0–0.5)
LYMPHOCYTES # BLD AUTO: 3.26 10*3/MM3 (ref 0.7–3.1)
LYMPHOCYTES NFR BLD AUTO: 21.5 % (ref 19.6–45.3)
MCH RBC QN AUTO: 27.9 PG (ref 26.6–33)
MCHC RBC AUTO-ENTMCNC: 29.8 G/DL (ref 31.5–35.7)
MCV RBC AUTO: 93.7 FL (ref 79–97)
MONOCYTES # BLD AUTO: 1.18 10*3/MM3 (ref 0.1–0.9)
MONOCYTES NFR BLD AUTO: 7.8 % (ref 5–12)
NEUTROPHILS NFR BLD AUTO: 10.27 10*3/MM3 (ref 1.7–7)
NEUTROPHILS NFR BLD AUTO: 67.5 % (ref 42.7–76)
NRBC BLD AUTO-RTO: 0 /100 WBC (ref 0–0.2)
PLATELET # BLD AUTO: 307 10*3/MM3 (ref 140–450)
PMV BLD AUTO: 11.9 FL (ref 6–12)
POTASSIUM SERPL-SCNC: 3.8 MMOL/L (ref 3.5–5.2)
QT INTERVAL: 420 MS
QTC INTERVAL: 487 MS
RBC # BLD AUTO: 5.27 10*6/MM3 (ref 3.77–5.28)
SODIUM SERPL-SCNC: 141 MMOL/L (ref 136–145)
WBC NRBC COR # BLD: 15.19 10*3/MM3 (ref 3.4–10.8)

## 2023-09-21 PROCEDURE — 82948 REAGENT STRIP/BLOOD GLUCOSE: CPT

## 2023-09-21 PROCEDURE — 99231 SBSQ HOSP IP/OBS SF/LOW 25: CPT | Performed by: FAMILY MEDICINE

## 2023-09-21 PROCEDURE — 80048 BASIC METABOLIC PNL TOTAL CA: CPT | Performed by: FAMILY MEDICINE

## 2023-09-21 PROCEDURE — 97530 THERAPEUTIC ACTIVITIES: CPT | Performed by: OCCUPATIONAL THERAPIST

## 2023-09-21 PROCEDURE — 97535 SELF CARE MNGMENT TRAINING: CPT | Performed by: OCCUPATIONAL THERAPIST

## 2023-09-21 PROCEDURE — 97530 THERAPEUTIC ACTIVITIES: CPT

## 2023-09-21 PROCEDURE — 94664 DEMO&/EVAL PT USE INHALER: CPT

## 2023-09-21 PROCEDURE — 97116 GAIT TRAINING THERAPY: CPT

## 2023-09-21 PROCEDURE — 92507 TX SP LANG VOICE COMM INDIV: CPT

## 2023-09-21 PROCEDURE — 94760 N-INVAS EAR/PLS OXIMETRY 1: CPT

## 2023-09-21 PROCEDURE — 85025 COMPLETE CBC W/AUTO DIFF WBC: CPT | Performed by: FAMILY MEDICINE

## 2023-09-21 PROCEDURE — 94799 UNLISTED PULMONARY SVC/PX: CPT

## 2023-09-21 PROCEDURE — 94761 N-INVAS EAR/PLS OXIMETRY MLT: CPT

## 2023-09-21 PROCEDURE — 97110 THERAPEUTIC EXERCISES: CPT | Performed by: OCCUPATIONAL THERAPIST

## 2023-09-21 PROCEDURE — 97110 THERAPEUTIC EXERCISES: CPT

## 2023-09-21 RX ORDER — FUROSEMIDE 20 MG/1
20 TABLET ORAL DAILY
Status: DISCONTINUED | OUTPATIENT
Start: 2023-09-21 | End: 2023-09-23

## 2023-09-21 RX ADMIN — IPRATROPIUM BROMIDE 0.5 MG: 0.5 SOLUTION RESPIRATORY (INHALATION) at 13:07

## 2023-09-21 RX ADMIN — TIOTROPIUM BROMIDE INHALATION SPRAY 2 PUFF: 3.12 SPRAY, METERED RESPIRATORY (INHALATION) at 07:24

## 2023-09-21 RX ADMIN — OXYBUTYNIN CHLORIDE 10 MG: 5 TABLET, EXTENDED RELEASE ORAL at 09:16

## 2023-09-21 RX ADMIN — VENLAFAXINE HYDROCHLORIDE 75 MG: 75 CAPSULE, EXTENDED RELEASE ORAL at 09:14

## 2023-09-21 RX ADMIN — METOPROLOL SUCCINATE 25 MG: 25 TABLET, EXTENDED RELEASE ORAL at 09:16

## 2023-09-21 RX ADMIN — PANTOPRAZOLE SODIUM 40 MG: 40 TABLET, DELAYED RELEASE ORAL at 06:32

## 2023-09-21 RX ADMIN — CETIRIZINE HYDROCHLORIDE 5 MG: 10 TABLET, FILM COATED ORAL at 09:15

## 2023-09-21 RX ADMIN — NYSTATIN 1 APPLICATION: 100000 CREAM TOPICAL at 21:03

## 2023-09-21 RX ADMIN — SACUBITRIL AND VALSARTAN 1 TABLET: 24; 26 TABLET, FILM COATED ORAL at 09:17

## 2023-09-21 RX ADMIN — PRAVASTATIN SODIUM 40 MG: 40 TABLET ORAL at 09:17

## 2023-09-21 RX ADMIN — ACETAMINOPHEN 650 MG: 325 TABLET ORAL at 21:03

## 2023-09-21 RX ADMIN — FUROSEMIDE 20 MG: 20 TABLET ORAL at 14:08

## 2023-09-21 RX ADMIN — ISOSORBIDE MONONITRATE 30 MG: 30 TABLET, EXTENDED RELEASE ORAL at 09:16

## 2023-09-21 RX ADMIN — IPRATROPIUM BROMIDE 0.5 MG: 0.5 SOLUTION RESPIRATORY (INHALATION) at 18:56

## 2023-09-21 RX ADMIN — ASPIRIN 81 MG: 81 TABLET, COATED ORAL at 09:14

## 2023-09-21 RX ADMIN — MONTELUKAST SODIUM 10 MG: 10 TABLET, COATED ORAL at 21:03

## 2023-09-21 RX ADMIN — NYSTATIN 1 APPLICATION: 100000 CREAM TOPICAL at 09:34

## 2023-09-21 RX ADMIN — SACUBITRIL AND VALSARTAN 1 TABLET: 24; 26 TABLET, FILM COATED ORAL at 21:03

## 2023-09-21 RX ADMIN — IPRATROPIUM BROMIDE 0.5 MG: 0.5 SOLUTION RESPIRATORY (INHALATION) at 00:11

## 2023-09-21 RX ADMIN — METFORMIN HYDROCHLORIDE 500 MG: 500 TABLET ORAL at 09:14

## 2023-09-21 RX ADMIN — METFORMIN HYDROCHLORIDE 500 MG: 500 TABLET ORAL at 18:35

## 2023-09-21 RX ADMIN — BUPROPION HYDROCHLORIDE 150 MG: 150 TABLET, EXTENDED RELEASE ORAL at 09:14

## 2023-09-21 NOTE — NURSING NOTE
CONTACTED PATIENT'S SON, REGAN COLE,  [phone] TO OBTAIN CONSENT FOR AUTHORIZATION OF MEDICAL INFORMATION RELEASE FOR THE PATIENT D/T PATIENT'S SLIGHT CONFUSION AND EXPRESSIVE APHASIA. EXPLAINED REASON FOR CONSENT TO REGAN, HE EXPRESSED UNDERSTANDING. REGAN GAVE VERBAL CONSENT TO ME AND ERIC MELGAR RN.

## 2023-09-21 NOTE — PLAN OF CARE
Problem: Communication Impairment  Goal: Effective Communication Skills  Outcome: Ongoing, Progressing   Goal Outcome Evaluation:

## 2023-09-21 NOTE — THERAPY TREATMENT NOTE
Inpatient Rehabilitation - Occupational Therapy Treatment Note     [facility]     Patient Name: Ofelia Knox  : 1945  MRN: [id number]    Today's Date: 2023                 Admit Date: 2023       No diagnosis found.    Patient Active Problem List   Diagnosis    CVA (cerebral vascular accident)       Past Medical History:   Diagnosis Date    AMS (altered mental status)     Aphasia     CKD (chronic kidney disease)     COPD (chronic obstructive pulmonary disease)     CVA (cerebral vascular accident)     DM2 (diabetes mellitus, type 2)     HTN (hypertension)     Restrictive lung disease     Urinary tract infection due to ESBL Klebsiella        Past Surgical History:   Procedure Laterality Date    HYSTERECTOMY      JOINT REPLACEMENT               IRF OT ASSESSMENT FLOWSHEET (last 12 hours)       IRF OT Evaluation and Treatment       Row Name 23 1400          OT Time and Intention    Document Type daily treatment  -     Mode of Treatment individual therapy;occupational therapy  -     Patient Effort good  -       Row Name 23 1400          General Information    Patient/Family/Caregiver Comments/Observations patient agreeable to therapy. patient tolerated well with no complaints, rest breaks provided.  -     Existing Precautions/Restrictions fall  aphasia, O2 2L PRN  -       Row Name 23 1400          Cognition/Psychosocial    Orientation Status (Cognition) oriented to;person  -     Follows Commands (Cognition) physical/tactile prompts required;repetition of directions required;verbal cues/prompting required  -       Row Name 23 1400          Bed-Chair Transfer    Bed-Chair [place] (Transfers) supervision;verbal cues  -       Row Name 23 1400          Chair-Bed Transfer    Chair-Bed [place] (Transfers) supervision;verbal cues  -       Row Name 23 1400          Motor Skills    Motor Skills coordination;functional endurance  -     Therapeutic  Exercise shoulder;elbow/forearm;wrist;hand  BUE ROM, gmc,fmc, strengthening, dowel ex  -       Row Name 09/21/23 1400          Positioning and Restraints    Post Treatment Position bed  -     In Bed supine;call light within reach;encouraged to call for assist;exit alarm on  -               User Key  (r) = Recorded By, (t) = Taken By, (c) = Cosigned By      Initials Name Effective Dates     Althea Downs OT 07/11/23 -                      Occupational Therapy Education       Title: PT OT SLP Therapies (Done)       Topic: Occupational Therapy (Done)       Point: ADL training (Done)       Description:   Instruct learner(s) on proper safety adaptation and remediation techniques during self care or transfers.   Instruct in proper use of assistive devices.                  Learning Progress Summary             Patient Acceptance, E, VU,NR by  at 9/16/2023 1145    Acceptance, E, VU,NR by  at 9/15/2023 1232                         Point: Precautions (Done)       Description:   Instruct learner(s) on prescribed precautions during self-care and functional transfers.                  Learning Progress Summary             Patient Acceptance, E, VU,NR by  at 9/16/2023 1145    Acceptance, E, VU,NR by  at 9/15/2023 1232                                         User Key       Initials Effective Dates Name Provider Type Discipline     07/11/23 -  Pam Peck, OT Occupational Therapist OT     05/25/21 -  Xena Chinchilla OT Occupational Therapist OT                        OT Recommendation and Plan                         Time Calculation:      Time Calculation- OT       Row Name 09/21/23 1421 09/21/23 1420          Time Calculation- OT    OT Start Time 1245  - 1045  -     OT Stop Time 1400  - 1100  -     OT Time Calculation (min) 75 min  - 15 min  -               User Key  (r) = Recorded By, (t) = Taken By, (c) = Cosigned By      Initials Name Provider Type    Althea Das  Keli, BINH Occupational Therapist                  Therapy Charges for Today       Code Description Service Date Service Provider Modifiers Qty    64471216977 HC OT THERAPEUTIC ACT EA 15 MIN 9/20/2023 Althea Downs, OT GO 2    06330764062 HC OT SELF CARE/MGMT/TRAIN EA 15 MIN 9/20/2023 Althea Downs, OT GO 2    45573483307 HC OT NEUROMUSC RE EDUCATION EA 15 MIN 9/20/2023 Althea Downs, OT GO 3    94827207848 HC OT THERAPEUTIC ACT EA 15 MIN 9/21/2023 Althea Downs, OT GO 2    48766781432 HC OT SELF CARE/MGMT/TRAIN EA 15 MIN 9/21/2023 Althea Downs OT GO 2    87426041083 HC OT THER PROC EA 15 MIN 9/21/2023 Althea Downs OT GO 2                     Althea Downs OT  9/21/2023

## 2023-09-21 NOTE — THERAPY TREATMENT NOTE
"Inpatient Rehabilitation - Speech Language Pathology Treatment Note  [facility]     Patient Name: Ofelia Knox  : 1945  MRN: [id number]  Today's Date: 2023             Admit Date: 2023    Ms. Knox was seen this am in [facility] for continued speech therapy. She was a/a and interactive. She is notably short of breath this date. SpO2 per fingertip pulse oximeter is 98%. RN is made aware.     Ms Knox is accompanied to session by her son which she initially introduces as \"Mr Knox\" however is able to report his first name when asked. She continues w/ Wernicke's like jargon w/ oe questions requiring greater than 3 words. She is increasingly aware of this.      Long Term Goal:  Patient will demonstrate adequate language skills to fully participate in adls at premorbid baseline level of function.       Short Term Goals:  1. GOAL MET:       2. GOAL MET:       3. GOAL MET:       4. GOAL MET:   5. GOAL MET:    6. Patient will identify the appropriate object/picture/word following a verbal description w/ 80% accuracy over three consecutive sessions.   Deferred this date.      7. GOAL MET:    8. Patient will complete oe sentences w/ 80% accuracy and min cues over three consecutive sessions.   100% w/ one and two word oe sentence completion.   70% accuracy w/ greater than two word responses required.     9. Patient will provide verbal 3+ descriptors of an object/picture/word w/ 80% accuracy and min cues over three consecutive sessions.   3-4 word descriptors for common, tangible nouns 60% trials. 80% with cues.      10. Patient will perform divergent naming tasks w/ 5+ objects named of a given category in < 2 min over three consecutive sessions.   Provides 2 responses independently in narrow categories. Provides 2-3 additional responses w/ either initial phoneme cue or initial letters provided.     Visit Dx:  No diagnosis found.  Patient Active Problem List   Diagnosis    CVA (cerebral vascular accident)     Past " Medical History:   Diagnosis Date    AMS (altered mental status)     Aphasia     CKD (chronic kidney disease)     COPD (chronic obstructive pulmonary disease)     CVA (cerebral vascular accident)     DM2 (diabetes mellitus, type 2)     HTN (hypertension)     Restrictive lung disease     Urinary tract infection due to ESBL Klebsiella      Past Surgical History:   Procedure Laterality Date    HYSTERECTOMY      JOINT REPLACEMENT       EDUCATION  The patient has been educated in the following areas:   Cognitive Impairment Communication Impairment.    SLP Recommendation and Plan   Continue current plan of care.     Thank you   Verona Sarmiento M.S., CCC-SLP           Time Calculation:      Time Calculation- SLP       Row Name 09/21/23 1011             Time Calculation- SLP    SLP Start Time 0915  -      SLP Stop Time 1000  -      SLP Time Calculation (min) 45 min  -      SLP - Next Appointment 09/22/23  -                User Key  (r) = Recorded By, (t) = Taken By, (c) = Cosigned By      Initials Name Provider Type    Verona Pizarro MS CCC-SLP Speech and Language Pathologist                    Therapy Charges for Today       Code Description Service Date Service Provider Modifiers Qty    48703586148  ST TREATMENT SPEECH 3 9/21/2023 Verona Sarmiento MS CCC-SLP GN 1                       MS XENA Schwartz  9/21/2023

## 2023-09-21 NOTE — NURSING NOTE
UPDATED DR MULTANI TO MOST RECENT LAB RESULTS AND INFORMED HIM CONSENT TO [facility] FOR EKG RESULTS HAD BEEN REFAXED AT 2009. NO NEW ORDERS AT THIS TIME.

## 2023-09-21 NOTE — NURSING NOTE
CALLED [facility]O [facility] SUPERVISOR YARI AND INFORMED HIM OF CONSENT OBTAINED OVER PHONE FROM SON REGAN COLE AND THE NEED FOR THE SON TO GIVE CONSENT D/T PATIENT'S SLIGHT CONFUSION AND EXPRESSIVE APHASIA.

## 2023-09-21 NOTE — NURSING NOTE
C/O STOMACH PAIN; STOMACH DISTENDED; BLADDER SCANNED FOR 674ML; URINARY RETENTION NOTED; I&O CATHED FOR 1300ML URINE; TOLERATED WELL; WILL MONITOR

## 2023-09-21 NOTE — PROGRESS NOTES
[facility]  PROGRESS NOTE     Patient Identification:  Name:  Ofelia Knox  Age:  78 y.o.  Sex:  female  :  1945  MRN:  [id number]  Visit Number:  [id number]  ROOM: 105/     Primary Care Provider:  Provider, No Known    Length of stay in inpatient status:  10    Subjective     Chief Compliant:  No chief complaint on file.      History of Presenting Illness:-year-old female who is status post left putamen/left internal capsule CVA.  Patient last evening had episode of shortness of breath and on evaluation was thought to have some degree of volume overload.  Patient states she is feeling much better this morning and having no complaints.    Objective     Current Hospital Meds:aspirin, 81 mg, Oral, Daily  buPROPion XL, 150 mg, Oral, Daily  cetirizine, 5 mg, Oral, Daily  ipratropium, 0.5 mg, Nebulization, 4x Daily - RT  isosorbide mononitrate, 30 mg, Oral, Q24H  metFORMIN, 500 mg, Oral, BID With Meals  metoprolol succinate XL, 25 mg, Oral, Q24H  montelukast, 10 mg, Oral, Nightly  nystatin, 1 application , Topical, Q12H  oxybutynin XL, 10 mg, Oral, Daily  pantoprazole, 40 mg, Oral, QAM AC  pravastatin, 40 mg, Oral, Daily  sacubitril-valsartan, 1 tablet, Oral, Q12H  tiotropium bromide monohydrate, 2 puff, Inhalation, Daily - RT  venlafaxine XR, 75 mg, Oral, Daily With Breakfast       ----------------------------------------------------------------------------------------------------------------------  Vital Signs:  Temp:  [97.8 øF (36.6 øC)] 97.8 øF (36.6 øC)  Heart Rate:  [68-75] 70  Resp:  [18-24] 18  BP: (148-155)/(76-96) 148/76  SpO2:  [92 %-99 %] 92 %  on  Flow (L/min):  [2] 2;   Device (Oxygen Therapy): room air  Body mass index is 40.03 kg/mý.    Wt Readings from Last 3 Encounters:   23 112 kg (248 lb)     Intake & Output (last 3 days)          07 07 07 07 0700    P.O. 360 1200 360 240    Total Intake(mL/kg) 360  (3.2) 1200 (10.7) 360 (3.2) 240 (2.1)    Urine (mL/kg/hr)   2325 (0.9)     Stool   0     Total Output   2325     Net +360 +1200 -1965 +240            Urine Unmeasured Occurrence 2 x 5 x 1 x     Stool Unmeasured Occurrence   2 x           Diet: Cardiac Diets, Diabetic Diets; Healthy Heart (2-3 Na+); Consistent Carbohydrate; Texture: Regular Texture (IDDSI 7); Fluid Consistency: Thin (IDDSI 0)  ----------------------------------------------------------------------------------------------------------------------  Physical exam:  Constitutional: Elderly female no distress  HEENT: Normocephalic atraumatic  Neck:   Supple  Cardiovascular: Regular rate and rhythm  Pulmonary/Chest: Clear to auscultation  Abdominal:  .  Positive bowel sounds soft  Musculoskeletal: No obvious arthropathy  Neurological: 4 out of 5 strength on the right upper extremity  Skin: No rash  Peripheral vascular: No edema  Genitourinary:  ----------------------------------------------------------------------------------------------------------------------    Last echocardiogram:  Results for orders placed during the hospital encounter of 09/11/23    Adult Transthoracic Echo Complete W/ Cont if Necessary Per Protocol    Interpretation Summary    Left ventricular systolic function is mildly decreased. Calculated left ventricular EF = 44% Left ventricular ejection fraction appears to be 41 - 45%.    Left ventricular wall thickness is consistent with mild eccentric hypertrophy.    Left ventricular diastolic function is consistent with (grade I) impaired relaxation.    The left atrial cavity is moderately dilated.    ----------------------------------------------------------------------------------------------------------------------  Results from last 7 days   Lab Units 09/21/23  0052 09/20/23  0006 09/15/23  0456   WBC 10*3/mm3 15.19* 12.70* 11.98*   HEMOGLOBIN g/dL 14.7 14.1 14.4   HEMATOCRIT % 49.4* 47.9* 48.5*   MCV fL 93.7 93.7 92.7   MCHC g/dL 29.8*  29.4* 29.7*   PLATELETS 10*3/mm3 307 325 294     Results from last 7 days   Lab Units 09/20/23  1839   PH, ARTERIAL pH units 7.543*   PO2 ART mm Hg 107.0   PCO2, ARTERIAL mm Hg 25.1*   HCO3 ART mmol/L 21.6     Results from last 7 days   Lab Units 09/21/23  0052 09/20/23  0006 09/18/23  0217   SODIUM mmol/L 141 139 144   POTASSIUM mmol/L 3.8 3.7 3.8   CHLORIDE mmol/L 105 105 105   CO2 mmol/L 25.1 22.0 27.7   BUN mg/dL 31* 32* 38*   CREATININE mg/dL 1.02* 1.01* 1.04*   CALCIUM mg/dL 9.8 9.4 9.3   GLUCOSE mg/dL 142* 150* 112*   Estimated Creatinine Clearance: 59.6 mL/min (A) (by C-G formula based on SCr of 1.02 mg/dL (H)).  No results found for: AMMONIA  Results from last 7 days   Lab Units 09/20/23 2125 09/20/23 1922   HSTROP T ng/L 13* 18*             Glucose   Date/Time Value Ref Range Status   09/21/2023 0627 126 70 - 130 mg/dL Final   09/20/2023 1926 119 70 - 130 mg/dL Final   09/20/2023 1828 132 (H) 70 - 130 mg/dL Final   09/20/2023 1558 82 70 - 130 mg/dL Final   09/20/2023 1104 129 70 - 130 mg/dL Final   09/20/2023 0607 95 70 - 130 mg/dL Final   09/19/2023 2003 112 70 - 130 mg/dL Final   09/19/2023 1618 94 70 - 130 mg/dL Final     Lab Results   Component Value Date    TSH 1.470 09/12/2023    FREET4 1.15 09/12/2023     No results found for: PREGTESTUR, PREGSERUM, HCG, HCGQUANT  Pain Management Panel           No data to display              Brief Urine Lab Results  (Last result in the past 365 days)        Color   Clarity   Blood   Leuk Est   Nitrite   Protein   CREAT   Urine HCG        09/14/23 1935 Dark Yellow   Clear   Negative   Negative   Negative   Trace                 No results found for: BLOODCX  Results from last 7 days   Lab Units 09/14/23 1935   NITRITE UA  Negative     No results found for: URINECX  No results found for: WOUNDCX  No results found for: STOOLCX        I have personally looked at the labs and they are summarized  above.  ----------------------------------------------------------------------------------------------------------------------  Detailed radiology reports for the last 24 hours:    Imaging Results (Last 24 Hours)       Procedure Component Value Units Date/Time    XR Chest 1 View [[id number]] Collected: 09/20/23 1847     Updated: 09/20/23 1850    Narrative:      INDICATION: Shortness of breath.     TECHNIQUE: Frontal radiograph of the chest.     COMPARISON: 9/14/2023.     FINDINGS:   Cardiomegaly. Mild pulmonary vascular congestion. No infiltrate, pleural  effusion or pneumothorax. No acute osseous abnormality evident.       Impression:      Mild pulmonary vascular congestion.        This report was finalized on 9/20/2023 6:48 PM by Alex Pallas, DO.             Final impressions for the last 30 days of radiology reports:    XR Chest 1 View    Result Date: 9/20/2023  Mild pulmonary vascular congestion.   This report was finalized on 9/20/2023 6:48 PM by Alex Pallas, DO.      XR Chest 1 View    Result Date: 9/14/2023  No radiographic evidence of acute cardiac or pulmonary disease.  This report was finalized on 9/14/2023 7:54 AM by Dr. Krunal Colvin MD.     I have personally looked at the radiology images and read the final radiology report.    Assessment & Plan    Status post left putamen/left internal capsule CVA--currently on aspirin and statin therapy.  Patient minimally assistance for bathing; supervision for upper body dressing; minimum assist for lower body dressing; set up for grooming; moderate assistance for toileting.  Quiring contact-guard to minimum assistance for transfers; ambulated 160 feet x 2 with front wheel walker and contact-guard yesterday.  Patient continues to have some aphonia at times.  Patient continues to work with speech therapy.    CHF with reduced EF--better compensated today.  Patient did require 20 mg IV Lasix dose last evening.  We will start patient on p.o. Lasix 40 mg daily today.  I  continue Entresto      Diabetes mellitus controlled    COPD with chronic respiratory failure continue O2 at 2 L per nasal cannula.      VTE Prophylaxis:   Mechanical Order History:        Ordered        09/11/23 2023  Place Sequential Compression Device  Once            09/11/23 2023  Maintain Sequential Compression Device  Continuous                          Pharmalogical Order History:       None            Abdullahi Sharpe MD  [facility] Hospitalist  09/21/23  11:21 EDT

## 2023-09-21 NOTE — THERAPY TREATMENT NOTE
Inpatient Rehabilitation - Physical Therapy Treatment Note        Richie     Patient Name: Ofelia Knox  : 1945  MRN: [id number]    Today's Date: 2023                    Admit Date: 2023      Visit Dx:   No diagnosis found.    Patient Active Problem List   Diagnosis    CVA (cerebral vascular accident)       Past Medical History:   Diagnosis Date    AMS (altered mental status)     Aphasia     CKD (chronic kidney disease)     COPD (chronic obstructive pulmonary disease)     CVA (cerebral vascular accident)     DM2 (diabetes mellitus, type 2)     HTN (hypertension)     Restrictive lung disease     Urinary tract infection due to ESBL Klebsiella        Past Surgical History:   Procedure Laterality Date    HYSTERECTOMY      JOINT REPLACEMENT         PT ASSESSMENT (last 12 hours)       IRF PT Evaluation and Treatment       Row Name 23 1303          PT Time and Intention    Document Type daily treatment  -RG     Mode of Treatment physical therapy  -     Patient/Family/Caregiver Comments/Observations Pt and nursing in agreement for skilled PT on this date. Increased confusion from previous day.  Verbal and tactile cues required to initiate task at times.  -       Row Name 23 1303          General Information    Limitations/Impairments aphasia;safety/cognitive  -       Row Name 23 1303          Cognition/Psychosocial    Follows Commands (Cognition) physical/tactile prompts required  -     Personal Safety Interventions gait belt;fall prevention program maintained;nonskid shoes/slippers when out of bed  -       Row Name 23 1303          Transfer Assessment/Treatment    Transfers sit-stand transfer;stand-sit transfer  -       Row Name 23 1303          Bed-Chair Transfer    Bed-Chair [place] (Transfers) verbal cues;nonverbal cues (demo/gesture);contact guard  -RG     Assistive Device (Bed-Chair Transfers) wheelchair  -       Row Name 23 1303           Chair-Bed Transfer    Chair-Bed [place] (Transfers) verbal cues;nonverbal cues (demo/gesture);contact guard  -RG     Assistive Device (Chair-Bed Transfers) wheelchair  -RG       Row Name 09/21/23 1303          Sit-Stand Transfer    Sit-Stand [place] (Transfers) contact guard;minimum assist (75% patient effort);verbal cues;1 person assist  -RG     Assistive Device (Sit-Stand Transfers) walker, front-wheeled;wheelchair  -RG       Row Name 09/21/23 1303          Stand-Sit Transfer    Stand-Sit [place] (Transfers) contact guard;verbal cues  -RG     Assistive Device (Stand-Sit Transfers) walker, front-wheeled;wheelchair  -RG       Row Name 09/21/23 1303          Gait/Stairs (Locomotion)    Gait/Stairs Locomotion gait/ambulation assistive device  -RG     [place] Level (Gait) contact guard;1 person assist  -RG     Assistive Device (Gait) walker, front-wheeled  -RG     Distance in Feet (Gait) 160' x 2  -RG     Deviations/Abnormal Patterns (Gait) beatrice decreased;gait speed decreased;stride length decreased  -RG     Bilateral Gait Deviations forward flexed posture  -RG       Row Name 09/21/23 1303          Hip (Therapeutic Exercise)    Hip Strengthening (Therapeutic Exercise) bilateral;flexion;aBduction;aDduction;marching while seated;marching while standing;sitting;standing;2 lb free weight;resistance band;green;10 repetitions;2 sets  -RG       Row Name 09/21/23 1303          Knee (Therapeutic Exercise)    Knee Strengthening (Therapeutic Exercise) bilateral;flexion;extension;marching while seated;marching while standing;LAQ (long arc quad);sitting;standing;2 lb free weight;resistance band;green;10 repetitions;2 sets  -RG       Row Name 09/21/23 1303          Ankle (Therapeutic Exercise)    Ankle Strengthening (Therapeutic Exercise) bilateral;dorsiflexion;plantarflexion;sitting;standing;10 repetitions;2 sets  -RG       Row Name 09/21/23 1303          Positioning and Restraints    Pre-Treatment Position  in bed  -RG     Post Treatment Position wheelchair  -RG     In Wheelchair notified nsg;sitting;with OT;legs elevated  -RG       Row Name 09/21/23 1303          Bed Mobility Goal 1 (PT-IRF)    Progress/Outcomes (Bed Mobility Goal 1, PT-IRF) goal ongoing  -RG       Row Name 09/21/23 1303          Transfer Goal 1 (PT-IRF)    Progress/Outcomes (Transfer Goal 1, PT-IRF) goal ongoing  -RG       Row Name 09/21/23 1303          Gait/Walking Locomotion Goal 1 (PT-IRF)    Progress/Outcomes (Gait/Walking Locomotion Goal 1, PT-IRF) goal ongoing  -RG               User Key  (r) = Recorded By, (t) = Taken By, (c) = Cosigned By      Initials Name Provider Type    Twan Oliver PTA Physical Therapist Assistant                     Physical Therapy Education       Title: PT OT SLP Therapies (Done)       Topic: Physical Therapy (Done)       Point: Mobility training (Done)       Learning Progress Summary             Patient Acceptance, E,D, VU,NR by RG at 9/21/2023 1307    Acceptance, E,D, VU,NR by RG at 9/20/2023 1258    Acceptance, E,D, VU,NR by RG at 9/19/2023 1258    Acceptance, E,D, VU,NR by RG at 9/18/2023 1338    Acceptance, E,TB, VU by  at 9/15/2023 1532    Acceptance, E,TB, VU by RM at 9/14/2023 1555    Acceptance, E,D, VU,NR by RG at 9/13/2023 1528    Acceptance, TB, NR by DL at 9/12/2023 2003    Acceptance, E, VU,NR by LB at 9/12/2023 1135                         Point: Home exercise program (Done)       Learning Progress Summary             Patient Acceptance, E,D, VU,NR by RG at 9/21/2023 1307    Acceptance, E,D, VU,NR by RG at 9/20/2023 1258    Acceptance, E,D, VU,NR by RG at 9/19/2023 1258    Acceptance, E,D, VU,NR by RG at 9/18/2023 1338    Acceptance, E,TB, VU by RM at 9/15/2023 1532    Acceptance, E,TB, VU by  at 9/14/2023 1555    Acceptance, E,D, VU,NR by RG at 9/13/2023 1528    Acceptance, TB, NR by DL at 9/12/2023 2003    Acceptance, E, VU,NR by LB at 9/12/2023 1135                         Point: Body  mechanics (Done)       Learning Progress Summary             Patient Acceptance, E,D, VU,NR by RG at 9/21/2023 1307    Acceptance, E,D, VU,NR by RG at 9/20/2023 1258    Acceptance, E,D, VU,NR by RG at 9/19/2023 1258    Acceptance, E,D, VU,NR by RG at 9/18/2023 1338    Acceptance, E,TB, VU by RM at 9/15/2023 1532    Acceptance, E,TB, VU by RM at 9/14/2023 1555    Acceptance, E,D, VU,NR by RG at 9/13/2023 1528    Acceptance, TB, NR by DL at 9/12/2023 2003    Acceptance, E, VU,NR by LB at 9/12/2023 1135                         Point: Precautions (Done)       Learning Progress Summary             Patient Acceptance, E,D, VU,NR by RG at 9/21/2023 1307    Acceptance, E,D, VU,NR by RG at 9/20/2023 1258    Acceptance, E,D, VU,NR by RG at 9/19/2023 1258    Acceptance, E,D, VU,NR by RG at 9/18/2023 1338    Acceptance, E,TB, VU by RM at 9/15/2023 1532    Acceptance, E,TB, VU by RM at 9/14/2023 1555    Acceptance, E,D, VU,NR by RG at 9/13/2023 1528    Acceptance, TB, NR by DL at 9/12/2023 2003    Acceptance, E, VU,NR by LB at 9/12/2023 1135                                         User Key       Initials Effective Dates Name Provider Type Discipline    LB 06/16/21 -  Andra Stewart, PT Physical Therapist PT    RM 02/17/23 -  Karis Obrien, PTA Physical Therapist Assistant PT    RG 06/16/21 -  Twan Cunningham PTA Physical Therapist Assistant PT    DL 03/16/22 -  Carolyn Ramirez, RN Registered Nurse Nurse                    PT Recommendation and Plan    Frequency of Treatment (PT): 5 times per week  Anticipated Equipment Needs at Discharge (PT Eval):  (tbd)                  Time Calculation:      PT Charges       Row Name 09/21/23 1307             Time Calculation    Start Time 0745  -RG      Stop Time 0915  -RG      Time Calculation (min) 90 min  -RG      PT Received On 09/21/23  -RG         Time Calculation- PT    Total Timed Code Minutes- PT 90 minute(s)  -RG                User Key  (r) = Recorded By, (t) =  Taken By, (c) = Cosigned By      Initials Name Provider Type    Twan Oliver, ESSIE Physical Therapist Assistant                    Therapy Charges for Today       Code Description Service Date Service Provider Modifiers Qty    70261927023 HC GAIT TRAINING EA 15 MIN 9/20/2023 Twan Cunningham, ESSIE GP, CQ 1    79895313657 HC PT THERAPEUTIC ACT EA 15 MIN 9/20/2023 Twan Cunningham PTA GP, CQ 2    46205896929 HC PT THER PROC EA 15 MIN 9/20/2023 Tawn Cunningham PTA GP, CQ 3    80518812235 HC GAIT TRAINING EA 15 MIN 9/21/2023 Twan Cunningham PTA GP, CQ 1    60308614331 HC PT THERAPEUTIC ACT EA 15 MIN 9/21/2023 Twan Cunningham PTA GP, CQ 2    50486639395 HC PT THER PROC EA 15 MIN 9/21/2023 Twan Cunningham PTA GP, CQ 3                     Twan Cunningham PTA  9/21/2023

## 2023-09-21 NOTE — PLAN OF CARE
Problem: Rehabilitation (IRF) Plan of Care  Goal: Plan of Care Review  Outcome: Ongoing, Progressing  Flowsheets (Taken 9/21/2023 1240)  Progress: improving  Plan of Care Reviewed With: patient  Goal: Patient-Specific Goal (Individualized)  Outcome: Ongoing, Progressing  Goal: Absence of New-Onset Illness or Injury  Outcome: Ongoing, Progressing  Intervention: Prevent Fall and Fall Injury  Recent Flowsheet Documentation  Taken 9/21/2023 1804 by Pavel Armenta, RN  Safety Promotion/Fall Prevention: safety round/check completed  Taken 9/21/2023 1603 by Pavel Armenta, RN  Safety Promotion/Fall Prevention: safety round/check completed  Taken 9/21/2023 1402 by Pavel Armenta, RN  Safety Promotion/Fall Prevention: safety round/check completed  Taken 9/21/2023 1204 by Pavel Armenta, RN  Safety Promotion/Fall Prevention: safety round/check completed  Taken 9/21/2023 1002 by Pavel Armenta, RN  Safety Promotion/Fall Prevention: safety round/check completed  Taken 9/21/2023 0750 by Pavel Armenta, RN  Safety Promotion/Fall Prevention:   safety round/check completed   gait belt   fall prevention program maintained   clutter free environment maintained   assistive device/personal items within reach  Intervention: Prevent VTE (Venous Thromboembolism)  Recent Flowsheet Documentation  Taken 9/21/2023 0750 by Pavel Armenta, RN  VTE Prevention/Management:   bilateral   sequential compression devices off   patient refused intervention  Goal: Optimal Comfort and Wellbeing  Outcome: Ongoing, Progressing  Goal: Home and Community Transition Plan Established  Outcome: Ongoing, Progressing     Problem: Skin Injury Risk Increased  Goal: Skin Health and Integrity  Outcome: Ongoing, Progressing  Intervention: Promote and Optimize Oral Intake  Recent Flowsheet Documentation  Taken 9/21/2023 0750 by Pavel Armenta, RN  Oral Nutrition Promotion: physical activity promoted  Intervention: Optimize Skin Protection  Recent  Flowsheet Documentation  Taken 9/21/2023 0750 by Pavel Armenta, RN  Pressure Reduction Techniques:   pressure points protected   heels elevated off bed   frequent weight shift encouraged  Pressure Reduction Devices:   pressure-redistributing mattress utilized   heel offloading device utilized  Skin Protection: incontinence pads utilized     Problem: Fall Injury Risk  Goal: Absence of Fall and Fall-Related Injury  Outcome: Ongoing, Progressing  Intervention: Identify and Manage Contributors  Recent Flowsheet Documentation  Taken 9/21/2023 0750 by Pavel Armenta, RN  Medication Review/Management: medications reviewed  Intervention: Promote Injury-Free Environment  Recent Flowsheet Documentation  Taken 9/21/2023 1804 by Pavel Armenta, RN  Safety Promotion/Fall Prevention: safety round/check completed  Taken 9/21/2023 1603 by Pavel Armenta, RN  Safety Promotion/Fall Prevention: safety round/check completed  Taken 9/21/2023 1402 by Pavel Armenta, RN  Safety Promotion/Fall Prevention: safety round/check completed  Taken 9/21/2023 1204 by Pavel Armenta, RN  Safety Promotion/Fall Prevention: safety round/check completed  Taken 9/21/2023 1002 by Pavel Armenta, RN  Safety Promotion/Fall Prevention: safety round/check completed  Taken 9/21/2023 0750 by Pavel Armenta, RN  Safety Promotion/Fall Prevention:   safety round/check completed   gait belt   fall prevention program maintained   clutter free environment maintained   assistive device/personal items within reach     Problem: Communication Impairment  Goal: Effective Communication Skills  Outcome: Ongoing, Progressing   Goal Outcome Evaluation:  Plan of Care Reviewed With: patient        Progress: improving

## 2023-09-22 LAB
ANION GAP SERPL CALCULATED.3IONS-SCNC: 9.5 MMOL/L (ref 5–15)
BUN SERPL-MCNC: 33 MG/DL (ref 8–23)
BUN/CREAT SERPL: 28 (ref 7–25)
CALCIUM SPEC-SCNC: 9.2 MG/DL (ref 8.6–10.5)
CHLORIDE SERPL-SCNC: 104 MMOL/L (ref 98–107)
CO2 SERPL-SCNC: 28.5 MMOL/L (ref 22–29)
CREAT SERPL-MCNC: 1.18 MG/DL (ref 0.57–1)
EGFRCR SERPLBLD CKD-EPI 2021: 48 ML/MIN/1.73
GLUCOSE BLDC GLUCOMTR-MCNC: 129 MG/DL (ref 70–130)
GLUCOSE BLDC GLUCOMTR-MCNC: 135 MG/DL (ref 70–130)
GLUCOSE BLDC GLUCOMTR-MCNC: 138 MG/DL (ref 70–130)
GLUCOSE BLDC GLUCOMTR-MCNC: 144 MG/DL (ref 70–130)
GLUCOSE SERPL-MCNC: 123 MG/DL (ref 65–99)
POTASSIUM SERPL-SCNC: 3.8 MMOL/L (ref 3.5–5.2)
SODIUM SERPL-SCNC: 142 MMOL/L (ref 136–145)

## 2023-09-22 PROCEDURE — 97530 THERAPEUTIC ACTIVITIES: CPT | Performed by: OCCUPATIONAL THERAPIST

## 2023-09-22 PROCEDURE — 94799 UNLISTED PULMONARY SVC/PX: CPT

## 2023-09-22 PROCEDURE — 97530 THERAPEUTIC ACTIVITIES: CPT

## 2023-09-22 PROCEDURE — 97112 NEUROMUSCULAR REEDUCATION: CPT | Performed by: OCCUPATIONAL THERAPIST

## 2023-09-22 PROCEDURE — 80048 BASIC METABOLIC PNL TOTAL CA: CPT | Performed by: FAMILY MEDICINE

## 2023-09-22 PROCEDURE — 92507 TX SP LANG VOICE COMM INDIV: CPT

## 2023-09-22 PROCEDURE — 97110 THERAPEUTIC EXERCISES: CPT

## 2023-09-22 PROCEDURE — 99231 SBSQ HOSP IP/OBS SF/LOW 25: CPT | Performed by: FAMILY MEDICINE

## 2023-09-22 PROCEDURE — 97116 GAIT TRAINING THERAPY: CPT

## 2023-09-22 PROCEDURE — 94761 N-INVAS EAR/PLS OXIMETRY MLT: CPT

## 2023-09-22 PROCEDURE — 97535 SELF CARE MNGMENT TRAINING: CPT | Performed by: OCCUPATIONAL THERAPIST

## 2023-09-22 PROCEDURE — 94664 DEMO&/EVAL PT USE INHALER: CPT

## 2023-09-22 PROCEDURE — 82948 REAGENT STRIP/BLOOD GLUCOSE: CPT

## 2023-09-22 RX ADMIN — SACUBITRIL AND VALSARTAN 1 TABLET: 24; 26 TABLET, FILM COATED ORAL at 08:37

## 2023-09-22 RX ADMIN — METFORMIN HYDROCHLORIDE 500 MG: 500 TABLET ORAL at 08:36

## 2023-09-22 RX ADMIN — ASPIRIN 81 MG: 81 TABLET, COATED ORAL at 08:37

## 2023-09-22 RX ADMIN — PRAVASTATIN SODIUM 40 MG: 40 TABLET ORAL at 08:37

## 2023-09-22 RX ADMIN — MONTELUKAST SODIUM 10 MG: 10 TABLET, COATED ORAL at 21:32

## 2023-09-22 RX ADMIN — NYSTATIN 1 APPLICATION: 100000 CREAM TOPICAL at 08:37

## 2023-09-22 RX ADMIN — ISOSORBIDE MONONITRATE 30 MG: 30 TABLET, EXTENDED RELEASE ORAL at 08:37

## 2023-09-22 RX ADMIN — METFORMIN HYDROCHLORIDE 500 MG: 500 TABLET ORAL at 17:23

## 2023-09-22 RX ADMIN — IPRATROPIUM BROMIDE 0.5 MG: 0.5 SOLUTION RESPIRATORY (INHALATION) at 19:51

## 2023-09-22 RX ADMIN — NYSTATIN 1 APPLICATION: 100000 CREAM TOPICAL at 21:32

## 2023-09-22 RX ADMIN — IPRATROPIUM BROMIDE 0.5 MG: 0.5 SOLUTION RESPIRATORY (INHALATION) at 07:37

## 2023-09-22 RX ADMIN — CETIRIZINE HYDROCHLORIDE 5 MG: 10 TABLET, FILM COATED ORAL at 08:37

## 2023-09-22 RX ADMIN — OXYBUTYNIN CHLORIDE 10 MG: 5 TABLET, EXTENDED RELEASE ORAL at 08:37

## 2023-09-22 RX ADMIN — PANTOPRAZOLE SODIUM 40 MG: 40 TABLET, DELAYED RELEASE ORAL at 06:32

## 2023-09-22 RX ADMIN — BUPROPION HYDROCHLORIDE 150 MG: 150 TABLET, EXTENDED RELEASE ORAL at 08:37

## 2023-09-22 RX ADMIN — FUROSEMIDE 20 MG: 20 TABLET ORAL at 08:37

## 2023-09-22 RX ADMIN — VENLAFAXINE HYDROCHLORIDE 75 MG: 75 CAPSULE, EXTENDED RELEASE ORAL at 08:36

## 2023-09-22 RX ADMIN — IPRATROPIUM BROMIDE 0.5 MG: 0.5 SOLUTION RESPIRATORY (INHALATION) at 14:04

## 2023-09-22 RX ADMIN — SACUBITRIL AND VALSARTAN 1 TABLET: 24; 26 TABLET, FILM COATED ORAL at 21:32

## 2023-09-22 RX ADMIN — TIOTROPIUM BROMIDE INHALATION SPRAY 2 PUFF: 3.12 SPRAY, METERED RESPIRATORY (INHALATION) at 07:36

## 2023-09-22 RX ADMIN — METOPROLOL SUCCINATE 25 MG: 25 TABLET, EXTENDED RELEASE ORAL at 08:37

## 2023-09-22 NOTE — PROGRESS NOTES
[facility]  PROGRESS NOTE     Patient Identification:  Name:  Ofelia Knox  Age:  78 y.o.  Sex:  female  :  1945  MRN:  [id number]  Visit Number:  [id number]  ROOM: 105/     Primary Care Provider:  Provider, No Known    Length of stay in inpatient status:  11    Subjective     Chief Compliant:  No chief complaint on file.      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  Patient states she is doing well this morning.  Has had no further shortness of breath or other difficulties    Objective     Current Hospital Meds:aspirin, 81 mg, Oral, Daily  buPROPion XL, 150 mg, Oral, Daily  cetirizine, 5 mg, Oral, Daily  furosemide, 20 mg, Oral, Daily  ipratropium, 0.5 mg, Nebulization, 4x Daily - RT  isosorbide mononitrate, 30 mg, Oral, Q24H  metFORMIN, 500 mg, Oral, BID With Meals  metoprolol succinate XL, 25 mg, Oral, Q24H  montelukast, 10 mg, Oral, Nightly  nystatin, 1 application , Topical, Q12H  oxybutynin XL, 10 mg, Oral, Daily  pantoprazole, 40 mg, Oral, QAM AC  pravastatin, 40 mg, Oral, Daily  sacubitril-valsartan, 1 tablet, Oral, Q12H  tiotropium bromide monohydrate, 2 puff, Inhalation, Daily - RT  venlafaxine XR, 75 mg, Oral, Daily With Breakfast       ----------------------------------------------------------------------------------------------------------------------  Vital Signs:  Temp:  [98.3 øF (36.8 øC)] 98.3 øF (36.8 øC)  Heart Rate:  [73-90] 79  Resp:  [18-20] 18  BP: (114)/(52) 114/52  SpO2:  [91 %-98 %] 98 %  on  Flow (L/min):  [2] 2;   Device (Oxygen Therapy): nasal cannula  Body mass index is 40.03 kg/mý.    Wt Readings from Last 3 Encounters:   23 112 kg (248 lb)     Intake & Output (last 3 days)          07 07 07 07 07 07 0700    P.O. 2350 164 6525     Total Intake(mL/kg) 1200 (10.7) 360 (3.2) 1440 (12.9)     Urine (mL/kg/hr)   2325 (0.9) 1200 (0.4)     Stool  0      Total Output  2325 1200     Net +1200 -1965 +240             Urine Unmeasured Occurrence 5 x 1 x      Stool Unmeasured Occurrence  2 x            Diet: Cardiac Diets, Diabetic Diets; Healthy Heart (2-3 Na+); Consistent Carbohydrate; Texture: Regular Texture (IDDSI 7); Fluid Consistency: Thin (IDDSI 0)  ----------------------------------------------------------------------------------------------------------------------  Physical exam:  Constitutional: No acute distress  HEENT: Normocephalic atraumatic  Neck:   Supple  Cardiovascular: Regular rate and rhythm  Pulmonary/Chest: Clear to auscultation  Abdominal: Positive bowel sounds soft.   Musculoskeletal: No arthropathy  Neurological: 4 out of right upper extremity  Skin: No rash  Peripheral vascular: No edema  Genitourinary:  ----------------------------------------------------------------------------------------------------------------------    Last echocardiogram:  Results for orders placed during the hospital encounter of 09/11/23    Adult Transthoracic Echo Complete W/ Cont if Necessary Per Protocol    Interpretation Summary    Left ventricular systolic function is mildly decreased. Calculated left ventricular EF = 44% Left ventricular ejection fraction appears to be 41 - 45%.    Left ventricular wall thickness is consistent with mild eccentric hypertrophy.    Left ventricular diastolic function is consistent with (grade I) impaired relaxation.    The left atrial cavity is moderately dilated.    ----------------------------------------------------------------------------------------------------------------------  Results from last 7 days   Lab Units 09/21/23  0052 09/20/23  0006   WBC 10*3/mm3 15.19* 12.70*   HEMOGLOBIN g/dL 14.7 14.1   HEMATOCRIT % 49.4* 47.9*   MCV fL 93.7 93.7   MCHC g/dL 29.8* 29.4*   PLATELETS 10*3/mm3 307 325     Results from last 7 days   Lab Units 09/20/23  1839   PH, ARTERIAL pH units 7.543*   PO2  ART mm Hg 107.0   PCO2, ARTERIAL mm Hg 25.1*   HCO3 ART mmol/L 21.6     Results from last 7 days   Lab Units 09/22/23  0137 09/21/23  0052 09/20/23  0006   SODIUM mmol/L 142 141 139   POTASSIUM mmol/L 3.8 3.8 3.7   CHLORIDE mmol/L 104 105 105   CO2 mmol/L 28.5 25.1 22.0   BUN mg/dL 33* 31* 32*   CREATININE mg/dL 1.18* 1.02* 1.01*   CALCIUM mg/dL 9.2 9.8 9.4   GLUCOSE mg/dL 123* 142* 150*   Estimated Creatinine Clearance: 51.5 mL/min (A) (by C-G formula based on SCr of 1.18 mg/dL (H)).  No results found for: AMMONIA  Results from last 7 days   Lab Units 09/20/23 2125 09/20/23 1922   HSTROP T ng/L 13* 18*             Glucose   Date/Time Value Ref Range Status   09/22/2023 0625 144 (H) 70 - 130 mg/dL Final   09/21/2023 2029 139 (H) 70 - 130 mg/dL Final   09/21/2023 1611 120 70 - 130 mg/dL Final   09/21/2023 1134 116 70 - 130 mg/dL Final   09/21/2023 0627 126 70 - 130 mg/dL Final   09/20/2023 1926 119 70 - 130 mg/dL Final   09/20/2023 1828 132 (H) 70 - 130 mg/dL Final   09/20/2023 1558 82 70 - 130 mg/dL Final     Lab Results   Component Value Date    TSH 1.470 09/12/2023    FREET4 1.15 09/12/2023     No results found for: PREGTESTUR, PREGSERUM, HCG, HCGQUANT  Pain Management Panel           No data to display              Brief Urine Lab Results  (Last result in the past 365 days)        Color   Clarity   Blood   Leuk Est   Nitrite   Protein   CREAT   Urine HCG        09/14/23 1935 Dark Yellow   Clear   Negative   Negative   Negative   Trace                 No results found for: BLOODCX      No results found for: URINECX  No results found for: WOUNDCX  No results found for: STOOLCX        I have personally looked at the labs and they are summarized above.  ----------------------------------------------------------------------------------------------------------------------  Detailed radiology reports for the last 24 hours:    Imaging Results (Last 24 Hours)       ** No results found for the last 24 hours. **           Final impressions for the last 30 days of radiology reports:    XR Chest 1 View    Result Date: 9/20/2023  Mild pulmonary vascular congestion.   This report was finalized on 9/20/2023 6:48 PM by Alex Pallas, DO.      XR Chest 1 View    Result Date: 9/14/2023  No radiographic evidence of acute cardiac or pulmonary disease.  This report was finalized on 9/14/2023 7:54 AM by Dr. Krunal Colvin MD.     I have personally looked at the radiology images and read the final radiology report.    Assessment & Plan    Status post left putamen/left internal capsule CVA.  Continue aspirin and statin therapy.  Requiring supervision for transfers.  Continues work on motor skills to improve ADLs and functional mobility.  Ambulated 160 feet x 2 with front wheel walker and contact-guard assistance.  Continues to work with speech therapy for help with cognitive improvement.    CHF with reduced EF--appears to be better compensated.  Continue Entresto and Lasix 20 mg p.o. daily.  Patient did have slight increase in creatinine overnight.  We will recheck BMP in the a.m. continue to follow closely.    Diabetes mellitus reasonable control    COPD with chronic respiratory failure patient vacillates between room air and 2 L nasal cannula.    VTE Prophylaxis:   Mechanical Order History:        Ordered        09/11/23 2023  Place Sequential Compression Device  Once            09/11/23 2023  Maintain Sequential Compression Device  Continuous                          Pharmalogical Order History:       None              Abdullahi Sharpe MD  [facility] Hospitalist  09/22/23  09:40 EDT

## 2023-09-22 NOTE — THERAPY TREATMENT NOTE
Inpatient Rehabilitation - Occupational Therapy Treatment Note     Richie     Patient Name: Ofelia Knox  : 1945  MRN: [id number]    Today's Date: 2023                 Admit Date: 2023       No diagnosis found.    Patient Active Problem List   Diagnosis    CVA (cerebral vascular accident)       Past Medical History:   Diagnosis Date    AMS (altered mental status)     Aphasia     CKD (chronic kidney disease)     COPD (chronic obstructive pulmonary disease)     CVA (cerebral vascular accident)     DM2 (diabetes mellitus, type 2)     HTN (hypertension)     Restrictive lung disease     Urinary tract infection due to ESBL Klebsiella        Past Surgical History:   Procedure Laterality Date    HYSTERECTOMY      JOINT REPLACEMENT               IRF OT ASSESSMENT FLOWSHEET (last 12 hours)       IRF OT Evaluation and Treatment       Row Name 23 8550          OT Time and Intention    Document Type daily treatment  -     Mode of Treatment individual therapy;occupational therapy  -     Patient Effort good  -       Row Name 23 1334          General Information    Patient/Family/Caregiver Comments/Observations patient agreeable to therapy this am. patient seen for ADL session and UE ther activities. patient tolerated well with no complaints. patient required min/mod cues for sequencing and completion for morning ADL activity.  -     Existing Precautions/Restrictions fall  O2 2l prn, aphasia  -       Row Name 23 1339          Cognition/Psychosocial    Affect/Mental Status (Cognition) WFL  -     Orientation Status (Cognition) oriented to;person;place  -     Follows Commands (Cognition) repetition of directions required;physical/tactile prompts required;verbal cues/prompting required  -       Row Name 23 3484          Bathing    [place] Level (Bathing) minimum assist (75% patient effort);verbal cues;nonverbal cues (demo/gesture)  -     Position (Bathing) edge of bed  sitting  -       Row Name 09/22/23 1330          Upper Body Dressing    [place] Level (Upper Body Dressing) nonverbal cues (demo/gesture);verbal cues;supervision;set up assistance  -     Position (Upper Body Dressing) edge of bed sitting  -       Row Name 09/22/23 1330          Lower Body Dressing    [place] Level (Lower Body Dressing) don;pants/bottoms;socks;minimum assist (75% patient effort);verbal cues;nonverbal cues (demo/gesture)  -     Position (Lower Body Dressing) edge of bed sitting  -       Row Name 09/22/23 1330          Grooming    [place] Level (Grooming) set up;verbal cues  -     Position (Grooming) sink side;supported sitting  -       Row Name 09/22/23 1330          Self-Feeding    [place] Level (Self-Feeding) supervision;set up;verbal cues  -[facility] Name 09/22/23 1330          Bed-Chair Transfer    Bed-Chair [place] (Transfers) contact guard;verbal cues;nonverbal cues (demo/gesture)  -[facility] Name 09/22/23 1330          Motor Skills    Motor Skills coordination;functional endurance  -     Therapeutic Exercise shoulder;elbow/forearm;wrist;hand  BUE ROM, UE bike, gmc,fmc,reaching,  strengthening  -[facility] Name 09/22/23 1330          Positioning and Restraints    Post Treatment Position wheelchair  -     In Wheelchair sitting;with family/caregiver  -               User Key  (r) = Recorded By, (t) = Taken By, (c) = Cosigned By      Initials Name Effective Dates    ELIJAH Althea Downs, OT 07/11/23 -                      Occupational Therapy Education       Title: PT OT SLP Therapies (Done)       Topic: Occupational Therapy (Done)       Point: ADL training (Done)       Description:   Instruct learner(s) on proper safety adaptation and remediation techniques during self care or transfers.   Instruct in proper use of assistive devices.                  Learning Progress Summary             Patient Acceptance, E, VU,NR by SUKHDEV at 9/16/2023  1145    Acceptance, E, VU,NR by  at 9/15/2023 1232                         Point: Precautions (Done)       Description:   Instruct learner(s) on prescribed precautions during self-care and functional transfers.                  Learning Progress Summary             Patient Acceptance, E, VU,NR by  at 9/16/2023 1145    Acceptance, E, VU,NR by  at 9/15/2023 1232                                         User Key       Initials Effective Dates Name Provider Type Discipline     07/11/23 -  Pam Peck, OT Occupational Therapist OT     05/25/21 -  Xena Chinchilla OT Occupational Therapist OT                        OT Recommendation and Plan                         Time Calculation:      Time Calculation- OT       Row Name 09/22/23 1334             Time Calculation- OT    OT Start Time 0735  -      OT Stop Time 0915  -      OT Time Calculation (min) 100 min  -                User Key  (r) = Recorded By, (t) = Taken By, (c) = Cosigned By      Initials Name Provider Type     Althea Downs, OT Occupational Therapist                  Therapy Charges for Today       Code Description Service Date Service Provider Modifiers Qty    53050887804 HC OT THERAPEUTIC ACT EA 15 MIN 9/21/2023 Althea Downs, OT GO 2    87514489530 HC OT SELF CARE/MGMT/TRAIN EA 15 MIN 9/21/2023 Althea Downs OT GO 2    47256876213 HC OT THER PROC EA 15 MIN 9/21/2023 Althea Downs, OT GO 2    14449476294 HC OT THERAPEUTIC ACT EA 15 MIN 9/22/2023 Althea Downs OT GO 2    81001658029 HC OT SELF CARE/MGMT/TRAIN EA 15 MIN 9/22/2023 Althea Downs, OT GO 3    65093939155 HC OT NEUROMUSC RE EDUCATION EA 15 MIN 9/22/2023 Althea Downs, OT GO 2                     Althae Downs OT  9/22/2023

## 2023-09-22 NOTE — PROGRESS NOTES
Rehabilitation Nursing  Inpatient Rehabilitation Plan of Care Note    Plan of Care  Mobility    Toilet Transfers (Active)  Current Status (9/11/2023 7:00:00 PM): PATIENT WILL HAVE NO TRANSFER DIFFICULTY    Weekly Goal: NO DIFFICULT WITH TRANSFERS  Discharge Goal: TRANSFERS INDEPENDENTLY    Pain    Pain Management (Active)  Current Status (9/11/2023 7:00:00 PM): PATIENT WILL VOICE NO PAIN  Weekly Goal: PATIENT WILL BE PAIN FREE  Discharge Goal: FREE FROM PAIN    Safety    Potential for Injury (Active)  Current Status (9/11/2023 7:00:00 PM): PATIENT WILL HAVE NO INJURY THIS STAY  Weekly Goal: NO INJURY  Discharge Goal: PATIENT WILL DISCHARGE    Body Systems    Integumentary (Active)  Current Status (9/11/2023 7:00:00 PM): PATIENT WILL HAVE NO SKIN  BREAKDOWN THIS  STAY  Weekly Goal: SKIN WILL REMAIN INTACT.  Discharge Goal: NO SKIN ISSUES.C    Signed by: Renee Matute, Nurse

## 2023-09-22 NOTE — PLAN OF CARE
Problem: Rehabilitation (IRF) Plan of Care  Goal: Patient-Specific Goal (Individualized)  Outcome: Ongoing, Progressing   Goal Outcome Evaluation:

## 2023-09-22 NOTE — PROGRESS NOTES
Occupational Therapy:    Physical Therapy:    Speech Language Pathology: Individual: 35 minutes.    Signed by: JUSTO Schwartz

## 2023-09-22 NOTE — THERAPY TREATMENT NOTE
Inpatient Rehabilitation - Physical Therapy Treatment Note        Richie     Patient Name: Ofelia Knox  : 1945  MRN: [id number]    Today's Date: 2023                    Admit Date: 2023      Visit Dx:   No diagnosis found.    Patient Active Problem List   Diagnosis    CVA (cerebral vascular accident)       Past Medical History:   Diagnosis Date    AMS (altered mental status)     Aphasia     CKD (chronic kidney disease)     COPD (chronic obstructive pulmonary disease)     CVA (cerebral vascular accident)     DM2 (diabetes mellitus, type 2)     HTN (hypertension)     Restrictive lung disease     Urinary tract infection due to ESBL Klebsiella        Past Surgical History:   Procedure Laterality Date    HYSTERECTOMY      JOINT REPLACEMENT         PT ASSESSMENT (last 12 hours)       IRF PT Evaluation and Treatment       Row Name 23 1301          PT Time and Intention    Document Type daily treatment  -RG     Mode of Treatment physical therapy  -RG     Patient/Family/Caregiver Comments/Observations Pt and nursing in agreement for skilled PT on this date. Pt performed PT session outside with family present.  Pt declined to perform standing exercsies.  -RG       Row Name 23 1301          General Information    Limitations/Impairments aphasia;safety/cognitive  -       Row Name 23 1301          Cognition/Psychosocial    Follows Commands (Cognition) physical/tactile prompts required  -     Personal Safety Interventions gait belt;fall prevention program maintained;nonskid shoes/slippers when out of bed  -       Row Name 23 1301          Transfer Assessment/Treatment    Transfers sit-stand transfer;stand-sit transfer  -       Row Name 23 1301          Bed-Chair Transfer    Bed-Chair [place] (Transfers) verbal cues;nonverbal cues (demo/gesture);contact guard  -RG     Assistive Device (Bed-Chair Transfers) wheelchair  -       Row Name 23 1301          Chair-Bed  Transfer    Chair-Bed [place] (Transfers) verbal cues;nonverbal cues (demo/gesture);contact guard  -RG     Assistive Device (Chair-Bed Transfers) wheelchair  -RG       Row Name 09/22/23 1301          Sit-Stand Transfer    Sit-Stand [place] (Transfers) contact guard;minimum assist (75% patient effort);verbal cues;1 person assist  -RG     Assistive Device (Sit-Stand Transfers) walker, front-wheeled;wheelchair  -RG       Row Name 09/22/23 1301          Stand-Sit Transfer    Stand-Sit [place] (Transfers) contact guard;verbal cues  -RG     Assistive Device (Stand-Sit Transfers) walker, front-wheeled;wheelchair  -RG       Row Name 09/22/23 1301          Gait/Stairs (Locomotion)    Gait/Stairs Locomotion gait/ambulation assistive device  -RG     [place] Level (Gait) contact guard;1 person assist  -RG     Assistive Device (Gait) walker, front-wheeled  -RG     Distance in Feet (Gait) 350+  -RG     Deviations/Abnormal Patterns (Gait) beatrice decreased;gait speed decreased;stride length decreased  -RG     Bilateral Gait Deviations forward flexed posture  -RG     Comment, (Gait/Stairs) Pt ambulated outside with family present.  -RG       Row Name 09/22/23 1301          Hip (Therapeutic Exercise)    Hip Strengthening (Therapeutic Exercise) bilateral;flexion;aBduction;aDduction;marching while seated;sitting;2 lb free weight;resistance band;green;10 repetitions;2 sets  -RG       Row Name 09/22/23 1301          Knee (Therapeutic Exercise)    Knee Strengthening (Therapeutic Exercise) bilateral;flexion;extension;marching while seated;LAQ (long arc quad);sitting;2 lb free weight;resistance band;green;10 repetitions;2 sets  -RG       Row Name 09/22/23 1301          Ankle (Therapeutic Exercise)    Ankle Strengthening (Therapeutic Exercise) bilateral;dorsiflexion;plantarflexion;sitting;10 repetitions;2 sets  -RG       Row Name 09/22/23 1301          Positioning and Restraints    Pre-Treatment Position sitting in  chair/recliner  -RG     Post Treatment Position wheelchair  -RG     In Wheelchair notified nsg;sitting;call light within reach;encouraged to call for assist;with family/caregiver;legs elevated  -RG       Row Name 09/22/23 1301          Bed Mobility Goal 1 (PT-IRF)    Progress/Outcomes (Bed Mobility Goal 1, PT-IRF) goal ongoing  -RG       Row Name 09/22/23 1301          Transfer Goal 1 (PT-IRF)    Progress/Outcomes (Transfer Goal 1, PT-IRF) goal ongoing  -RG       Row Name 09/22/23 1301          Gait/Walking Locomotion Goal 1 (PT-IRF)    Progress/Outcomes (Gait/Walking Locomotion Goal 1, PT-IRF) goal ongoing  -RG               User Key  (r) = Recorded By, (t) = Taken By, (c) = Cosigned By      Initials Name Provider Type    Twan Oliver PTA Physical Therapist Assistant                     Physical Therapy Education       Title: PT OT SLP Therapies (Done)       Topic: Physical Therapy (Done)       Point: Mobility training (Done)       Learning Progress Summary             Patient Acceptance, E,D, NR,VU by RG at 9/22/2023 1315    Acceptance, E,D, VU,NR by RG at 9/21/2023 1307    Acceptance, E,D, VU,NR by RG at 9/20/2023 1258    Acceptance, E,D, VU,NR by RG at 9/19/2023 1258    Acceptance, E,D, VU,NR by RG at 9/18/2023 1338    Acceptance, E,TB, VU by RM at 9/15/2023 1532    Acceptance, E,TB, VU by RM at 9/14/2023 1555    Acceptance, E,D, VU,NR by RG at 9/13/2023 1528    Acceptance, TB, NR by DL at 9/12/2023 2003    Acceptance, E, VU,NR by LB at 9/12/2023 1135                         Point: Home exercise program (Done)       Learning Progress Summary             Patient Acceptance, E,D, NR,VU by RG at 9/22/2023 1315    Acceptance, E,D, VU,NR by RG at 9/21/2023 1307    Acceptance, E,D, VU,NR by RG at 9/20/2023 1258    Acceptance, E,D, VU,NR by RG at 9/19/2023 1258    Acceptance, E,D, VU,NR by RG at 9/18/2023 1338    Acceptance, E,TB, VU by RM at 9/15/2023 1532    Acceptance, E,TB, VU by RM at 9/14/2023 1555     Acceptance, E,D, VU,NR by RG at 9/13/2023 1528    Acceptance, TB, NR by DL at 9/12/2023 2003    Acceptance, E, VU,NR by LB at 9/12/2023 1135                         Point: Body mechanics (Done)       Learning Progress Summary             Patient Acceptance, E,D, NR,VU by RG at 9/22/2023 1315    Acceptance, E,D, VU,NR by RG at 9/21/2023 1307    Acceptance, E,D, VU,NR by RG at 9/20/2023 1258    Acceptance, E,D, VU,NR by RG at 9/19/2023 1258    Acceptance, E,D, VU,NR by RG at 9/18/2023 1338    Acceptance, E,TB, VU by RM at 9/15/2023 1532    Acceptance, E,TB, VU by RM at 9/14/2023 1555    Acceptance, E,D, VU,NR by RG at 9/13/2023 1528    Acceptance, TB, NR by DL at 9/12/2023 2003    Acceptance, E, VU,NR by LB at 9/12/2023 1135                         Point: Precautions (Done)       Learning Progress Summary             Patient Acceptance, E,D, NR,VU by RG at 9/22/2023 1315    Acceptance, E,D, VU,NR by RG at 9/21/2023 1307    Acceptance, E,D, VU,NR by RG at 9/20/2023 1258    Acceptance, E,D, VU,NR by RG at 9/19/2023 1258    Acceptance, E,D, VU,NR by RG at 9/18/2023 1338    Acceptance, E,TB, VU by RM at 9/15/2023 1532    Acceptance, E,TB, VU by RM at 9/14/2023 1555    Acceptance, E,D, VU,NR by RG at 9/13/2023 1528    Acceptance, TB, NR by DL at 9/12/2023 2003    Acceptance, E, VU,NR by LB at 9/12/2023 1135                                         User Key       Initials Effective Dates Name Provider Type Discipline    LB 06/16/21 -  Andra Stewart, PT Physical Therapist PT    RM 02/17/23 -  Karis Obrien, PTA Physical Therapist Assistant PT     06/16/21 -  Twan Cunningham PTA Physical Therapist Assistant PT    DL 03/16/22 -  Carolyn Ramirez, RN Registered Nurse Nurse                    PT Recommendation and Plan    Frequency of Treatment (PT): 5 times per week  Anticipated Equipment Needs at Discharge (PT Eval):  (tbd)                  Time Calculation:      PT Charges       Row Name 09/22/23 4621              Time Calculation    Start Time 0915  -RG      Stop Time 1045  -RG      Time Calculation (min) 90 min  -RG      PT Received On 09/22/23  -RG         Time Calculation- PT    Total Timed Code Minutes- PT 90 minute(s)  -RG                User Key  (r) = Recorded By, (t) = Taken By, (c) = Cosigned By      Initials Name Provider Type    Twan Oliver PTA Physical Therapist Assistant                    Therapy Charges for Today       Code Description Service Date Service Provider Modifiers Qty    22810295290 HC GAIT TRAINING EA 15 MIN 9/21/2023 Twan Cunningham PTA GP, CQ 1    65037809853 HC PT THERAPEUTIC ACT EA 15 MIN 9/21/2023 Twan Cunningham PTA GP, CQ 2    18529290736 HC PT THER PROC EA 15 MIN 9/21/2023 Twan Cunningham PTA GP, CQ 3    19011085994 HC GAIT TRAINING EA 15 MIN 9/22/2023 Twan Cunningham, ESSIE GP, CQ 1    91290074584 HC PT THERAPEUTIC ACT EA 15 MIN 9/22/2023 Twan Cunningham PTA GP, CQ 2    96564854780 HC PT THER PROC EA 15 MIN 9/22/2023 Twan Cunningham PTA GP, CQ 3                     Twan Cunningham PTA  9/22/2023

## 2023-09-22 NOTE — PLAN OF CARE
Goal Outcome Evaluation:  Plan of Care Reviewed With: patient        Progress: improving  Outcome Evaluation: BED/CHAIR ALARM NOTED; PROGRESSING WITH CURRENT THERAPIES; CONTINUE PLAN OF CARE; MONITOR

## 2023-09-22 NOTE — THERAPY TREATMENT NOTE
Inpatient Rehabilitation - Speech Language Pathology Treatment Note  [facility]     Patient Name: Ofelia Knox  : 1945  MRN: [id number]  Today's Date: 2023             Admit Date: 2023    Ms. Knox was seen this am in [facility] for continued speech therapy. She was a/a and interactive. She continues w/ Wernicke's like jargon w/ oe questions requiring greater than 3 words. She is increasingly aware of this.      Long Term Goal:  Patient will demonstrate adequate language skills to fully participate in adls at premorbid baseline level of function.       Short Term Goals:  1. GOAL MET:       2. GOAL MET:       3. GOAL MET:       4. GOAL MET:   5. GOAL MET:    6. Patient will identify the appropriate object/picture/word following a verbal description w/ 80% accuracy over three consecutive sessions.   Verbal description provided and intermittent initial phoneme or pictures cues provided - 90% accuracy     7. GOAL MET:    8. Patient will complete complex oe sentences w/ 90% accuracy and min cues over three consecutive sessions.   100% w/ one and two word oe sentence completion.   70% accuracy w/ greater than two word responses required.     9. Patient will provide verbal 3+ descriptors of an object/picture/word w/ 80% accuracy and min cues over three consecutive sessions.   Deferred this date     10. Patient will perform divergent naming tasks w/ 5+ objects named of a given category in < 2 min over three consecutive sessions.   Provides 3 responses independently in narrow categories. Provides 2-3 additional responses w/ either initial phoneme cue or initial letters provided.     11. NEW GOAL: Pt will increase verbal expression to 90% for expressing object functions w/ single word and/or phrase responses w/ min cues over three consecutive sessions.     12. NEW GOAL: Pt will increase verbal expression to 90% accuracy for producing two-or-more-word responses during sentence completions/tasks w/ min cues over three  consecutive sessions.     13. NEW GOAL: Pt will increase verbal expression to 80% accuracy independently while forming sentences relevant to current topic over three consecutive sessions.     14. NEW GOAL: Pt will increase verbal expression to 90% accuracy, independently, over three consecutive sessions, for sentence formulation when given a target word to incorporate in the sentence.     Visit Dx:  No diagnosis found.  Patient Active Problem List   Diagnosis    CVA (cerebral vascular accident)     Past Medical History:   Diagnosis Date    AMS (altered mental status)     Aphasia     CKD (chronic kidney disease)     COPD (chronic obstructive pulmonary disease)     CVA (cerebral vascular accident)     DM2 (diabetes mellitus, type 2)     HTN (hypertension)     Restrictive lung disease     Urinary tract infection due to ESBL Klebsiella      Past Surgical History:   Procedure Laterality Date    HYSTERECTOMY      JOINT REPLACEMENT       EDUCATION  The patient has been educated in the following areas:   Cognitive Impairment Communication Impairment.    SLP Recommendation and Plan   Continue current plan of care.     Thank you   Verona Sarmiento M.S., CCC-SLP           Time Calculation:      Time Calculation- SLP       Row Name 09/22/23 1050             Time Calculation- SLP    SLP Start Time 0925  -JR      SLP Stop Time 1000  -      SLP Time Calculation (min) 35 min  -      SLP - Next Appointment 09/25/23  -                User Key  (r) = Recorded By, (t) = Taken By, (c) = Cosigned By      Initials Name Provider Type    Verona Pizarro MS CCC-SLP Speech and Language Pathologist                    Therapy Charges for Today       Code Description Service Date Service Provider Modifiers Qty    45098439917  ST TREATMENT SPEECH 3 9/21/2023 Verona Sarmiento MS CCC-SLP GN 1    [id number]  ST TREATMENT SPEECH 2 9/22/2023 Verona Sarmiento MS CCC-SLP GN 1                       Verona Sarmiento MS CCC-JUSTO  9/22/2023

## 2023-09-23 LAB
ANION GAP SERPL CALCULATED.3IONS-SCNC: 9.1 MMOL/L (ref 5–15)
BASOPHILS # BLD AUTO: 0.07 10*3/MM3 (ref 0–0.2)
BASOPHILS NFR BLD AUTO: 0.6 % (ref 0–1.5)
BUN SERPL-MCNC: 30 MG/DL (ref 8–23)
BUN/CREAT SERPL: 25.9 (ref 7–25)
CALCIUM SPEC-SCNC: 9.2 MG/DL (ref 8.6–10.5)
CHLORIDE SERPL-SCNC: 103 MMOL/L (ref 98–107)
CO2 SERPL-SCNC: 30.9 MMOL/L (ref 22–29)
CREAT SERPL-MCNC: 1.16 MG/DL (ref 0.57–1)
DEPRECATED RDW RBC AUTO: 49.1 FL (ref 37–54)
EGFRCR SERPLBLD CKD-EPI 2021: 49 ML/MIN/1.73
EOSINOPHIL # BLD AUTO: 0.29 10*3/MM3 (ref 0–0.4)
EOSINOPHIL NFR BLD AUTO: 2.4 % (ref 0.3–6.2)
ERYTHROCYTE [DISTWIDTH] IN BLOOD BY AUTOMATED COUNT: 14.4 % (ref 12.3–15.4)
GLUCOSE BLDC GLUCOMTR-MCNC: 103 MG/DL (ref 70–130)
GLUCOSE BLDC GLUCOMTR-MCNC: 129 MG/DL (ref 70–130)
GLUCOSE BLDC GLUCOMTR-MCNC: 141 MG/DL (ref 70–130)
GLUCOSE BLDC GLUCOMTR-MCNC: 146 MG/DL (ref 70–130)
GLUCOSE SERPL-MCNC: 140 MG/DL (ref 65–99)
HCT VFR BLD AUTO: 45.4 % (ref 34–46.6)
HGB BLD-MCNC: 13.6 G/DL (ref 12–15.9)
IMM GRANULOCYTES # BLD AUTO: 0.05 10*3/MM3 (ref 0–0.05)
IMM GRANULOCYTES NFR BLD AUTO: 0.4 % (ref 0–0.5)
LYMPHOCYTES # BLD AUTO: 3.16 10*3/MM3 (ref 0.7–3.1)
LYMPHOCYTES NFR BLD AUTO: 25.8 % (ref 19.6–45.3)
MCH RBC QN AUTO: 27.7 PG (ref 26.6–33)
MCHC RBC AUTO-ENTMCNC: 30 G/DL (ref 31.5–35.7)
MCV RBC AUTO: 92.5 FL (ref 79–97)
MONOCYTES # BLD AUTO: 1.21 10*3/MM3 (ref 0.1–0.9)
MONOCYTES NFR BLD AUTO: 9.9 % (ref 5–12)
NEUTROPHILS NFR BLD AUTO: 60.9 % (ref 42.7–76)
NEUTROPHILS NFR BLD AUTO: 7.45 10*3/MM3 (ref 1.7–7)
NRBC BLD AUTO-RTO: 0 /100 WBC (ref 0–0.2)
PLATELET # BLD AUTO: 297 10*3/MM3 (ref 140–450)
PMV BLD AUTO: 11.9 FL (ref 6–12)
POTASSIUM SERPL-SCNC: 3.5 MMOL/L (ref 3.5–5.2)
RBC # BLD AUTO: 4.91 10*6/MM3 (ref 3.77–5.28)
SODIUM SERPL-SCNC: 143 MMOL/L (ref 136–145)
WBC NRBC COR # BLD: 12.23 10*3/MM3 (ref 3.4–10.8)

## 2023-09-23 PROCEDURE — 85025 COMPLETE CBC W/AUTO DIFF WBC: CPT | Performed by: FAMILY MEDICINE

## 2023-09-23 PROCEDURE — 82948 REAGENT STRIP/BLOOD GLUCOSE: CPT

## 2023-09-23 PROCEDURE — 94760 N-INVAS EAR/PLS OXIMETRY 1: CPT

## 2023-09-23 PROCEDURE — 99231 SBSQ HOSP IP/OBS SF/LOW 25: CPT | Performed by: FAMILY MEDICINE

## 2023-09-23 PROCEDURE — 94799 UNLISTED PULMONARY SVC/PX: CPT

## 2023-09-23 PROCEDURE — 80048 BASIC METABOLIC PNL TOTAL CA: CPT | Performed by: FAMILY MEDICINE

## 2023-09-23 PROCEDURE — 94664 DEMO&/EVAL PT USE INHALER: CPT

## 2023-09-23 RX ORDER — FUROSEMIDE 20 MG/1
20 TABLET ORAL EVERY OTHER DAY
Status: DISCONTINUED | OUTPATIENT
Start: 2023-09-24 | End: 2023-10-10

## 2023-09-23 RX ADMIN — SACUBITRIL AND VALSARTAN 1 TABLET: 24; 26 TABLET, FILM COATED ORAL at 21:14

## 2023-09-23 RX ADMIN — PANTOPRAZOLE SODIUM 40 MG: 40 TABLET, DELAYED RELEASE ORAL at 08:20

## 2023-09-23 RX ADMIN — NYSTATIN 1 APPLICATION: 100000 CREAM TOPICAL at 08:21

## 2023-09-23 RX ADMIN — PRAVASTATIN SODIUM 40 MG: 40 TABLET ORAL at 08:19

## 2023-09-23 RX ADMIN — IPRATROPIUM BROMIDE 0.5 MG: 0.5 SOLUTION RESPIRATORY (INHALATION) at 00:46

## 2023-09-23 RX ADMIN — METOPROLOL SUCCINATE 25 MG: 25 TABLET, EXTENDED RELEASE ORAL at 08:19

## 2023-09-23 RX ADMIN — BUPROPION HYDROCHLORIDE 150 MG: 150 TABLET, EXTENDED RELEASE ORAL at 08:19

## 2023-09-23 RX ADMIN — IPRATROPIUM BROMIDE 0.5 MG: 0.5 SOLUTION RESPIRATORY (INHALATION) at 19:42

## 2023-09-23 RX ADMIN — MONTELUKAST SODIUM 10 MG: 10 TABLET, COATED ORAL at 21:14

## 2023-09-23 RX ADMIN — ASPIRIN 81 MG: 81 TABLET, COATED ORAL at 08:19

## 2023-09-23 RX ADMIN — OXYBUTYNIN CHLORIDE 10 MG: 5 TABLET, EXTENDED RELEASE ORAL at 08:19

## 2023-09-23 RX ADMIN — IPRATROPIUM BROMIDE 0.5 MG: 0.5 SOLUTION RESPIRATORY (INHALATION) at 07:40

## 2023-09-23 RX ADMIN — METFORMIN HYDROCHLORIDE 500 MG: 500 TABLET ORAL at 17:20

## 2023-09-23 RX ADMIN — IPRATROPIUM BROMIDE 0.5 MG: 0.5 SOLUTION RESPIRATORY (INHALATION) at 13:38

## 2023-09-23 RX ADMIN — FUROSEMIDE 20 MG: 20 TABLET ORAL at 08:19

## 2023-09-23 RX ADMIN — SACUBITRIL AND VALSARTAN 1 TABLET: 24; 26 TABLET, FILM COATED ORAL at 08:19

## 2023-09-23 RX ADMIN — ISOSORBIDE MONONITRATE 30 MG: 30 TABLET, EXTENDED RELEASE ORAL at 08:19

## 2023-09-23 RX ADMIN — CETIRIZINE HYDROCHLORIDE 5 MG: 10 TABLET, FILM COATED ORAL at 08:19

## 2023-09-23 RX ADMIN — TIOTROPIUM BROMIDE INHALATION SPRAY 2 PUFF: 3.12 SPRAY, METERED RESPIRATORY (INHALATION) at 07:40

## 2023-09-23 RX ADMIN — NYSTATIN 1 APPLICATION: 100000 CREAM TOPICAL at 21:14

## 2023-09-23 RX ADMIN — METFORMIN HYDROCHLORIDE 500 MG: 500 TABLET ORAL at 08:19

## 2023-09-23 RX ADMIN — VENLAFAXINE HYDROCHLORIDE 75 MG: 75 CAPSULE, EXTENDED RELEASE ORAL at 08:19

## 2023-09-23 NOTE — PROGRESS NOTES
Rehabilitation Nursing  Inpatient Rehabilitation Plan of Care Note    Plan of Care  Copy from [facility]    Toilet Transfers (Active)  Current Status (9/11/2023 7:00:00 PM): PATIENT WILL HAVE NO TRANSFER DIFFICULTY    Weekly Goal: NO DIFFICULT WITH TRANSFERS  Discharge Goal: TRANSFERS INDEPENDENTLY    Pain    Pain Management (Active)  Current Status (9/11/2023 7:00:00 PM): PATIENT WILL VOICE NO PAIN  Weekly Goal: PATIENT WILL BE PAIN FREE  Discharge Goal: FREE FROM PAIN    Safety    Potential for Injury (Active)  Current Status (9/11/2023 7:00:00 PM): PATIENT WILL HAVE NO INJURY THIS STAY  Weekly Goal: NO INJURY  Discharge Goal: PATIENT WILL DISCHARGE    Body Systems    Integumentary (Active)  Current Status (9/11/2023 7:00:00 PM): PATIENT WILL HAVE NO SKIN  BREAKDOWN THIS  STAY  Weekly Goal: SKIN WILL REMAIN INTACT.  Discharge Goal: NO SKIN ISSUES.    Signed by: Bertha Downs, Nurse

## 2023-09-23 NOTE — PROGRESS NOTES
[facility]  PROGRESS NOTE     Patient Identification:  Name:  Ofelia Knox  Age:  78 y.o.  Sex:  female  :  1945  MRN:  [id number]  Visit Number:  [id number]  ROOM: 105/     Primary Care Provider:  Provider, No Known    Length of stay in inpatient status:  12    Subjective     Chief Compliant:  No chief complaint on file.      History of Presenting Illness: 78-year-old female who is status post CVA involving the left internal capsule and putamen.  Patient states she is breathing easier this morning.  Has no new complaints at this time    Objective     Current Hospital Meds:aspirin, 81 mg, Oral, Daily  buPROPion XL, 150 mg, Oral, Daily  cetirizine, 5 mg, Oral, Daily  [START ON 2023] furosemide, 20 mg, Oral, Every Other Day  ipratropium, 0.5 mg, Nebulization, 4x Daily - RT  isosorbide mononitrate, 30 mg, Oral, Q24H  metFORMIN, 500 mg, Oral, BID With Meals  metoprolol succinate XL, 25 mg, Oral, Q24H  montelukast, 10 mg, Oral, Nightly  nystatin, 1 application , Topical, Q12H  oxybutynin XL, 10 mg, Oral, Daily  pantoprazole, 40 mg, Oral, QAM AC  pravastatin, 40 mg, Oral, Daily  sacubitril-valsartan, 1 tablet, Oral, Q12H  tiotropium bromide monohydrate, 2 puff, Inhalation, Daily - RT  venlafaxine XR, 75 mg, Oral, Daily With Breakfast       ----------------------------------------------------------------------------------------------------------------------  Vital Signs:  Temp:  [98 øF (36.7 øC)-98.4 øF (36.9 øC)] 98.4 øF (36.9 øC)  Heart Rate:  [65-84] 68  Resp:  [16-20] 18  BP: (115-136)/(73-84) 115/73  SpO2:  [94 %-97 %] 96 %  on   ;   Device (Oxygen Therapy): room air  Body mass index is 40.03 kg/mý.    Wt Readings from Last 3 Encounters:   23 112 kg (248 lb)     Intake & Output (last 3 days)          07 07 07 0700  07 0700    P.O. 360 1560 1440     Total Intake(mL/kg) 360 (3.2) 1560 (13.9) 1440 (12.9)     Urine  (mL/kg/hr) 2325 (0.9) 1200 (0.4) 1200 (0.4)     Stool 0  0     Total Output 2325 1200 1200     Net -1965 +360 +240             Urine Unmeasured Occurrence 1 x       Stool Unmeasured Occurrence 2 x  2 x           Diet: Cardiac Diets, Diabetic Diets; Healthy Heart (2-3 Na+); Consistent Carbohydrate; Texture: Regular Texture (IDDSI 7); Fluid Consistency: Thin (IDDSI 0)  ----------------------------------------------------------------------------------------------------------------------  Physical exam:  Constitutional: No acute distress  HEENT: Normocephalic atraumatic  Neck:   Supple  Cardiovascular: Regular rate and rhythm  Pulmonary/Chest: Clear to auscultation  Abdominal: Positive bowel sounds soft.   Musculoskeletal: No arthropathy  Neurological: 4 out of 5 strength in right upper extremity  Skin: No rash  Peripheral vascular: Trace edema in lower extremities  Genitourinary:  ----------------------------------------------------------------------------------------------------------------------    Last echocardiogram:  Results for orders placed during the hospital encounter of 09/11/23    Adult Transthoracic Echo Complete W/ Cont if Necessary Per Protocol    Interpretation Summary    Left ventricular systolic function is mildly decreased. Calculated left ventricular EF = 44% Left ventricular ejection fraction appears to be 41 - 45%.    Left ventricular wall thickness is consistent with mild eccentric hypertrophy.    Left ventricular diastolic function is consistent with (grade I) impaired relaxation.    The left atrial cavity is moderately dilated.    ----------------------------------------------------------------------------------------------------------------------  Results from last 7 days   Lab Units 09/23/23  0120 09/21/23  0052 09/20/23  0006   WBC 10*3/mm3 12.23* 15.19* 12.70*   HEMOGLOBIN g/dL 13.6 14.7 14.1   HEMATOCRIT % 45.4 49.4* 47.9*   MCV fL 92.5 93.7 93.7   MCHC g/dL 30.0* 29.8* 29.4*   PLATELETS  10*3/mm3 297 307 325     Results from last 7 days   Lab Units 09/20/23  1839   PH, ARTERIAL pH units 7.543*   PO2 ART mm Hg 107.0   PCO2, ARTERIAL mm Hg 25.1*   HCO3 ART mmol/L 21.6     Results from last 7 days   Lab Units 09/23/23  0120 09/22/23  0137 09/21/23  0052   SODIUM mmol/L 143 142 141   POTASSIUM mmol/L 3.5 3.8 3.8   CHLORIDE mmol/L 103 104 105   CO2 mmol/L 30.9* 28.5 25.1   BUN mg/dL 30* 33* 31*   CREATININE mg/dL 1.16* 1.18* 1.02*   CALCIUM mg/dL 9.2 9.2 9.8   GLUCOSE mg/dL 140* 123* 142*   Estimated Creatinine Clearance: 52.4 mL/min (A) (by C-G formula based on SCr of 1.16 mg/dL (H)).  No results found for: AMMONIA  Results from last 7 days   Lab Units 09/20/23 2125 09/20/23 1922   HSTROP T ng/L 13* 18*             Glucose   Date/Time Value Ref Range Status   09/23/2023 0616 146 (H) 70 - 130 mg/dL Final   09/22/2023 2004 138 (H) 70 - 130 mg/dL Final   09/22/2023 1613 129 70 - 130 mg/dL Final   09/22/2023 1100 135 (H) 70 - 130 mg/dL Final   09/22/2023 0625 144 (H) 70 - 130 mg/dL Final   09/21/2023 2029 139 (H) 70 - 130 mg/dL Final   09/21/2023 1611 120 70 - 130 mg/dL Final   09/21/2023 1134 116 70 - 130 mg/dL Final     Lab Results   Component Value Date    TSH 1.470 09/12/2023    FREET4 1.15 09/12/2023     No results found for: PREGTESTUR, PREGSERUM, HCG, HCGQUANT  Pain Management Panel           No data to display              Brief Urine Lab Results  (Last result in the past 365 days)        Color   Clarity   Blood   Leuk Est   Nitrite   Protein   CREAT   Urine HCG        09/14/23 1935 Dark Yellow   Clear   Negative   Negative   Negative   Trace                 No results found for: BLOODCX      No results found for: URINECX  No results found for: WOUNDCX  No results found for: STOOLCX        I have personally looked at the labs and they are summarized above.  ----------------------------------------------------------------------------------------------------------------------  Detailed radiology  reports for the last 24 hours:    Imaging Results (Last 24 Hours)       ** No results found for the last 24 hours. **          Final impressions for the last 30 days of radiology reports:    XR Chest 1 View    Result Date: 9/20/2023  Mild pulmonary vascular congestion.   This report was finalized on 9/20/2023 6:48 PM by Alex Pallas, DO.      XR Chest 1 View    Result Date: 9/14/2023  No radiographic evidence of acute cardiac or pulmonary disease.  This report was finalized on 9/14/2023 7:54 AM by Dr. Krunal Colvin MD.     I have personally looked at the radiology images and read the final radiology report.    Assessment & Plan    Status post left internal capsule/left putamen CVA--continue aspirin and statin therapy.  Patient requiring minimum assistance for bathing; set up for upper body dressing; minimum assist for lower body dressing; set up for grooming; set up for self-feeding.  Requiring contact-guard to minimum assist for sit to stand and stand to sit transfers.  Ambulated 350 feet plus with front wheel walker and contact-guard one-person assistance yesterday.  Continue strengthening exercises lower extremities.    Expressive aphasia--she continues to have Warnicke's light jargon with questions requiring greater than 3 words.  Patient is making some progress with speech therapy and continues to work diligently with on this issue.    CHF reduced EF patient is better compensated.  Did start Entresto earlier in the week.  We will change Lasix to 20 mg every other day.  Patient does appear to have a mild volume contraction alkalosis.  Recheck BMP in the a.m.    Diabetes mellitus well controlled    COPD with chronic respiratory failure doing reasonably well and is stable at this time    VTE Prophylaxis:   Mechanical Order History:        Ordered        09/11/23 2023  Place Sequential Compression Device  Once            09/11/23 2023  Maintain Sequential Compression Device  Continuous                           Pharmalogical Order History:       None                Abdullahi Sharpe MD  [facility] Hospitalist  09/23/23  09:24 EDT

## 2023-09-24 LAB
ANION GAP SERPL CALCULATED.3IONS-SCNC: 9.4 MMOL/L (ref 5–15)
BUN SERPL-MCNC: 27 MG/DL (ref 8–23)
BUN/CREAT SERPL: 25 (ref 7–25)
CALCIUM SPEC-SCNC: 8.8 MG/DL (ref 8.6–10.5)
CHLORIDE SERPL-SCNC: 102 MMOL/L (ref 98–107)
CO2 SERPL-SCNC: 27.6 MMOL/L (ref 22–29)
CREAT SERPL-MCNC: 1.08 MG/DL (ref 0.57–1)
EGFRCR SERPLBLD CKD-EPI 2021: 53.3 ML/MIN/1.73
GLUCOSE BLDC GLUCOMTR-MCNC: 111 MG/DL (ref 70–130)
GLUCOSE BLDC GLUCOMTR-MCNC: 116 MG/DL (ref 70–130)
GLUCOSE BLDC GLUCOMTR-MCNC: 118 MG/DL (ref 70–130)
GLUCOSE BLDC GLUCOMTR-MCNC: 132 MG/DL (ref 70–130)
GLUCOSE SERPL-MCNC: 146 MG/DL (ref 65–99)
POTASSIUM SERPL-SCNC: 3.1 MMOL/L (ref 3.5–5.2)
POTASSIUM SERPL-SCNC: 4.6 MMOL/L (ref 3.5–5.2)
SODIUM SERPL-SCNC: 139 MMOL/L (ref 136–145)

## 2023-09-24 PROCEDURE — 80048 BASIC METABOLIC PNL TOTAL CA: CPT | Performed by: FAMILY MEDICINE

## 2023-09-24 PROCEDURE — 82948 REAGENT STRIP/BLOOD GLUCOSE: CPT

## 2023-09-24 PROCEDURE — 84132 ASSAY OF SERUM POTASSIUM: CPT | Performed by: FAMILY MEDICINE

## 2023-09-24 PROCEDURE — 94799 UNLISTED PULMONARY SVC/PX: CPT

## 2023-09-24 PROCEDURE — 94664 DEMO&/EVAL PT USE INHALER: CPT

## 2023-09-24 PROCEDURE — 94760 N-INVAS EAR/PLS OXIMETRY 1: CPT

## 2023-09-24 RX ORDER — POTASSIUM CHLORIDE 20 MEQ/1
40 TABLET, EXTENDED RELEASE ORAL EVERY 4 HOURS
Status: COMPLETED | OUTPATIENT
Start: 2023-09-24 | End: 2023-09-24

## 2023-09-24 RX ADMIN — PRAVASTATIN SODIUM 40 MG: 40 TABLET ORAL at 09:22

## 2023-09-24 RX ADMIN — VENLAFAXINE HYDROCHLORIDE 75 MG: 75 CAPSULE, EXTENDED RELEASE ORAL at 09:22

## 2023-09-24 RX ADMIN — IPRATROPIUM BROMIDE 0.5 MG: 0.5 SOLUTION RESPIRATORY (INHALATION) at 19:26

## 2023-09-24 RX ADMIN — SACUBITRIL AND VALSARTAN 1 TABLET: 24; 26 TABLET, FILM COATED ORAL at 09:21

## 2023-09-24 RX ADMIN — OXYBUTYNIN CHLORIDE 10 MG: 5 TABLET, EXTENDED RELEASE ORAL at 09:22

## 2023-09-24 RX ADMIN — POTASSIUM CHLORIDE 40 MEQ: 1500 TABLET, EXTENDED RELEASE ORAL at 17:22

## 2023-09-24 RX ADMIN — PANTOPRAZOLE SODIUM 40 MG: 40 TABLET, DELAYED RELEASE ORAL at 09:22

## 2023-09-24 RX ADMIN — METFORMIN HYDROCHLORIDE 500 MG: 500 TABLET ORAL at 09:22

## 2023-09-24 RX ADMIN — SACUBITRIL AND VALSARTAN 1 TABLET: 24; 26 TABLET, FILM COATED ORAL at 22:00

## 2023-09-24 RX ADMIN — POTASSIUM CHLORIDE 40 MEQ: 1500 TABLET, EXTENDED RELEASE ORAL at 09:22

## 2023-09-24 RX ADMIN — MONTELUKAST SODIUM 10 MG: 10 TABLET, COATED ORAL at 22:00

## 2023-09-24 RX ADMIN — METFORMIN HYDROCHLORIDE 500 MG: 500 TABLET ORAL at 17:22

## 2023-09-24 RX ADMIN — METOPROLOL SUCCINATE 25 MG: 25 TABLET, EXTENDED RELEASE ORAL at 09:22

## 2023-09-24 RX ADMIN — BUPROPION HYDROCHLORIDE 150 MG: 150 TABLET, EXTENDED RELEASE ORAL at 09:23

## 2023-09-24 RX ADMIN — NYSTATIN 1 APPLICATION: 100000 CREAM TOPICAL at 09:24

## 2023-09-24 RX ADMIN — ASPIRIN 81 MG: 81 TABLET, COATED ORAL at 09:23

## 2023-09-24 RX ADMIN — IPRATROPIUM BROMIDE 0.5 MG: 0.5 SOLUTION RESPIRATORY (INHALATION) at 13:55

## 2023-09-24 RX ADMIN — IPRATROPIUM BROMIDE 0.5 MG: 0.5 SOLUTION RESPIRATORY (INHALATION) at 06:53

## 2023-09-24 RX ADMIN — FUROSEMIDE 20 MG: 20 TABLET ORAL at 09:23

## 2023-09-24 RX ADMIN — IPRATROPIUM BROMIDE 0.5 MG: 0.5 SOLUTION RESPIRATORY (INHALATION) at 00:58

## 2023-09-24 RX ADMIN — CETIRIZINE HYDROCHLORIDE 5 MG: 10 TABLET, FILM COATED ORAL at 09:23

## 2023-09-24 RX ADMIN — TIOTROPIUM BROMIDE INHALATION SPRAY 2 PUFF: 3.12 SPRAY, METERED RESPIRATORY (INHALATION) at 06:53

## 2023-09-24 RX ADMIN — NYSTATIN 1 APPLICATION: 100000 CREAM TOPICAL at 22:00

## 2023-09-24 RX ADMIN — POTASSIUM CHLORIDE 40 MEQ: 1500 TABLET, EXTENDED RELEASE ORAL at 14:08

## 2023-09-24 RX ADMIN — ISOSORBIDE MONONITRATE 30 MG: 30 TABLET, EXTENDED RELEASE ORAL at 09:22

## 2023-09-24 NOTE — PLAN OF CARE
Goal Outcome Evaluation:                   Pt is progressing medically and physically with all therapies, continue with current plan of care.

## 2023-09-24 NOTE — PROGRESS NOTES
Rehabilitation Nursing  Inpatient Rehabilitation Plan of Care Note    Plan of Care  Copy from POC    Mobility    Toilet Transfers (Active)  Current Status (9/11/2023 7:00:00 PM): PATIENT WILL HAVE NO TRANSFER DIFFICULTY    Weekly Goal: NO DIFFICULT WITH TRANSFERS  Discharge Goal: TRANSFERS INDEPENDENTLY    Pain    Pain Management (Active)  Current Status (9/11/2023 7:00:00 PM): PATIENT WILL VOICE NO PAIN  Weekly Goal: PATIENT WILL BE PAIN FREE  Discharge Goal: FREE FROM PAIN    Safety    Potential for Injury (Active)  Current Status (9/11/2023 7:00:00 PM): PATIENT WILL HAVE NO INJURY THIS STAY  Weekly Goal: NO INJURY  Discharge Goal: PATIENT WILL DISCHARGE    Body Systems    Integumentary (Active)  Current Status (9/11/2023 7:00:00 PM): PATIENT WILL HAVE NO SKIN  BREAKDOWN THIS  STAY  Weekly Goal: SKIN WILL REMAIN INTACT.  Discharge Goal: NO SKIN ISSUES.    RN Interventions    Body Systems -  RN: pressure relief, barrier cream. skin assessments. monitor skin pressure  relief [RN]    Pain -  RN: give meds per order, monitor effectiveness, notifiy dr if pain meds do not  work [RN]    Safety -  RN: hourly safety rounds, callbell w/i reach, encourage to call for assistance,  personal items w/i reach, non-skid socks, gait belt [RN]  RN: BED ALARM [RN]    Signed by: Nurse Nguyễn

## 2023-09-25 LAB
GLUCOSE BLDC GLUCOMTR-MCNC: 113 MG/DL (ref 70–130)
GLUCOSE BLDC GLUCOMTR-MCNC: 119 MG/DL (ref 70–130)
GLUCOSE BLDC GLUCOMTR-MCNC: 124 MG/DL (ref 70–130)
GLUCOSE BLDC GLUCOMTR-MCNC: 133 MG/DL (ref 70–130)

## 2023-09-25 PROCEDURE — 97530 THERAPEUTIC ACTIVITIES: CPT | Performed by: OCCUPATIONAL THERAPIST

## 2023-09-25 PROCEDURE — 97530 THERAPEUTIC ACTIVITIES: CPT

## 2023-09-25 PROCEDURE — 94799 UNLISTED PULMONARY SVC/PX: CPT

## 2023-09-25 PROCEDURE — 94761 N-INVAS EAR/PLS OXIMETRY MLT: CPT

## 2023-09-25 PROCEDURE — 99232 SBSQ HOSP IP/OBS MODERATE 35: CPT | Performed by: INTERNAL MEDICINE

## 2023-09-25 PROCEDURE — 82948 REAGENT STRIP/BLOOD GLUCOSE: CPT

## 2023-09-25 PROCEDURE — 94760 N-INVAS EAR/PLS OXIMETRY 1: CPT

## 2023-09-25 PROCEDURE — 94664 DEMO&/EVAL PT USE INHALER: CPT

## 2023-09-25 PROCEDURE — 97535 SELF CARE MNGMENT TRAINING: CPT | Performed by: OCCUPATIONAL THERAPIST

## 2023-09-25 PROCEDURE — 97112 NEUROMUSCULAR REEDUCATION: CPT | Performed by: OCCUPATIONAL THERAPIST

## 2023-09-25 PROCEDURE — 97110 THERAPEUTIC EXERCISES: CPT

## 2023-09-25 PROCEDURE — 97116 GAIT TRAINING THERAPY: CPT

## 2023-09-25 PROCEDURE — 92507 TX SP LANG VOICE COMM INDIV: CPT

## 2023-09-25 RX ADMIN — METOPROLOL SUCCINATE 25 MG: 25 TABLET, EXTENDED RELEASE ORAL at 09:10

## 2023-09-25 RX ADMIN — OXYBUTYNIN CHLORIDE 10 MG: 5 TABLET, EXTENDED RELEASE ORAL at 09:10

## 2023-09-25 RX ADMIN — NYSTATIN 1 APPLICATION: 100000 CREAM TOPICAL at 20:15

## 2023-09-25 RX ADMIN — MONTELUKAST SODIUM 10 MG: 10 TABLET, COATED ORAL at 20:14

## 2023-09-25 RX ADMIN — METFORMIN HYDROCHLORIDE 500 MG: 500 TABLET ORAL at 17:13

## 2023-09-25 RX ADMIN — PRAVASTATIN SODIUM 40 MG: 40 TABLET ORAL at 09:10

## 2023-09-25 RX ADMIN — NYSTATIN 1 APPLICATION: 100000 CREAM TOPICAL at 09:20

## 2023-09-25 RX ADMIN — IPRATROPIUM BROMIDE 0.5 MG: 0.5 SOLUTION RESPIRATORY (INHALATION) at 00:45

## 2023-09-25 RX ADMIN — IPRATROPIUM BROMIDE 0.5 MG: 0.5 SOLUTION RESPIRATORY (INHALATION) at 13:13

## 2023-09-25 RX ADMIN — METFORMIN HYDROCHLORIDE 500 MG: 500 TABLET ORAL at 09:10

## 2023-09-25 RX ADMIN — CETIRIZINE HYDROCHLORIDE 5 MG: 10 TABLET, FILM COATED ORAL at 09:10

## 2023-09-25 RX ADMIN — ASPIRIN 81 MG: 81 TABLET, COATED ORAL at 09:10

## 2023-09-25 RX ADMIN — SACUBITRIL AND VALSARTAN 1 TABLET: 24; 26 TABLET, FILM COATED ORAL at 20:14

## 2023-09-25 RX ADMIN — VENLAFAXINE HYDROCHLORIDE 75 MG: 75 CAPSULE, EXTENDED RELEASE ORAL at 09:10

## 2023-09-25 RX ADMIN — BUPROPION HYDROCHLORIDE 150 MG: 150 TABLET, EXTENDED RELEASE ORAL at 09:10

## 2023-09-25 RX ADMIN — SACUBITRIL AND VALSARTAN 1 TABLET: 24; 26 TABLET, FILM COATED ORAL at 09:21

## 2023-09-25 RX ADMIN — PANTOPRAZOLE SODIUM 40 MG: 40 TABLET, DELAYED RELEASE ORAL at 14:01

## 2023-09-25 RX ADMIN — ISOSORBIDE MONONITRATE 30 MG: 30 TABLET, EXTENDED RELEASE ORAL at 09:10

## 2023-09-25 NOTE — THERAPY TREATMENT NOTE
Inpatient Rehabilitation - Occupational Therapy Treatment Note     [facility]     Patient Name: Ofelia Knox  : 1945  MRN: [id number]    Today's Date: 2023                 Admit Date: 2023       No diagnosis found.    Patient Active Problem List   Diagnosis    CVA (cerebral vascular accident)       Past Medical History:   Diagnosis Date    AMS (altered mental status)     Aphasia     CKD (chronic kidney disease)     COPD (chronic obstructive pulmonary disease)     CVA (cerebral vascular accident)     DM2 (diabetes mellitus, type 2)     HTN (hypertension)     Restrictive lung disease     Urinary tract infection due to ESBL Klebsiella        Past Surgical History:   Procedure Laterality Date    HYSTERECTOMY      JOINT REPLACEMENT               IRF OT ASSESSMENT FLOWSHEET (last 12 hours)       IRF OT Evaluation and Treatment       Row Name 23 1300          OT Time and Intention    Document Type daily treatment  -     Mode of Treatment individual therapy;occupational therapy  -     Patient Effort good  -       Row Name 23 1300          General Information    Patient/Family/Caregiver Comments/Observations patient agreeable to therapy. patient tolerated well with no complaints.  -     Existing Precautions/Restrictions fall  aphasia  -       Row Name 23 1300          Cognition/Psychosocial    Orientation Status (Cognition) oriented to;person;unable/difficult to assess  -     Follows Commands (Cognition) physical/tactile prompts required;verbal cues/prompting required  -       Row Name 23 1300          Lower Body Dressing    Tyler Level (Lower Body Dressing) don;socks;contact guard assist  -     Position (Lower Body Dressing) edge of bed sitting  -       Row Name 23 1300          Grooming    Tyler Level (Grooming) grooming skills;hair care, combing/brushing;oral care regimen;set up;verbal cues  -     Position (Grooming) sink side;supported sitting   -       Row Name 09/25/23 1300          Bed-Chair Transfer    Bed-Chair [place] (Transfers) contact guard;verbal cues  -       Row Name 09/25/23 1300          Motor Skills    Motor Skills coordination;functional endurance  -     Therapeutic Exercise shoulder;elbow/forearm;wrist;hand  BUE ROM,gmc,fmc, strengthening, UE PRE, UE bike  -       Row Name 09/25/23 1300          Positioning and Restraints    Post Treatment Position wheelchair  -     In Wheelchair sitting;with SLP  -               User Key  (r) = Recorded By, (t) = Taken By, (c) = Cosigned By      Initials Name Effective Dates     Althea Downs, OT 07/11/23 -                      Occupational Therapy Education       Title: PT OT SLP Therapies (In Progress)       Topic: Occupational Therapy (In Progress)       Point: ADL training (In Progress)       Description:   Instruct learner(s) on proper safety adaptation and remediation techniques during self care or transfers.   Instruct in proper use of assistive devices.                  Learning Progress Summary             Patient Acceptance, TB, NR by DL at 9/24/2023 2200    Acceptance, E, VU,NR by HB at 9/16/2023 1145    Acceptance, E, VU,NR by BF at 9/15/2023 1232                         Point: Precautions (In Progress)       Description:   Instruct learner(s) on prescribed precautions during self-care and functional transfers.                  Learning Progress Summary             Patient Acceptance, TB, NR by DL at 9/24/2023 2200    Acceptance, E, VU,NR by HB at 9/16/2023 1145    Acceptance, E, VU,NR by BF at 9/15/2023 1232                                         User Key       Initials Effective Dates Name Provider Type Discipline     07/11/23 -  Pam Peck OT Occupational Therapist OT    HB 05/25/21 -  Xena Chinchilla OT Occupational Therapist OT    DL 03/16/22 -  Carolyn Ramirez, RN Registered Nurse Nurse                        OT Recommendation and Plan                          Time Calculation:      Time Calculation- OT       Row Name 09/25/23 1357             Time Calculation-     OT Start Time 0735  -      OT Stop Time 0915  -      OT Time Calculation (min) 100 min  -                User Key  (r) = Recorded By, (t) = Taken By, (c) = Cosigned By      Initials Name Provider Type     Althea Downs OT Occupational Therapist                  Therapy Charges for Today       Code Description Service Date Service Provider Modifiers Qty    93056039386  OT THERAPEUTIC ACT EA 15 MIN 9/25/2023 Althea Downs, OT GO 2    [id number]  OT SELF CARE/MGMT/TRAIN EA 15 MIN 9/25/2023 Althea Downs, OT GO 2    [id number]  OT NEUROMUSC RE EDUCATION EA 15 MIN 9/25/2023 Althea Downs, BINH GO 3                     Althea Downs OT  9/25/2023

## 2023-09-25 NOTE — PLAN OF CARE
Goal Outcome Evaluation:  Plan of Care Reviewed With: patient resting in bed no changes at present.  Continues to participate in therapy. Will continue to monitor.

## 2023-09-25 NOTE — PROGRESS NOTES
Assisted By: Felicity GLASS    CC: Follow-up on CVA    Interview History/HPI: Patient seems to be doing better from a speech standpoint.  She answers questions appropriately and can usually eventually find the right words.  No complaints voiced no new events overnight.          Current Hospital Meds:  aspirin, 81 mg, Oral, Daily  buPROPion XL, 150 mg, Oral, Daily  cetirizine, 5 mg, Oral, Daily  furosemide, 20 mg, Oral, Every Other Day  ipratropium, 0.5 mg, Nebulization, 4x Daily - RT  isosorbide mononitrate, 30 mg, Oral, Q24H  metFORMIN, 500 mg, Oral, BID With Meals  metoprolol succinate XL, 25 mg, Oral, Q24H  montelukast, 10 mg, Oral, Nightly  nystatin, 1 application , Topical, Q12H  oxybutynin XL, 10 mg, Oral, Daily  pantoprazole, 40 mg, Oral, QAM AC  pravastatin, 40 mg, Oral, Daily  sacubitril-valsartan, 1 tablet, Oral, Q12H  tiotropium bromide monohydrate, 2 puff, Inhalation, Daily - RT  venlafaxine XR, 75 mg, Oral, Daily With Breakfast         Vitals:    09/25/23 0700   BP: 104/51   Pulse: 76   Resp: 18   Temp: 97.9 øF (36.6 øC)   SpO2: 92%         Intake/Output Summary (Last 24 hours) at 9/25/2023 0843  Last data filed at 9/25/2023 0500  Gross per 24 hour   Intake 1320 ml   Output 900 ml   Net 420 ml       EXAM: Pleasant and in no distress, lungs are clear, heart regular rate and rhythm,  strength is about 3-4/5 and appears symmetric, she moves both legs well, no edema, adequate distal pulses, Knox catheter in place, apparently she was having some urinary retention and Knox catheter had to be placed.      Diet: Cardiac Diets, Diabetic Diets; Healthy Heart (2-3 Na+); Consistent Carbohydrate; Texture: Regular Texture (IDDSI 7); Fluid Consistency: Thin (IDDSI 0)        LABS:     Lab Results (last 48 hours)       Procedure Component Value Units Date/Time    POC Glucose Once [[id number]]  (Normal) Collected: 09/25/23 0600    Specimen: Blood Updated: 09/25/23 0609     Glucose 124 mg/dL     Potassium [[id number]]   (Normal) Collected: 09/24/23 2200    Specimen: Blood Updated: 09/24/23 2221     Potassium 4.6 mmol/L      Comment: 1+ Hemolysis    Slight hemolysis detected by analyzer. Results may be affected.       POC Glucose Once [[id number]]  (Normal) Collected: 09/24/23 1937    Specimen: Blood Updated: 09/24/23 1943     Glucose 118 mg/dL     POC Glucose Once [[id number]]  (Normal) Collected: 09/24/23 1603    Specimen: Blood Updated: 09/24/23 1609     Glucose 111 mg/dL     POC Glucose Once [[id number]]  (Normal) Collected: 09/24/23 1138    Specimen: Blood Updated: 09/24/23 1145     Glucose 116 mg/dL     POC Glucose Once [[id number]]  (Abnormal) Collected: 09/24/23 0631    Specimen: Blood Updated: 09/24/23 0646     Glucose 132 mg/dL     Basic Metabolic Panel [[id number]]  (Abnormal) Collected: 09/24/23 0250    Specimen: Blood Updated: 09/24/23 0320     Glucose 146 mg/dL      BUN 27 mg/dL      Creatinine 1.08 mg/dL      Sodium 139 mmol/L      Potassium 3.1 mmol/L      Comment: Slight hemolysis detected by analyzer. Results may be affected.        Chloride 102 mmol/L      CO2 27.6 mmol/L      Calcium 8.8 mg/dL      BUN/Creatinine Ratio 25.0     Anion Gap 9.4 mmol/L      eGFR 53.3 mL/min/1.73     Narrative:      GFR Normal >60  Chronic Kidney Disease <60  Kidney Failure <15    The GFR formula is only valid for adults with stable renal function between ages 18 and 70.    POC Glucose Once [[id number]]  (Normal) Collected: 09/23/23 1949    Specimen: Blood Updated: 09/23/23 2005     Glucose 103 mg/dL     POC Glucose Once [[id number]]  (Normal) Collected: 09/23/23 1605    Specimen: Blood Updated: 09/23/23 1611     Glucose 129 mg/dL     POC Glucose Once [[id number]]  (Abnormal) Collected: 09/23/23 1116    Specimen: Blood Updated: 09/23/23 1122     Glucose 141 mg/dL                  Radiology:    Imaging Results (Last 72 Hours)       ** No results found for the last 72 hours. **            Results for orders placed during the hospital encounter  of 09/11/23    Adult Transthoracic Echo Complete W/ Cont if Necessary Per Protocol    Interpretation Summary    Left ventricular systolic function is mildly decreased. Calculated left ventricular EF = 44% Left ventricular ejection fraction appears to be 41 - 45%.    Left ventricular wall thickness is consistent with mild eccentric hypertrophy.    Left ventricular diastolic function is consistent with (grade I) impaired relaxation.    The left atrial cavity is moderately dilated.      Assessment/Plan:   Status post CVA, patient is working with speech therapy for cognitive, expressive difficulties and seems to be improving.  I discussed with speech therapist who concurs.  With OT, patient is min assist for bathing, set up for upper body dressing, min assist lower body dressing, set up for grooming, supervision to set up for self-feeding.  With PT, patient is min assist contact-guard with sit to stand, contact-guard stand to sit, contact-guard chair to bed and bed to chair.  Patient was able to walk 350 feet plus contact-guard 1 person front wheeled walker.  Patient is progressing well towards her goals.    Urinary retention, continue Knox catheter for now.,  Possibly consider getting and out the next 48 hours and try voiding trials.    Hypokalemia, supplemented and improved.    HFrEF EF 41 to 45%, she also has a diastolic component, patient appears compensated at this time, lungs are clear and no edema.  Patient is on scheduled Lasix, Entresto and Toprol.  Blood pressure appears to be tolerating these medications.    Diabetes, on metformin alone, glucoses are controlled, continue    Depression, appears to be well controlled at this time on Effexor and Wellbutrin.    DVT prophylaxis, SCUDs    Jim Appiah MD

## 2023-09-25 NOTE — PROGRESS NOTES
Inpatient Rehabilitation Functional Measures Assessment and Plan of Care    Plan of Care  Self Care    [OT] Dressing (Lower)(Active)  Current Status(09/25/2023): Min  Weekly Goal(09/26/2023): cga  Discharge Goal: cga    Functional Measures  JOHN Eating:  JOHN Grooming:  JOHN Bathing:  JOHN Upper Body Dressing:  JOHN Lower Body Dressing:  JOHN Toileting:    JOHN Bladder Management  Level of Assistance:  Frequency/Number of Accidents this Shift:    JOHN Bowel Management  Level of Assistance:  Frequency/Number of Accidents this Shift:    JOHN Bed/Chair/Wheelchair Transfer:  JOHN Toilet Transfer:  JOHN Tub/Shower Transfer:    Previously Documented Mode of Locomotion at Discharge:  [facility] Expected Mode of Locomotion at Discharge:  JOHN Walk/Wheelchair:  JOHN Stairs:    JOHN Comprehension:  JOHN Expression:  JOHN Social Interaction:  [facility] Problem Solving:  JOHN Memory:    Therapy Mode Minutes  Occupational Therapy: Individual: 100 minutes.  Physical Therapy:  Speech Language Pathology:    Discharge Functional Goals:    Signed by: Keli Downs, Occupational Therapist

## 2023-09-25 NOTE — THERAPY TREATMENT NOTE
Inpatient Rehabilitation - Speech Language Pathology Treatment Note  [facility]     Patient Name: Ofelia Knox  : 1945  MRN: [id number]  Today's Date: 2023             Admit Date: 2023    Ms. Knox was seen this am in [facility] for continued speech therapy. She was a/a and interactive. She continues w/ Wernicke's like jargon w/ oe questions requiring greater than 3 words. This is less prevalent on this date.     Son is present during therapy tasks this date and provides intermittent initial phoneme cues or visual cues for therapy tasks when appropriate.      Long Term Goal:  Patient will demonstrate adequate language skills to fully participate in adls at premorbid baseline level of function.       Short Term Goals:  1. GOAL MET:       2. GOAL MET:       3. GOAL MET:       4. GOAL MET:   5. GOAL MET:    6. Patient will identify(receptively/expressively) the appropriate object/picture/word following a verbal description w/ 80% accuracy over three consecutive sessions.   Verbal description provided and receptive ID from field of 4 pictures/objects - 100% accuracy  Verbal description provided and pt required to expressively state appropriate object - 75% w/ graphic descriptors listed, visual cues, and intermittent use of initial phonemes.      7. GOAL MET:    8. Patient will complete complex oe sentences w/ 90% accuracy and min cues over three consecutive sessions.   100% w/ one and two word oe sentence completion.   70% accuracy w/ greater than two word responses required.     9. Patient will provide verbal 3+ descriptors of an object/picture/word w/ 80% accuracy and min cues over three consecutive sessions.   Deferred this date     10. Patient will perform divergent naming tasks w/ 5+ objects named of a given category in < 2 min over three consecutive sessions.   Deferred this date.      11.  Pt will increase verbal expression to 90% for expressing object functions w/ single word and/or phrase responses w/  "min cues over three consecutive sessions.   Use of carrier phrase \"This is a ____. It is used to ______\". She is able to label object in 90% opp. Requires initial phoneme, letters, and/or leading sentences for all responses - 90% opp.    12.  Pt will increase verbal expression to 90% accuracy for producing two-or-more-word responses during sentence completions/tasks w/ min cues over three consecutive sessions.   Deferred this date.     13.  Pt will increase verbal expression to 80% accuracy independently while forming sentences relevant to current topic over three consecutive sessions.   Verbal expression of sentences relevant to current topic is on topic approx 50% of opp.     14.  Pt will increase verbal expression to 90% accuracy, independently, over three consecutive sessions, for sentence formulation when given a target word to incorporate in the sentence.   Deferred this date    Visit Dx:  No diagnosis found.  Patient Active Problem List   Diagnosis    CVA (cerebral vascular accident)     Past Medical History:   Diagnosis Date    AMS (altered mental status)     Aphasia     CKD (chronic kidney disease)     COPD (chronic obstructive pulmonary disease)     CVA (cerebral vascular accident)     DM2 (diabetes mellitus, type 2)     HTN (hypertension)     Restrictive lung disease     Urinary tract infection due to ESBL Klebsiella      Past Surgical History:   Procedure Laterality Date    HYSTERECTOMY      JOINT REPLACEMENT       EDUCATION  The patient has been educated in the following areas:   Cognitive Impairment Communication Impairment.    SLP Recommendation and Plan   Continue current plan of care.     Thank you   Verona Sarmiento M.S., CCC-SLP           Time Calculation:      Time Calculation- SLP       Row Name 09/25/23 1116             Time Calculation- SLP    SLP Start Time 0925  -JR      SLP Stop Time 1000  -      SLP Time Calculation (min) 35 min  -      SLP - Next Appointment 09/26/23  -                " User Key  (r) = Recorded By, (t) = Taken By, (c) = Cosigned By      Initials Name Provider Type    Verona Pizarro MS CCC-SLP Speech and Language Pathologist                    Therapy Charges for Today       Code Description Service Date Service Provider Modifiers Qty    00330659263  ST TREATMENT SPEECH 2 9/25/2023 Verona Sarmiento MS CCC-JUSTO GN 1                       MS XENA Schwartz  9/25/2023

## 2023-09-25 NOTE — PROGRESS NOTES
Rehabilitation Nursing  Inpatient Rehabilitation Plan of Care Note    Plan of Care  Copy from [facility]    Toilet Transfers (Active)  Current Status (9/11/2023 7:00:00 PM): PATIENT WILL HAVE NO TRANSFER DIFFICULTY    Weekly Goal: NO DIFFICULT WITH TRANSFERS  Discharge Goal: TRANSFERS INDEPENDENTLY    Pain    Pain Management (Active)  Current Status (9/11/2023 7:00:00 PM): PATIENT WILL VOICE NO PAIN  Weekly Goal: PATIENT WILL BE PAIN FREE  Discharge Goal: FREE FROM PAIN    Safety    Potential for Injury (Active)  Current Status (9/11/2023 7:00:00 PM): PATIENT WILL HAVE NO INJURY THIS STAY  Weekly Goal: NO INJURY  Discharge Goal: PATIENT WILL DISCHARGE    Body Systems    Integumentary (Active)  Current Status (9/11/2023 7:00:00 PM): PATIENT WILL HAVE NO SKIN  BREAKDOWN THIS  STAY  Weekly Goal: SKIN WILL REMAIN INTACT.  Discharge Goal: NO SKIN ISSUES.    Signed by: Carolyn Ramirez RN

## 2023-09-25 NOTE — THERAPY TREATMENT NOTE
Inpatient Rehabilitation - Physical Therapy Treatment Note        [facility]     Patient Name: Ofelia Knox  : 1945  MRN: [id number]    Today's Date: 2023                    Admit Date: 2023      Visit Dx:   No diagnosis found.    Patient Active Problem List   Diagnosis    CVA (cerebral vascular accident)       Past Medical History:   Diagnosis Date    AMS (altered mental status)     Aphasia     CKD (chronic kidney disease)     COPD (chronic obstructive pulmonary disease)     CVA (cerebral vascular accident)     DM2 (diabetes mellitus, type 2)     HTN (hypertension)     Restrictive lung disease     Urinary tract infection due to ESBL Klebsiella        Past Surgical History:   Procedure Laterality Date    HYSTERECTOMY      JOINT REPLACEMENT         PT ASSESSMENT (last 12 hours)       IRF PT Evaluation and Treatment       Row Name 23 1416          PT Time and Intention    Document Type daily treatment  -RG     Mode of Treatment physical therapy  -RG     Patient/Family/Caregiver Comments/Observations Pt and nursing in agreement for skilled PT on this date.  Her family was present for skilled PT today. Pt was able to go up and down 5 steps CGA, ambulated 250' with RW.  She did not ambulate as far as previous session secondary to fatigue from doing the stairs earlier.  -RG       Row Name 23 1416          General Information    Limitations/Impairments aphasia;safety/cognitive  -RG       Row Name 23 1416          Cognition/Psychosocial    Follows Commands (Cognition) physical/tactile prompts required  -     Personal Safety Interventions gait belt;fall prevention program maintained;nonskid shoes/slippers when out of bed  -RG       Row Name 23 1416          Transfer Assessment/Treatment    Transfers sit-stand transfer;stand-sit transfer  -       Row Name 23 1416          Bed-Chair Transfer    Bed-Chair Pep (Transfers) verbal cues;nonverbal cues (demo/gesture);contact  guard  -RG     Assistive Device (Bed-Chair Transfers) wheelchair  -RG       Row Name 09/25/23 1416          Chair-Bed Transfer    Chair-Bed [place] (Transfers) verbal cues;nonverbal cues (demo/gesture);contact guard  -RG     Assistive Device (Chair-Bed Transfers) wheelchair  -RG       Row Name 09/25/23 1416          Sit-Stand Transfer    Sit-Stand [place] (Transfers) contact guard;minimum assist (75% patient effort);verbal cues;1 person assist  -RG     Assistive Device (Sit-Stand Transfers) walker, front-wheeled;wheelchair  -RG       Row Name 09/25/23 1416          Stand-Sit Transfer    Stand-Sit [place] (Transfers) contact guard;verbal cues  -RG     Assistive Device (Stand-Sit Transfers) walker, front-wheeled;wheelchair  -RG       Row Name 09/25/23 1416          Gait/Stairs (Locomotion)    Gait/Stairs Locomotion gait/ambulation assistive device  -RG     [place] Level (Gait) contact guard;1 person assist  -RG     Assistive Device (Gait) walker, front-wheeled  -RG     Distance in Feet (Gait) 250'  -RG     Deviations/Abnormal Patterns (Gait) beatrice decreased;gait speed decreased;stride length decreased  -RG     Bilateral Gait Deviations forward flexed posture  -RG     [place] Level (Stairs) nonverbal cues (demo/gesture);verbal cues;contact guard  -RG     Handrail Location (Stairs) both sides  -RG     Number of Steps (Stairs) 5  -RG     Ascending Technique (Stairs) step-to-step  -RG     Descending Technique (Stairs) step-to-step  -RG     Stairs, Safety Issues balance decreased during turns  -RG     Comment, (Gait/Stairs) c/o fatigue from stairs earlier in session  -RG       Row Name 09/25/23 1416          Hip (Therapeutic Exercise)    Hip Strengthening (Therapeutic Exercise) bilateral;flexion;aBduction;aDduction;marching while seated;sitting;marching while standing;standing;2 lb free weight;resistance band;green;10 repetitions;2 sets  -RG       Row Name 09/25/23 1416          Knee (Therapeutic  Exercise)    Knee Strengthening (Therapeutic Exercise) bilateral;flexion;extension;marching while seated;marching while standing;LAQ (long arc quad);hamstring curls;sitting;standing;2 lb free weight;resistance band;green;10 repetitions;2 sets  -       Row Name 09/25/23 1416          Ankle (Therapeutic Exercise)    Ankle Strengthening (Therapeutic Exercise) bilateral;dorsiflexion;plantarflexion;sitting;standing;10 repetitions;2 sets  -       Row Name 09/25/23 1416          Positioning and Restraints    Pre-Treatment Position sitting in chair/recliner  -RG     Post Treatment Position wheelchair  -RG     In Wheelchair notified nsg;sitting;call light within reach;encouraged to call for assist;legs elevated  -       Row Name 09/25/23 1416          Bed Mobility Goal 1 (PT-IRF)    Progress/Outcomes (Bed Mobility Goal 1, PT-IRF) goal ongoing  -       Row Name 09/25/23 1416          Transfer Goal 1 (PT-IRF)    Progress/Outcomes (Transfer Goal 1, PT-IRF) goal ongoing  -       Row Name 09/25/23 1416          Gait/Walking Locomotion Goal 1 (PT-IRF)    Progress/Outcomes (Gait/Walking Locomotion Goal 1, PT-IRF) goal ongoing  -               User Key  (r) = Recorded By, (t) = Taken By, (c) = Cosigned By      Initials Name Provider Type    RG Twan Cunningham PTA Physical Therapist Assistant                     Physical Therapy Education       Title: PT OT SLP Therapies (In Progress)       Topic: Physical Therapy (Done)       Point: Mobility training (Done)       Learning Progress Summary             Patient Acceptance, E,D, VU,NR by RG at 9/25/2023 1423    Acceptance, TB, NR by DL at 9/24/2023 2200    Acceptance, E,D, NR,VU by RG at 9/22/2023 1315    Acceptance, E,D, VU,NR by RG at 9/21/2023 1307    Acceptance, E,D, VU,NR by RG at 9/20/2023 1258    Acceptance, E,D, VU,NR by RG at 9/19/2023 1258    Acceptance, E,D, VU,NR by RG at 9/18/2023 1338    Acceptance, E,TB, VU by RM at 9/15/2023 1532    Acceptance, E,TB, VU by   at 9/14/2023 1555    Acceptance, E,D, VU,NR by RG at 9/13/2023 1528    Acceptance, TB, NR by DL at 9/12/2023 2003    Acceptance, E, VU,NR by LB at 9/12/2023 1135                         Point: Home exercise program (Done)       Learning Progress Summary             Patient Acceptance, E,D, VU,NR by RG at 9/25/2023 1423    Acceptance, TB, NR by DL at 9/24/2023 2200    Acceptance, E,D, NR,VU by RG at 9/22/2023 1315    Acceptance, E,D, VU,NR by RG at 9/21/2023 1307    Acceptance, E,D, VU,NR by RG at 9/20/2023 1258    Acceptance, E,D, VU,NR by RG at 9/19/2023 1258    Acceptance, E,D, VU,NR by RG at 9/18/2023 1338    Acceptance, E,TB, VU by RM at 9/15/2023 1532    Acceptance, E,TB, VU by RM at 9/14/2023 1555    Acceptance, E,D, VU,NR by RG at 9/13/2023 1528    Acceptance, TB, NR by DL at 9/12/2023 2003    Acceptance, E, VU,NR by LB at 9/12/2023 1135                         Point: Body mechanics (Done)       Learning Progress Summary             Patient Acceptance, E,D, VU,NR by RG at 9/25/2023 1423    Acceptance, TB, NR by DL at 9/24/2023 2200    Acceptance, E,D, NR,VU by RG at 9/22/2023 1315    Acceptance, E,D, VU,NR by RG at 9/21/2023 1307    Acceptance, E,D, VU,NR by RG at 9/20/2023 1258    Acceptance, E,D, VU,NR by RG at 9/19/2023 1258    Acceptance, E,D, VU,NR by RG at 9/18/2023 1338    Acceptance, E,TB, VU by RM at 9/15/2023 1532    Acceptance, E,TB, VU by RM at 9/14/2023 1555    Acceptance, E,D, VU,NR by RG at 9/13/2023 1528    Acceptance, TB, NR by DL at 9/12/2023 2003    Acceptance, E, VU,NR by LB at 9/12/2023 1135                         Point: Precautions (Done)       Learning Progress Summary             Patient Acceptance, E,D, VU,NR by RG at 9/25/2023 1423    Acceptance, TB, NR by DL at 9/24/2023 2200    Acceptance, E,D, NR,VU by RG at 9/22/2023 1315    Acceptance, E,D, VU,NR by RG at 9/21/2023 1307    Acceptance, E,D, VU,NR by RG at 9/20/2023 1258    Acceptance, E,D, VU,NR by RG at 9/19/2023 1258     Acceptance, E,D, VU,NR by RG at 9/18/2023 1338    Acceptance, E,TB, VU by RM at 9/15/2023 1532    Acceptance, E,TB, VU by RM at 9/14/2023 1555    Acceptance, E,D, VU,NR by RG at 9/13/2023 1528    Acceptance, TB, NR by DL at 9/12/2023 2003    Acceptance, E, VU,NR by LB at 9/12/2023 1135                                         User Key       Initials Effective Dates Name Provider Type Discipline    LB 06/16/21 -  Andra Stewart, PT Physical Therapist PT    RM 02/17/23 -  Karis Obrien, PTA Physical Therapist Assistant PT    RG 06/16/21 -  Twan Cunningham PTA Physical Therapist Assistant PT    DL 03/16/22 -  Carolyn Ramirez, RN Registered Nurse Nurse                    PT Recommendation and Plan    Frequency of Treatment (PT): 5 times per week  Anticipated Equipment Needs at Discharge (PT Eval):  (tbd)                  Time Calculation:      PT Charges       Row Name 09/25/23 1423             Time Calculation    Start Time 1000  -RG      Stop Time 1130  -RG      Time Calculation (min) 90 min  -RG      PT Received On 09/25/23  -RG         Time Calculation- PT    Total Timed Code Minutes- PT 90 minute(s)  -RG                User Key  (r) = Recorded By, (t) = Taken By, (c) = Cosigned By      Initials Name Provider Type    RG Twan Cunningham PTA Physical Therapist Assistant                    Therapy Charges for Today       Code Description Service Date Service Provider Modifiers Qty    23870311093 HC GAIT TRAINING EA 15 MIN 9/25/2023 Twan Cunningham PTA GP, CQ 1    80515885828 HC PT THERAPEUTIC ACT EA 15 MIN 9/25/2023 Twan Cunningham PTA GP, CQ 2    16846651574 HC PT THER PROC EA 15 MIN 9/25/2023 Twan Cunningham PTA GP, CQ 3                     Twan Cunningham PTA  9/25/2023

## 2023-09-26 LAB
ANION GAP SERPL CALCULATED.3IONS-SCNC: 11.7 MMOL/L (ref 5–15)
BASOPHILS # BLD AUTO: 0.05 10*3/MM3 (ref 0–0.2)
BASOPHILS NFR BLD AUTO: 0.4 % (ref 0–1.5)
BUN SERPL-MCNC: 30 MG/DL (ref 8–23)
BUN/CREAT SERPL: 26.1 (ref 7–25)
CALCIUM SPEC-SCNC: 8.9 MG/DL (ref 8.6–10.5)
CHLORIDE SERPL-SCNC: 103 MMOL/L (ref 98–107)
CO2 SERPL-SCNC: 22.3 MMOL/L (ref 22–29)
CREAT SERPL-MCNC: 1.15 MG/DL (ref 0.57–1)
DEPRECATED RDW RBC AUTO: 49.8 FL (ref 37–54)
EGFRCR SERPLBLD CKD-EPI 2021: 49.5 ML/MIN/1.73
EOSINOPHIL # BLD AUTO: 0.41 10*3/MM3 (ref 0–0.4)
EOSINOPHIL NFR BLD AUTO: 3.4 % (ref 0.3–6.2)
ERYTHROCYTE [DISTWIDTH] IN BLOOD BY AUTOMATED COUNT: 14.5 % (ref 12.3–15.4)
GLUCOSE BLDC GLUCOMTR-MCNC: 103 MG/DL (ref 70–130)
GLUCOSE BLDC GLUCOMTR-MCNC: 128 MG/DL (ref 70–130)
GLUCOSE BLDC GLUCOMTR-MCNC: 132 MG/DL (ref 70–130)
GLUCOSE BLDC GLUCOMTR-MCNC: 156 MG/DL (ref 70–130)
GLUCOSE SERPL-MCNC: 117 MG/DL (ref 65–99)
HCT VFR BLD AUTO: 45.1 % (ref 34–46.6)
HGB BLD-MCNC: 13.8 G/DL (ref 12–15.9)
IMM GRANULOCYTES # BLD AUTO: 0.02 10*3/MM3 (ref 0–0.05)
IMM GRANULOCYTES NFR BLD AUTO: 0.2 % (ref 0–0.5)
LYMPHOCYTES # BLD AUTO: 3.77 10*3/MM3 (ref 0.7–3.1)
LYMPHOCYTES NFR BLD AUTO: 31.5 % (ref 19.6–45.3)
MCH RBC QN AUTO: 28.5 PG (ref 26.6–33)
MCHC RBC AUTO-ENTMCNC: 30.6 G/DL (ref 31.5–35.7)
MCV RBC AUTO: 93 FL (ref 79–97)
MONOCYTES # BLD AUTO: 1.03 10*3/MM3 (ref 0.1–0.9)
MONOCYTES NFR BLD AUTO: 8.6 % (ref 5–12)
NEUTROPHILS NFR BLD AUTO: 55.9 % (ref 42.7–76)
NEUTROPHILS NFR BLD AUTO: 6.67 10*3/MM3 (ref 1.7–7)
NRBC BLD AUTO-RTO: 0 /100 WBC (ref 0–0.2)
PLATELET # BLD AUTO: 277 10*3/MM3 (ref 140–450)
PMV BLD AUTO: 12.2 FL (ref 6–12)
POTASSIUM SERPL-SCNC: 4.1 MMOL/L (ref 3.5–5.2)
RBC # BLD AUTO: 4.85 10*6/MM3 (ref 3.77–5.28)
SODIUM SERPL-SCNC: 137 MMOL/L (ref 136–145)
WBC NRBC COR # BLD: 11.95 10*3/MM3 (ref 3.4–10.8)

## 2023-09-26 PROCEDURE — 97535 SELF CARE MNGMENT TRAINING: CPT | Performed by: OCCUPATIONAL THERAPIST

## 2023-09-26 PROCEDURE — 97530 THERAPEUTIC ACTIVITIES: CPT

## 2023-09-26 PROCEDURE — 92507 TX SP LANG VOICE COMM INDIV: CPT

## 2023-09-26 PROCEDURE — 94761 N-INVAS EAR/PLS OXIMETRY MLT: CPT

## 2023-09-26 PROCEDURE — 97112 NEUROMUSCULAR REEDUCATION: CPT

## 2023-09-26 PROCEDURE — 97116 GAIT TRAINING THERAPY: CPT

## 2023-09-26 PROCEDURE — 94799 UNLISTED PULMONARY SVC/PX: CPT

## 2023-09-26 PROCEDURE — 97530 THERAPEUTIC ACTIVITIES: CPT | Performed by: OCCUPATIONAL THERAPIST

## 2023-09-26 PROCEDURE — 97112 NEUROMUSCULAR REEDUCATION: CPT | Performed by: OCCUPATIONAL THERAPIST

## 2023-09-26 PROCEDURE — 99231 SBSQ HOSP IP/OBS SF/LOW 25: CPT | Performed by: INTERNAL MEDICINE

## 2023-09-26 PROCEDURE — 97110 THERAPEUTIC EXERCISES: CPT

## 2023-09-26 PROCEDURE — 82948 REAGENT STRIP/BLOOD GLUCOSE: CPT

## 2023-09-26 PROCEDURE — 80048 BASIC METABOLIC PNL TOTAL CA: CPT | Performed by: INTERNAL MEDICINE

## 2023-09-26 PROCEDURE — 99221 1ST HOSP IP/OBS SF/LOW 40: CPT

## 2023-09-26 PROCEDURE — 85025 COMPLETE CBC W/AUTO DIFF WBC: CPT | Performed by: INTERNAL MEDICINE

## 2023-09-26 RX ADMIN — MONTELUKAST SODIUM 10 MG: 10 TABLET, COATED ORAL at 20:04

## 2023-09-26 RX ADMIN — METFORMIN HYDROCHLORIDE 500 MG: 500 TABLET ORAL at 17:29

## 2023-09-26 RX ADMIN — SACUBITRIL AND VALSARTAN 1 TABLET: 24; 26 TABLET, FILM COATED ORAL at 08:34

## 2023-09-26 RX ADMIN — NYSTATIN 1 APPLICATION: 100000 CREAM TOPICAL at 20:05

## 2023-09-26 RX ADMIN — PANTOPRAZOLE SODIUM 40 MG: 40 TABLET, DELAYED RELEASE ORAL at 06:31

## 2023-09-26 RX ADMIN — METOPROLOL SUCCINATE 25 MG: 25 TABLET, EXTENDED RELEASE ORAL at 08:34

## 2023-09-26 RX ADMIN — ISOSORBIDE MONONITRATE 30 MG: 30 TABLET, EXTENDED RELEASE ORAL at 08:34

## 2023-09-26 RX ADMIN — BUPROPION HYDROCHLORIDE 150 MG: 150 TABLET, EXTENDED RELEASE ORAL at 08:34

## 2023-09-26 RX ADMIN — ASPIRIN 81 MG: 81 TABLET, COATED ORAL at 08:34

## 2023-09-26 RX ADMIN — CETIRIZINE HYDROCHLORIDE 5 MG: 10 TABLET, FILM COATED ORAL at 08:34

## 2023-09-26 RX ADMIN — SACUBITRIL AND VALSARTAN 1 TABLET: 24; 26 TABLET, FILM COATED ORAL at 20:04

## 2023-09-26 RX ADMIN — METFORMIN HYDROCHLORIDE 500 MG: 500 TABLET ORAL at 08:33

## 2023-09-26 RX ADMIN — NYSTATIN 1 APPLICATION: 100000 CREAM TOPICAL at 08:46

## 2023-09-26 RX ADMIN — FUROSEMIDE 20 MG: 20 TABLET ORAL at 08:34

## 2023-09-26 RX ADMIN — PRAVASTATIN SODIUM 40 MG: 40 TABLET ORAL at 08:34

## 2023-09-26 RX ADMIN — VENLAFAXINE HYDROCHLORIDE 75 MG: 75 CAPSULE, EXTENDED RELEASE ORAL at 08:34

## 2023-09-26 NOTE — PROGRESS NOTES
Assisted By: Vicki GLASS    CC: Follow-up on CVA    Interview History/HPI: Patient without acute complaints, no new events overnight.          Current Hospital Meds:  aspirin, 81 mg, Oral, Daily  buPROPion XL, 150 mg, Oral, Daily  cetirizine, 5 mg, Oral, Daily  furosemide, 20 mg, Oral, Every Other Day  ipratropium, 0.5 mg, Nebulization, 4x Daily - RT  isosorbide mononitrate, 30 mg, Oral, Q24H  metFORMIN, 500 mg, Oral, BID With Meals  metoprolol succinate XL, 25 mg, Oral, Q24H  montelukast, 10 mg, Oral, Nightly  nystatin, 1 application , Topical, Q12H  pantoprazole, 40 mg, Oral, QAM AC  pravastatin, 40 mg, Oral, Daily  sacubitril-valsartan, 1 tablet, Oral, Q12H  tiotropium bromide monohydrate, 2 puff, Inhalation, Daily - RT  venlafaxine XR, 75 mg, Oral, Daily With Breakfast         Vitals:    09/26/23 0834   BP: 134/68   Pulse: 65   Resp: 18   Temp: 98.2 øF (36.8 øC)   SpO2: 92%         Intake/Output Summary (Last 24 hours) at 9/26/2023 1053  Last data filed at 9/26/2023 0700  Gross per 24 hour   Intake 600 ml   Output 975 ml   Net -375 ml       EXAM: No real change in her affect, strength remains good left slightly weaker than the right, no edema moves all extremities on command still some word finding noted, lungs clear, heart regular rate and rhythm.  Knox catheter in place.      Diet: Cardiac Diets, Diabetic Diets; Healthy Heart (2-3 Na+); Consistent Carbohydrate; Texture: Regular Texture (IDDSI 7); Fluid Consistency: Thin (IDDSI 0)        LABS:     Lab Results (last 48 hours)       Procedure Component Value Units Date/Time    POC Glucose Once [[id number]]  (Normal) Collected: 09/26/23 0615    Specimen: Blood Updated: 09/26/23 0621     Glucose 128 mg/dL     Basic Metabolic Panel [[id number]]  (Abnormal) Collected: 09/26/23 0034    Specimen: Blood Updated: 09/26/23 0232     Glucose 117 mg/dL      BUN 30 mg/dL      Creatinine 1.15 mg/dL      Sodium 137 mmol/L      Potassium 4.1 mmol/L      Comment: Slight hemolysis  detected by analyzer. Results may be affected.        Chloride 103 mmol/L      CO2 22.3 mmol/L      Calcium 8.9 mg/dL      BUN/Creatinine Ratio 26.1     Anion Gap 11.7 mmol/L      eGFR 49.5 mL/min/1.73     Narrative:      GFR Normal >60  Chronic Kidney Disease <60  Kidney Failure <15    The GFR formula is only valid for adults with stable renal function between ages 18 and 70.    CBC & Differential [[id number]]  (Abnormal) Collected: 09/26/23 0034    Specimen: Blood Updated: 09/26/23 0150    Narrative:      The following orders were created for panel order CBC & Differential.  Procedure                               Abnormality         Status                     ---------                               -----------         ------                     CBC Auto Differential[[id number]]        Abnormal            Final result                 Please view results for these tests on the individual orders.    CBC Auto Differential [[id number]]  (Abnormal) Collected: 09/26/23 0034    Specimen: Blood Updated: 09/26/23 0150     WBC 11.95 10*3/mm3      RBC 4.85 10*6/mm3      Hemoglobin 13.8 g/dL      Hematocrit 45.1 %      MCV 93.0 fL      MCH 28.5 pg      MCHC 30.6 g/dL      RDW 14.5 %      RDW-SD 49.8 fl      MPV 12.2 fL      Platelets 277 10*3/mm3      Neutrophil % 55.9 %      Lymphocyte % 31.5 %      Monocyte % 8.6 %      Eosinophil % 3.4 %      Basophil % 0.4 %      Immature Grans % 0.2 %      Neutrophils, Absolute 6.67 10*3/mm3      Lymphocytes, Absolute 3.77 10*3/mm3      Monocytes, Absolute 1.03 10*3/mm3      Eosinophils, Absolute 0.41 10*3/mm3      Basophils, Absolute 0.05 10*3/mm3      Immature Grans, Absolute 0.02 10*3/mm3      nRBC 0.0 /100 WBC     POC Glucose Once [[id number]]  (Abnormal) Collected: 09/25/23 2002    Specimen: Blood Updated: 09/25/23 2009     Glucose 133 mg/dL     POC Glucose Once [[id number]]  (Normal) Collected: 09/25/23 1623    Specimen: Blood Updated: 09/25/23 1633     Glucose 119 mg/dL     POC Glucose  Once [[id number]]  (Normal) Collected: 09/25/23 1113    Specimen: Blood Updated: 09/25/23 1120     Glucose 113 mg/dL     POC Glucose Once [[id number]]  (Normal) Collected: 09/25/23 0600    Specimen: Blood Updated: 09/25/23 0609     Glucose 124 mg/dL     Potassium [[id number]]  (Normal) Collected: 09/24/23 2200    Specimen: Blood Updated: 09/24/23 2221     Potassium 4.6 mmol/L      Comment: 1+ Hemolysis    Slight hemolysis detected by analyzer. Results may be affected.       POC Glucose Once [[id number]]  (Normal) Collected: 09/24/23 1937    Specimen: Blood Updated: 09/24/23 1943     Glucose 118 mg/dL     POC Glucose Once [[id number]]  (Normal) Collected: 09/24/23 1603    Specimen: Blood Updated: 09/24/23 1609     Glucose 111 mg/dL     POC Glucose Once [[id number]]  (Normal) Collected: 09/24/23 1138    Specimen: Blood Updated: 09/24/23 1145     Glucose 116 mg/dL                  Radiology:    Imaging Results (Last 72 Hours)       ** No results found for the last 72 hours. **            Results for orders placed during the hospital encounter of 09/11/23    Adult Transthoracic Echo Complete W/ Cont if Necessary Per Protocol    Interpretation Summary    Left ventricular systolic function is mildly decreased. Calculated left ventricular EF = 44% Left ventricular ejection fraction appears to be 41 - 45%.    Left ventricular wall thickness is consistent with mild eccentric hypertrophy.    Left ventricular diastolic function is consistent with (grade I) impaired relaxation.    The left atrial cavity is moderately dilated.      Assessment/Plan:   Status post CVA, patient continues to work with all 3 modalities of therapy.  Patient was discussed in case conference this a.m.  She continues to make steps forward in her cognitive speech therapy and expressive aphasia.  She is overall min assist with bathing lower body dressing and toileting.  She is contact-guard for transfers and able to walk up to 200 feet with a rolling walker  contact-guard assist.    Urinary retention, I have stopped Ditropan, consulted urology for further input.  Hopefully can try voiding trial potentially prior to going home.    HFrEF, compensated at this time, continue Entresto and Toprol.    Diabetes, controlled on metformin alone, follow    DVT prophylaxis, SCUDs    Depression, she seemed to be somewhat more dysthymic this morning but then I saw her later with OT and she seemed to be some better, continue Effexor and Wellbutrin.    Creatinine is stable    Jim Appiah MD

## 2023-09-26 NOTE — THERAPY TREATMENT NOTE
Inpatient Rehabilitation - Occupational Therapy Treatment Note     Richie     Patient Name: Ofelia Knox  : 1945  MRN: [id number]    Today's Date: 2023                 Admit Date: 2023       No diagnosis found.    Patient Active Problem List   Diagnosis    CVA (cerebral vascular accident)       Past Medical History:   Diagnosis Date    AMS (altered mental status)     Aphasia     CKD (chronic kidney disease)     COPD (chronic obstructive pulmonary disease)     CVA (cerebral vascular accident)     DM2 (diabetes mellitus, type 2)     HTN (hypertension)     Restrictive lung disease     Urinary tract infection due to ESBL Klebsiella        Past Surgical History:   Procedure Laterality Date    HYSTERECTOMY      JOINT REPLACEMENT               IRF OT ASSESSMENT FLOWSHEET (last 12 hours)       IRF OT Evaluation and Treatment       Row Name 23 1500          OT Time and Intention    Document Type daily treatment  -     Mode of Treatment individual therapy;occupational therapy  -     Patient Effort good  -       Row Name 23 1500          General Information    Patient/Family/Caregiver Comments/Observations patient agreeable to therapy. patient states she was tired this am. patient tolerated therapy with rest breaks.  -     Existing Precautions/Restrictions fall  aphasia  -       Row Name 23 1500          Cognition/Psychosocial    Affect/Mental Status (Cognition) sad/depressed affect  -     Orientation Status (Cognition) oriented to;person;place;verbal cues/prompts needed for orientation  -     Follows Commands (Cognition) verbal cues/prompting required;repetition of directions required  -       Row Name 23 1500          Lower Body Dressing    [place] Level (Lower Body Dressing) minimum assist (75% patient effort);contact guard assist;verbal cues;don;pants/bottoms;socks  -       Row Name 23 1500          Grooming    [place] Level (Grooming) grooming  skills;hair care, combing/brushing;oral care regimen;set up;verbal cues  -       Row Name 09/26/23 1500          Bed-Chair Transfer    Bed-Chair [place] (Transfers) contact guard;verbal cues  -       Row Name 09/26/23 1500          Motor Skills    Motor Skills coordination;functional endurance  -     Therapeutic Exercise shoulder;elbow/forearm;wrist;hand  BUE ROM, gmc,fmc, strengthening, reaching, UE PRE, BUE UE bike  -       Row Name 09/26/23 1500          Positioning and Restraints    Post Treatment Position wheelchair  -     In Wheelchair sitting;with PT  -               User Key  (r) = Recorded By, (t) = Taken By, (c) = Cosigned By      Initials Name Effective Dates     Althea Downs OT 07/11/23 -                      Occupational Therapy Education       Title: PT OT SLP Therapies (In Progress)       Topic: Occupational Therapy (In Progress)       Point: ADL training (In Progress)       Description:   Instruct learner(s) on proper safety adaptation and remediation techniques during self care or transfers.   Instruct in proper use of assistive devices.                  Learning Progress Summary             Patient Acceptance, TB, NR by DL at 9/24/2023 2200    Acceptance, E, VU,NR by  at 9/16/2023 1145    Acceptance, E, VU,NR by  at 9/15/2023 1232                         Point: Precautions (In Progress)       Description:   Instruct learner(s) on prescribed precautions during self-care and functional transfers.                  Learning Progress Summary             Patient Acceptance, TB, NR by DL at 9/24/2023 2200    Acceptance, E, VU,NR by  at 9/16/2023 1145    Acceptance, E, VU,NR by  at 9/15/2023 1232                                         User Key       Initials Effective Dates Name Provider Type Discipline     07/11/23 -  Pam Peck OT Occupational Therapist OT    HB 05/25/21 -  Xena Chinchilla OT Occupational Therapist OT    DL 03/16/22 -  James  Carolyn, RN Registered Nurse Nurse                        OT Recommendation and Plan                         Time Calculation:      Time Calculation- OT       Row Name 09/26/23 1529             Time Calculation-     OT Start Time 0730  -      OT Stop Time 0900  -      OT Time Calculation (min) 90 min  -                User Key  (r) = Recorded By, (t) = Taken By, (c) = Cosigned By      Initials Name Provider Type     Althea Downs, BINH Occupational Therapist                  Therapy Charges for Today       Code Description Service Date Service Provider Modifiers Qty    86660952979 HC OT THERAPEUTIC ACT EA 15 MIN 9/25/2023 Althea Downs, OT GO 2    33587687021 HC OT SELF CARE/MGMT/TRAIN EA 15 MIN 9/25/2023 Althea Downs, OT GO 2    86287971221 HC OT NEUROMUSC RE EDUCATION EA 15 MIN 9/25/2023 Althea Downs, OT GO 3    07788826384 HC OT SELF CARE/MGMT/TRAIN EA 15 MIN 9/26/2023 Althea Downs, OT GO 2    03015306713 HC OT THERAPEUTIC ACT EA 15 MIN 9/26/2023 Althea Downs, OT GO 2    40295639278 HC OT NEUROMUSC RE EDUCATION EA 15 MIN 9/26/2023 Althea Downs, OT GO 2                     Althea Downs OT  9/26/2023

## 2023-09-26 NOTE — SIGNIFICANT NOTE
09/26/23 5745   Plan   Plan Team conference held today and pt is scheduled for discharge on 10-3-23.  Left message for son [phone].

## 2023-09-26 NOTE — PLAN OF CARE
Goal Outcome Evaluation:  Plan of Care Reviewed With: patient resting in bed, continues to participate in therapy. Will continue to monitor.

## 2023-09-26 NOTE — PROGRESS NOTES
Occupational Therapy:    Physical Therapy: Individual: 90 minutes.    Speech Language Pathology:    Signed by: Andra Stewart, Physical Therapist

## 2023-09-26 NOTE — CONSULTS
Name:  Ofelia Knox  :  1945    DATE OF ADMISSION  2023    DATE OF CONSULT  2023     REFERRING PHYSICIAN  Provider,No Known    PRIMARY CARE PHYSICIAN  Provider, No Known    REASON FOR CONSULT  Urinary retention    CHIEF COMPLAINT  Weakness    HISTORY OF PRESENT ILLNESS:   Ofelia Knox is a 78 y.o. female who was admitted to inpatient rehabilitation facility on 2023.  She has a past medical history significant for diabetes, hypertension, and home O2 dependent COPD.  She was initially brought to the emergency department for evaluation of altered mental status on 9/10/2023.  She had an MRI that showed an acute infarct in the left putamen and anterior limb of the internal capsule.  There was no stenosis on CTA of the head and neck.  She was started on a high-dose statin and aspirin.  Since rehabilitation patient has been doing well however she developed acute urinary retention roughly 5 days ago and a indwelling Knox catheter was placed at that time.    I saw Ofelia Knox in their hospital room this morning.  Patient was sitting up in her wheelchair eating lunch as I entered the room.  She does endorse a history of urinary incontinence however has never been on any medications prior.  She was started on oxybutynin 10 mg every 24 hours on 2023.  She states she has had several Knox catheters during hospitalizations however they have all been removed and denies any difficulty urinating post removal.  Does have a history of ESBL Klebsiella urinary tract infections.  She denies any dysuria, flank pain, gross hematuria, or significant pressure.  She did endorse frequency and urgency prior to cath placement.  Her oxybutynin is since been stopped.  She has no other acute problems today.    PAST MEDICAL HISTORY  Past Medical History:   Diagnosis Date    AMS (altered mental status)     Aphasia     CKD (chronic kidney disease)     COPD (chronic obstructive pulmonary disease)     CVA (cerebral vascular  accident)     DM2 (diabetes mellitus, type 2)     HTN (hypertension)     Restrictive lung disease     Urinary tract infection due to ESBL Klebsiella        PAST SURGICAL HISTORY  Past Surgical History:   Procedure Laterality Date    HYSTERECTOMY      JOINT REPLACEMENT         SOCIAL HISTORY  Social History     Socioeconomic History    Marital status:    Tobacco Use    Smoking status: Never     Passive exposure: Never    Smokeless tobacco: Never   Vaping Use    Vaping Use: Unknown   Substance and Sexual Activity    Alcohol use: Never    Drug use: Never    Sexual activity: Never       FAMILY HISTORY  History reviewed. No pertinent family history.    ALLERGIES  Allergies   Allergen Reactions    Codeine Nausea Only    Albuterol Rash    Amoxicillin Rash    Cephalexin Rash    Clavulanic Acid Rash    Levofloxacin Rash     INPATIENT MEDICATIONS  Current Facility-Administered Medications   Medication Dose Route Frequency Provider Last Rate Last Admin    acetaminophen (TYLENOL) tablet 650 mg  650 mg Oral Q4H PRN Jim Appiah MD   650 mg at 09/21/23 2103    aspirin EC tablet 81 mg  81 mg Oral Daily Jim Appiah MD   81 mg at 09/26/23 0834    benzonatate (TESSALON) capsule 200 mg  200 mg Oral Q4H PRN Abdullahi Sharpe MD        buPROPion XL (WELLBUTRIN XL) 24 hr tablet 150 mg  150 mg Oral Daily Jim Appiah MD   150 mg at 09/26/23 0834    cetirizine (zyrTEC) tablet 5 mg  5 mg Oral Daily Jim Appiah MD   5 mg at 09/26/23 0834    dextrose (D50W) (25 g/50 mL) IV injection 25 g  25 g Intravenous Q15 Min PRN Jim Appiah MD   25 g at 09/13/23 0559    dextrose (GLUTOSE) oral gel 15 g  15 g Oral Q15 Min PRN Jim Appiah MD        furosemide (LASIX) tablet 20 mg  20 mg Oral Every Other Day Abdullahi Sharpe MD   20 mg at 09/26/23 0834    glucagon HCl (Diagnostic) injection 1 mg  1 mg Subcutaneous Q15 Min PRN Jim Appiah MD        ipratropium (ATROVENT) nebulizer solution 0.5 mg  0.5 mg Nebulization 4x Daily - RT  "Abdullahi Sharpe MD   0.5 mg at 09/25/23 1313    isosorbide mononitrate (IMDUR) 24 hr tablet 30 mg  30 mg Oral Q24H Jim Appiah MD   30 mg at 09/26/23 0834    Magnesium Standard Dose Replacement - Follow Nurse / BPA Driven Protocol   Does not apply PRN Abdullahi Sharpe MD        metFORMIN (GLUCOPHAGE) tablet 500 mg  500 mg Oral BID With Meals Jim Appiah MD   500 mg at 09/26/23 0833    metoprolol succinate XL (TOPROL-XL) 24 hr tablet 25 mg  25 mg Oral Q24H Jim Appiah MD   25 mg at 09/26/23 0834    montelukast (SINGULAIR) tablet 10 mg  10 mg Oral Nightly Jim Appiah MD   10 mg at 09/25/23 2014    nystatin (MYCOSTATIN) 429361 UNIT/GM cream 1 application   1 application  Topical Q12H Jim Appiah MD   1 application  at 09/26/23 0846    pantoprazole (PROTONIX) EC tablet 40 mg  40 mg Oral QAM AC Jim Appiah MD   40 mg at 09/26/23 0631    Potassium Replacement - Follow Nurse / BPA Driven Protocol   Does not apply PRN Abdullahi Sharpe MD        pravastatin (PRAVACHOL) tablet 40 mg  40 mg Oral Daily Jim Appiah MD   40 mg at 09/26/23 0834    sacubitril-valsartan (ENTRESTO) 24-26 MG tablet 1 tablet  1 tablet Oral Q12H Abdullahi Sharpe MD   1 tablet at 09/26/23 0834    tiotropium (SPIRIVA RESPIMAT) 2.5 mcg/act aerosol solution inhaler  2 puff Inhalation Daily - RT Jim Appiah MD   2 puff at 09/24/23 0653    venlafaxine XR (EFFEXOR-XR) 24 hr capsule 75 mg  75 mg Oral Daily With Breakfast Jim Appiah MD   75 mg at 09/26/23 0834       PHYSICAL EXAMINATION    /68   Pulse 65   Temp 98.2 øF (36.8 øC) (Oral)   Resp 18   Ht 167.6 cm (66\")   Wt 112 kg (248 lb)   LMP  (LMP Unknown)   SpO2 98%   BMI 40.03 kg/mý     GENERAL:  A well-developed, well-nourished, chronically ill-appearing elderly white female in no acute distress.  HEENT:  Pupils equally round and reactive to light.  Extraocular muscles intact.  CARDIOVASCULAR:  Regular rate and rhythm.  No murmurs, gallops or rubs.  LUNGS:  Clear to " auscultation bilaterally.  ABDOMEN:  Soft, nontender, nondistended with positive bowel sounds.  : Indwelling Knox catheter in place with yellow urine noted in collection bag.  No signs of sediments or gross hematuria.  EXTREMITIES:  No clubbing, cyanosis or edema bilaterally.  SKIN:  No rashes or petechiae.  NEURO:  Cranial nerves grossly intact.  No focal deficits.  PSYCH:  Alert and oriented x3.    LABORATORY     WBC   Date Value Ref Range Status   09/26/2023 11.95 (H) 3.40 - 10.80 10*3/mm3 Final     RBC   Date Value Ref Range Status   09/26/2023 4.85 3.77 - 5.28 10*6/mm3 Final     Hemoglobin   Date Value Ref Range Status   09/26/2023 13.8 12.0 - 15.9 g/dL Final     Hematocrit   Date Value Ref Range Status   09/26/2023 45.1 34.0 - 46.6 % Final     MCV   Date Value Ref Range Status   09/26/2023 93.0 79.0 - 97.0 fL Final     MCH   Date Value Ref Range Status   09/26/2023 28.5 26.6 - 33.0 pg Final     MCHC   Date Value Ref Range Status   09/26/2023 30.6 (L) 31.5 - 35.7 g/dL Final     RDW   Date Value Ref Range Status   09/26/2023 14.5 12.3 - 15.4 % Final     RDW-SD   Date Value Ref Range Status   09/26/2023 49.8 37.0 - 54.0 fl Final     MPV   Date Value Ref Range Status   09/26/2023 12.2 (H) 6.0 - 12.0 fL Final     Platelets   Date Value Ref Range Status   09/26/2023 277 140 - 450 10*3/mm3 Final     Neutrophil %   Date Value Ref Range Status   09/26/2023 55.9 42.7 - 76.0 % Final     Lymphocyte %   Date Value Ref Range Status   09/26/2023 31.5 19.6 - 45.3 % Final     Monocyte %   Date Value Ref Range Status   09/26/2023 8.6 5.0 - 12.0 % Final     Eosinophil %   Date Value Ref Range Status   09/26/2023 3.4 0.3 - 6.2 % Final     Basophil %   Date Value Ref Range Status   09/26/2023 0.4 0.0 - 1.5 % Final     Immature Grans %   Date Value Ref Range Status   09/26/2023 0.2 0.0 - 0.5 % Final     Neutrophils, Absolute   Date Value Ref Range Status   09/26/2023 6.67 1.70 - 7.00 10*3/mm3 Final     Lymphocytes, Absolute    Date Value Ref Range Status   09/26/2023 3.77 (H) 0.70 - 3.10 10*3/mm3 Final     Monocytes, Absolute   Date Value Ref Range Status   09/26/2023 1.03 (H) 0.10 - 0.90 10*3/mm3 Final     Eosinophils, Absolute   Date Value Ref Range Status   09/26/2023 0.41 (H) 0.00 - 0.40 10*3/mm3 Final     Basophils, Absolute   Date Value Ref Range Status   09/26/2023 0.05 0.00 - 0.20 10*3/mm3 Final     Immature Grans, Absolute   Date Value Ref Range Status   09/26/2023 0.02 0.00 - 0.05 10*3/mm3 Final     nRBC   Date Value Ref Range Status   09/26/2023 0.0 0.0 - 0.2 /100 WBC Final       Glucose   Date Value Ref Range Status   09/26/2023 117 (H) 65 - 99 mg/dL Final     Sodium   Date Value Ref Range Status   09/26/2023 137 136 - 145 mmol/L Final     Potassium   Date Value Ref Range Status   09/26/2023 4.1 3.5 - 5.2 mmol/L Final     Comment:     Slight hemolysis detected by analyzer. Results may be affected.     CO2   Date Value Ref Range Status   09/26/2023 22.3 22.0 - 29.0 mmol/L Final     Chloride   Date Value Ref Range Status   09/26/2023 103 98 - 107 mmol/L Final     Anion Gap   Date Value Ref Range Status   09/26/2023 11.7 5.0 - 15.0 mmol/L Final     Creatinine   Date Value Ref Range Status   09/26/2023 1.15 (H) 0.57 - 1.00 mg/dL Final     BUN   Date Value Ref Range Status   09/26/2023 30 (H) 8 - 23 mg/dL Final     BUN/Creatinine Ratio   Date Value Ref Range Status   09/26/2023 26.1 (H) 7.0 - 25.0 Final     Calcium   Date Value Ref Range Status   09/26/2023 8.9 8.6 - 10.5 mg/dL Final       No results found for: MG, PHOS  No results found for: LDH, URICACID     IMAGING  Imaging Results (Last 72 Hours)       ** No results found for the last 72 hours. **            CT Abdomen and Pelvis: No results found for this or any previous visit.       CT Stone Protocol: No results found for this or any previous visit.       KUB: No results found for this or any previous visit.   \    LABS:   No results displayed because visit has over 200  results.           PATHOLOGY  * Cannot find OR log *    IMPRESSION AND PLAN  Ofelia Knox is a 78 y.o., white female with:  Urinary retention -patient may undergo voiding trial at this time.  I would recommend to discontinue any anticholinergics/antispasmodics until patient is urinating normally.  Retention could be secondary to recent CTA but is most likely secondary to anticholinergics..  Patient can begin Flomax once daily to help with urinating if she wishes.  Did discuss the risk and benefits of this including lightheadedness, dizziness, syncope, chest pain, or shortness of breath.  If patient is unable to urinate and indwelling Knox catheter can be replaced.  Advised her that if she continues to have difficulty urinating we can schedule her for a urodynamic study in the outpatient clinic.  Discussed this procedure in detail including risk and benefits.  I will reevaluate the patient in the morning.  Patient verbalized understanding.    The patient was in agreement with these plans.    Thank you for asking us to participate in Ofelia Knox's care.  We will continue to follow with you.  Please do not hesitate to call with any questions or concerns that you may have.    A total of 60 minutes were spent coordinating this patient's care in clinic today; 30 minutes of which were face-to-face with the patient, reviewing medical history and counseling on the current treatment and followup plan.  All questions were answered to patient's satisfaction.         This document has been electronically signed by Alan Petty PA-C  September 26, 2023 12:43 EDT    Part of this note may be an electronic transcription/translation of spoken language to printed text using the Dragon Dictation System.

## 2023-09-26 NOTE — THERAPY TREATMENT NOTE
Inpatient Rehabilitation - Speech Language Pathology Treatment Note  [facility]     Patient Name: Ofelia Knox  : 1945  MRN: [id number]  Today's Date: 2023             Admit Date: 2023    Ms. Knox was seen this am in [facility] for continued speech therapy. She was a/a and interactive. She is noted w/ limited Wernicke's like jargon this date, however this becomes more apparent during conversational exchanges.      Long Term Goal:  Patient will demonstrate adequate language skills to fully participate in adls at premorbid baseline level of function.       Short Term Goals:  1. GOAL MET:       2. GOAL MET:       3. GOAL MET:       4. GOAL MET:   5. GOAL MET:    6. Patient will identify(receptively/expressively) the appropriate object/picture/word following a verbal description w/ 80% accuracy over three consecutive sessions.   Verbal description provided and receptive ID from field of 4-6 pictures/objects - 100% accuracy  Verbal description provided and pt required to expressively state appropriate object - 66% independently. Improves to 100% w/ cues of initial phoneme or oe sentence.     7. GOAL MET:    8. Patient will complete complex oe sentences w/ 90% accuracy and min cues over three consecutive sessions.   83% w/ one and two word oe sentence completion independently.   Improves to 100% w/ min cues of initial phoneme      9. Patient will provide verbal 3+ descriptors of an object/picture/word w/ 80% accuracy and min cues over three consecutive sessions.   Deferred this date     10. Patient will perform divergent naming tasks w/ 5+ objects named of a given category in < 2 min over three consecutive sessions.   Two items named in a narrow category w/ 75% named w/o cues. Min cues of initial phoneme or oe sentence improves to 100%.     11.  Pt will increase verbal expression to 90% for expressing object functions w/ single word and/or phrase responses w/ min cues over three consecutive sessions.   Able to  state object function independently 60% w/ 2-5 word phrases. Improves to 100% w/ min cues.     12.  Pt will increase verbal expression to 90% accuracy for producing two-or-more-word responses during sentence completions/tasks w/ min cues over three consecutive sessions.   60% accuracy of 2-5 word responses during sentence completion tasks    13.  Pt will increase verbal expression to 80% accuracy independently while forming sentences relevant to current topic over three consecutive sessions.   Verbal expression of sentences relevant to current topic is on topic approx 75% of opp.     14.  Pt will increase verbal expression to 90% accuracy, independently, over three consecutive sessions, for sentence formulation when given a target word to incorporate in the sentence.   Deferred this date    Visit Dx:  No diagnosis found.  Patient Active Problem List   Diagnosis    CVA (cerebral vascular accident)     Past Medical History:   Diagnosis Date    AMS (altered mental status)     Aphasia     CKD (chronic kidney disease)     COPD (chronic obstructive pulmonary disease)     CVA (cerebral vascular accident)     DM2 (diabetes mellitus, type 2)     HTN (hypertension)     Restrictive lung disease     Urinary tract infection due to ESBL Klebsiella      Past Surgical History:   Procedure Laterality Date    HYSTERECTOMY      JOINT REPLACEMENT       EDUCATION  The patient has been educated in the following areas:   Cognitive Impairment Communication Impairment.    SLP Recommendation and Plan   Continue current plan of care.     Thank you   Verona Sarmiento M.S., CCC-SLP           Time Calculation:      Time Calculation- SLP       Row Name 09/26/23 1311             Time Calculation- SLP    SLP Start Time 1000  -JR      SLP Stop Time 1045  -JR      SLP Time Calculation (min) 45 min  -      SLP - Next Appointment 09/27/23  -                User Key  (r) = Recorded By, (t) = Taken By, (c) = Cosigned By      Initials Name Provider Type     Verona Pizarro, MS CCC-SLP Speech and Language Pathologist                    Therapy Charges for Today       Code Description Service Date Service Provider Modifiers Qty    51839077180  ST TREATMENT SPEECH 2 9/25/2023 Verona Sarmiento MS CCC-SLP GN 1    [id number] [facility] TREATMENT SPEECH 3 9/26/2023 Verona Sarmiento, MS MIRANDA-SLP GN 1                       MS XENA Schwartz  9/26/2023

## 2023-09-26 NOTE — THERAPY RE-EVALUATION
Inpatient Rehabilitation - Physical Therapy Re-Evaluation        [facility]     Patient Name: Ofelia Rehman  : 1945  MRN: [id number]    Today's Date: 2023                    Admit Date: 2023      Visit Dx:   No diagnosis found.    Patient Active Problem List   Diagnosis    CVA (cerebral vascular accident)       Past Medical History:   Diagnosis Date    AMS (altered mental status)     Aphasia     CKD (chronic kidney disease)     COPD (chronic obstructive pulmonary disease)     CVA (cerebral vascular accident)     DM2 (diabetes mellitus, type 2)     HTN (hypertension)     Restrictive lung disease     Urinary tract infection due to ESBL Klebsiella        Past Surgical History:   Procedure Laterality Date    HYSTERECTOMY      JOINT REPLACEMENT         PT ASSESSMENT (last 12 hours)       IRF PT Evaluation and Treatment       Row Name 23 1539          PT Time and Intention    Document Type re-evaluation;daily treatment  -LB     Mode of Treatment individual therapy;physical therapy  -LB       Row Name 23 1539          General Information    Existing Precautions/Restrictions fall  rehman, O2 prn, aphasia  -LB       Row Name 23 1539          Pain Scale: FACES Pre/Post-Treatment    Pain: FACES Scale, Pretreatment 0-->no hurt  -LB     Posttreatment Pain Rating 0-->no hurt  -LB       Row Name 23 1539          Cognition/Psychosocial    Affect/Mental Status (Cognition) confused  -LB     Follows Commands (Cognition) verbal cues/prompting required;physical/tactile prompts required;repetition of directions required;follows one-step commands;75-90% accuracy  -LB     Personal Safety Interventions gait belt;nonskid shoes/slippers when out of bed;supervised activity  -LB       Row Name 23 1539          Bed Mobility    Supine-Sit-Supine [place] (Bed Mobility) contact guard;verbal cues;nonverbal cues (demo/gesture)  -LB     Assistive Device (Bed Mobility) bed rails  -       Row Name  09/26/23 1539          Bed-Chair Transfer    Bed-Chair [place] (Transfers) contact guard;verbal cues;nonverbal cues (demo/gesture)  -LB     Assistive Device (Bed-Chair Transfers) wheelchair  -LB       Row Name 09/26/23 1539          Chair-Bed Transfer    Chair-Bed [place] (Transfers) contact guard;verbal cues;nonverbal cues (demo/gesture)  -LB     Assistive Device (Chair-Bed Transfers) wheelchair  -LB       Row Name 09/26/23 1539          Sit-Stand Transfer    Sit-Stand [place] (Transfers) verbal cues;nonverbal cues (demo/gesture);contact guard  -LB     Assistive Device (Sit-Stand Transfers) walker, front-wheeled  -LB       Row Name 09/26/23 1539          Stand-Sit Transfer    Stand-Sit [place] (Transfers) verbal cues;nonverbal cues (demo/gesture);contact guard  -LB     Assistive Device (Stand-Sit Transfers) walker, front-wheeled  -LB       Row Name 09/26/23 1539          Gait/Stairs (Locomotion)    [place] Level (Gait) contact guard;verbal cues;nonverbal cues (demo/gesture)  -LB     Assistive Device (Gait) walker, front-wheeled  -LB     Distance in Feet (Gait) 180' 160'  -LB     Deviations/Abnormal Patterns (Gait) beatrice decreased;gait speed decreased;stride length decreased  -LB     Bilateral Gait Deviations forward flexed posture  -LB       Row Name 09/26/23 1539          Safety Issues, Functional Mobility    Impairments Affecting Function (Mobility) balance;cognition;endurance/activity tolerance;pain;strength  -LB       Row Name 09/26/23 1539          Balance    Comment, Balance sitting balloon tap with BUE  -LB       Row Name 09/26/23 1539          Motor Skills    Therapeutic Exercise --  sitting ex bid, standing ex in PB  -LB       Row Name 09/26/23 1539          Positioning and Restraints    Pre-Treatment Position --  am-w/c, pm-bed  -LB     In Bed call light within reach;encouraged to call for assist  am  -LB     In Wheelchair call light within reach;encouraged to call for  assist;exit alarm on  pm  -LB       Row Name 09/26/23 1539          Vital Signs    Intra SpO2 (%) 98  wheezing/sob noted with activity  -LB     O2 Delivery Intra Treatment supplemental O2  -LB       Row Name 09/26/23 1539          Therapy Assessment/Plan (PT)    Patient's Goals For Discharge return home  -       Row Name 09/26/23 1539          Therapy Assessment/Plan (PT)    Rehab Potential/Prognosis (PT) adequate, monitor progress closely  -LB     Frequency of Treatment (PT) 5 times per week  -LB     Estimated Duration of Therapy (PT) 2 weeks  -LB     Problem List (PT) balance;cognition;mobility;strength;pain;communication  -LB     Activity Limitations Related to Problem List (PT) unable to ambulate safely;unable to transfer safely  -LB       Row Name 09/26/23 1539          Weekly Progress Summary (PT)    Functional Goal Overall Progress (PT) progressing toward functional goals as expected  -LB     Impairments Still Limiting Function (PT) balance impairment;functional activity tolerance impairment;strength deficit  -LB     Recommendations (PT) continue PT  -LB     Plan for Continued Service After Discharge (PT) tbd  -LB       [facility] Name 09/26/23 1539          Therapy Plan Review/Discharge Plan (PT)    Anticipated Equipment Needs at Discharge (PT Eval) --  tbd  -LB     Anticipated Discharge Disposition (PT) home with assist;home with home health;home with outpatient therapy services  -       Row Name 09/26/23 1539          IRF PT Goals    Bed Mobility Goal Selection (PT-IRF) bed mobility, PT goal 1  -LB     Transfer Goal Selection (PT-IRF) transfers, PT goal 1  -LB     Gait (Walking Locomotion) Goal Selection (PT-IRF) gait, PT goal 1  -LB       [facility] Name 09/26/23 1539          Bed Mobility Goal 1 (PT-IRF)    Activity/Assistive Device (Bed Mobility Goal 1, PT-IRF) sit to supine/supine to sit  -LB     [place] Level (Bed Mobility Goal 1, PT-IRF) supervision required  -LB     Time Frame (Bed Mobility Goal 1,  PT-IRF) by discharge  -LB     Progress/Outcomes (Bed Mobility Goal 1, PT-IRF) goal ongoing  -LB       Row Name 09/26/23 1539          Transfer Goal 1 (PT-IRF)    Activity/Assistive Device (Transfer Goal 1, PT-IRF) sit-to-stand/stand-to-sit;bed-to-chair/chair-to-bed  -LB     [place] Level (Transfer Goal 1, PT-IRF) supervision required  -LB     Time Frame (Transfer Goal 1, PT-IRF) by discharge  -LB     Progress/Outcomes (Transfer Goal 1, PT-IRF) goal ongoing  -LB       Row Name 09/26/23 1539          Gait/Walking Locomotion Goal 1 (PT-IRF)    Activity/Assistive Device (Gait/Walking Locomotion Goal 1, PT-IRF) walker, rolling  -LB     Gait/Walking Locomotion Distance Goal 1 (PT-IRF) 300'  -LB     [place] Level (Gait/Walking Locomotion Goal 1, PT-IRF) supervision required  -LB     Time Frame (Gait/Walking Locomotion Goal 1, PT-IRF) by discharge  -LB     Progress/Outcomes (Gait/Walking Locomotion Goal 1, PT-IRF) goal ongoing  -LB               User Key  (r) = Recorded By, (t) = Taken By, (c) = Cosigned By      Initials Name Provider Type    Andra Lea, PT Physical Therapist                     Physical Therapy Education       Title: PT OT SLP Therapies (In Progress)       Topic: Physical Therapy (Done)       Point: Mobility training (Done)       Learning Progress Summary             Patient Acceptance, E, VU,NR by LB at 9/26/2023 1548    Acceptance, E,D, VU,NR by RG at 9/25/2023 1423    Acceptance, TB, NR by DL at 9/24/2023 2200    Acceptance, E,D, NR,VU by RG at 9/22/2023 1315    Acceptance, E,D, VU,NR by RG at 9/21/2023 1307    Acceptance, E,D, VU,NR by RG at 9/20/2023 1258    Acceptance, E,D, VU,NR by RG at 9/19/2023 1258    Acceptance, E,D, VU,NR by RG at 9/18/2023 1338    Acceptance, E,TB, VU by RM at 9/15/2023 1532    Acceptance, E,TB, VU by RM at 9/14/2023 1555    Acceptance, E,D, VU,NR by RG at 9/13/2023 1528    Acceptance, TB, NR by DL at 9/12/2023 2003    Acceptance, E, VU,NR by LB at  9/12/2023 1135                         Point: Home exercise program (Done)       Learning Progress Summary             Patient Acceptance, E, VU,NR by LB at 9/26/2023 1548    Acceptance, E,D, VU,NR by RG at 9/25/2023 1423    Acceptance, TB, NR by DL at 9/24/2023 2200    Acceptance, E,D, NR,VU by RG at 9/22/2023 1315    Acceptance, E,D, VU,NR by RG at 9/21/2023 1307    Acceptance, E,D, VU,NR by RG at 9/20/2023 1258    Acceptance, E,D, VU,NR by RG at 9/19/2023 1258    Acceptance, E,D, VU,NR by RG at 9/18/2023 1338    Acceptance, E,TB, VU by RM at 9/15/2023 1532    Acceptance, E,TB, VU by RM at 9/14/2023 1555    Acceptance, E,D, VU,NR by RG at 9/13/2023 1528    Acceptance, TB, NR by DL at 9/12/2023 2003    Acceptance, E, VU,NR by LB at 9/12/2023 1135                         Point: Body mechanics (Done)       Learning Progress Summary             Patient Acceptance, E, VU,NR by LB at 9/26/2023 1548    Acceptance, E,D, VU,NR by RG at 9/25/2023 1423    Acceptance, TB, NR by DL at 9/24/2023 2200    Acceptance, E,D, NR,VU by RG at 9/22/2023 1315    Acceptance, E,D, VU,NR by RG at 9/21/2023 1307    Acceptance, E,D, VU,NR by RG at 9/20/2023 1258    Acceptance, E,D, VU,NR by RG at 9/19/2023 1258    Acceptance, E,D, VU,NR by RG at 9/18/2023 1338    Acceptance, E,TB, VU by RM at 9/15/2023 1532    Acceptance, E,TB, VU by RM at 9/14/2023 1555    Acceptance, E,D, VU,NR by RG at 9/13/2023 1528    Acceptance, TB, NR by DL at 9/12/2023 2003    Acceptance, E, VU,NR by LB at 9/12/2023 1135                         Point: Precautions (Done)       Learning Progress Summary             Patient Acceptance, E, VU,NR by LB at 9/26/2023 1548    Acceptance, E,D, VU,NR by RG at 9/25/2023 1423    Acceptance, TB, NR by DL at 9/24/2023 2200    Acceptance, E,D, NR,VU by RG at 9/22/2023 1315    Acceptance, E,D, VU,NR by RG at 9/21/2023 1307    Acceptance, E,D, VU,NR by RG at 9/20/2023 1258    Acceptance, E,D, VU,NR by RG at 9/19/2023 1258     Acceptance, E,D, VU,NR by RG at 9/18/2023 1338    Acceptance, E,TB, VU by RM at 9/15/2023 1532    Acceptance, E,TB, VU by RM at 9/14/2023 1555    Acceptance, E,D, VU,NR by RG at 9/13/2023 1528    Acceptance, TB, NR by DL at 9/12/2023 2003    Acceptance, E, VU,NR by LB at 9/12/2023 1135                                         User Key       Initials Effective Dates Name Provider Type Discipline    LB 06/16/21 -  Andra Stewart, PT Physical Therapist PT    RM 02/17/23 -  Karis Obrien, PTA Physical Therapist Assistant PT    RG 06/16/21 -  Twan Cunningham PTA Physical Therapist Assistant PT    DL 03/16/22 -  Carolyn Ramirez RN Registered Nurse Nurse                    PT Recommendation and Plan    Planned Therapy Interventions (PT): balance training, bed mobility training, gait training, neuromuscular re-education, patient/family education, strengthening, transfer training  Frequency of Treatment (PT): 5 times per week  Anticipated Equipment Needs at Discharge (PT Eval):  (tbd)                  Time Calculation:      PT Charges       Row Name 09/26/23 1549 09/26/23 1548          Time Calculation    Start Time 1045  -LB 0915  -LB     Stop Time 1130  -LB 1000  -LB     Time Calculation (min) 45 min  -LB 45 min  -LB     PT Received On -- 09/26/23  -LB     PT Goal Re-Cert Due Date -- 10/03/23  -LB               User Key  (r) = Recorded By, (t) = Taken By, (c) = Cosigned By      Initials Name Provider Type    LB Andra Stewart, PT Physical Therapist                    Therapy Charges for Today       Code Description Service Date Service Provider Modifiers Qty    72733495806 HC GAIT TRAINING EA 15 MIN 9/26/2023 Andra Stewart, PT GP 1    [id number] HC PT THER PROC EA 15 MIN 9/26/2023 Andra Stewart, PT GP 3    [id number] HC PT NEUROMUSC RE EDUCATION EA 15 MIN 9/26/2023 Andra Stewart, PT GP 1    88083868392 HC PT THERAPEUTIC ACT EA 15 MIN 9/26/2023 Andra Stewart, PT GP 1                      Andra Stewart, PT  9/26/2023

## 2023-09-26 NOTE — PROGRESS NOTES
Rehabilitation Nursing  Inpatient Rehabilitation Plan of Care Note    Plan of Care  Copy from [facility]    Toilet Transfers (Active)  Current Status (9/11/2023 7:00:00 PM): PATIENT WILL HAVE NO TRANSFER DIFFICULTY    Weekly Goal: NO DIFFICULT WITH TRANSFERS  Discharge Goal: TRANSFERS INDEPENDENTLY    Pain    Pain Management (Active)  Current Status (9/11/2023 7:00:00 PM): PATIENT WILL VOICE NO PAIN  Weekly Goal: PATIENT WILL BE PAIN FREE  Discharge Goal: FREE FROM PAIN    Safety    Potential for Injury (Active)  Current Status (9/11/2023 7:00:00 PM): PATIENT WILL HAVE NO INJURY THIS STAY  Weekly Goal: NO INJURY  Discharge Goal: PATIENT WILL DISCHARGE    Body Systems    Integumentary (Active)  Current Status (9/11/2023 7:00:00 PM): PATIENT WILL HAVE NO SKIN  BREAKDOWN THIS  STAY  Weekly Goal: SKIN WILL REMAIN INTACT.  Discharge Goal: NO SKIN ISSUES.    Signed by: Vicki Daniel, Nurse

## 2023-09-27 LAB
GLUCOSE BLDC GLUCOMTR-MCNC: 116 MG/DL (ref 70–130)
GLUCOSE BLDC GLUCOMTR-MCNC: 120 MG/DL (ref 70–130)
GLUCOSE BLDC GLUCOMTR-MCNC: 75 MG/DL (ref 70–130)
GLUCOSE BLDC GLUCOMTR-MCNC: 89 MG/DL (ref 70–130)
GLUCOSE BLDC GLUCOMTR-MCNC: 92 MG/DL (ref 70–130)

## 2023-09-27 PROCEDURE — 97110 THERAPEUTIC EXERCISES: CPT

## 2023-09-27 PROCEDURE — 94799 UNLISTED PULMONARY SVC/PX: CPT

## 2023-09-27 PROCEDURE — 97535 SELF CARE MNGMENT TRAINING: CPT | Performed by: OCCUPATIONAL THERAPIST

## 2023-09-27 PROCEDURE — 94664 DEMO&/EVAL PT USE INHALER: CPT

## 2023-09-27 PROCEDURE — 99232 SBSQ HOSP IP/OBS MODERATE 35: CPT | Performed by: INTERNAL MEDICINE

## 2023-09-27 PROCEDURE — 97116 GAIT TRAINING THERAPY: CPT

## 2023-09-27 PROCEDURE — 97112 NEUROMUSCULAR REEDUCATION: CPT | Performed by: OCCUPATIONAL THERAPIST

## 2023-09-27 PROCEDURE — 97112 NEUROMUSCULAR REEDUCATION: CPT

## 2023-09-27 PROCEDURE — 92507 TX SP LANG VOICE COMM INDIV: CPT

## 2023-09-27 PROCEDURE — 97530 THERAPEUTIC ACTIVITIES: CPT

## 2023-09-27 PROCEDURE — 82948 REAGENT STRIP/BLOOD GLUCOSE: CPT

## 2023-09-27 PROCEDURE — 97530 THERAPEUTIC ACTIVITIES: CPT | Performed by: OCCUPATIONAL THERAPIST

## 2023-09-27 PROCEDURE — 94761 N-INVAS EAR/PLS OXIMETRY MLT: CPT

## 2023-09-27 RX ADMIN — SACUBITRIL AND VALSARTAN 1 TABLET: 24; 26 TABLET, FILM COATED ORAL at 20:28

## 2023-09-27 RX ADMIN — PANTOPRAZOLE SODIUM 40 MG: 40 TABLET, DELAYED RELEASE ORAL at 06:32

## 2023-09-27 RX ADMIN — TIOTROPIUM BROMIDE INHALATION SPRAY 2 PUFF: 3.12 SPRAY, METERED RESPIRATORY (INHALATION) at 07:33

## 2023-09-27 RX ADMIN — CETIRIZINE HYDROCHLORIDE 5 MG: 10 TABLET, FILM COATED ORAL at 08:33

## 2023-09-27 RX ADMIN — PRAVASTATIN SODIUM 40 MG: 40 TABLET ORAL at 08:34

## 2023-09-27 RX ADMIN — VENLAFAXINE HYDROCHLORIDE 75 MG: 75 CAPSULE, EXTENDED RELEASE ORAL at 08:34

## 2023-09-27 RX ADMIN — METOPROLOL SUCCINATE 25 MG: 25 TABLET, EXTENDED RELEASE ORAL at 08:33

## 2023-09-27 RX ADMIN — ISOSORBIDE MONONITRATE 30 MG: 30 TABLET, EXTENDED RELEASE ORAL at 08:33

## 2023-09-27 RX ADMIN — IPRATROPIUM BROMIDE 0.5 MG: 0.5 SOLUTION RESPIRATORY (INHALATION) at 07:33

## 2023-09-27 RX ADMIN — METFORMIN HYDROCHLORIDE 500 MG: 500 TABLET ORAL at 17:03

## 2023-09-27 RX ADMIN — METFORMIN HYDROCHLORIDE 500 MG: 500 TABLET ORAL at 08:33

## 2023-09-27 RX ADMIN — BUPROPION HYDROCHLORIDE 150 MG: 150 TABLET, EXTENDED RELEASE ORAL at 08:33

## 2023-09-27 RX ADMIN — NYSTATIN 1 APPLICATION: 100000 CREAM TOPICAL at 08:34

## 2023-09-27 RX ADMIN — NYSTATIN 1 APPLICATION: 100000 CREAM TOPICAL at 20:28

## 2023-09-27 RX ADMIN — SACUBITRIL AND VALSARTAN 1 TABLET: 24; 26 TABLET, FILM COATED ORAL at 08:34

## 2023-09-27 RX ADMIN — MONTELUKAST SODIUM 10 MG: 10 TABLET, COATED ORAL at 20:28

## 2023-09-27 RX ADMIN — ASPIRIN 81 MG: 81 TABLET, COATED ORAL at 08:33

## 2023-09-27 NOTE — PROGRESS NOTES
Case Management  Inpatient Rehabilitation Team Conference    Conference Date/Time: 9/26/2023 6:15:05 AM    Team Conference Attendees:  MD Viviana Pickett SW Jessica Bill, MARIA LUISA,   MARIA LUISA Rocha, PT  Keli Downs, OT  Verona Sarmiento, JUSTO    Demographics            Age: 78Y            Gender: Female    Admission Date: 9/11/2023 6:31:00 PM  Rehabilitation Diagnosis:  CVA  Comorbidities: N39.0 Urinary tract infection, site not specified  I63.9 Cerebral infarction, unspecified  R47.01 Aphasia  E11.9 Type 2 diabetes mellitus without complications  I10 Essential (primary) hypertension  D75.1 Secondary polycythemia  J44.9 Chronic obstructive pulmonary disease, unspecified  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  Medical Necessity Expected Level Rationale: good  Intensity and Duration: an average of 3 hours/5 days per week  Medical Supervision and 24 Hour Rehab Nursing: x  Physical Therapy: x  PT Intensity/Duration: PT 1-1.5 hours per day/5 days per week  Occupational Therapy: x  OT Intensity/Duration: OT 1-1.5 hours per day/5 days per week  Speech and Language Therapy: x  SLP Intensity/Duration: SLP 30 mins-90 mins per day/5 days per week  Social Work: x  Therapeutic Recreation: x  Updated (if changes indicated)    Anticipated Discharge Date/Estimated Length of Stay:   10-3-23      Discharge Plan of Care:    Based on the patient's medical and  functional status, their prognosis and  expected level of functional improvement is: good      Interdisciplinary Problem/Goals/Status  Mobility    [RN] Toilet Transfers(Active)  Current Status(09/11/2023): PATIENT WILL HAVE NO TRANSFER DIFFICULTY  Weekly Goal(09/26/2023): NO DIFFICULT WITH TRANSFERS  Discharge Goal: TRANSFERS INDEPENDENTLY    [PT] Bed/Chair/Wheelchair(Active)  Current Status(09/12/2023): min-mod A  Weekly Goal(09/19/2023): Sup  Discharge Goal: Sup    [PT] Walk(Active)  Current Status(09/12/2023): amb 160' RW CGA  Weekly Goal(09/19/2023): amb 300' RW Sup  Discharge Goal: amb 300' RW Sup        Safety    [RN] Potential for Injury(Active)  Current Status(09/11/2023): PATIENT WILL HAVE NO INJURY THIS STAY  Weekly Goal(10/03/2023): NO INJURY  Discharge Goal: PATIENT WILL DISCHARGE        Pain    [RN] Pain Management(Active)  Current Status(09/11/2023): PATIENT WILL VOICE NO PAIN  Weekly Goal(10/10/2023): PATIENT WILL BE PAIN FREE  Discharge Goal: FREE FROM PAIN        Body Systems    [RN] Integumentary(Active)  Current Status(09/11/2023): PATIENT WILL HAVE NO SKIN  BREAKDOWN THIS STAY  Weekly Goal(10/10/2023): SKIN WILL REMAIN INTACT.  Discharge Goal: NO SKIN ISSUES.        Self Care    [OT] Dressing (Lower)(Active)  Current Status(09/25/2023): Min  Weekly Goal(09/26/2023): cga  Discharge Goal: cga        Communication    [ST] Comprehension(Active)  Current Status(09/26/2023): Trace to mild auditory comprehension deficits  Weekly Goal(10/02/2023): receptive ID of object following verbal description  Discharge Goal: WFL communication and comprehension    [ST] Expression(Active)  Current Status(09/26/2023): Fluent aphasia w/ anomia  Weekly Goal(10/02/2023): Provide 3+ verbal descriptors of an object/pic/word  Discharge Goal: WFL communication    Comments: Son completed applications for assisted living in [place], however, pt  may not qualify for this level of care at this time.    Signed by: Viviana  TONEY Sousa    Physician CoSigned By: Jim Appiah 09/27/2023 06:40:28

## 2023-09-27 NOTE — PROGRESS NOTES
Occupational Therapy: Individual: 105 minutes.    Physical Therapy:    Speech Language Pathology:    Signed by: Keli Downs, Occupational Therapist

## 2023-09-27 NOTE — PROGRESS NOTES
Assisted By: Vicki GLASS, seen later also in OT.    CC: Follow-up on CVA    Interview History/HPI: Patient appears to feel better today.  No acute complaints, slept okay.          Current Hospital Meds:  aspirin, 81 mg, Oral, Daily  buPROPion XL, 150 mg, Oral, Daily  cetirizine, 5 mg, Oral, Daily  furosemide, 20 mg, Oral, Every Other Day  ipratropium, 0.5 mg, Nebulization, 4x Daily - RT  isosorbide mononitrate, 30 mg, Oral, Q24H  metFORMIN, 500 mg, Oral, BID With Meals  metoprolol succinate XL, 25 mg, Oral, Q24H  montelukast, 10 mg, Oral, Nightly  nystatin, 1 application , Topical, Q12H  pantoprazole, 40 mg, Oral, QAM AC  pravastatin, 40 mg, Oral, Daily  sacubitril-valsartan, 1 tablet, Oral, Q12H  tiotropium bromide monohydrate, 2 puff, Inhalation, Daily - RT  venlafaxine XR, 75 mg, Oral, Daily With Breakfast         Vitals:    09/27/23 0833   BP: 115/64   Pulse: 66   Resp:    Temp:    SpO2:          Intake/Output Summary (Last 24 hours) at 9/27/2023 0956  Last data filed at 9/27/2023 0900  Gross per 24 hour   Intake 480 ml   Output 1000 ml   Net -520 ml       EXAM: Temperature 98.1, saturation good on room air, no distress, morbidly obese by BMI greater than 40, lungs clear, heart regular rate and rhythm, overall strength is symmetric, no edema.  It appears good today.  Knox catheter in place with medium yellow clear urine      Diet: Cardiac Diets, Diabetic Diets; Healthy Heart (2-3 Na+); Consistent Carbohydrate; Texture: Regular Texture (IDDSI 7); Fluid Consistency: Thin (IDDSI 0)        LABS:     Lab Results (last 48 hours)       Procedure Component Value Units Date/Time    POC Glucose Once [[id number]]  (Normal) Collected: 09/27/23 0612    Specimen: Blood Updated: 09/27/23 0618     Glucose 120 mg/dL     POC Glucose Once [[id number]]  (Abnormal) Collected: 09/26/23 2016    Specimen: Blood Updated: 09/26/23 2023     Glucose 156 mg/dL     POC Glucose Once [[id number]]  (Normal) Collected: 09/26/23 1198    Specimen:  Blood Updated: 09/26/23 1624     Glucose 103 mg/dL     POC Glucose Once [[id number]]  (Abnormal) Collected: 09/26/23 1057    Specimen: Blood Updated: 09/26/23 1121     Glucose 132 mg/dL     POC Glucose Once [[id number]]  (Normal) Collected: 09/26/23 0615    Specimen: Blood Updated: 09/26/23 0621     Glucose 128 mg/dL     Basic Metabolic Panel [[id number]]  (Abnormal) Collected: 09/26/23 0034    Specimen: Blood Updated: 09/26/23 0232     Glucose 117 mg/dL      BUN 30 mg/dL      Creatinine 1.15 mg/dL      Sodium 137 mmol/L      Potassium 4.1 mmol/L      Comment: Slight hemolysis detected by analyzer. Results may be affected.        Chloride 103 mmol/L      CO2 22.3 mmol/L      Calcium 8.9 mg/dL      BUN/Creatinine Ratio 26.1     Anion Gap 11.7 mmol/L      eGFR 49.5 mL/min/1.73     Narrative:      GFR Normal >60  Chronic Kidney Disease <60  Kidney Failure <15    The GFR formula is only valid for adults with stable renal function between ages 18 and 70.    CBC & Differential [[id number]]  (Abnormal) Collected: 09/26/23 0034    Specimen: Blood Updated: 09/26/23 0150    Narrative:      The following orders were created for panel order CBC & Differential.  Procedure                               Abnormality         Status                     ---------                               -----------         ------                     CBC Auto Differential[[id number]]        Abnormal            Final result                 Please view results for these tests on the individual orders.    CBC Auto Differential [[id number]]  (Abnormal) Collected: 09/26/23 0034    Specimen: Blood Updated: 09/26/23 0150     WBC 11.95 10*3/mm3      RBC 4.85 10*6/mm3      Hemoglobin 13.8 g/dL      Hematocrit 45.1 %      MCV 93.0 fL      MCH 28.5 pg      MCHC 30.6 g/dL      RDW 14.5 %      RDW-SD 49.8 fl      MPV 12.2 fL      Platelets 277 10*3/mm3      Neutrophil % 55.9 %      Lymphocyte % 31.5 %      Monocyte % 8.6 %      Eosinophil % 3.4 %      Basophil %  0.4 %      Immature Grans % 0.2 %      Neutrophils, Absolute 6.67 10*3/mm3      Lymphocytes, Absolute 3.77 10*3/mm3      Monocytes, Absolute 1.03 10*3/mm3      Eosinophils, Absolute 0.41 10*3/mm3      Basophils, Absolute 0.05 10*3/mm3      Immature Grans, Absolute 0.02 10*3/mm3      nRBC 0.0 /100 WBC     POC Glucose Once [[id number]]  (Abnormal) Collected: 09/25/23 2002    Specimen: Blood Updated: 09/25/23 2009     Glucose 133 mg/dL     POC Glucose Once [[id number]]  (Normal) Collected: 09/25/23 1623    Specimen: Blood Updated: 09/25/23 1633     Glucose 119 mg/dL     POC Glucose Once [[id number]]  (Normal) Collected: 09/25/23 1113    Specimen: Blood Updated: 09/25/23 1120     Glucose 113 mg/dL                  Radiology:    Imaging Results (Last 72 Hours)       ** No results found for the last 72 hours. **            Results for orders placed during the hospital encounter of 09/11/23    Adult Transthoracic Echo Complete W/ Cont if Necessary Per Protocol    Interpretation Summary    Left ventricular systolic function is mildly decreased. Calculated left ventricular EF = 44% Left ventricular ejection fraction appears to be 41 - 45%.    Left ventricular wall thickness is consistent with mild eccentric hypertrophy.    Left ventricular diastolic function is consistent with (grade I) impaired relaxation.    The left atrial cavity is moderately dilated.      Assessment/Plan:   Status post CVA, patient continues to participate with speech, occupational, and physical therapy.  She is making progress in all modalities.  With PT, patient is contact-guard with bed mobility, contact-guard with transfers, contact-guard with gait she walked 180s and 160 feet front wheeled walker yesterday.  She had decreased beatrice gait speed and stride length.  PT continue to work on balance and endurance as well.  With Occupational Therapy, patient is min assist with lower body dressing, PT continues to work on ADLs, strengthening endurance range  of motion fine motor manipulation.  With speech therapy, patient has met many of her short-term goals, continue to work on language skills with cognitive and expressive.  Continue aspirin and statin.    Urinary retention, it Ditropan stopped yesterday, urology note seen and appreciated, we are going to attempt to discontinue Knox catheter.  I am not sure her blood pressure or being on medication for HFrEF will tolerate Flomax.  If needed urodynamics can be done as an outpatient.    HFrEF, tolerating Entresto, Lasix, and beta-blocker.    Diabetes, controlled on metformin alone.    COPD lungs are clear, and changing her nebs to every 6 hours as needed patient is on Spiriva    Probable CKD, stable    DVT prophylaxis, SCUDs    Jim Appiah MD

## 2023-09-27 NOTE — SIGNIFICANT NOTE
09/27/23 1115   Plan   Plan [facility] paperwork for son provided to MD.  Son needs paperwork completed by 9-29-23.

## 2023-09-27 NOTE — PROGRESS NOTES
Inpatient Rehabilitation Functional Measures Assessment    Functional Measures  JOHN Eating:  JOHN Grooming:  JOHN Bathing:  JOHN Upper Body Dressing:  JOHN Lower Body Dressing:  JOHN Toileting:    JOHN Bladder Management  Level of Assistance:  Frequency/Number of Accidents this Shift:    JOHN Bowel Management  Level of Assistance:  Frequency/Number of Accidents this Shift:    JOHN Bed/Chair/Wheelchair Transfer:  JOHN Toilet Transfer:  JOHN Tub/Shower Transfer:    Previously Documented Mode of Locomotion at Discharge:  [facility] Expected Mode of Locomotion at Discharge:  [facility] Walk/Wheelchair:  [facility] Stairs:    [facility] Comprehension:  [facility] Expression:  [facility] Social Interaction:  [facility] Problem Solving:  [facility] Memory:    Therapy Mode Minutes  Occupational Therapy:  Physical Therapy:  Speech Language Pathology: Individual: 45 minutes.    Discharge Functional Goals:    Signed by: Annalisa Calderón SLP

## 2023-09-27 NOTE — PLAN OF CARE
Goal Outcome Evaluation:  Plan of Care Reviewed With: patient with family at bed side, no complaints at present. Continues to participate in therapy, will continue to monitor.

## 2023-09-27 NOTE — SIGNIFICANT NOTE
09/27/23 0940   Plan   Plan MD is agreeable to SS working on SNF placement for pt.  Face sheet, H&P, PT/OT/SLP notes/evaluations, and medication list sent to [facility] via [employer].  Josephine with [facility] to follow-up with SS when decision is made about admission and if pt can be accepted NH will contact insurance with PA request.

## 2023-09-27 NOTE — THERAPY TREATMENT NOTE
Inpatient Rehabilitation - Occupational Therapy Treatment Note     Richie     Patient Name: Ofelia Knox  : 1945  MRN: [id number]    Today's Date: 2023                 Admit Date: 2023       No diagnosis found.    Patient Active Problem List   Diagnosis    CVA (cerebral vascular accident)       Past Medical History:   Diagnosis Date    AMS (altered mental status)     Aphasia     CKD (chronic kidney disease)     COPD (chronic obstructive pulmonary disease)     CVA (cerebral vascular accident)     DM2 (diabetes mellitus, type 2)     HTN (hypertension)     Restrictive lung disease     Urinary tract infection due to ESBL Klebsiella        Past Surgical History:   Procedure Laterality Date    HYSTERECTOMY      JOINT REPLACEMENT               IRF OT ASSESSMENT FLOWSHEET (last 12 hours)       IRF OT Evaluation and Treatment       Row Name 23 1500          OT Time and Intention    Document Type daily treatment  -     Mode of Treatment individual therapy;occupational therapy  -     Patient Effort good  -       Row Name 23 1500          General Information    Patient/Family/Caregiver Comments/Observations patient agreeable to therapy. patient tolerated therapy well with no complaints. patient continues to require verbal cues for activity completion  -     Existing Precautions/Restrictions fall  aphasia  -       Row Name 23 1500          Cognition/Psychosocial    Orientation Status (Cognition) oriented to;person;place;verbal cues/prompts needed for orientation  -     Follows Commands (Cognition) follows one-step commands;repetition of directions required;verbal cues/prompting required  -       Row Name 23 1500          Chair-Bed Transfer    Chair-Bed [place] (Transfers) contact guard;verbal cues  -       Row Name 23 1500          Motor Skills    Motor Skills coordination;functional endurance  -     Therapeutic Exercise shoulder;elbow/forearm;wrist;hand   BUE ROM, UE PRE, UE bike, reaching, gmc,fmc, strengthening  -       Row Name 09/27/23 1500          Positioning and Restraints    Post Treatment Position bed  -     In Bed supine;call light within reach;encouraged to call for assist;exit alarm on  -               User Key  (r) = Recorded By, (t) = Taken By, (c) = Cosigned By      Initials Name Effective Dates     Althea Downs, OT 07/11/23 -                      Occupational Therapy Education       Title: PT OT SLP Therapies (In Progress)       Topic: Occupational Therapy (In Progress)       Point: ADL training (In Progress)       Description:   Instruct learner(s) on proper safety adaptation and remediation techniques during self care or transfers.   Instruct in proper use of assistive devices.                  Learning Progress Summary             Patient Acceptance, TB, NR by DL at 9/24/2023 2200    Acceptance, E, VU,NR by HB at 9/16/2023 1145    Acceptance, E, VU,NR by BF at 9/15/2023 1232                         Point: Precautions (In Progress)       Description:   Instruct learner(s) on prescribed precautions during self-care and functional transfers.                  Learning Progress Summary             Patient Acceptance, TB, NR by DL at 9/24/2023 2200    Acceptance, E, VU,NR by HB at 9/16/2023 1145    Acceptance, E, VU,NR by BF at 9/15/2023 1232                                         User Key       Initials Effective Dates Name Provider Type Discipline     07/11/23 -  Pam Peck OT Occupational Therapist OT    HB 05/25/21 -  Xena Chinchilla OT Occupational Therapist OT    DL 03/16/22 -  Carolyn Ramirez, RN Registered Nurse Nurse                        OT Recommendation and Plan                         Time Calculation:      Time Calculation- OT       Row Name 09/27/23 1506 09/27/23 1505          Time Calculation- OT    OT Start Time 1245  -AH 0915  -     OT Stop Time 1345  -AH 1000  -     OT Time Calculation (min) 60  min  - 45 min  -               User Key  (r) = Recorded By, (t) = Taken By, (c) = Cosigned By      Initials Name Provider Type     Althea Downs, OT Occupational Therapist                  Therapy Charges for Today       Code Description Service Date Service Provider Modifiers Qty    40302992362 HC OT SELF CARE/MGMT/TRAIN EA 15 MIN 9/26/2023 Althea Downs, OT GO 2    37578789524 HC OT THERAPEUTIC ACT EA 15 MIN 9/26/2023 Althea Downs, OT GO 2    91911331380 HC OT NEUROMUSC RE EDUCATION EA 15 MIN 9/26/2023 Althea Downs, OT GO 2    24395334119 HC OT THERAPEUTIC ACT EA 15 MIN 9/27/2023 Althea Downs, OT GO 2    09582054533 HC OT SELF CARE/MGMT/TRAIN EA 15 MIN 9/27/2023 Althea Downs, OT GO 2    66618706049 HC OT NEUROMUSC RE EDUCATION EA 15 MIN 9/27/2023 Althea Downs, OT GO 3                     Althea Downs OT  9/27/2023

## 2023-09-27 NOTE — THERAPY TREATMENT NOTE
Inpatient Rehabilitation - Speech Language Pathology Treatment Note  [facility]     Patient Name: Ofelia Knox  : 1945  MRN: [id number]  Today's Date: 2023             Admit Date: 2023     Ms. Knox was seen this pm in [facility] for continued speech therapy. She was a/a and interactive, pleasant for all activities. She is notably more effective and successful in her oe communicative exchanges today, compared to previous sessions. She was evidenced with improving self awareness and monitoring as well.       Long Term Goal:  Patient will demonstrate adequate language skills to fully participate in adls at premorbid baseline level of function.       Short Term Goals:  1. GOAL MET:       2. GOAL MET:       3. GOAL MET:       4. GOAL MET:    5. GOAL MET:    6. Patient will identify(receptively/expressively) the appropriate object/picture/word following a verbal description w/ 80% accuracy over three consecutive sessions.   Verbal description provided and receptive ID from field of 4-6 pictures/objects - 100% accuracy.  Verbal description provided and pt required to expressively state appropriate object - 80% independently. Improves to 100% w/ cues of initial phoneme or oe sentence.     7. GOAL MET:    8. Patient will complete complex oe sentences w/ 90% accuracy and min cues over three consecutive sessions.   90% w/ one and two word oe sentence completion independently.   Improves to 100% w/ min cues of initial phoneme      9. Patient will provide verbal 3+ descriptors of an object/picture/word w/ 80% accuracy and min cues over three consecutive sessions.   Deferred this date     10. Patient will perform divergent naming tasks w/ 5+ objects named of a given category in < 2 min over three consecutive sessions.   Two items named in a narrow category w/ 75% named w/o cues. Min cues of initial phoneme or oe sentence improves to 100%.      11.  Pt will increase verbal expression to 90% for expressing object  functions w/ single word and/or phrase responses w/ min cues over three consecutive sessions.   Able to state object function independently 80% w/ 2-5 word phrases. Improves to 100% w/ min cues.      12.  Pt will increase verbal expression to 90% accuracy for producing two-or-more-word responses during sentence completions/tasks w/ min cues over three consecutive sessions.   70% accuracy of 2-5 word responses during sentence completion tasks     13.  Pt will increase verbal expression to 80% accuracy independently while forming sentences relevant to current topic over three consecutive sessions.   Verbal expression of sentences relevant to current topic is on topic approx 80% of opp.      14.  Pt will increase verbal expression to 90% accuracy, independently, over three consecutive sessions, for sentence formulation when given a target word to incorporate in the sentence.   Deferred this date    Visit Dx:  No diagnosis found.  Patient Active Problem List   Diagnosis    CVA (cerebral vascular accident)     Past Medical History:   Diagnosis Date    AMS (altered mental status)     Aphasia     CKD (chronic kidney disease)     COPD (chronic obstructive pulmonary disease)     CVA (cerebral vascular accident)     DM2 (diabetes mellitus, type 2)     HTN (hypertension)     Restrictive lung disease     Urinary tract infection due to ESBL Klebsiella      Past Surgical History:   Procedure Laterality Date    HYSTERECTOMY      JOINT REPLACEMENT       EDUCATION  The patient has been educated in the following areas:   Communication Impairment.    SLP Recommendation and Plan   Continue plan of care to allow for maximal opportunities for improvements.     Thank you   Annalisa Calderón M.S., CCC/SLP                                                                                     Time Calculation:      Time Calculation- SLP       Time Calculation- SLP       Row Name 09/27/23 1779    SLP Start Time 1450  -Recorded by: MILES    SLP Stop  Time 1535  -Recorded by: MILES KEMP Time Calculation (min) 45 min  -Recorded by: MILES    SLP - Next Appointment 09/28/23  -Recorded by: MILES                      User Key       Initials Name Provider Type    Annalisa Schulte, MS CCC-SLP Speech and Language Pathologist                    Therapy Charges for Today       Code Description Service Date Service Provider Modifiers Qty    60666302323  ST TREATMENT SPEECH 3 9/27/2023 Annalisa Calderón MS CCC-SLP GN 1                       Annalisa Calderón MS CCC-SLP  9/27/2023

## 2023-09-27 NOTE — SIGNIFICANT NOTE
09/27/23 1030   Plan   Plan SS and son spoke to pt about plans for continued rehab in a SNF and [facility] having a bed available on 9-29-23.  Pt is agreeable to going to this facility for rehab.  Son says he placed pt on waiting list at [facility] and [facility] is [facility] and accept pt.  Son wants pt to get rehabilitation and be able to qualify for assisted living in [place].  Son brought [facility] papers for MD to complete.   Patient/Family in Agreement with Plan yes

## 2023-09-27 NOTE — THERAPY TREATMENT NOTE
Inpatient Rehabilitation - Physical Therapy Treatment Note        [facility]     Patient Name: Ofelia Rehman  : 1945  MRN: [id number]    Today's Date: 2023                    Admit Date: 2023      Visit Dx:   No diagnosis found.    Patient Active Problem List   Diagnosis    CVA (cerebral vascular accident)       Past Medical History:   Diagnosis Date    AMS (altered mental status)     Aphasia     CKD (chronic kidney disease)     COPD (chronic obstructive pulmonary disease)     CVA (cerebral vascular accident)     DM2 (diabetes mellitus, type 2)     HTN (hypertension)     Restrictive lung disease     Urinary tract infection due to ESBL Klebsiella        Past Surgical History:   Procedure Laterality Date    HYSTERECTOMY      JOINT REPLACEMENT         PT ASSESSMENT (last 12 hours)       IRF PT Evaluation and Treatment       Row Name 23 1526          PT Time and Intention    Document Type daily treatment  -LB     Mode of Treatment individual therapy;physical therapy  -LB     Patient/Family/Caregiver Comments/Observations family member was present during 2nd session  -LB       Row Name 23 1526          General Information    Existing Precautions/Restrictions fall  rehman, O2 prn, aphasia  -LB       Row Name 23 1526          Pain Scale: FACES Pre/Post-Treatment    Pain: FACES Scale, Pretreatment 0-->no hurt  -LB     Posttreatment Pain Rating 0-->no hurt  -LB       Row Name 23 1526          Cognition/Psychosocial    Affect/Mental Status (Cognition) confused  -LB     Follows Commands (Cognition) verbal cues/prompting required;physical/tactile prompts required;repetition of directions required;follows one-step commands;75-90% accuracy  -LB     Personal Safety Interventions gait belt;nonskid shoes/slippers when out of bed;supervised activity  -LB       Row Name 23 1526          Bed Mobility    Supine-Sit [place] (Bed Mobility) standby assist  -LB     Assistive Device (Bed  Mobility) bed rails  -LB       Row Name 09/27/23 1526          Bed-Chair Transfer    Bed-Chair [place] (Transfers) verbal cues;nonverbal cues (demo/gesture);standby assist  -LB     Assistive Device (Bed-Chair Transfers) wheelchair  -LB       Row Name 09/27/23 1526          Chair-Bed Transfer    Chair-Bed [place] (Transfers) verbal cues;nonverbal cues (demo/gesture);standby assist  -LB     Assistive Device (Chair-Bed Transfers) wheelchair  -LB       Row Name 09/27/23 1526          Sit-Stand Transfer    Sit-Stand [place] (Transfers) verbal cues;nonverbal cues (demo/gesture);standby assist  -LB     Assistive Device (Sit-Stand Transfers) walker, front-wheeled  -LB       Row Name 09/27/23 1526          Stand-Sit Transfer    Stand-Sit [place] (Transfers) verbal cues;nonverbal cues (demo/gesture);standby assist  -LB     Assistive Device (Stand-Sit Transfers) walker, front-wheeled  -LB       Row Name 09/27/23 1526          Gait/Stairs (Locomotion)    [place]al Level (Gait) verbal cues;nonverbal cues (demo/gesture);standby assist  -LB     Assistive Device (Gait) walker, front-wheeled  -LB     Distance in Feet (Gait) am-280', pm-260'  -LB     Deviations/Abnormal Patterns (Gait) beatrice decreased;gait speed decreased;stride length decreased  -LB     Bilateral Gait Deviations forward flexed posture  -LB     Comment, (Gait/Stairs) increased dyspnea with exertion but O2 sat wnl on RA  -LB       Row Name 09/27/23 1526          Balance    Comment, Balance standing in PB on foam for bag toss at goal with BUE  -       Row Name 09/27/23 1526          Motor Skills    Therapeutic Exercise --  sitting ex with 2# and green tb bid  -LB       Row Name 09/27/23 1526          Positioning and Restraints    Pre-Treatment Position --  am-bed, pm-w/c  -LB     In Wheelchair --  am-OT, pm-in room with call light and family member  -LB       Row Name 09/27/23 1526          Vital Signs    Intra SpO2 (%) 96  -LB     O2  Delivery Intra Treatment room air  -LB       Row Name 09/27/23 1526          Daily Progress Summary (PT)    Recommendations (PT) continue PT  -LB       Row Name 09/27/23 1526          IRF PT Goals    Bed Mobility Goal Selection (PT-IRF) bed mobility, PT goal 1  -LB     Transfer Goal Selection (PT-IRF) transfers, PT goal 1  -LB     Gait (Walking Locomotion) Goal Selection (PT-IRF) gait, PT goal 1  -LB       Row Name 09/27/23 1526          Bed Mobility Goal 1 (PT-IRF)    Activity/Assistive Device (Bed Mobility Goal 1, PT-IRF) sit to supine/supine to sit  -LB     [place] Level (Bed Mobility Goal 1, PT-IRF) supervision required  -LB     Time Frame (Bed Mobility Goal 1, PT-IRF) by discharge  -LB     Progress/Outcomes (Bed Mobility Goal 1, PT-IRF) goal ongoing  -LB       Row Name 09/27/23 1526          Transfer Goal 1 (PT-IRF)    Activity/Assistive Device (Transfer Goal 1, PT-IRF) sit-to-stand/stand-to-sit;bed-to-chair/chair-to-bed  -LB     [place] Level (Transfer Goal 1, PT-IRF) supervision required  -LB     Time Frame (Transfer Goal 1, PT-IRF) by discharge  -LB     Progress/Outcomes (Transfer Goal 1, PT-IRF) goal ongoing  -LB       Row Name 09/27/23 1526          Gait/Walking Locomotion Goal 1 (PT-IRF)    Activity/Assistive Device (Gait/Walking Locomotion Goal 1, PT-IRF) walker, rolling  -LB     Gait/Walking Locomotion Distance Goal 1 (PT-IRF) 300'  -LB     [place] Level (Gait/Walking Locomotion Goal 1, PT-IRF) supervision required  -LB     Time Frame (Gait/Walking Locomotion Goal 1, PT-IRF) by discharge  -LB     Progress/Outcomes (Gait/Walking Locomotion Goal 1, PT-IRF) goal ongoing  -LB               User Key  (r) = Recorded By, (t) = Taken By, (c) = Cosigned By      Initials Name Provider Type    Andra Lea PT Physical Therapist                     Physical Therapy Education       Title: PT OT SLP Therapies (In Progress)       Topic: Physical Therapy (Done)       Point: Mobility training  (Done)       Learning Progress Summary             Patient Acceptance, E, VU,NR by LB at 9/27/2023 1532    Acceptance, E, VU,NR by LB at 9/26/2023 1548    Acceptance, E,D, VU,NR by RG at 9/25/2023 1423    Acceptance, TB, NR by DL at 9/24/2023 2200    Acceptance, E,D, NR,VU by RG at 9/22/2023 1315    Acceptance, E,D, VU,NR by RG at 9/21/2023 1307    Acceptance, E,D, VU,NR by RG at 9/20/2023 1258    Acceptance, E,D, VU,NR by RG at 9/19/2023 1258    Acceptance, E,D, VU,NR by RG at 9/18/2023 1338    Acceptance, E,TB, VU by RM at 9/15/2023 1532    Acceptance, E,TB, VU by RM at 9/14/2023 1555    Acceptance, E,D, VU,NR by RG at 9/13/2023 1528    Acceptance, TB, NR by DL at 9/12/2023 2003    Acceptance, E, VU,NR by LB at 9/12/2023 1135                         Point: Home exercise program (Done)       Learning Progress Summary             Patient Acceptance, E, VU,NR by LB at 9/27/2023 1532    Acceptance, E, VU,NR by LB at 9/26/2023 1548    Acceptance, E,D, VU,NR by RG at 9/25/2023 1423    Acceptance, TB, NR by DL at 9/24/2023 2200    Acceptance, E,D, NR,VU by RG at 9/22/2023 1315    Acceptance, E,D, VU,NR by RG at 9/21/2023 1307    Acceptance, E,D, VU,NR by RG at 9/20/2023 1258    Acceptance, E,D, VU,NR by RG at 9/19/2023 1258    Acceptance, E,D, VU,NR by RG at 9/18/2023 1338    Acceptance, E,TB, VU by RM at 9/15/2023 1532    Acceptance, E,TB, VU by RM at 9/14/2023 1555    Acceptance, E,D, VU,NR by RG at 9/13/2023 1528    Acceptance, TB, NR by DL at 9/12/2023 2003    Acceptance, E, VU,NR by LB at 9/12/2023 1135                         Point: Body mechanics (Done)       Learning Progress Summary             Patient Acceptance, E, VU,NR by LB at 9/27/2023 1532    Acceptance, E, VU,NR by LB at 9/26/2023 1548    Acceptance, E,D, VU,NR by RG at 9/25/2023 1423    Acceptance, TB, NR by DL at 9/24/2023 2200    Acceptance, E,D, NR,VU by RG at 9/22/2023 1315    Acceptance, E,D, VU,NR by RG at 9/21/2023 1307    Acceptance, E,D, VU,NR  by RG at 9/20/2023 1258    Acceptance, E,D, VU,NR by RG at 9/19/2023 1258    Acceptance, E,D, VU,NR by RG at 9/18/2023 1338    Acceptance, E,TB, VU by RM at 9/15/2023 1532    Acceptance, E,TB, VU by RM at 9/14/2023 1555    Acceptance, E,D, VU,NR by RG at 9/13/2023 1528    Acceptance, TB, NR by DL at 9/12/2023 2003    Acceptance, E, VU,NR by LB at 9/12/2023 1135                         Point: Precautions (Done)       Learning Progress Summary             Patient Acceptance, E, VU,NR by LB at 9/27/2023 1532    Acceptance, E, VU,NR by LB at 9/26/2023 1548    Acceptance, E,D, VU,NR by RG at 9/25/2023 1423    Acceptance, TB, NR by DL at 9/24/2023 2200    Acceptance, E,D, NR,VU by RG at 9/22/2023 1315    Acceptance, E,D, VU,NR by RG at 9/21/2023 1307    Acceptance, E,D, VU,NR by RG at 9/20/2023 1258    Acceptance, E,D, VU,NR by RG at 9/19/2023 1258    Acceptance, E,D, VU,NR by RG at 9/18/2023 1338    Acceptance, E,TB, VU by RM at 9/15/2023 1532    Acceptance, E,TB, VU by RM at 9/14/2023 1555    Acceptance, E,D, VU,NR by RG at 9/13/2023 1528    Acceptance, TB, NR by DL at 9/12/2023 2003    Acceptance, E, VU,NR by LB at 9/12/2023 1135                                         User Key       Initials Effective Dates Name Provider Type Discipline    LB 06/16/21 -  Andra Stewart, PT Physical Therapist PT    RM 02/17/23 -  Karis Obrien, PTA Physical Therapist Assistant PT    RG 06/16/21 -  Twan Cunningham PTA Physical Therapist Assistant PT    DL 03/16/22 -  Carolyn Ramirez, RN Registered Nurse Nurse                    PT Recommendation and Plan    Planned Therapy Interventions (PT): balance training, bed mobility training, gait training, neuromuscular re-education, patient/family education, strengthening, transfer training  Frequency of Treatment (PT): 5 times per week  Anticipated Equipment Needs at Discharge (PT Eval):  (tbd)  Daily Progress Summary (PT)  Recommendations (PT): continue PT               Time  Calculation:      PT Charges       Row Name 09/27/23 1533 09/27/23 1532          Time Calculation    Start Time 1045  -LB 0830  -LB     Stop Time 1130  -LB 0915  -LB     Time Calculation (min) 45 min  -LB 45 min  -LB     PT Received On -- 09/27/23  -LB               User Key  (r) = Recorded By, (t) = Taken By, (c) = Cosigned By      Initials Name Provider Type    Andra Lea, PT Physical Therapist                    Therapy Charges for Today       Code Description Service Date Service Provider Modifiers Qty    49206673083 HC GAIT TRAINING EA 15 MIN 9/26/2023 Andra Stewart, PT GP 1    30485584946 HC PT THER PROC EA 15 MIN 9/26/2023 Andra Stewart, PT GP 3    22280659944 HC PT NEUROMUSC RE EDUCATION EA 15 MIN 9/26/2023 Andra Stewart, PT GP 1    54999874038 HC PT THERAPEUTIC ACT EA 15 MIN 9/26/2023 Andra Stewart, PT GP 1    72339404895 HC GAIT TRAINING EA 15 MIN 9/27/2023 Andra Stewart, PT GP 2    58143202476 HC PT THER PROC EA 15 MIN 9/27/2023 Andra Stewart, PT GP 2    49257306847 HC PT NEUROMUSC RE EDUCATION EA 15 MIN 9/27/2023 Andra Stewart, PT GP 1    82621623424 HC PT THERAPEUTIC ACT EA 15 MIN 9/27/2023 Andra Stewart, PT GP 1                     Andra Stewart, PT  9/27/2023

## 2023-09-27 NOTE — SIGNIFICANT NOTE
09/27/23 0830   Plan   Plan Spoke to son [phone] about how pt is progressing in therapy and plans for discharge on 10-3-23 if pt can go to his home or her home with 24 hour assistance/supervision.  Pt is unable to go to son's home at discharge and does not have someone to stay with her at her home.  Pt is on the waiting list at [facility].  Pt has to be evaluated in-person for assisted living/they do not come to the hospital for evaluations.  Discussed option of pt going to a SNF for continued rehab/nursing care then being evaluated for assisted living.   Educated about traditional Medicare part A SNF benefits and explained her [facility] Medicare replacement plan may be the same as traditional Medicare or a little different.  Educated about [place] Medicaid and son says pt would not be applying for Medicaid.  Reviewed private pay options for SNF if pt used all or her SNF benefits or if insurance no longer approved SNF.  Son is agreeable to pt going to [facility] or [facility] for continued rehab.  Contacted [facility] [phone] per Cady who states bed is not available at this time but may have a bed in two weeks if resident is able to discharge home.  Cady took pt's name and will call [facility] if a bed becomes available sooner.  Cady says they are in-network with pt's insurance.  Contacted [facility] [phone] per Josephine who says bed is expected to be available on 9-29-23; she says referral can be sent and if they can offer bed insurance to be contacted for prior-authorization request for admission.  Reviewed this with son who says to talk to MD and if pt is medically stable for SNF he is agreeable to SS sending referral to [facility].

## 2023-09-28 LAB
GLUCOSE BLDC GLUCOMTR-MCNC: 108 MG/DL (ref 70–130)
GLUCOSE BLDC GLUCOMTR-MCNC: 109 MG/DL (ref 70–130)
GLUCOSE BLDC GLUCOMTR-MCNC: 116 MG/DL (ref 70–130)
GLUCOSE BLDC GLUCOMTR-MCNC: 99 MG/DL (ref 70–130)

## 2023-09-28 PROCEDURE — 97530 THERAPEUTIC ACTIVITIES: CPT | Performed by: OCCUPATIONAL THERAPIST

## 2023-09-28 PROCEDURE — 99231 SBSQ HOSP IP/OBS SF/LOW 25: CPT

## 2023-09-28 PROCEDURE — 97535 SELF CARE MNGMENT TRAINING: CPT | Performed by: OCCUPATIONAL THERAPIST

## 2023-09-28 PROCEDURE — 97112 NEUROMUSCULAR REEDUCATION: CPT

## 2023-09-28 PROCEDURE — 92507 TX SP LANG VOICE COMM INDIV: CPT

## 2023-09-28 PROCEDURE — 97110 THERAPEUTIC EXERCISES: CPT

## 2023-09-28 PROCEDURE — 94799 UNLISTED PULMONARY SVC/PX: CPT

## 2023-09-28 PROCEDURE — 94761 N-INVAS EAR/PLS OXIMETRY MLT: CPT

## 2023-09-28 PROCEDURE — 82948 REAGENT STRIP/BLOOD GLUCOSE: CPT

## 2023-09-28 PROCEDURE — 97116 GAIT TRAINING THERAPY: CPT

## 2023-09-28 PROCEDURE — 94664 DEMO&/EVAL PT USE INHALER: CPT

## 2023-09-28 PROCEDURE — 97112 NEUROMUSCULAR REEDUCATION: CPT | Performed by: OCCUPATIONAL THERAPIST

## 2023-09-28 RX ADMIN — METOPROLOL SUCCINATE 25 MG: 25 TABLET, EXTENDED RELEASE ORAL at 08:32

## 2023-09-28 RX ADMIN — PRAVASTATIN SODIUM 40 MG: 40 TABLET ORAL at 08:32

## 2023-09-28 RX ADMIN — METFORMIN HYDROCHLORIDE 500 MG: 500 TABLET ORAL at 17:38

## 2023-09-28 RX ADMIN — ASPIRIN 81 MG: 81 TABLET, COATED ORAL at 08:30

## 2023-09-28 RX ADMIN — BUPROPION HYDROCHLORIDE 150 MG: 150 TABLET, EXTENDED RELEASE ORAL at 08:30

## 2023-09-28 RX ADMIN — FUROSEMIDE 20 MG: 20 TABLET ORAL at 08:31

## 2023-09-28 RX ADMIN — METFORMIN HYDROCHLORIDE 500 MG: 500 TABLET ORAL at 08:32

## 2023-09-28 RX ADMIN — VENLAFAXINE HYDROCHLORIDE 75 MG: 75 CAPSULE, EXTENDED RELEASE ORAL at 08:33

## 2023-09-28 RX ADMIN — NYSTATIN 1 APPLICATION: 100000 CREAM TOPICAL at 21:32

## 2023-09-28 RX ADMIN — SACUBITRIL AND VALSARTAN 1 TABLET: 24; 26 TABLET, FILM COATED ORAL at 21:32

## 2023-09-28 RX ADMIN — TIOTROPIUM BROMIDE INHALATION SPRAY 2 PUFF: 3.12 SPRAY, METERED RESPIRATORY (INHALATION) at 07:21

## 2023-09-28 RX ADMIN — SACUBITRIL AND VALSARTAN 1 TABLET: 24; 26 TABLET, FILM COATED ORAL at 08:32

## 2023-09-28 RX ADMIN — NYSTATIN 1 APPLICATION: 100000 CREAM TOPICAL at 08:33

## 2023-09-28 RX ADMIN — ISOSORBIDE MONONITRATE 30 MG: 30 TABLET, EXTENDED RELEASE ORAL at 08:32

## 2023-09-28 RX ADMIN — MONTELUKAST SODIUM 10 MG: 10 TABLET, COATED ORAL at 21:32

## 2023-09-28 RX ADMIN — PANTOPRAZOLE SODIUM 40 MG: 40 TABLET, DELAYED RELEASE ORAL at 06:36

## 2023-09-28 RX ADMIN — CETIRIZINE HYDROCHLORIDE 5 MG: 10 TABLET, FILM COATED ORAL at 08:30

## 2023-09-28 NOTE — SIGNIFICANT NOTE
09/28/23 0907   Plan   Plan Spoke to Josephine with [phone] who says she will submit request to insurance for prior-authorization today.  Josephine will follow-up with SS when insurance makes a decision.

## 2023-09-28 NOTE — PROGRESS NOTES
Occupational Therapy:    Physical Therapy:    Speech Language Pathology: Individual: 50 minutes.    Signed by: JUSTO Schwartz

## 2023-09-28 NOTE — THERAPY TREATMENT NOTE
Inpatient Rehabilitation - Speech Language Pathology Treatment Note  [facility]     Patient Name: Ofelia Knox  : 1945  MRN: [id number]  Today's Date: 2023             Admit Date: 2023     Ms. Knox was seen this pm in [facility] for continued speech therapy. She was a/a and interactive, pleasant for all activities. She is notably more effective and successful in her oe communicative exchanges today, compared to previous sessions. She was evidenced with improving self awareness and monitoring as well.       Long Term Goal:  Patient will demonstrate adequate language skills to fully participate in adls at premorbid baseline level of function.       Short Term Goals:  1. GOAL MET:       2. GOAL MET:       3. GOAL MET:       4. GOAL MET:    5. GOAL MET:    6. Patient will identify(receptively/expressively) the appropriate object/picture/word following a verbal description w/ 80% accuracy over three consecutive sessions.   Verbal description provided and receptive ID from field of 4-6 pictures/objects - 100% accuracy.  Verbal description provided and pt required to expressively state appropriate object - 80% independently. Improves to 100% w/ cues of initial phoneme or oe sentence.     7. GOAL MET:    8. Patient will complete complex oe sentences w/ 90% accuracy and min cues over three consecutive sessions.   90% w/ one and two word oe sentence completion independently.   Improves to 100% w/ min cues of initial phoneme      9. Patient will provide verbal 3+ descriptors of an object/picture/word w/ 80% accuracy and min cues over three consecutive sessions.   Deferred this date     10. Patient will perform divergent naming tasks w/ 5+ objects named of a given category in < 2 min over three consecutive sessions.   Three items named in a narrow category w/o cues. Min-mod cues of descriptions of additional objects w/ additional 3 items named.     11.  Pt will increase verbal expression to 90% for expressing object  functions w/ single word and/or phrase responses w/ min cues over three consecutive sessions.   Able to state object function independently 63% w/ 2-5 word phrases. Improves to 100% w/ min-mod cues.      12.  Pt will increase verbal expression to 90% accuracy for producing two-or-more-word responses during sentence completions/tasks w/ min cues over three consecutive sessions.   70% accuracy of 2-5 word responses during sentence completion tasks     13.  Pt will increase verbal expression to 80% accuracy independently while forming sentences relevant to current topic over three consecutive sessions.   Verbal expression of sentences relevant to current topic is on topic approx 80% of opp.      14.  Pt will increase verbal expression to 90% accuracy, independently, over three consecutive sessions, for sentence formulation when given a target word to incorporate in the sentence.   Deferred this date    Visit Dx:  No diagnosis found.  Patient Active Problem List   Diagnosis    CVA (cerebral vascular accident)     Past Medical History:   Diagnosis Date    AMS (altered mental status)     Aphasia     CKD (chronic kidney disease)     COPD (chronic obstructive pulmonary disease)     CVA (cerebral vascular accident)     DM2 (diabetes mellitus, type 2)     HTN (hypertension)     Restrictive lung disease     Urinary tract infection due to ESBL Klebsiella      Past Surgical History:   Procedure Laterality Date    HYSTERECTOMY      JOINT REPLACEMENT       EDUCATION  The patient has been educated in the following areas:   Communication Impairment.    SLP Recommendation and Plan   Continue plan of care to allow for maximal opportunities for improvements.     Thank you   Verona Sarmiento M.S., CCC/SLP        Time Calculation:      Time Calculation- SLP       Row Name 09/28/23 0309             Time Calculation- SLP    SLP Start Time 1000  -JR      SLP Stop Time 1050  -JR      SLP Time Calculation (min) 50 min  -JR      SLP - Next  Appointment 09/29/23  -                User Key  (r) = Recorded By, (t) = Taken By, (c) = Cosigned By      Initials Name Provider Type    Verona Pizarro MS CCC-SLP Speech and Language Pathologist                    Therapy Charges for Today       Code Description Service Date Service Provider Modifiers Qty    49641734057  ST TREATMENT SPEECH 3 9/28/2023 Verona Sarmiento MS CCC-SLP GN 1                       MS XENA Schwartz  9/28/2023

## 2023-09-28 NOTE — THERAPY PROGRESS REPORT/RE-CERT
Inpatient Rehabilitation - Occupational Therapy Progress Note     Richie     Patient Name: Ofelia Knox  : 1945  MRN: [id number]    Today's Date: 2023                 Admit Date: 2023       No diagnosis found.    Patient Active Problem List   Diagnosis    CVA (cerebral vascular accident)       Past Medical History:   Diagnosis Date    AMS (altered mental status)     Aphasia     CKD (chronic kidney disease)     COPD (chronic obstructive pulmonary disease)     CVA (cerebral vascular accident)     DM2 (diabetes mellitus, type 2)     HTN (hypertension)     Restrictive lung disease     Urinary tract infection due to ESBL Klebsiella        Past Surgical History:   Procedure Laterality Date    HYSTERECTOMY      JOINT REPLACEMENT               IRF OT ASSESSMENT FLOWSHEET (last 12 hours)       IRF OT Evaluation and Treatment       Row Name 23 1300          OT Time and Intention    Document Type daily treatment;progress note  -     Mode of Treatment individual therapy;occupational therapy  -     Patient Effort good  -       Row Name 23 1300          General Information    Patient/Family/Caregiver Comments/Observations patient continues to be agreeable to therapy. patient continues to tolerated well with rest breaks provided. patient continues to require verbal cues  for activity, intiation of activity.  -     Existing Precautions/Restrictions fall  aphasia  -       Row Name 23 1300          Cognition/Psychosocial    Affect/Mental Status (Cognition) WFL  -     Orientation Status (Cognition) oriented to;place;person;situation;verbal cues/prompts needed for orientation  -     Follows Commands (Cognition) follows one-step commands;initiation impaired;physical/tactile prompts required;verbal cues/prompting required;repetition of directions required  -       Row Name 23 1300          Bathing    [place] Level (Bathing) minimum assist (75% patient effort);contact guard  assist;verbal cues  -     Position (Bathing) edge of bed sitting  -[facility] Name 09/28/23 1300          Upper Body Dressing    [place] Level (Upper Body Dressing) supervision;set up assistance;verbal cues  -     Position (Upper Body Dressing) edge of bed sitting  -AH       Row Name 09/28/23 1300          Lower Body Dressing    [place] Level (Lower Body Dressing) minimum assist (75% patient effort);contact guard assist;verbal cues  -     Position (Lower Body Dressing) edge of bed sitting  -AH       Row Name 09/28/23 1300          Grooming    [place] Level (Grooming) grooming skills;deodorant application;hair care, combing/brushing;oral care regimen;wash face, hands;set up  -     Position (Grooming) sink side  -AH       Row Name 09/28/23 1300          Toileting    [place] Level (Toileting) toileting skills;adjust/manage clothing;perform perineal hygiene;minimum assist (75% patient effort);contact guard assist;verbal cues  -AH       Row Name 09/28/23 1300          Self-Feeding    [place] Level (Self-Feeding) set up  -AH       Row Name 09/28/23 1300          Bed-Chair Transfer    Bed-Chair [place] (Transfers) contact guard;verbal cues  -     Assistive Device (Bed-Chair Transfers) wheelchair  -AH       Row Name 09/28/23 1300          Motor Skills    Motor Skills functional endurance;coordination  -     Therapeutic Exercise shoulder;elbow/forearm;wrist;hand  BUE ROM, UE PRE, UE bike, gmc,fmc, reaching,  strengthening  -AH       Row Name 09/28/23 1300          Positioning and Restraints    Post Treatment Position wheelchair  -AH     In Wheelchair sitting;with PT  -AH       Row Name 09/28/23 1300          LB Dressing Goal 1 (OT-IRF)    Progress/Outcomes (LB Dressing Goal 1, OT-IRF) goal met  -AH       Row Name 09/28/23 1300          LB Dressing Goal 2 (OT-IRF)    Progress/Outcomes (LB Dressing Goal 2, OT-IRF) goal met  -AH       Row Name 09/28/23 1300          Toileting Goal  1 (OT-IRF)    Progress/Outcomes (Toileting Goal 1, OT-IRF) goal met  -       Row Name 09/28/23 1300          Toileting Goal 2 (OT-IRF)    Progress/Outcomes (Toileting Goal 2, OT-IRF) goal met  -       Row Name 09/28/23 1300           Activity Tolerance Goal 1 (OT)    Activity Tolerance Goal 1 (OT) patient will improve functional activity tolerance to increase safety and independence with  self care activities.  -               User Key  (r) = Recorded By, (t) = Taken By, (c) = Cosigned By      Initials Name Effective Dates     Althea Downs, OT 07/11/23 -                      Occupational Therapy Education       Title: PT OT SLP Therapies (In Progress)       Topic: Occupational Therapy (In Progress)       Point: ADL training (In Progress)       Description:   Instruct learner(s) on proper safety adaptation and remediation techniques during self care or transfers.   Instruct in proper use of assistive devices.                  Learning Progress Summary             Patient Acceptance, TB, NR by DL at 9/24/2023 2200    Acceptance, E, VU,NR by HB at 9/16/2023 1145    Acceptance, E, VU,NR by BF at 9/15/2023 1232                         Point: Precautions (In Progress)       Description:   Instruct learner(s) on prescribed precautions during self-care and functional transfers.                  Learning Progress Summary             Patient Acceptance, TB, NR by DL at 9/24/2023 2200    Acceptance, E, VU,NR by HB at 9/16/2023 1145    Acceptance, E, VU,NR by BF at 9/15/2023 1232                                         User Key       Initials Effective Dates Name Provider Type Discipline    BF 07/11/23 -  Pam Peck OT Occupational Therapist OT    HB 05/25/21 -  Xena Chinchilla OT Occupational Therapist OT    DL 03/16/22 -  Carolyn Ramirez, RN Registered Nurse Nurse                        OT Recommendation and Plan                         Time Calculation:      Time Calculation- OT       Row Name  09/28/23 1318             Time Calculation- OT    OT Start Time 0735  -      OT Stop Time 0915  -      OT Time Calculation (min) 100 min  -                User Key  (r) = Recorded By, (t) = Taken By, (c) = Cosigned By      Initials Name Provider Type    Althea Das OT Occupational Therapist                  Therapy Charges for Today       Code Description Service Date Service Provider Modifiers Qty    59433894835 HC OT THERAPEUTIC ACT EA 15 MIN 9/27/2023 Althea Downs, OT GO 2    04994095263 HC OT SELF CARE/MGMT/TRAIN EA 15 MIN 9/27/2023 Althea Downs, OT GO 2    66058054625 HC OT NEUROMUSC RE EDUCATION EA 15 MIN 9/27/2023 Althea Downs, OT GO 3    86260989476 HC OT THERAPEUTIC ACT EA 15 MIN 9/28/2023 Althea Downs, OT  2    57176882156 HC OT SELF CARE/MGMT/TRAIN EA 15 MIN 9/28/2023 Althea Downs, OT  3    99726433851 HC OT NEUROMUSC RE EDUCATION EA 15 MIN 9/28/2023 Althea Downs OT  2                     Althea Downs OT  9/28/2023

## 2023-09-28 NOTE — PROGRESS NOTES
No new complaints, she still had to have some in and out catheterization overnight.  Removed yesterday.  Continuing serial bladder scans every 4-6 hours.  If PVR remains 3  Knox is to be reanchored.  She can then follow-up for urinary dynamics as an outpatient.  Since Knox catheter is out, we will check renal function in a.m.  She appears compensated from an HFrEF standpoint.

## 2023-09-28 NOTE — PROGRESS NOTES
Rehabilitation Nursing  Inpatient Rehabilitation Plan of Care Note    Plan of Care  Copy from [facility]    Toilet Transfers (Active)  Current Status (9/11/2023 7:00:00 PM): PATIENT WILL HAVE NO TRANSFER DIFFICULTY    Weekly Goal: NO DIFFICULT WITH TRANSFERS  Discharge Goal: TRANSFERS INDEPENDENTLY    Pain    Pain Management (Active)  Current Status (9/11/2023 7:00:00 PM): PATIENT WILL VOICE NO PAIN  Weekly Goal: PATIENT WILL BE PAIN FREE  Discharge Goal: FREE FROM PAIN    Safety    Potential for Injury (Active)  Current Status (9/11/2023 7:00:00 PM): PATIENT WILL HAVE NO INJURY THIS STAY  Weekly Goal: NO INJURY  Discharge Goal: PATIENT WILL DISCHARGE    Body Systems    Integumentary (Active)  Current Status (9/11/2023 7:00:00 PM): PATIENT WILL HAVE NO SKIN  BREAKDOWN THIS  STAY  Weekly Goal: SKIN WILL REMAIN INTACT.  Discharge Goal: NO SKIN ISSUES.    Signed by: Pavel Armenta RN

## 2023-09-28 NOTE — SIGNIFICANT NOTE
09/28/23 0927   Plan   Plan Spoke to son about [facility] submitting PA request to insurance for admission.  Son is agreeable to pt privately paying for SNF care if insurance does not approve admission at [facility].  Son plans to take pt for assisted living evaluation when she may be ready for this level of care.  Provided son with FMLA paperwork completed by MD.

## 2023-09-28 NOTE — THERAPY TREATMENT NOTE
Inpatient Rehabilitation - Physical Therapy Treatment Note       ABENA Quiroz     Patient Name: Ofelia Knox  : 1945  MRN: [id number]    Today's Date: 2023                    Admit Date: 2023      Visit Dx:   No diagnosis found.    Patient Active Problem List   Diagnosis    CVA (cerebral vascular accident)       Past Medical History:   Diagnosis Date    AMS (altered mental status)     Aphasia     CKD (chronic kidney disease)     COPD (chronic obstructive pulmonary disease)     CVA (cerebral vascular accident)     DM2 (diabetes mellitus, type 2)     HTN (hypertension)     Restrictive lung disease     Urinary tract infection due to ESBL Klebsiella        Past Surgical History:   Procedure Laterality Date    HYSTERECTOMY      JOINT REPLACEMENT         PT ASSESSMENT (last 12 hours)       IRF PT Evaluation and Treatment       Row Name 23 1452          PT Time and Intention    Document Type daily treatment  -LB     Mode of Treatment individual therapy;physical therapy  -LB     Patient/Family/Caregiver Comments/Observations family member present during am session  -LB       Row Name 23 1452          General Information    Existing Precautions/Restrictions fall  aphasia  -LB       Row Name 23 1452          Pain Scale: FACES Pre/Post-Treatment    Pain: FACES Scale, Pretreatment 0-->no hurt  -LB     Posttreatment Pain Rating 0-->no hurt  -LB       Row Name 23 1452          Cognition/Psychosocial    Affect/Mental Status (Cognition) WFL  -LB     Follows Commands (Cognition) verbal cues/prompting required;physical/tactile prompts required  mild aphasia and confusion, some directions need repeated but she participates well.  -LB     Personal Safety Interventions gait belt;nonskid shoes/slippers when out of bed;supervised activity  -LB       Row Name 23 1452          Bed Mobility    Supine-Sit-Supine [place] (Bed Mobility) standby assist  -LB     Assistive Device (Bed Mobility) bed  rails  -LB       Row Name 09/28/23 1452          Bed-Chair Transfer    Bed-Chair [place] (Transfers) verbal cues;nonverbal cues (demo/gesture);standby assist  -LB     Assistive Device (Bed-Chair Transfers) wheelchair  -LB       Row Name 09/28/23 1452          Chair-Bed Transfer    Chair-Bed [place] (Transfers) verbal cues;nonverbal cues (demo/gesture);standby assist  -LB     Assistive Device (Chair-Bed Transfers) wheelchair  -LB       Row Name 09/28/23 1452          Sit-Stand Transfer    Sit-Stand [place] (Transfers) verbal cues;nonverbal cues (demo/gesture);standby assist  -LB     Assistive Device (Sit-Stand Transfers) walker, front-wheeled  -LB       Row Name 09/28/23 1452          Stand-Sit Transfer    Stand-Sit [place] (Transfers) verbal cues;nonverbal cues (demo/gesture);standby assist  -LB     Assistive Device (Stand-Sit Transfers) walker, front-wheeled  -LB       Row Name 09/28/23 1452          Gait/Stairs (Locomotion)    [place] Level (Gait) verbal cues;nonverbal cues (demo/gesture);standby assist  -LB     Assistive Device (Gait) walker, front-wheeled  -LB     Distance in Feet (Gait) 320' bid  -LB     Deviations/Abnormal Patterns (Gait) beatrice decreased;gait speed decreased;stride length decreased  -LB     Bilateral Gait Deviations forward flexed posture  -LB     [place] Level (Stairs) contact guard;verbal cues;nonverbal cues (demo/gesture)  -LB     Handrail Location (Stairs) both sides  -LB     Number of Steps (Stairs) 15  -LB     Ascending Technique (Stairs) step-to-step  L knee is weaker from OA  -LB     Descending Technique (Stairs) step-to-step  L knee is weaker from OA  -LB     Stairs, Safety Issues sequencing ability decreased  verbal cues for which leg to lead  -LB     Comment, (Gait/Stairs) less sob with exertion today  -LB       Row Name 09/28/23 1452          Balance    Comment, Balance sitting balloon tap  -LB       Row Name 09/28/23 1452          Motor Skills     Therapeutic Exercise --  sitting ex with 2# and green tb bid  -LB       Row Name 09/28/23 1452          Aerobic Exercise    Type (Aerobic Exercise) recumbent stationary bike  -LB     Time Performed (Aerobic Exercise) level 1.0, 5 min  -LB     Comment, Aerobic Exercise (Therapeutic Exercise) straps needed for feet, she did have discomfort at L knee, she reports history of OA  -LB       Row Name 09/28/23 1452          Positioning and Restraints    Pre-Treatment Position --  am-w/c, pm-bed  -LB     In Bed call light within reach;encouraged to call for assist;heels elevated  pm  -LB     In Wheelchair with SLP;with family/caregiver  am  -LB       Row Name 09/28/23 1452          Daily Progress Summary (PT)    Recommendations (PT) continue PT  -LB               User Key  (r) = Recorded By, (t) = Taken By, (c) = Cosigned By      Initials Name Provider Type    Andra Lea, PT Physical Therapist                     Physical Therapy Education       Title: PT OT SLP Therapies (In Progress)       Topic: Physical Therapy (Done)       Point: Mobility training (Done)       Learning Progress Summary             Patient Acceptance, E, VU,NR by LB at 9/28/2023 1501    Acceptance, E, VU,NR by LB at 9/27/2023 1532    Acceptance, E, VU,NR by LB at 9/26/2023 1548    Acceptance, E,D, VU,NR by RG at 9/25/2023 1423    Acceptance, TB, NR by DL at 9/24/2023 2200    Acceptance, E,D, NR,VU by RG at 9/22/2023 1315    Acceptance, E,D, VU,NR by RG at 9/21/2023 1307    Acceptance, E,D, VU,NR by RG at 9/20/2023 1258    Acceptance, E,D, VU,NR by RG at 9/19/2023 1258    Acceptance, E,D, VU,NR by RG at 9/18/2023 1338    Acceptance, E,TB, VU by RM at 9/15/2023 1532    Acceptance, E,TB, VU by RM at 9/14/2023 1555    Acceptance, E,D, VU,NR by RG at 9/13/2023 1528    Acceptance, TB, NR by DL at 9/12/2023 2003    Acceptance, E, VU,NR by LB at 9/12/2023 1135                         Point: Home exercise program (Done)       Learning Progress  Summary             Patient Acceptance, E, VU,NR by LB at 9/28/2023 1501    Acceptance, E, VU,NR by LB at 9/27/2023 1532    Acceptance, E, VU,NR by LB at 9/26/2023 1548    Acceptance, E,D, VU,NR by RG at 9/25/2023 1423    Acceptance, TB, NR by DL at 9/24/2023 2200    Acceptance, E,D, NR,VU by RG at 9/22/2023 1315    Acceptance, E,D, VU,NR by RG at 9/21/2023 1307    Acceptance, E,D, VU,NR by RG at 9/20/2023 1258    Acceptance, E,D, VU,NR by RG at 9/19/2023 1258    Acceptance, E,D, VU,NR by RG at 9/18/2023 1338    Acceptance, E,TB, VU by RM at 9/15/2023 1532    Acceptance, E,TB, VU by RM at 9/14/2023 1555    Acceptance, E,D, VU,NR by RG at 9/13/2023 1528    Acceptance, TB, NR by DL at 9/12/2023 2003    Acceptance, E, VU,NR by LB at 9/12/2023 1135                         Point: Body mechanics (Done)       Learning Progress Summary             Patient Acceptance, E, VU,NR by LB at 9/28/2023 1501    Acceptance, E, VU,NR by LB at 9/27/2023 1532    Acceptance, E, VU,NR by LB at 9/26/2023 1548    Acceptance, E,D, VU,NR by RG at 9/25/2023 1423    Acceptance, TB, NR by DL at 9/24/2023 2200    Acceptance, E,D, NR,VU by RG at 9/22/2023 1315    Acceptance, E,D, VU,NR by RG at 9/21/2023 1307    Acceptance, E,D, VU,NR by RG at 9/20/2023 1258    Acceptance, E,D, VU,NR by RG at 9/19/2023 1258    Acceptance, E,D, VU,NR by RG at 9/18/2023 1338    Acceptance, E,TB, VU by RM at 9/15/2023 1532    Acceptance, E,TB, VU by RM at 9/14/2023 1555    Acceptance, E,D, VU,NR by RG at 9/13/2023 1528    Acceptance, TB, NR by DL at 9/12/2023 2003    Acceptance, E, VU,NR by LB at 9/12/2023 1135                         Point: Precautions (Done)       Learning Progress Summary             Patient Acceptance, E, VU,NR by LB at 9/28/2023 1501    Acceptance, E, VU,NR by LB at 9/27/2023 1532    Acceptance, E, VU,NR by LB at 9/26/2023 1548    Acceptance, E,D, VU,NR by RG at 9/25/2023 1423    Acceptance, TB, NR by DL at 9/24/2023 2200    Acceptance, E,D,  NR,VU by RG at 9/22/2023 1315    Acceptance, E,D, VU,NR by RG at 9/21/2023 1307    Acceptance, E,D, VU,NR by RG at 9/20/2023 1258    Acceptance, E,D, VU,NR by RG at 9/19/2023 1258    Acceptance, E,D, VU,NR by RG at 9/18/2023 1338    Acceptance, E,TB, VU by RM at 9/15/2023 1532    Acceptance, E,TB, VU by RM at 9/14/2023 1555    Acceptance, E,D, VU,NR by RG at 9/13/2023 1528    Acceptance, TB, NR by DL at 9/12/2023 2003    Acceptance, E, VU,NR by LB at 9/12/2023 1135                                         User Key       Initials Effective Dates Name Provider Type Discipline     06/16/21 -  Andra Stewart, PT Physical Therapist PT    RM 02/17/23 -  Karis Obrien, PTA Physical Therapist Assistant PT    RG 06/16/21 -  Twan Cunningham PTA Physical Therapist Assistant PT    DL 03/16/22 -  Carolyn Ramirez, RN Registered Nurse Nurse                    PT Recommendation and Plan    Planned Therapy Interventions (PT): balance training, bed mobility training, gait training, neuromuscular re-education, patient/family education, strengthening, transfer training  Frequency of Treatment (PT): 5 times per week  Anticipated Equipment Needs at Discharge (PT Eval):  (tbd)  Daily Progress Summary (PT)  Recommendations (PT): continue PT               Time Calculation:      PT Charges       Row Name 09/28/23 1502 09/28/23 1501          Time Calculation    Start Time 1330  -LB 0915  -LB     Stop Time 1415  -LB 1000  -LB     Time Calculation (min) 45 min  -LB 45 min  -LB     PT Received On -- 09/28/23  -LB               User Key  (r) = Recorded By, (t) = Taken By, (c) = Cosigned By      Initials Name Provider Type    Andra Lea, PT Physical Therapist                    Therapy Charges for Today       Code Description Service Date Service Provider Modifiers Qty    37113282102 HC GAIT TRAINING EA 15 MIN 9/27/2023 Andra Stewart, PT GP 2    18289880019 HC PT THER PROC EA 15 MIN 9/27/2023 Andra Stewart  Luigi, PT GP 2    78970773421 HC PT NEUROMUSC RE EDUCATION EA 15 MIN 9/27/2023 Andra Stewart, PT GP 1    24968396765 HC PT THERAPEUTIC ACT EA 15 MIN 9/27/2023 Andra Stewart, PT GP 1    39158601554 HC GAIT TRAINING EA 15 MIN 9/28/2023 Andra Stewart, PT GP 2    93654602516 HC PT THER PROC EA 15 MIN 9/28/2023 Andra Stewart, PT GP 3    55604869500 HC PT NEUROMUSC RE EDUCATION EA 15 MIN 9/28/2023 Andra Stewart, PT GP 1                     Andra Stewart, PT  9/28/2023

## 2023-09-28 NOTE — PROGRESS NOTES
Chief Complaint: Urinary retention    History of Present Illness:  Ms. Knox is a pleasant 78-year-old female with a recent history of a CVA who is currently in physical therapy.  Patient was started on voiding trial late yesterday evening around 6 PM.  States she has urinated several times since.  She did have a bladder scan completed around 10 PM last night that showed a postvoid residual of 147 mL.  She reports she has been urinating good.  She denies any dysuria, pelvic pain/pressure, pushing or straining to begin urination, intermittency, hesitancy, or sensation of incomplete bladder emptying.  Her most recent bladder scan completed at 3:00 this morning showed a postvoid around 407.  Otherwise she has no new complaints.    Vital Signs  Temp:  [97.6 øF (36.4 øC)-98.2 øF (36.8 øC)] 97.6 øF (36.4 øC)  Heart Rate:  [69-76] 76  Resp:  [18] 18  BP: (125-143)/(80-85) 143/85  Body mass index is 40.03 kg/mý.      Intake/Output Summary (Last 24 hours) at 9/28/2023 0917  Last data filed at 9/28/2023 0330  Gross per 24 hour   Intake 480 ml   Output 925 ml   Net -445 ml     Intake & Output (last 3 days)         09/25 0701 09/26 0700 09/26 0701 09/27 0700 09/27 0701  09/28 0700 09/28 0701  09/29 0700    P.O. 600 240 480     Total Intake(mL/kg) 600 (5.4) 240 (2.1) 480 (4.3)     Urine (mL/kg/hr) 975 (0.4) 1000 (0.4) 925 (0.3)     Total Output 975 1000 925     Net -375 -760 -445             Stool Unmeasured Occurrence 1 x               Physical exam:  PHYSICAL EXAM  GENERAL:  A well developed, well nourished, elderly white female in no acute distress.  HEENT:  Pupils equally round, reactive to light.  Extraocular muscles intact.  CARDIOVASCULAR:  Regular rate and rhythm.  No murmurs, gallops or rubs.  LUNGS:  Clear to auscultation bilaterally.  ABDOMEN:  Soft, nontender, nondistended with positive bowel sounds.  SKIN:  No rashes or petechiae.  EXTREMITIES:  No clubbing, cyanosis or edema bilaterally.  NEURO:  Cranial nerves  grossly intact.  No focal deficits.  PSYCH:  Alert and oriented x3.     Results Review:  Lab Results   Component Value Date    WBC 11.95 (H) 09/26/2023    HGB 13.8 09/26/2023    HCT 45.1 09/26/2023    MCV 93.0 09/26/2023     09/26/2023     Lab Results   Component Value Date    GLUCOSE 117 (H) 09/26/2023    BUN 30 (H) 09/26/2023    CREATININE 1.15 (H) 09/26/2023    BCR 26.1 (H) 09/26/2023    CO2 22.3 09/26/2023    CALCIUM 8.9 09/26/2023    ALBUMIN 3.3 (L) 09/12/2023    AST 13 09/12/2023    ALT 10 09/12/2023       Imaging Results (Last 72 Hours)       ** No results found for the last 72 hours. **                   Assessment & Plan   Urinary retention -patient is roughly 12 hours post catheter removal states she is doing well.  Her last bladder scan revealed roughly 407 mL.  Recommend to continue with serial bladder scans roughly every 4-6 hours.  If patient's PVR remains above 350 to 400 mL I would recommend anchoring another Knox catheter.  Advised her if retention reoccurs we will set her up for urodynamic study in the outpatient setting.  Patient should not be restarted on any anticholinergics such as oxybutynin, Vesicare, Myrbetriq, or Gemtesa until urodynamic studies completed.  Thank you for allowing us to participate in Ms. Knox's care.          This document has been electronically signed by Alan Petty PA-C  September 28, 2023 09:17 EDT    Part of this note may be an electronic transcription/translation of spoken language to printed text using the Dragon Dictation System.

## 2023-09-28 NOTE — PAYOR COMM NOTE
"Annalisa Garcia, RN   Utilization Review Nurse for Inpatient Rehab   Phone: [phone]  Fax: [phone]  Email: [email or web address]  [facility] NPI: [id number]    CLINICAL REVIEW FOR CONTINUED REHAB STAY APPROVAL  Member:  Ofelia Knox  :  1945  ID# [id number]   Auth# [id number]   Admission Date:  23  Planning for Discharge home on 10/03/23  *Requesting additional 7 days    Ofelia Knox (78 y.o. Female)       Date of Birth   1945    Social Security Number       Address   [address] [place] [address]    Home Phone   [phone]    MRN   [id number]       Rastafari   None    Marital Status                               Admission Date   23    Admission Type   Elective    Admitting Provider   Jim Appiah MD    Attending Provider   Jim Appiah MD    Department, Room/Bed   [facility] REHABILITATION, 105/1S       Discharge Date       Discharge Disposition       Discharge Destination                                 Attending Provider: Jim Appiah MD    Allergies: Codeine, Albuterol, Amoxicillin, Cephalexin, Clavulanic Acid, Levofloxacin    Isolation: None   Infection: None   Code Status: CPR    Ht: 167.6 cm (66\")   Wt: 112 kg (248 lb)    Admission Cmt: None   Principal Problem: CVA (cerebral vascular accident) [I63.9]                     Adwoa Sousa MSW     Case Management  Significant Note      Signed  Date of Service:  23  Creation Time:  23     Signed                           23   Plan   Plan Spoke to Josephine with [phone] who says she will submit request to insurance for prior-authorization today.  Josephine will follow-up with  when insurance makes a decision.                       Adwoa Sousa MSW     Case Management  Significant Note      Signed  Date of Service:  23  Creation Time:  23     Signed            "                09/27/23 1115   Plan   Plan [facility] paperwork for son provided to MD.  Son needs paperwork completed by 9-29-23.                    Adwoa Sousa, ERNA     Case Management  Significant Note      Signed  Date of Service:  09/27/23 1454  Creation Time:  09/27/23 1454     Signed                           09/27/23 1030   Plan   Plan SS and son spoke to pt about plans for continued rehab in a SNF and [facility] having a bed available on 9-29-23.  Pt is agreeable to going to this facility for rehab.  Son says he placed pt on waiting list at [facility] and he is aware that pt could transfer to [facility] later on if needed if they have a bed and accept pt.  Son wants pt to get rehabilitation and be able to qualify for assisted living in [place].  Son brought [facility] papers for MD to complete.   Patient/Family in Agreement with Plan yes                       Adwoa Sousa, ERNA     Case Management  Significant Note      Signed  Date of Service:  09/27/23 1451  Creation Time:  09/27/23 1451     Signed                           09/27/23 0940   Plan   Plan MD is agreeable to SS working on SNF placement for pt.  Face sheet, H&P, PT/OT/SLP notes/evaluations, and medication list sent to [facility] via [employer].  Josephine with [facility] to follow-up with SS when decision is made about admission and if pt can be accepted [facility] will contact insurance with PA request.                    Adwoa Sousa, ERNA     Case Management  Significant Note      Signed  Date of Service:  09/27/23 1141  Creation Time:  09/27/23 1141     Signed                           09/27/23 0842   Plan   Plan Spoke to son [phone] about how pt is progressing in therapy and plans for discharge on 10-3-23 if pt can go to his home or her home with 24 hour assistance/supervision.  Pt is unable to go to son's home at discharge and does not have someone to stay with her at her  home.  Pt is on the waiting list at [facility] assisted living.  Pt has to be evaluated in-person for assisted living/they do not come to the hospital for evaluations.  Discussed option of pt going to a SNF for continued rehab/nursing care then being evaluated for assisted living.   Educated about traditional Medicare part A SNF benefits and explained her [facility] Medicare replacement plan may be the same as traditional Medicare or a little different.  Educated about [place] Medicaid and son says pt would not be applying for Medicaid.  Reviewed private pay options for SNF if pt used all or her SNF benefits or if insurance no longer approved SNF.  Son is agreeable to pt going to [facility] or [facility] for continued rehab.  Contacted [facility] [phone] per Cady who states bed is not available at this time but may have a bed in two weeks if resident is able to discharge home.  Cady took pt's name and will call SS if a bed becomes available sooner.  Cady says they are in-network with pt's insurance.  Contacted [facility] [phone] per Josephine who says bed is expected to be available on 9-29-23; she says referral can be sent and if they can offer bed insurance to be contacted for prior-authorization request for admission.  Reviewed this with son who says to talk to MD and if pt is medically stable for SNF he is agreeable to SS sending referral to [facility].                    Adwoa Sousa, MSW     Case Management  Significant Note      Signed  Date of Service:  09/26/23 1629  Creation Time:  09/26/23 1629     Signed                           09/26/23 1628   Plan   Plan Team conference held today and pt is scheduled for discharge on 10-3-23.  Left message for son [phone].                    Alan Petty PA-C   Physician Assistant  Urology     Progress Notes      Attested     Date of Service: 09/28/23 0916  Creation Time: 09/28/23 0916     Attested           Attestation signed by  Bill Pandey MD at 09/28/23 0958     I have reviewed this documentation and agree.                 Chief Complaint: Urinary retention     History of Present Illness:  Ms. Knox is a pleasant 78-year-old female with a recent history of a CVA who is currently in physical therapy.  Patient was started on voiding trial late yesterday evening around 6 PM.  States she has urinated several times since.  She did have a bladder scan completed around 10 PM last night that showed a postvoid residual of 147 mL.  She reports she has been urinating good.  She denies any dysuria, pelvic pain/pressure, pushing or straining to begin urination, intermittency, hesitancy, or sensation of incomplete bladder emptying.  Her most recent bladder scan completed at 3:00 this morning showed a postvoid around 407.  Otherwise she has no new complaints.     Vital Signs  Temp:  [97.6 øF (36.4 øC)-98.2 øF (36.8 øC)] 97.6 øF (36.4 øC)  Heart Rate:  [69-76] 76  Resp:  [18] 18  BP: (125-143)/(80-85) 143/85  Body mass index is 40.03 kg/mý.        Intake/Output Summary (Last 24 hours) at 9/28/2023 0917  Last data filed at 9/28/2023 0330      Gross per 24 hour   Intake 480 ml   Output 925 ml   Net -445 ml      Intake & Output (last 3 days)           09/25 0701 09/26 0700 09/26 0701 09/27 0700 09/27 0701  09/28 0700 09/28 0701  09/29 0700     P.O. 600 240 480       Total Intake(mL/kg) 600 (5.4) 240 (2.1) 480 (4.3)       Urine (mL/kg/hr) 975 (0.4) 1000 (0.4) 925 (0.3)       Total Output 975 1000 925       Net -375 -760 -445                     Stool Unmeasured Occurrence 1 x                      Physical exam:  PHYSICAL EXAM  GENERAL:  A well developed, well nourished, elderly white female in no acute distress.  HEENT:  Pupils equally round, reactive to light.  Extraocular muscles intact.  CARDIOVASCULAR:  Regular rate and rhythm.  No murmurs, gallops or rubs.  LUNGS:  Clear to auscultation bilaterally.  ABDOMEN:  Soft, nontender, nondistended  with positive bowel sounds.  SKIN:  No rashes or petechiae.  EXTREMITIES:  No clubbing, cyanosis or edema bilaterally.  NEURO:  Cranial nerves grossly intact.  No focal deficits.  PSYCH:  Alert and oriented x3.      Results Review:        Lab Results   Component Value Date     WBC 11.95 (H) 09/26/2023     HGB 13.8 09/26/2023     HCT 45.1 09/26/2023     MCV 93.0 09/26/2023      09/26/2023            Lab Results   Component Value Date     GLUCOSE 117 (H) 09/26/2023     BUN 30 (H) 09/26/2023     CREATININE 1.15 (H) 09/26/2023     BCR 26.1 (H) 09/26/2023     CO2 22.3 09/26/2023     CALCIUM 8.9 09/26/2023     ALBUMIN 3.3 (L) 09/12/2023     AST 13 09/12/2023     ALT 10 09/12/2023         Imaging Results (Last 72 Hours)         ** No results found for the last 72 hours. **                            Assessment & Plan     Urinary retention -patient is roughly 12 hours post catheter removal states she is doing well.  Her last bladder scan revealed roughly 407 mL.  Recommend to continue with serial bladder scans roughly every 4-6 hours.  If patient's PVR remains above 350 to 400 mL I would recommend anchoring another Knox catheter.  Advised her if retention reoccurs we will set her up for urodynamic study in the outpatient setting.  Patient should not be restarted on any anticholinergics such as oxybutynin, Vesicare, Myrbetriq, or Gemtesa until urodynamic studies completed.  Thank you for allowing us to participate in Ms. Knox's care.             This document has been electronically signed by Alan Petty PA-C  September 28, 2023 09:17 EDT     Part of this note may be an electronic transcription/translation of spoken language to printed text using the Dragon Dictation System.                  Cosigned by: Bill Pandey MD at 09/28/23 0944         Jim Appiah MD   Physician  Medicine     Progress Notes      Signed     Date of Service: 09/27/23 0956  Creation Time: 09/27/23 0956     Signed          Assisted By: Vicki GLASS, seen later also in OT.     CC: Follow-up on CVA     Interview History/HPI: Patient appears to feel better today.  No acute complaints, slept okay.              Current Hospital Meds:  aspirin, 81 mg, Oral, Daily  buPROPion XL, 150 mg, Oral, Daily  cetirizine, 5 mg, Oral, Daily  furosemide, 20 mg, Oral, Every Other Day  ipratropium, 0.5 mg, Nebulization, 4x Daily - RT  isosorbide mononitrate, 30 mg, Oral, Q24H  metFORMIN, 500 mg, Oral, BID With Meals  metoprolol succinate XL, 25 mg, Oral, Q24H  montelukast, 10 mg, Oral, Nightly  nystatin, 1 application , Topical, Q12H  pantoprazole, 40 mg, Oral, QAM AC  pravastatin, 40 mg, Oral, Daily  sacubitril-valsartan, 1 tablet, Oral, Q12H  tiotropium bromide monohydrate, 2 puff, Inhalation, Daily - RT  venlafaxine XR, 75 mg, Oral, Daily With Breakfast            Vitals:     09/27/23 0833   BP: 115/64   Pulse: 66   Resp:     Temp:     SpO2:              Intake/Output Summary (Last 24 hours) at 9/27/2023 0956  Last data filed at 9/27/2023 0900      Gross per 24 hour   Intake 480 ml   Output 1000 ml   Net -520 ml         EXAM: Temperature 98.1, saturation good on room air, no distress, morbidly obese by BMI greater than 40, lungs clear, heart regular rate and rhythm, overall strength is symmetric, no edema.  It appears good today.  Knox catheter in place with medium yellow clear urine        Diet: Cardiac Diets, Diabetic Diets; Healthy Heart (2-3 Na+); Consistent Carbohydrate; Texture: Regular Texture (IDDSI 7); Fluid Consistency: Thin (IDDSI 0)           LABS:      Lab Results (last 48 hours)         Procedure Component Value Units Date/Time     POC Glucose Once [[id number]]  (Normal) Collected: 09/27/23 0612     Specimen: Blood Updated: 09/27/23 0618       Glucose 120 mg/dL       POC Glucose Once [[id number]]  (Abnormal) Collected: 09/26/23 2016     Specimen: Blood Updated: 09/26/23 2023       Glucose 156 mg/dL       POC Glucose Once [[id number]]   (Normal) Collected: 09/26/23 1615     Specimen: Blood Updated: 09/26/23 1624       Glucose 103 mg/dL       POC Glucose Once [[id number]]  (Abnormal) Collected: 09/26/23 1057     Specimen: Blood Updated: 09/26/23 1121       Glucose 132 mg/dL       POC Glucose Once [[id number]]  (Normal) Collected: 09/26/23 0615     Specimen: Blood Updated: 09/26/23 0621       Glucose 128 mg/dL       Basic Metabolic Panel [[id number]]  (Abnormal) Collected: 09/26/23 0034     Specimen: Blood Updated: 09/26/23 0232       Glucose 117 mg/dL         BUN 30 mg/dL         Creatinine 1.15 mg/dL         Sodium 137 mmol/L         Potassium 4.1 mmol/L         Comment: Slight hemolysis detected by analyzer. Results may be affected.          Chloride 103 mmol/L         CO2 22.3 mmol/L         Calcium 8.9 mg/dL         BUN/Creatinine Ratio 26.1       Anion Gap 11.7 mmol/L         eGFR 49.5 mL/min/1.73       Narrative:       GFR Normal >60  Chronic Kidney Disease <60  Kidney Failure <15     The GFR formula is only valid for adults with stable renal function between ages 18 and 70.     CBC & Differential [[id number]]  (Abnormal) Collected: 09/26/23 0034     Specimen: Blood Updated: 09/26/23 0150     Narrative:       The following orders were created for panel order CBC & Differential.  Procedure                               Abnormality         Status                     ---------                               -----------         ------                     CBC Auto Differential[[id number]]        Abnormal            Final result                  Please view results for these tests on the individual orders.     CBC Auto Differential [[id number]]  (Abnormal) Collected: 09/26/23 0034     Specimen: Blood Updated: 09/26/23 0150       WBC 11.95 10*3/mm3         RBC 4.85 10*6/mm3         Hemoglobin 13.8 g/dL         Hematocrit 45.1 %         MCV 93.0 fL         MCH 28.5 pg         MCHC 30.6 g/dL         RDW 14.5 %         RDW-SD 49.8 fl         MPV 12.2 fL          Platelets 277 10*3/mm3         Neutrophil % 55.9 %         Lymphocyte % 31.5 %         Monocyte % 8.6 %         Eosinophil % 3.4 %         Basophil % 0.4 %         Immature Grans % 0.2 %         Neutrophils, Absolute 6.67 10*3/mm3         Lymphocytes, Absolute 3.77 10*3/mm3         Monocytes, Absolute 1.03 10*3/mm3         Eosinophils, Absolute 0.41 10*3/mm3         Basophils, Absolute 0.05 10*3/mm3         Immature Grans, Absolute 0.02 10*3/mm3         nRBC 0.0 /100 WBC       POC Glucose Once [[id number]]  (Abnormal) Collected: 09/25/23 2002     Specimen: Blood Updated: 09/25/23 2009       Glucose 133 mg/dL       POC Glucose Once [[id number]]  (Normal) Collected: 09/25/23 1623     Specimen: Blood Updated: 09/25/23 1633       Glucose 119 mg/dL       POC Glucose Once [[id number]]  (Normal) Collected: 09/25/23 1113     Specimen: Blood Updated: 09/25/23 1120       Glucose 113 mg/dL                        Radiology:     Imaging Results (Last 72 Hours)         ** No results found for the last 72 hours. **                Results for orders placed during the hospital encounter of 09/11/23     Adult Transthoracic Echo Complete W/ Cont if Necessary Per Protocol     Interpretation Summary    Left ventricular systolic function is mildly decreased. Calculated left ventricular EF = 44% Left ventricular ejection fraction appears to be 41 - 45%.    Left ventricular wall thickness is consistent with mild eccentric hypertrophy.    Left ventricular diastolic function is consistent with (grade I) impaired relaxation.    The left atrial cavity is moderately dilated.        Assessment/Plan:   Status post CVA, patient continues to participate with speech, occupational, and physical therapy.  She is making progress in all modalities.  With PT, patient is contact-guard with bed mobility, contact-guard with transfers, contact-guard with gait she walked 180s and 160 feet front wheeled walker yesterday.  She had decreased beatrice gait speed and  stride length.  PT continue to work on balance and endurance as well.  With Occupational Therapy, patient is min assist with lower body dressing, PT continues to work on ADLs, strengthening endurance range of motion fine motor manipulation.  With speech therapy, patient has met many of her short-term goals, continue to work on language skills with cognitive and expressive.  Continue aspirin and statin.     Urinary retention, it Ditropan stopped yesterday, urology note seen and appreciated, we are going to attempt to discontinue Knox catheter.  I am not sure her blood pressure or being on medication for HFrEF will tolerate Flomax.  If needed urodynamics can be done as an outpatient.     HFrEF, tolerating Entresto, Lasix, and beta-blocker.     Diabetes, controlled on metformin alone.     COPD lungs are clear, and changing her nebs to every 6 hours as needed patient is on Spiriva     Probable CKD, stable     DVT prophylaxis, SCUDs     MD Stephane Espinoza, Annalisa Castillo, MS CCC-SLP   Speech and Language Pathologist  Speech Therapy     Therapy Treatment Note      Signed     Date of Service: 23  Creation Time: 23     Signed       Expand All Collapse All    Inpatient Rehabilitation - Speech Language Pathology Treatment Note  ABENA Quiroz     Patient Name: Ofelia Knox                : 1945                      MRN: [id number]  Today's Date: 2023                                                                        Admit Date: 2023      Ms. Knox was seen this pm in IPR for continued speech therapy. She was a/a and interactive, pleasant for all activities. She is notably more effective and successful in her oe communicative exchanges today, compared to previous sessions. She was evidenced with improving self awareness and monitoring as well.       Long Term Goal:  Patient will demonstrate adequate language skills to fully participate in adls at premorbid baseline  level of function.       Short Term Goals:  1. GOAL MET:       2. GOAL MET:       3. GOAL MET:       4. GOAL MET:    5. GOAL MET:    6. Patient will identify(receptively/expressively) the appropriate object/picture/word following a verbal description w/ 80% accuracy over three consecutive sessions.   Verbal description provided and receptive ID from field of 4-6 pictures/objects - 100% accuracy.  Verbal description provided and pt required to expressively state appropriate object - 80% independently. Improves to 100% w/ cues of initial phoneme or oe sentence.     7. GOAL MET:    8. Patient will complete complex oe sentences w/ 90% accuracy and min cues over three consecutive sessions.   90% w/ one and two word oe sentence completion independently.   Improves to 100% w/ min cues of initial phoneme      9. Patient will provide verbal 3+ descriptors of an object/picture/word w/ 80% accuracy and min cues over three consecutive sessions.   Deferred this date     10. Patient will perform divergent naming tasks w/ 5+ objects named of a given category in < 2 min over three consecutive sessions.   Two items named in a narrow category w/ 75% named w/o cues. Min cues of initial phoneme or oe sentence improves to 100%.      11.  Pt will increase verbal expression to 90% for expressing object functions w/ single word and/or phrase responses w/ min cues over three consecutive sessions.   Able to state object function independently 80% w/ 2-5 word phrases. Improves to 100% w/ min cues.      12.  Pt will increase verbal expression to 90% accuracy for producing two-or-more-word responses during sentence completions/tasks w/ min cues over three consecutive sessions.   70% accuracy of 2-5 word responses during sentence completion tasks     13.  Pt will increase verbal expression to 80% accuracy independently while forming sentences relevant to current topic over three consecutive sessions.   Verbal expression of sentences relevant to  current topic is on topic approx 80% of opp.      14.  Pt will increase verbal expression to 90% accuracy, independently, over three consecutive sessions, for sentence formulation when given a target word to incorporate in the sentence.   Deferred this date     Visit Dx:  Visit Diagnosis   No diagnosis found.     Problem List       Patient Active Problem List   Diagnosis    CVA (cerebral vascular accident)         Medical History        Past Medical History:   Diagnosis Date    AMS (altered mental status)      Aphasia      CKD (chronic kidney disease)      COPD (chronic obstructive pulmonary disease)      CVA (cerebral vascular accident)      DM2 (diabetes mellitus, type 2)      HTN (hypertension)      Restrictive lung disease      Urinary tract infection due to ESBL Klebsiella           Surgical History         Past Surgical History:   Procedure Laterality Date    HYSTERECTOMY        JOINT REPLACEMENT             EDUCATION  The patient has been educated in the following areas:   Communication Impairment.     SLP Recommendation and Plan   Continue plan of care to allow for maximal opportunities for improvements.      Thank you   Annalisa Calderón M.S., [facility]/SLP                                SLP GOALS                              Time Calculation:        Time Calculation- SLP         Time Calculation- SLP         Row Name 09/27/23 1536     SLP Start Time 1450  -Recorded by: MILES     SLP Stop Time 1535  -Recorded by: MILES     SLP Time Calculation (min) 45 min  -Recorded by: MILES     SLP - Next Appointment 09/28/23  -Recorded by: Andra Foote, PT   Physical Therapist  Physical Therapy     Therapy Treatment Note      Signed     Date of Service: 09/27/23 1533  Creation Time: 09/27/23 1533     Signed       Expand All Collapse All    Inpatient Rehabilitation - Physical Therapy Treatment Note                                                              ABENA Quiroz     Patient Name: Ofelia Knxo                 : 1945                      MRN: [id number]     Today's Date: 2023                                                                                            Admit Date: 2023                          Visit Dx:   Visit Diagnosis   No diagnosis found.        Problem List       Patient Active Problem List   Diagnosis    CVA (cerebral vascular accident)            Medical History        Past Medical History:   Diagnosis Date    AMS (altered mental status)      Aphasia      CKD (chronic kidney disease)      COPD (chronic obstructive pulmonary disease)      CVA (cerebral vascular accident)      DM2 (diabetes mellitus, type 2)      HTN (hypertension)      Restrictive lung disease      Urinary tract infection due to ESBL Klebsiella              Surgical History         Past Surgical History:   Procedure Laterality Date    HYSTERECTOMY        JOINT REPLACEMENT                PT ASSESSMENT (last 12 hours)            IRF PT Evaluation and Treatment         Row Name 23 1526                 PT Time and Intention     Document Type daily treatment  -LB       Mode of Treatment individual therapy;physical therapy  -LB       Patient/Family/Caregiver Comments/Observations family member was present during 2nd session  -LB          Row Name 23 1526                 General Information     Existing Precautions/Restrictions fall  rehman, O2 prn, aphasia  -LB          Row Name 23 1526                 Pain Scale: FACES Pre/Post-Treatment     Pain: FACES Scale, Pretreatment 0-->no hurt  -LB       Posttreatment Pain Rating 0-->no hurt  -LB          Row Name 23 1526                 Cognition/Psychosocial     Affect/Mental Status (Cognition) confused  -LB       Follows Commands (Cognition) verbal cues/prompting required;physical/tactile prompts required;repetition of directions required;follows one-step commands;75-90% accuracy  -LB       Personal Safety Interventions gait belt;nonskid  shoes/slippers when out of bed;supervised activity  -LB          Row Name 09/27/23 1526                 Bed Mobility     Supine-Sit [place] (Bed Mobility) standby assist  -LB       Assistive Device (Bed Mobility) bed rails  -LB          Row Name 09/27/23 1526                 Bed-Chair Transfer     Bed-Chair [place] (Transfers) verbal cues;nonverbal cues (demo/gesture);standby assist  -LB       Assistive Device (Bed-Chair Transfers) wheelchair  -LB          Row Name 09/27/23 1526                 Chair-Bed Transfer     Chair-Bed [place] (Transfers) verbal cues;nonverbal cues (demo/gesture);standby assist  -LB       Assistive Device (Chair-Bed Transfers) wheelchair  -LB          Row Name 09/27/23 1526                 Sit-Stand Transfer     Sit-Stand [place] (Transfers) verbal cues;nonverbal cues (demo/gesture);standby assist  -LB       Assistive Device (Sit-Stand Transfers) walker, front-wheeled  -LB          Row Name 09/27/23 1526                 Stand-Sit Transfer     Stand-Sit [place] (Transfers) verbal cues;nonverbal cues (demo/gesture);standby assist  -LB       Assistive Device (Stand-Sit Transfers) walker, front-wheeled  -LB          Row Name 09/27/23 1526                 Gait/Stairs (Locomotion)     [place] Level (Gait) verbal cues;nonverbal cues (demo/gesture);standby assist  -LB       Assistive Device (Gait) walker, front-wheeled  -LB       Distance in Feet (Gait) am-280', pm-260'  -LB       Deviations/Abnormal Patterns (Gait) beatrice decreased;gait speed decreased;stride length decreased  -LB       Bilateral Gait Deviations forward flexed posture  -LB       Comment, (Gait/Stairs) increased dyspnea with exertion but O2 sat wnl on RA  -LB          Row Name 09/27/23 1526                 Balance     Comment, Balance standing in PB on foam for bag toss at goal with BUE  -LB          Row Name 09/27/23 1526                 Motor Skills     Therapeutic Exercise --  sitting ex with 2# and  green tb bid  -LB          Row Name 09/27/23 1526                 Positioning and Restraints     Pre-Treatment Position --  am-bed, pm-w/c  -LB       In Wheelchair --  am-OT, pm-in room with call light and family member  -LB          Row Name 09/27/23 1526                 Vital Signs     Intra SpO2 (%) 96  -LB       O2 Delivery Intra Treatment room air  -LB          Row Name 09/27/23 1526                 Daily Progress Summary (PT)     Recommendations (PT) continue PT  -LB          Row Name 09/27/23 1526                 IRF PT Goals     Bed Mobility Goal Selection (PT-IRF) bed mobility, PT goal 1  -LB       Transfer Goal Selection (PT-IRF) transfers, PT goal 1  -LB       Gait (Walking Locomotion) Goal Selection (PT-IRF) gait, PT goal 1  -LB          Row Name 09/27/23 1526                 Bed Mobility Goal 1 (PT-IRF)     Activity/Assistive Device (Bed Mobility Goal 1, PT-IRF) sit to supine/supine to sit  -LB       [place] Level (Bed Mobility Goal 1, PT-IRF) supervision required  -LB       Time Frame (Bed Mobility Goal 1, PT-IRF) by discharge  -LB       Progress/Outcomes (Bed Mobility Goal 1, PT-IRF) goal ongoing  -LB          Row Name 09/27/23 1526                 Transfer Goal 1 (PT-IRF)     Activity/Assistive Device (Transfer Goal 1, PT-IRF) sit-to-stand/stand-to-sit;bed-to-chair/chair-to-bed  -LB       [place] Level (Transfer Goal 1, PT-IRF) supervision required  -LB       Time Frame (Transfer Goal 1, PT-IRF) by discharge  -LB       Progress/Outcomes (Transfer Goal 1, PT-IRF) goal ongoing  -LB          Row Name 09/27/23 1526                 Gait/Walking Locomotion Goal 1 (PT-IRF)     Activity/Assistive Device (Gait/Walking Locomotion Goal 1, PT-IRF) walker, rolling  -LB       Gait/Walking Locomotion Distance Goal 1 (PT-IRF) 300'  -LB       [place] Level (Gait/Walking Locomotion Goal 1, PT-IRF) supervision required  -LB       Time Frame (Gait/Walking Locomotion Goal 1, PT-IRF) by discharge  -LB        Progress/Outcomes (Gait/Walking Locomotion Goal 1, PT-IRF) goal ongoing  -LB                    User Key  (r) = Recorded By, (t) = Taken By, (c) = Cosigned By        Initials Name Provider Type     Andra Lea, PT Physical Therapist                               PT Recommendation and Plan     Planned Therapy Interventions (PT): balance training, bed mobility training, gait training, neuromuscular re-education, patient/family education, strengthening, transfer training  Frequency of Treatment (PT): 5 times per week  Anticipated Equipment Needs at Discharge (PT Eval):  (tbd)  Daily Progress Summary (PT)  Recommendations (PT): continue PT         Time Calculation:        PT Charges         Row Name 23 1533 23 1532                  Time Calculation     Start Time 1045  -LB 0830  -LB       Stop Time 1130  -LB 0915  -LB       Time Calculation (min) 45 min  -LB 45 min  -LB       PT Received On -- 23  -Althea Her, OT   Occupational Therapist  Occupational Therapy     Therapy Treatment Note      Addendum     Date of Service: 23  Creation Time: 23     Expand All Collapse All    Inpatient Rehabilitation - Occupational Therapy Treatment Note     ABENA Quiroz     Patient Name: Ofelia Knox                : 1945                      MRN: [id number]     Today's Date: 2023                                                                             Admit Date: 2023        Visit Diagnosis   No diagnosis found.        Problem List       Patient Active Problem List   Diagnosis    CVA (cerebral vascular accident)            Medical History        Past Medical History:   Diagnosis Date    AMS (altered mental status)      Aphasia      CKD (chronic kidney disease)      COPD (chronic obstructive pulmonary disease)      CVA (cerebral vascular accident)      DM2 (diabetes mellitus, type 2)      HTN (hypertension)      Restrictive lung  disease      Urinary tract infection due to ESBL Klebsiella              Surgical History         Past Surgical History:   Procedure Laterality Date    HYSTERECTOMY        JOINT REPLACEMENT                               IRF OT ASSESSMENT FLOWSHEET (last 12 hours)            IRF OT Evaluation and Treatment         Row Name 09/27/23 1500                 OT Time and Intention     Document Type daily treatment  -       Mode of Treatment individual therapy;occupational therapy  -       Patient Effort good  -          Row Name 09/27/23 1500                 General Information     Patient/Family/Caregiver Comments/Observations patient agreeable to therapy. patient tolerated therapy well with no complaints. patient continues to require verbal cues for activity completion  -       Existing Precautions/Restrictions fall  aphasia  -          Row Name 09/27/23 1500                 Cognition/Psychosocial     Orientation Status (Cognition) oriented to;person;place;verbal cues/prompts needed for orientation  -       Follows Commands (Cognition) follows one-step commands;repetition of directions required;verbal cues/prompting required  -          Row Name 09/27/23 1500                 Chair-Bed Transfer     Chair-Bed [place] (Transfers) contact guard;verbal cues  -          Row Name 09/27/23 1500                 Motor Skills     Motor Skills coordination;functional endurance  -       Therapeutic Exercise shoulder;elbow/forearm;wrist;hand  BUE ROM, UE PRE, UE bike, reaching, gmc,fmc, strengthening  -          Row Name 09/27/23 1500                 Positioning and Restraints     Post Treatment Position bed  -       In Bed supine;call light within reach;encouraged to call for assist;exit alarm on  -                    User Key  (r) = Recorded By, (t) = Taken By, (c) = Cosigned By        Initials Name Effective Dates      Althea Downs, OT 07/11/23 -                                OT  Recommendation and Plan           Time Calculation:        Time Calculation- OT         Row Name 23 1506 23 1505                  Time Calculation- OT     OT Start Time 1245  - 0915  -       OT Stop Time 1345  -AH 1000  -       OT Time Calculation (min) 60 min  - 45 min  -                 Althea Downs, OT   Occupational Therapist  Occupational Therapy     Therapy Treatment Note      Signed     Date of Service: 23  Creation Time: 23     Signed       Expand All Collapse All    Inpatient Rehabilitation - Occupational Therapy Treatment Note      Richie     Patient Name: Ofelia Knox                : 1945                      MRN: [id number]     Today's Date: 2023                                                                             Admit Date: 2023        Visit Diagnosis   No diagnosis found.        Problem List       Patient Active Problem List   Diagnosis    CVA (cerebral vascular accident)            Medical History        Past Medical History:   Diagnosis Date    AMS (altered mental status)      Aphasia      CKD (chronic kidney disease)      COPD (chronic obstructive pulmonary disease)      CVA (cerebral vascular accident)      DM2 (diabetes mellitus, type 2)      HTN (hypertension)      Restrictive lung disease      Urinary tract infection due to ESBL Klebsiella              Surgical History         Past Surgical History:   Procedure Laterality Date    HYSTERECTOMY        JOINT REPLACEMENT                               IRF OT ASSESSMENT FLOWSHEET (last 12 hours)            IRF OT Evaluation and Treatment         Row Name 23 1500                 OT Time and Intention     Document Type daily treatment  -       Mode of Treatment individual therapy;occupational therapy  -       Patient Effort good  -          Row Name 23 1500                 General Information     Patient/Family/Caregiver Comments/Observations patient  agreeable to therapy. patient states she was tired this am. patient tolerated therapy with rest breaks.  -       Existing Precautions/Restrictions fall  aphasia  -          Row Name 09/26/23 1500                 Cognition/Psychosocial     Affect/Mental Status (Cognition) sad/depressed affect  -       Orientation Status (Cognition) oriented to;person;place;verbal cues/prompts needed for orientation  -       Follows Commands (Cognition) verbal cues/prompting required;repetition of directions required  -          Row Name 09/26/23 1500                 Lower Body Dressing     [place] Level (Lower Body Dressing) minimum assist (75% patient effort);contact guard assist;verbal cues;don;pants/bottoms;socks  -          Row Name 09/26/23 1500                 Grooming     [place] Level (Grooming) grooming skills;hair care, combing/brushing;oral care regimen;set up;verbal cues  -          Row Name 09/26/23 1500                 Bed-Chair Transfer     Bed-Chair [place] (Transfers) contact guard;verbal cues  -          Row Name 09/26/23 1500                 Motor Skills     Motor Skills coordination;functional endurance  -       Therapeutic Exercise shoulder;elbow/forearm;wrist;hand  BUE ROM, gmc,fmc, strengthening, reaching, UE PRE, BUE UE bike  -          Row Name 09/26/23 1500                 Positioning and Restraints     Post Treatment Position wheelchair  -       In Wheelchair sitting;with PT  -                    User Key  (r) = Recorded By, (t) = Taken By, (c) = Cosigned By        Initials Name Effective Dates      Althea Downs, OT 07/11/23 -                                OT Recommendation and Plan        Time Calculation:        Time Calculation- [facility] Name 09/26/23 1529                       Time Calculation-      OT Start Time 0730  -         OT Stop Time 0900  -         OT Time Calculation (min) 90 min  -Althea Das  Keli OT   Occupational Therapist  Occupational Therapy     Therapy Treatment Note      Signed     Date of Service: 23 1358  Creation Time: 23     Signed       Expand All Collapse All    Inpatient Rehabilitation - Occupational Therapy Treatment Note     ABENA Richie     Patient Name: Ofelia Knox                : 1945                      MRN: [id number]     Today's Date: 2023                                                                             Admit Date: 2023        Visit Diagnosis   No diagnosis found.        Problem List       Patient Active Problem List   Diagnosis    CVA (cerebral vascular accident)            Medical History        Past Medical History:   Diagnosis Date    AMS (altered mental status)      Aphasia      CKD (chronic kidney disease)      COPD (chronic obstructive pulmonary disease)      CVA (cerebral vascular accident)      DM2 (diabetes mellitus, type 2)      HTN (hypertension)      Restrictive lung disease      Urinary tract infection due to ESBL Klebsiella              Surgical History         Past Surgical History:   Procedure Laterality Date    HYSTERECTOMY        JOINT REPLACEMENT                               IRF OT ASSESSMENT FLOWSHEET (last 12 hours)            IRF OT Evaluation and Treatment         Row Name 23 1300                 OT Time and Intention     Document Type daily treatment  -AH       Mode of Treatment individual therapy;occupational therapy  -       Patient Effort good  -          Row Name 23 1300                 General Information     Patient/Family/Caregiver Comments/Observations patient agreeable to therapy. patient tolerated well with no complaints.  -       Existing Precautions/Restrictions fall  aphasia  -          Row Name 23 1300                 Cognition/Psychosocial     Orientation Status (Cognition) oriented to;person;unable/difficult to assess  -       Follows Commands (Cognition)  physical/tactile prompts required;verbal cues/prompting required  -          Row Name 09/25/23 1300                 Lower Body Dressing     [place] Level (Lower Body Dressing) don;socks;contact guard assist  -       Position (Lower Body Dressing) edge of bed sitting  -          Row Name 09/25/23 1300                 Grooming     [place] Level (Grooming) grooming skills;hair care, combing/brushing;oral care regimen;set up;verbal cues  -       Position (Grooming) sink side;supported sitting  -          Row Name 09/25/23 1300                 Bed-Chair Transfer     Bed-Chair [place] (Transfers) contact guard;verbal cues  -[facility] Name 09/25/23 1300                 Motor Skills     Motor Skills coordination;functional endurance  -       Therapeutic Exercise shoulder;elbow/forearm;wrist;hand  BUE ROM,gmc,fmc, strengthening, UE PRE, UE bike  -[facility] Name 09/25/23 1300                 Positioning and Restraints     Post Treatment Position wheelchair  -       In Wheelchair sitting;with SLP  -                    User Key  (r) = Recorded By, (t) = Taken By, (c) = Cosigned By        Initials Name Effective Dates      Althea Downs, OT 07/11/23 -                                OT Recommendation and Plan           Time Calculation:        Time Calculation- OT         [facility] Name 09/25/23 2647                       Time Calculation-      OT Start Time 0735  -         OT Stop Time 0915  -         OT Time Calculation (min) 100 min  [facility]

## 2023-09-28 NOTE — PLAN OF CARE
Problem: Rehabilitation (IRF) Plan of Care  Goal: Plan of Care Review  Outcome: Ongoing, Progressing  Flowsheets (Taken 9/28/2023 1123)  Progress: improving  Plan of Care Reviewed With: patient  Goal: Patient-Specific Goal (Individualized)  Outcome: Ongoing, Progressing  Goal: Absence of New-Onset Illness or Injury  Outcome: Ongoing, Progressing  Intervention: Prevent Fall and Fall Injury  Recent Flowsheet Documentation  Taken 9/28/2023 1003 by Pavel Armenta, RN  Safety Promotion/Fall Prevention: safety round/check completed  Taken 9/28/2023 0740 by Pavel Armenta, RN  Safety Promotion/Fall Prevention:   safety round/check completed   nonskid shoes/slippers when out of bed   gait belt   clutter free environment maintained   assistive device/personal items within reach  Intervention: Prevent VTE (Venous Thromboembolism)  Recent Flowsheet Documentation  Taken 9/28/2023 0740 by Pavel Armenta, RN  VTE Prevention/Management:   bilateral   sequential compression devices off   patient refused intervention  Goal: Optimal Comfort and Wellbeing  Outcome: Ongoing, Progressing  Goal: Home and Community Transition Plan Established  Outcome: Ongoing, Progressing     Problem: Skin Injury Risk Increased  Goal: Skin Health and Integrity  Outcome: Ongoing, Progressing  Intervention: Optimize Skin Protection  Recent Flowsheet Documentation  Taken 9/28/2023 0740 by Pavel Armenta, RN  Pressure Reduction Techniques:   pressure points protected   heels elevated off bed   frequent weight shift encouraged  Pressure Reduction Devices:   pressure-redistributing mattress utilized   heel offloading device utilized  Skin Protection:   incontinence pads utilized   adhesive use limited     Problem: Fall Injury Risk  Goal: Absence of Fall and Fall-Related Injury  Outcome: Ongoing, Progressing  Intervention: Identify and Manage Contributors  Recent Flowsheet Documentation  Taken 9/28/2023 0740 by Pavel Armenta, RN  Medication  Review/Management: medications reviewed  Intervention: Promote Injury-Free Environment  Recent Flowsheet Documentation  Taken 9/28/2023 1003 by Pavel Armenta, RN  Safety Promotion/Fall Prevention: safety round/check completed  Taken 9/28/2023 0740 by Pavel Armenta, RN  Safety Promotion/Fall Prevention:   safety round/check completed   nonskid shoes/slippers when out of bed   gait belt   clutter free environment maintained   assistive device/personal items within reach     Problem: Communication Impairment  Goal: Effective Communication Skills  Outcome: Ongoing, Progressing  Intervention: Optimize Communication Skills  Recent Flowsheet Documentation  Taken 9/28/2023 0740 by Pavel Armenta, RN  Communication Enhancement Strategies: call light answered in person   Goal Outcome Evaluation:  Plan of Care Reviewed With: patient        Progress: improving

## 2023-09-29 LAB
ANION GAP SERPL CALCULATED.3IONS-SCNC: 11.6 MMOL/L (ref 5–15)
BASOPHILS # BLD AUTO: 0.04 10*3/MM3 (ref 0–0.2)
BASOPHILS NFR BLD AUTO: 0.5 % (ref 0–1.5)
BUN SERPL-MCNC: 27 MG/DL (ref 8–23)
BUN/CREAT SERPL: 25 (ref 7–25)
CALCIUM SPEC-SCNC: 8.8 MG/DL (ref 8.6–10.5)
CHLORIDE SERPL-SCNC: 106 MMOL/L (ref 98–107)
CO2 SERPL-SCNC: 22.4 MMOL/L (ref 22–29)
CREAT SERPL-MCNC: 1.08 MG/DL (ref 0.57–1)
DEPRECATED RDW RBC AUTO: 49.8 FL (ref 37–54)
EGFRCR SERPLBLD CKD-EPI 2021: 53.3 ML/MIN/1.73
EOSINOPHIL # BLD AUTO: 0.26 10*3/MM3 (ref 0–0.4)
EOSINOPHIL NFR BLD AUTO: 3.1 % (ref 0.3–6.2)
ERYTHROCYTE [DISTWIDTH] IN BLOOD BY AUTOMATED COUNT: 14.5 % (ref 12.3–15.4)
GLUCOSE BLDC GLUCOMTR-MCNC: 122 MG/DL (ref 70–130)
GLUCOSE BLDC GLUCOMTR-MCNC: 134 MG/DL (ref 70–130)
GLUCOSE BLDC GLUCOMTR-MCNC: 142 MG/DL (ref 70–130)
GLUCOSE SERPL-MCNC: 137 MG/DL (ref 65–99)
HCT VFR BLD AUTO: 45.5 % (ref 34–46.6)
HGB BLD-MCNC: 13.7 G/DL (ref 12–15.9)
IMM GRANULOCYTES # BLD AUTO: 0.02 10*3/MM3 (ref 0–0.05)
IMM GRANULOCYTES NFR BLD AUTO: 0.2 % (ref 0–0.5)
LYMPHOCYTES # BLD AUTO: 2.29 10*3/MM3 (ref 0.7–3.1)
LYMPHOCYTES NFR BLD AUTO: 27 % (ref 19.6–45.3)
MCH RBC QN AUTO: 28 PG (ref 26.6–33)
MCHC RBC AUTO-ENTMCNC: 30.1 G/DL (ref 31.5–35.7)
MCV RBC AUTO: 93 FL (ref 79–97)
MONOCYTES # BLD AUTO: 0.79 10*3/MM3 (ref 0.1–0.9)
MONOCYTES NFR BLD AUTO: 9.3 % (ref 5–12)
NEUTROPHILS NFR BLD AUTO: 5.07 10*3/MM3 (ref 1.7–7)
NEUTROPHILS NFR BLD AUTO: 59.9 % (ref 42.7–76)
NRBC BLD AUTO-RTO: 0 /100 WBC (ref 0–0.2)
PLATELET # BLD AUTO: 245 10*3/MM3 (ref 140–450)
PMV BLD AUTO: 12 FL (ref 6–12)
POTASSIUM SERPL-SCNC: 3.7 MMOL/L (ref 3.5–5.2)
RBC # BLD AUTO: 4.89 10*6/MM3 (ref 3.77–5.28)
SODIUM SERPL-SCNC: 140 MMOL/L (ref 136–145)
WBC NRBC COR # BLD: 8.47 10*3/MM3 (ref 3.4–10.8)

## 2023-09-29 PROCEDURE — 92507 TX SP LANG VOICE COMM INDIV: CPT

## 2023-09-29 PROCEDURE — 97112 NEUROMUSCULAR REEDUCATION: CPT | Performed by: OCCUPATIONAL THERAPIST

## 2023-09-29 PROCEDURE — 94761 N-INVAS EAR/PLS OXIMETRY MLT: CPT

## 2023-09-29 PROCEDURE — 97110 THERAPEUTIC EXERCISES: CPT

## 2023-09-29 PROCEDURE — 80048 BASIC METABOLIC PNL TOTAL CA: CPT | Performed by: INTERNAL MEDICINE

## 2023-09-29 PROCEDURE — 94799 UNLISTED PULMONARY SVC/PX: CPT

## 2023-09-29 PROCEDURE — 97530 THERAPEUTIC ACTIVITIES: CPT

## 2023-09-29 PROCEDURE — 82948 REAGENT STRIP/BLOOD GLUCOSE: CPT

## 2023-09-29 PROCEDURE — 97530 THERAPEUTIC ACTIVITIES: CPT | Performed by: OCCUPATIONAL THERAPIST

## 2023-09-29 PROCEDURE — 97110 THERAPEUTIC EXERCISES: CPT | Performed by: OCCUPATIONAL THERAPIST

## 2023-09-29 PROCEDURE — 97116 GAIT TRAINING THERAPY: CPT

## 2023-09-29 PROCEDURE — 85025 COMPLETE CBC W/AUTO DIFF WBC: CPT | Performed by: INTERNAL MEDICINE

## 2023-09-29 RX ADMIN — ISOSORBIDE MONONITRATE 30 MG: 30 TABLET, EXTENDED RELEASE ORAL at 09:00

## 2023-09-29 RX ADMIN — SACUBITRIL AND VALSARTAN 1 TABLET: 24; 26 TABLET, FILM COATED ORAL at 20:52

## 2023-09-29 RX ADMIN — METOPROLOL SUCCINATE 25 MG: 25 TABLET, EXTENDED RELEASE ORAL at 08:59

## 2023-09-29 RX ADMIN — PRAVASTATIN SODIUM 40 MG: 40 TABLET ORAL at 08:59

## 2023-09-29 RX ADMIN — NYSTATIN 1 APPLICATION: 100000 CREAM TOPICAL at 20:53

## 2023-09-29 RX ADMIN — BUPROPION HYDROCHLORIDE 150 MG: 150 TABLET, EXTENDED RELEASE ORAL at 09:00

## 2023-09-29 RX ADMIN — MONTELUKAST SODIUM 10 MG: 10 TABLET, COATED ORAL at 20:52

## 2023-09-29 RX ADMIN — PANTOPRAZOLE SODIUM 40 MG: 40 TABLET, DELAYED RELEASE ORAL at 09:00

## 2023-09-29 RX ADMIN — SACUBITRIL AND VALSARTAN 1 TABLET: 24; 26 TABLET, FILM COATED ORAL at 08:59

## 2023-09-29 RX ADMIN — ASPIRIN 81 MG: 81 TABLET, COATED ORAL at 09:00

## 2023-09-29 RX ADMIN — TIOTROPIUM BROMIDE INHALATION SPRAY 2 PUFF: 3.12 SPRAY, METERED RESPIRATORY (INHALATION) at 08:39

## 2023-09-29 RX ADMIN — VENLAFAXINE HYDROCHLORIDE 75 MG: 75 CAPSULE, EXTENDED RELEASE ORAL at 08:59

## 2023-09-29 RX ADMIN — CETIRIZINE HYDROCHLORIDE 5 MG: 10 TABLET, FILM COATED ORAL at 09:00

## 2023-09-29 RX ADMIN — NYSTATIN 1 APPLICATION: 100000 CREAM TOPICAL at 09:03

## 2023-09-29 NOTE — SIGNIFICANT NOTE
09/29/23 1035   Plan   Plan Faxed discharge summary and AVS to [place] [phone].  Nurse will call report to NH.  Pt will be admitted to [facility] in room 406.  NH packet completed.

## 2023-09-29 NOTE — PLAN OF CARE
Goal Outcome Evaluation:       Problem: Rehabilitation (IRF) Plan of Care  Goal: Plan of Care Review  Outcome: Ongoing, Progressing  Flowsheets (Taken 9/28/2023 1123 by Pavel Armenta, RN)  Progress: improving  Plan of Care Reviewed With: patient  Goal: Patient-Specific Goal (Individualized)  Outcome: Ongoing, Progressing  Goal: Absence of New-Onset Illness or Injury  Outcome: Ongoing, Progressing  Intervention: Prevent Fall and Fall Injury  Recent Flowsheet Documentation  Taken 9/29/2023 1000 by Siobhan Meyers RN  Safety Promotion/Fall Prevention: safety round/check completed  Taken 9/29/2023 0800 by Siobhan Meyers RN  Safety Promotion/Fall Prevention: safety round/check completed  Intervention: Prevent Infection  Recent Flowsheet Documentation  Taken 9/29/2023 1000 by Siobhan Meyers RN  Infection Prevention: hand hygiene promoted  Taken 9/29/2023 0800 by Siobhan Meyers RN  Infection Prevention: hand hygiene promoted  Goal: Optimal Comfort and Wellbeing  Outcome: Ongoing, Progressing  Goal: Home and Community Transition Plan Established  Outcome: Ongoing, Progressing

## 2023-09-29 NOTE — THERAPY TREATMENT NOTE
Inpatient Rehabilitation - Physical Therapy Treatment Note        [facility]     Patient Name: Ofelia Knox  : 1945  MRN: [id number]    Today's Date: 2023                    Admit Date: 2023      Visit Dx:   No diagnosis found.    Patient Active Problem List   Diagnosis    CVA (cerebral vascular accident)       Past Medical History:   Diagnosis Date    AMS (altered mental status)     Aphasia     CKD (chronic kidney disease)     COPD (chronic obstructive pulmonary disease)     CVA (cerebral vascular accident)     DM2 (diabetes mellitus, type 2)     HTN (hypertension)     Restrictive lung disease     Urinary tract infection due to ESBL Klebsiella        Past Surgical History:   Procedure Laterality Date    HYSTERECTOMY      JOINT REPLACEMENT         PT ASSESSMENT (last 12 hours)       IRF PT Evaluation and Treatment       Row Name 23 1352          PT Time and Intention    Document Type daily treatment  -RG     Mode of Treatment individual therapy;physical therapy  -RG     Patient/Family/Caregiver Comments/Observations Pt and nursing in agreement for skilled PT on this date.  Pt c/o fatigue secondary to not sleeping well the previous night.  Pt required extra rest breaks today.  -RG       Row Name 23 1353          General Information    Existing Precautions/Restrictions fall  aphasia  -RG       Row Name 23 1351          Pain Scale: FACES Pre/Post-Treatment    Pain: FACES Scale, Pretreatment 0-->no hurt  -RG     Posttreatment Pain Rating 0-->no hurt  -RG       Row Name 23 1355          Cognition/Psychosocial    Affect/Mental Status (Cognition) WFL  -RG     Follows Commands (Cognition) verbal cues/prompting required;physical/tactile prompts required  mild aphasia and confusion, some directions need repeated but she participates well.  -RG     Personal Safety Interventions fall prevention program maintained;gait belt;nonskid shoes/slippers when out of bed  -RG       Row Name 23  1357          Bed Mobility    Supine-Sit-Supine [place] (Bed Mobility) standby assist  -RG     Assistive Device (Bed Mobility) bed rails  -RG       Row Name 09/29/23 1357          Bed-Chair Transfer    Bed-Chair [place] (Transfers) verbal cues;nonverbal cues (demo/gesture);standby assist  -RG     Assistive Device (Bed-Chair Transfers) wheelchair  -RG       Row Name 09/29/23 1357          Chair-Bed Transfer    Chair-Bed [place] (Transfers) verbal cues;nonverbal cues (demo/gesture);standby assist  -RG     Assistive Device (Chair-Bed Transfers) wheelchair  -RG       Row Name 09/29/23 1357          Sit-Stand Transfer    Sit-Stand [place] (Transfers) verbal cues;nonverbal cues (demo/gesture);standby assist  -RG     Assistive Device (Sit-Stand Transfers) walker, front-wheeled  -RG       Row Name 09/29/23 1357          Stand-Sit Transfer    Stand-Sit [place] (Transfers) verbal cues;nonverbal cues (demo/gesture);standby assist  -RG     Assistive Device (Stand-Sit Transfers) walker, front-wheeled  -RG       Row Name 09/29/23 1357          Gait/Stairs (Locomotion)    [place] Level (Gait) verbal cues;nonverbal cues (demo/gesture);standby assist  -RG     Assistive Device (Gait) walker, front-wheeled  -RG     Distance in Feet (Gait) 320'  -RG     Deviations/Abnormal Patterns (Gait) beatrice decreased;gait speed decreased;stride length decreased  -RG     Bilateral Gait Deviations forward flexed posture  -RG     Ascending Technique (Stairs) --  L knee is weaker from OA  -RG     Descending Technique (Stairs) --  L knee is weaker from OA  -RG     Comment, (Gait/Stairs) pt declined steps today secondary to fatigue  -RG       Row Name 09/29/23 1357          Balance    Static Standing Balance contact guard;non-verbal cues (demo/gesture);verbal cues  -RG     Position/Device Used, Standing Balance parallel bars  -RG     Comment, Balance standing on foam block unsupported CGA from therapist  -RG       Row Name  09/29/23 1357          Hip (Therapeutic Exercise)    Hip Strengthening (Therapeutic Exercise) bilateral;flexion;aBduction;aDduction;marching while seated;marching while standing;sitting;standing;2 lb free weight;resistance band;green;10 repetitions;2 sets  -RG       Row Name 09/29/23 1357          Knee (Therapeutic Exercise)    Knee Strengthening (Therapeutic Exercise) bilateral;flexion;extension;marching while seated;marching while standing;LAQ (long arc quad);sitting;standing;2 lb free weight;resistance band;green;10 repetitions;2 sets  -RG       Row Name 09/29/23 1357          Ankle (Therapeutic Exercise)    Ankle Strengthening (Therapeutic Exercise) bilateral;dorsiflexion;plantarflexion;sitting;standing;10 repetitions;2 sets  -RG       Row Name 09/29/23 1357          Positioning and Restraints    Pre-Treatment Position in bed  -RG     Post Treatment Position wheelchair  -RG     In Wheelchair notified nsg;sitting;with SLP;legs elevated  -RG               User Key  (r) = Recorded By, (t) = Taken By, (c) = Cosigned By      Initials Name Provider Type    Twan Oliver PTA Physical Therapist Assistant                     Physical Therapy Education       Title: PT OT SLP Therapies (In Progress)       Topic: Physical Therapy (Done)       Point: Mobility training (Done)       Learning Progress Summary             Patient Acceptance, E,D, VU,NR by RG at 9/29/2023 1403    Acceptance, E, VU,NR by LB at 9/28/2023 1501    Acceptance, E, VU,NR by LB at 9/27/2023 1532    Acceptance, E, VU,NR by LB at 9/26/2023 1548    Acceptance, E,D, VU,NR by RG at 9/25/2023 1423    Acceptance, TB, NR by DL at 9/24/2023 2200    Acceptance, E,D, NR,VU by RG at 9/22/2023 1315    Acceptance, E,D, VU,NR by RG at 9/21/2023 1307    Acceptance, E,D, VU,NR by RG at 9/20/2023 1258    Acceptance, E,D, VU,NR by RG at 9/19/2023 1258    Acceptance, E,D, VU,NR by RG at 9/18/2023 1338    Acceptance, E,TB, VU by RM at 9/15/2023 1532    Acceptance, E,TB,  VU by RM at 9/14/2023 1555    Acceptance, E,D, VU,NR by RG at 9/13/2023 1528    Acceptance, TB, NR by DL at 9/12/2023 2003    Acceptance, E, VU,NR by LB at 9/12/2023 1135                         Point: Home exercise program (Done)       Learning Progress Summary             Patient Acceptance, E,D, VU,NR by RG at 9/29/2023 1403    Acceptance, E, VU,NR by LB at 9/28/2023 1501    Acceptance, E, VU,NR by LB at 9/27/2023 1532    Acceptance, E, VU,NR by LB at 9/26/2023 1548    Acceptance, E,D, VU,NR by RG at 9/25/2023 1423    Acceptance, TB, NR by DL at 9/24/2023 2200    Acceptance, E,D, NR,VU by RG at 9/22/2023 1315    Acceptance, E,D, VU,NR by RG at 9/21/2023 1307    Acceptance, E,D, VU,NR by RG at 9/20/2023 1258    Acceptance, E,D, VU,NR by RG at 9/19/2023 1258    Acceptance, E,D, VU,NR by RG at 9/18/2023 1338    Acceptance, E,TB, VU by RM at 9/15/2023 1532    Acceptance, E,TB, VU by RM at 9/14/2023 1555    Acceptance, E,D, VU,NR by RG at 9/13/2023 1528    Acceptance, TB, NR by DL at 9/12/2023 2003    Acceptance, E, VU,NR by LB at 9/12/2023 1135                         Point: Body mechanics (Done)       Learning Progress Summary             Patient Acceptance, E,D, VU,NR by RG at 9/29/2023 1403    Acceptance, E, VU,NR by LB at 9/28/2023 1501    Acceptance, E, VU,NR by LB at 9/27/2023 1532    Acceptance, E, VU,NR by LB at 9/26/2023 1548    Acceptance, E,D, VU,NR by RG at 9/25/2023 1423    Acceptance, TB, NR by DL at 9/24/2023 2200    Acceptance, E,D, NR,VU by RG at 9/22/2023 1315    Acceptance, E,D, VU,NR by RG at 9/21/2023 1307    Acceptance, E,D, VU,NR by RG at 9/20/2023 1258    Acceptance, E,D, VU,NR by RG at 9/19/2023 1258    Acceptance, E,D, VU,NR by RG at 9/18/2023 1338    Acceptance, E,TB, VU by RM at 9/15/2023 1532    Acceptance, E,TB, VU by RM at 9/14/2023 1555    Acceptance, E,D, VU,NR by RG at 9/13/2023 1528    Acceptance, TB, NR by DL at 9/12/2023 2003    Acceptance, E, VU,NR by LB at 9/12/2023 1135                          Point: Precautions (Done)       Learning Progress Summary             Patient Acceptance, E,D, VU,NR by RG at 9/29/2023 1403    Acceptance, E, VU,NR by LB at 9/28/2023 1501    Acceptance, E, VU,NR by LB at 9/27/2023 1532    Acceptance, E, VU,NR by LB at 9/26/2023 1548    Acceptance, E,D, VU,NR by RG at 9/25/2023 1423    Acceptance, TB, NR by DL at 9/24/2023 2200    Acceptance, E,D, NR,VU by RG at 9/22/2023 1315    Acceptance, E,D, VU,NR by RG at 9/21/2023 1307    Acceptance, E,D, VU,NR by RG at 9/20/2023 1258    Acceptance, E,D, VU,NR by RG at 9/19/2023 1258    Acceptance, E,D, VU,NR by RG at 9/18/2023 1338    Acceptance, E,TB, VU by RM at 9/15/2023 1532    Acceptance, E,TB, VU by RM at 9/14/2023 1555    Acceptance, E,D, VU,NR by RG at 9/13/2023 1528    Acceptance, TB, NR by DL at 9/12/2023 2003    Acceptance, E, VU,NR by LB at 9/12/2023 1135                                         User Key       Initials Effective Dates Name Provider Type Discipline    LB 06/16/21 -  Andra Stewart, PT Physical Therapist PT    RM 02/17/23 -  Karis Obrien, PTA Physical Therapist Assistant PT    RG 06/16/21 -  Twan Cunningham PTA Physical Therapist Assistant PT    DL 03/16/22 -  Carolyn Ramirez, RN Registered Nurse Nurse                    PT Recommendation and Plan    Frequency of Treatment (PT): 5 times per week  Anticipated Equipment Needs at Discharge (PT Eval):  (tbd)                  Time Calculation:      PT Charges       Row Name 09/29/23 1403             Time Calculation    Start Time 0915  -RG      Stop Time 1045  -RG      Time Calculation (min) 90 min  -RG      PT Received On 09/29/23  -RG         Time Calculation- PT    Total Timed Code Minutes- PT 90 minute(s)  -RG                User Key  (r) = Recorded By, (t) = Taken By, (c) = Cosigned By      Initials Name Provider Type    Twan Oliver PTA Physical Therapist Assistant                    Therapy Charges for Today       Code  Description Service Date Service Provider Modifiers Qty    67691741936 HC GAIT TRAINING EA 15 MIN 9/29/2023 Twan Cunningham, ESSIE GP, CQ 1    23203082666 HC PT THERAPEUTIC ACT EA 15 MIN 9/29/2023 Twan Cunningham PTA GP, CQ 2    40874495651 HC PT THER PROC EA 15 MIN 9/29/2023 Twan Cunningham PTA GP, CQ 3                     Twan Cunningham PTA  9/29/2023

## 2023-09-29 NOTE — SIGNIFICANT NOTE
23 1043   Plan   [facility] says insurance is offering a peer to peer review by 2:30 pm today.  Rehab MD can call [phone], option [phone] and provide pt's name,  and member number.   sent this information to MD via secure chat.

## 2023-09-29 NOTE — SIGNIFICANT NOTE
09/29/23 3537   Plan   Plan MD says insurance has denied request for SNF admission.  Reviewed this with son who is agreeable to private pay for SNF care at [facility].  Spoke to Josephine with [facility] about denial by insurance and she says she does not have a bed today as anticipated but could accept pt on Monday if insurance approves continued rehab stay.  Rehab  sent update to insurance today and is still awaiting their decision.  Reviewed this with Josephine who says if insurance will not allow pt to stay in rehab until 10-2-23 she will have a resident being discharged on Saturday 9-30-23 and pt could be admitted this date if needed.  Date for discharge to [facility] to be decided based on insurance decision for continued rehab stay.  Reviewed this information with son.  MD aware too.   Patient/Family in Agreement with Plan yes

## 2023-09-29 NOTE — THERAPY TREATMENT NOTE
Inpatient Rehabilitation - Speech Language Pathology Treatment Note  [facility]     Patient Name: Ofelia Knox  : 1945  MRN: [id number]  Today's Date: 2023             Admit Date: 2023     Ms. Knox was seen this am in [facility] for continued speech therapy. She was a/a and interactive, pleasant for all activities. She is noted w/ increased Wernicke's like jargon this date and increased word-finding difficulties. She is aware. She reports limited rest overnight.       Long Term Goal:  Patient will demonstrate adequate language skills to fully participate in adls at premorbid baseline level of function.       Short Term Goals:  1. GOAL MET:       2. GOAL MET:       3. GOAL MET:       4. GOAL MET:    5. GOAL MET:    6. Patient will identify(receptively/expressively) the appropriate object/picture/word following a verbal description w/ 80% accuracy over three consecutive sessions.   Deferred this date.      7. GOAL MET:    8. Patient will complete complex oe sentences w/ 90% accuracy and min cues over three consecutive sessions.   Deferred this date.      9. Patient will provide verbal 3+ descriptors of an object/picture/word w/ 80% accuracy and min cues over three consecutive sessions.   Able to provide 1 verbal descriptor of named common object w/ 100% accuracy. She then begins Wernicke's like jargon. Able to name additional descriptors via repetition of SLP.      10. Patient will perform divergent naming tasks w/ 5+ objects named of a given category in < 2 min over three consecutive sessions.   Deferred this date.      11.  Pt will increase verbal expression to 90% for expressing object functions w/ single word and/or phrase responses w/ min cues over three consecutive sessions.   Deferred this date.      12.  Pt will increase verbal expression to 90% accuracy for producing two-or-more-word responses during sentence completions/tasks w/ min cues over three consecutive sessions.   75% accuracy of 2-5 word  responses during sentence completion tasks. Increased jargon this date.      13.  Pt will increase verbal expression to 80% accuracy independently while forming sentences relevant to current topic over three consecutive sessions.   Verbal expression of sentences relevant to current topic is on topic approx 50% of opp. Increased jargon this date.      14.  Pt will increase verbal expression to 90% accuracy, independently, over three consecutive sessions, for sentence formulation when given a target word to incorporate in the sentence.   Given noun: 2/3 opp independently. Requires imitation for third attempt.   Given verb: Requires imitation for all attempts.     Visit Dx:  No diagnosis found.  Patient Active Problem List   Diagnosis    CVA (cerebral vascular accident)     Past Medical History:   Diagnosis Date    AMS (altered mental status)     Aphasia     CKD (chronic kidney disease)     COPD (chronic obstructive pulmonary disease)     CVA (cerebral vascular accident)     DM2 (diabetes mellitus, type 2)     HTN (hypertension)     Restrictive lung disease     Urinary tract infection due to ESBL Klebsiella      Past Surgical History:   Procedure Laterality Date    HYSTERECTOMY      JOINT REPLACEMENT       EDUCATION  The patient has been educated in the following areas:   Communication Impairment.    SLP Recommendation and Plan   Continue plan of care to allow for maximal opportunities for improvements.     Thank you   Verona Sarmiento M.S., CCC/SLP        Time Calculation:      Time Calculation- SLP       Row Name 09/29/23 1600             Time Calculation- SLP    SLP Start Time 1055  -JR      SLP Stop Time 1140  -      SLP Time Calculation (min) 45 min  -      SLP - Next Appointment 10/02/23  -                User Key  (r) = Recorded By, (t) = Taken By, (c) = Cosigned By      Initials Name Provider Type    Verona Pizarro MS CCC-SLP Speech and Language Pathologist                    Therapy Charges for Today        Code Description Service Date Service Provider Modifiers Qty    83812545081 HC ST TREATMENT SPEECH 3 9/28/2023 Verona Sarmiento MS CCC-SLP GN 1    [id number]  ST TREATMENT SPEECH 3 9/29/2023 Verona Sarmiento MS CCC-SLP GN 1                       Verona Sarmiento MS CCC-SLP  9/29/2023

## 2023-09-29 NOTE — SIGNIFICANT NOTE
09/29/23 8858   Plan   Plan Spoke to pt and son about MD to call insurance for peer to peer review regarding SNF admission.  Son went to [facility] this am and met with Josephine.  Son is agreeable for pt to private pay for SNF if insurance does not approve admission.  Son says NH will bill insurance for therapy.  Explained they usually bill the insurance for medications too.   Son plans to take pt to [facility] next week to be evaluated for assisted living and if she does not qualify she will continue receiving SNF care.   Patient/Family in Agreement with Plan yes

## 2023-09-29 NOTE — THERAPY TREATMENT NOTE
Inpatient Rehabilitation - Occupational Therapy Treatment Note     [facility]     Patient Name: Ofelia Knox  : 1945  MRN: [id number]    Today's Date: 2023                 Admit Date: 2023       No diagnosis found.    Patient Active Problem List   Diagnosis    CVA (cerebral vascular accident)       Past Medical History:   Diagnosis Date    AMS (altered mental status)     Aphasia     CKD (chronic kidney disease)     COPD (chronic obstructive pulmonary disease)     CVA (cerebral vascular accident)     DM2 (diabetes mellitus, type 2)     HTN (hypertension)     Restrictive lung disease     Urinary tract infection due to ESBL Klebsiella        Past Surgical History:   Procedure Laterality Date    HYSTERECTOMY      JOINT REPLACEMENT               IRF OT ASSESSMENT FLOWSHEET (last 12 hours)       IRF OT Evaluation and Treatment       Row Name 23 1200          OT Time and Intention    Document Type daily treatment  -     Mode of Treatment individual therapy;occupational therapy  -     Patient Effort good  -       Row Name 23 1200          General Information    Patient/Family/Caregiver Comments/Observations patient agreeable to therapy. patient states she did not rest well last night. patient tolerated therapy well with no complaints.  -       Row Name 23 1200          Cognition/Psychosocial    Orientation Status (Cognition) oriented to;person;place;situation;verbal cues/prompts needed for orientation  -     Follows Commands (Cognition) follows one-step commands;physical/tactile prompts required;verbal cues/prompting required  -       Row Name 23 1200          Motor Skills    Motor Skills coordination;functional endurance  -     Therapeutic Exercise shoulder;elbow/forearm;wrist;hand  BUE ROM, gmc,fmc, strenghening, bilateral coordination  -       Row Name 23 1200          Positioning and Restraints    Post Treatment Position wheelchair  -     In Wheelchair  sitting;encouraged to call for assist;exit alarm on  nurses station  -               User Key  (r) = Recorded By, (t) = Taken By, (c) = Cosigned By      Initials Name Effective Dates     Althea Downs, OT 07/11/23 -                      Occupational Therapy Education       Title: PT OT SLP Therapies (In Progress)       Topic: Occupational Therapy (In Progress)       Point: ADL training (In Progress)       Description:   Instruct learner(s) on proper safety adaptation and remediation techniques during self care or transfers.   Instruct in proper use of assistive devices.                  Learning Progress Summary             Patient Acceptance, TB, NR by DL at 9/24/2023 2200    Acceptance, E, VU,NR by  at 9/16/2023 1145    Acceptance, E, VU,NR by  at 9/15/2023 1232                         Point: Precautions (In Progress)       Description:   Instruct learner(s) on prescribed precautions during self-care and functional transfers.                  Learning Progress Summary             Patient Acceptance, TB, NR by DL at 9/24/2023 2200    Acceptance, E, VU,NR by  at 9/16/2023 1145    Acceptance, E, VU,NR by BF at 9/15/2023 1232                                         User Key       Initials Effective Dates Name Provider Type Discipline     07/11/23 -  Pam Peck OT Occupational Therapist OT    HB 05/25/21 -  Xena Chinchilla OT Occupational Therapist OT    DL 03/16/22 -  Carolyn Ramirez RN Registered Nurse Nurse                        OT Recommendation and Plan                         Time Calculation:      Time Calculation- OT       Row Name 09/29/23 1256             Time Calculation-     OT Start Time 0735  -      OT Stop Time 0905  -      OT Time Calculation (min) 90 min  -                User Key  (r) = Recorded By, (t) = Taken By, (c) = Cosigned By      Initials Name Provider Type     Althea Downs, OT Occupational Therapist                  Therapy Charges for  Today       Code Description Service Date Service Provider Modifiers Qty    34720506746 HC OT THERAPEUTIC ACT EA 15 MIN 9/28/2023 Althea Downs, OT GO 2    75990352700 HC OT SELF CARE/MGMT/TRAIN EA 15 MIN 9/28/2023 Althea Downs, OT GO 3    13421465920 HC OT NEUROMUSC RE EDUCATION EA 15 MIN 9/28/2023 Althea Downs, OT GO 2    83129857932 HC OT THERAPEUTIC ACT EA 15 MIN 9/29/2023 Althea Downs, OT GO 2    29380983124 HC OT THER PROC EA 15 MIN 9/29/2023 Althea Downs, OT GO 1    35041205952 HC OT NEUROMUSC RE EDUCATION EA 15 MIN 9/29/2023 Althea Downs OT GO 3                     Althea Downs OT  9/29/2023

## 2023-09-29 NOTE — PROGRESS NOTES
Assisted By: Patient was actually seen, she is sitting in a wheelchair looking out the window and I sat down to talk to her for quite some time.    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  We talked about her living in [place].  We talked about her family.  Patient states she overall did not sleep well due to things going on in her room but otherwise she is without acute complaints.          Current Hospital Meds:  aspirin, 81 mg, Oral, Daily  buPROPion XL, 150 mg, Oral, Daily  cetirizine, 5 mg, Oral, Daily  furosemide, 20 mg, Oral, Every Other Day  isosorbide mononitrate, 30 mg, Oral, Q24H  metFORMIN, 500 mg, Oral, BID With Meals  metoprolol succinate XL, 25 mg, Oral, Q24H  montelukast, 10 mg, Oral, Nightly  nystatin, 1 application , Topical, Q12H  pantoprazole, 40 mg, Oral, QAM AC  pravastatin, 40 mg, Oral, Daily  sacubitril-valsartan, 1 tablet, Oral, Q12H  tiotropium bromide monohydrate, 2 puff, Inhalation, Daily - RT  venlafaxine XR, 75 mg, Oral, Daily With Breakfast         Vitals:    09/29/23 0700   BP: 121/78   Pulse: 77   Resp: 16   Temp: 98.2 øF (36.8 øC)   SpO2: 92%         Intake/Output Summary (Last 24 hours) at 9/29/2023 0833  Last data filed at 9/29/2023 0700  Gross per 24 hour   Intake 1200 ml   Output --   Net 1200 ml       EXAM: She is pleasant and in no distress, morbidly obese by BMI greater than 40.  Mood is good skin warm and dry overall strength is symmetric, the left hand may be slightly weaker than the right, lungs are clear, heart regular rate and rhythm without murmur, mood is good, extremities are without edema.      Diet: Cardiac Diets, Diabetic Diets; Healthy Heart (2-3 Na+); Consistent Carbohydrate; Texture: Regular Texture (IDDSI 7); Fluid Consistency: Thin (IDDSI 0)        LABS:     Lab Results (last 48 hours)       Procedure Component Value Units Date/Time    POC Glucose Once  [[id number]]  (Abnormal) Collected: 09/29/23 0602    Specimen: Blood Updated: 09/29/23 0620     Glucose 142 mg/dL     Basic Metabolic Panel [[id number]]  (Abnormal) Collected: 09/29/23 0254    Specimen: Blood Updated: 09/29/23 0354     Glucose 137 mg/dL      BUN 27 mg/dL      Creatinine 1.08 mg/dL      Sodium 140 mmol/L      Potassium 3.7 mmol/L      Chloride 106 mmol/L      CO2 22.4 mmol/L      Calcium 8.8 mg/dL      BUN/Creatinine Ratio 25.0     Anion Gap 11.6 mmol/L      eGFR 53.3 mL/min/1.73     Narrative:      GFR Normal >60  Chronic Kidney Disease <60  Kidney Failure <15    The GFR formula is only valid for adults with stable renal function between ages 18 and 70.    CBC & Differential [[id number]]  (Abnormal) Collected: 09/29/23 0254    Specimen: Blood Updated: 09/29/23 0333    Narrative:      The following orders were created for panel order CBC & Differential.  Procedure                               Abnormality         Status                     ---------                               -----------         ------                     CBC Auto Differential[[id number]]        Abnormal            Final result                 Please view results for these tests on the individual orders.    CBC Auto Differential [[id number]]  (Abnormal) Collected: 09/29/23 0254    Specimen: Blood Updated: 09/29/23 0333     WBC 8.47 10*3/mm3      RBC 4.89 10*6/mm3      Hemoglobin 13.7 g/dL      Hematocrit 45.5 %      MCV 93.0 fL      MCH 28.0 pg      MCHC 30.1 g/dL      RDW 14.5 %      RDW-SD 49.8 fl      MPV 12.0 fL      Platelets 245 10*3/mm3      Neutrophil % 59.9 %      Lymphocyte % 27.0 %      Monocyte % 9.3 %      Eosinophil % 3.1 %      Basophil % 0.5 %      Immature Grans % 0.2 %      Neutrophils, Absolute 5.07 10*3/mm3      Lymphocytes, Absolute 2.29 10*3/mm3      Monocytes, Absolute 0.79 10*3/mm3      Eosinophils, Absolute 0.26 10*3/mm3      Basophils, Absolute 0.04 10*3/mm3      Immature Grans, Absolute 0.02 10*3/mm3       nRBC 0.0 /100 WBC     POC Glucose Once [[id number]]  (Normal) Collected: 09/28/23 2155    Specimen: Blood Updated: 09/28/23 2202     Glucose 99 mg/dL     POC Glucose Once [[id number]]  (Normal) Collected: 09/28/23 1534    Specimen: Blood Updated: 09/28/23 1540     Glucose 108 mg/dL     POC Glucose Once [[id number]]  (Normal) Collected: 09/28/23 1049    Specimen: Blood Updated: 09/28/23 1055     Glucose 116 mg/dL     POC Glucose Once [[id number]]  (Normal) Collected: 09/28/23 0607    Specimen: Blood Updated: 09/28/23 0613     Glucose 109 mg/dL     POC Glucose Once [[id number]]  (Normal) Collected: 09/27/23 2334    Specimen: Blood Updated: 09/27/23 2341     Glucose 92 mg/dL     POC Glucose Once [[id number]]  (Normal) Collected: 09/27/23 1950    Specimen: Blood Updated: 09/27/23 1956     Glucose 75 mg/dL     POC Glucose Once [[id number]]  (Normal) Collected: 09/27/23 1542    Specimen: Blood Updated: 09/27/23 1548     Glucose 89 mg/dL     POC Glucose Once [[id number]]  (Normal) Collected: 09/27/23 1102    Specimen: Blood Updated: 09/27/23 1108     Glucose 116 mg/dL                  Radiology:    Imaging Results (Last 72 Hours)       ** No results found for the last 72 hours. **            Results for orders placed during the hospital encounter of 09/11/23    Adult Transthoracic Echo Complete W/ Cont if Necessary Per Protocol    Interpretation Summary    Left ventricular systolic function is mildly decreased. Calculated left ventricular EF = 44% Left ventricular ejection fraction appears to be 41 - 45%.    Left ventricular wall thickness is consistent with mild eccentric hypertrophy.    Left ventricular diastolic function is consistent with (grade I) impaired relaxation.    The left atrial cavity is moderately dilated.      Assessment/Plan:   Status post CVA, patient is working with speech occupational physical therapy.  I can notice a significant improvement in speech and her conversation, choosing appropriate words better.   Discussed with speech therapy who certainly concurs patient is improving.  She is staying on task with speech much better.  With PT, patient is standby assist bed to chair and chair to bed, standby assist sit to stand and stand to sit.  Patient is walking 3 and 20 feet twice a day front wheeled walker standby assist.  With OT, patient is min assist with bathing, supervision upper body dressing, min assist lower body dressing, min assist toileting hygiene.  Set up for self-feeding.    Urinary retention, patient has not had to have any further in and out catheterization, this may have been related to Ditropan, continue to monitor.    HFrEF, compensated, continue current medication    DVT prophylaxis, SCUDs    Diabetes, controlled on metformin alone however glucoses are essentially running normal, I am going to try to stop this and follow-up.  This can be added back if needed.    Depression, seems to be well controlled on Effexor and Wellbutrin    COPD, stable    CKD, creatinine is stable.    Jim Appiah MD

## 2023-09-29 NOTE — SIGNIFICANT NOTE
09/29/23 0856   Plan   Plan Spoke to Josephine with [phone] who states she talked to someone with pt's insurance yesterday and sent clinical updates for the medical director to review.  Josephine has not heard a decision yet and says she talked to pt's son who is suppose to come to their facility today.  Josephine will follow-up with SS when she receives a decision from insurance.

## 2023-09-29 NOTE — SIGNIFICANT NOTE
09/29/23 1425   Plan   Plan MD says insurance has denied request for SNF admission.  Reviewed this with son who is agreeable to private pay for SNF care at [facility].  Spoke to Josephine with [facility] about denial by insurance and she says she does not have a bed today as anticipated but could accept pt on Monday if insurance approves continued rehab stay.  Rehab  sent update to insurance today and is still awaiting their decision.  Reviewed this with Josephine who says if insurance will not allow pt to stay in rehab until 10-2-23 she will have a resident being discharged on Saturday 9-30-23 and pt could be admitted this date if needed.  Date for discharge to [facility] to be decided based on insurance decision for continued rehab stay.  Reviewed this information with son.   Patient/Family in Agreement with Plan yes

## 2023-09-29 NOTE — PROGRESS NOTES
Rehabilitation Nursing  Inpatient Rehabilitation Plan of Care Note    Plan of Care  Copy from [facility]    Toilet Transfers (Active)  Current Status (9/11/2023 7:00:00 PM): PATIENT WILL HAVE NO TRANSFER DIFFICULTY    Weekly Goal: NO DIFFICULT WITH TRANSFERS  Discharge Goal: TRANSFERS INDEPENDENTLY    Pain    Pain Management (Active)  Current Status (9/11/2023 7:00:00 PM): PATIENT WILL VOICE NO PAIN  Weekly Goal: PATIENT WILL BE PAIN FREE  Discharge Goal: FREE FROM PAIN    Safety    Potential for Injury (Active)  Current Status (9/11/2023 7:00:00 PM): PATIENT WILL HAVE NO INJURY THIS STAY  Weekly Goal: NO INJURY  Discharge Goal: PATIENT WILL DISCHARGE    Body Systems    Integumentary (Active)  Current Status (9/11/2023 7:00:00 PM): PATIENT WILL HAVE NO SKIN  BREAKDOWN THIS  STAY  Weekly Goal: SKIN WILL REMAIN INTACT.  Discharge Goal: NO SKIN ISSUES.    Signed by: Bertha Downs, Nurse

## 2023-09-30 LAB
GLUCOSE BLDC GLUCOMTR-MCNC: 118 MG/DL (ref 70–130)
GLUCOSE BLDC GLUCOMTR-MCNC: 123 MG/DL (ref 70–130)
GLUCOSE BLDC GLUCOMTR-MCNC: 124 MG/DL (ref 70–130)
GLUCOSE BLDC GLUCOMTR-MCNC: 152 MG/DL (ref 70–130)

## 2023-09-30 PROCEDURE — 94664 DEMO&/EVAL PT USE INHALER: CPT

## 2023-09-30 PROCEDURE — 82948 REAGENT STRIP/BLOOD GLUCOSE: CPT

## 2023-09-30 PROCEDURE — 94799 UNLISTED PULMONARY SVC/PX: CPT

## 2023-09-30 RX ADMIN — VENLAFAXINE HYDROCHLORIDE 75 MG: 75 CAPSULE, EXTENDED RELEASE ORAL at 08:54

## 2023-09-30 RX ADMIN — PANTOPRAZOLE SODIUM 40 MG: 40 TABLET, DELAYED RELEASE ORAL at 08:58

## 2023-09-30 RX ADMIN — SACUBITRIL AND VALSARTAN 1 TABLET: 24; 26 TABLET, FILM COATED ORAL at 08:54

## 2023-09-30 RX ADMIN — CETIRIZINE HYDROCHLORIDE 5 MG: 10 TABLET, FILM COATED ORAL at 08:54

## 2023-09-30 RX ADMIN — NYSTATIN 1 APPLICATION: 100000 CREAM TOPICAL at 20:59

## 2023-09-30 RX ADMIN — TIOTROPIUM BROMIDE INHALATION SPRAY 2 PUFF: 3.12 SPRAY, METERED RESPIRATORY (INHALATION) at 07:55

## 2023-09-30 RX ADMIN — NYSTATIN 1 APPLICATION: 100000 CREAM TOPICAL at 08:57

## 2023-09-30 RX ADMIN — ASPIRIN 81 MG: 81 TABLET, COATED ORAL at 08:54

## 2023-09-30 RX ADMIN — SACUBITRIL AND VALSARTAN 1 TABLET: 24; 26 TABLET, FILM COATED ORAL at 20:58

## 2023-09-30 RX ADMIN — FUROSEMIDE 20 MG: 20 TABLET ORAL at 08:54

## 2023-09-30 RX ADMIN — MONTELUKAST SODIUM 10 MG: 10 TABLET, COATED ORAL at 20:58

## 2023-09-30 RX ADMIN — PRAVASTATIN SODIUM 40 MG: 40 TABLET ORAL at 08:54

## 2023-09-30 RX ADMIN — METOPROLOL SUCCINATE 25 MG: 25 TABLET, EXTENDED RELEASE ORAL at 08:54

## 2023-09-30 RX ADMIN — BUPROPION HYDROCHLORIDE 150 MG: 150 TABLET, EXTENDED RELEASE ORAL at 08:54

## 2023-09-30 RX ADMIN — ISOSORBIDE MONONITRATE 30 MG: 30 TABLET, EXTENDED RELEASE ORAL at 08:58

## 2023-09-30 NOTE — PLAN OF CARE
Goal Outcome Evaluation:           Progress: improving         Problem: Rehabilitation (IRF) Plan of Care  Goal: Plan of Care Review  Outcome: Ongoing, Progressing  Flowsheets  Taken 9/30/2023 1052 by Siobhan Meyers RN  Progress: improving  Taken 9/28/2023 1123 by Pavel Armenta, RN  Plan of Care Reviewed With: patient  Goal: Patient-Specific Goal (Individualized)  Outcome: Ongoing, Progressing  Goal: Absence of New-Onset Illness or Injury  Outcome: Ongoing, Progressing  Intervention: Prevent Fall and Fall Injury  Recent Flowsheet Documentation  Taken 9/30/2023 1000 by Siobhan Meyers RN  Safety Promotion/Fall Prevention: assistive device/personal items within reach  Taken 9/30/2023 0800 by Siobhan Meyers RN  Safety Promotion/Fall Prevention: assistive device/personal items within reach  Intervention: Prevent Infection  Recent Flowsheet Documentation  Taken 9/30/2023 1000 by Siobhan Meyers RN  Infection Prevention: hand hygiene promoted  Taken 9/30/2023 0800 by Siobhan Meyers RN  Infection Prevention: hand hygiene promoted  Goal: Optimal Comfort and Wellbeing  Outcome: Ongoing, Progressing  Goal: Home and Community Transition Plan Established  Outcome: Ongoing, Progressing     Problem: Skin Injury Risk Increased  Goal: Skin Health and Integrity  Outcome: Ongoing, Progressing     Problem: Fall Injury Risk  Goal: Absence of Fall and Fall-Related Injury  Outcome: Ongoing, Progressing  Intervention: Identify and Manage Contributors  Recent Flowsheet Documentation  Taken 9/30/2023 1000 by Siobhan Meyers RN  Medication Review/Management: medications reviewed  Taken 9/30/2023 0800 by Siobhan Meyers RN  Medication Review/Management: medications reviewed  Intervention: Promote Injury-Free Environment  Recent Flowsheet Documentation  Taken 9/30/2023 1000 by Siobhan Meyers RN  Safety Promotion/Fall Prevention: assistive device/personal items within reach  Taken 9/30/2023 0800 by Luigi  Siobhan RN  Safety Promotion/Fall Prevention: assistive device/personal items within reach     Problem: Communication Impairment  Goal: Effective Communication Skills  Outcome: Ongoing, Progressing

## 2023-09-30 NOTE — PROGRESS NOTES
Rehabilitation Nursing  Inpatient Rehabilitation Plan of Care Note    Plan of Care  Mobility    Toilet Transfers (Active)  Current Status (9/11/2023 7:00:00 PM): PATIENT WILL HAVE NO TRANSFER DIFFICULTY    Weekly Goal: NO DIFFICULT WITH TRANSFERS  Discharge Goal: TRANSFERS INDEPENDENTLY    Pain    Pain Management (Active)  Current Status (9/11/2023 7:00:00 PM): PATIENT WILL VOICE NO PAIN  Weekly Goal: PATIENT WILL BE PAIN FREE  Discharge Goal: FREE FROM PAIN    Safety    Potential for Injury (Active)  Current Status (9/11/2023 7:00:00 PM): PATIENT WILL HAVE NO INJURY THIS STAY  Weekly Goal: NO INJURY  Discharge Goal: PATIENT WILL DISCHARGE    Body Systems    Integumentary (Active)  Current Status (9/11/2023 7:00:00 PM): PATIENT WILL HAVE NO SKIN  BREAKDOWN THIS  STAY  Weekly Goal: SKIN WILL REMAIN INTACT.  Discharge Goal: NO SKIN ISSUES.    Signed by: Joanna Meyers RN

## 2023-09-30 NOTE — PROGRESS NOTES
CC: Follow-up on CVA    Interview History/HPI: Patient states she slept better, she did move rooms.  She has no acute complaints this morning.          Current Hospital Meds:  aspirin, 81 mg, Oral, Daily  buPROPion XL, 150 mg, Oral, Daily  cetirizine, 5 mg, Oral, Daily  furosemide, 20 mg, Oral, Every Other Day  isosorbide mononitrate, 30 mg, Oral, Q24H  metoprolol succinate XL, 25 mg, Oral, Q24H  montelukast, 10 mg, Oral, Nightly  nystatin, 1 application , Topical, Q12H  pantoprazole, 40 mg, Oral, QAM AC  pravastatin, 40 mg, Oral, Daily  sacubitril-valsartan, 1 tablet, Oral, Q12H  tiotropium bromide monohydrate, 2 puff, Inhalation, Daily - RT  venlafaxine XR, 75 mg, Oral, Daily With Breakfast         Vitals:    09/30/23 0755   BP:    Pulse: 60   Resp: 12   Temp:    SpO2: 96%         Intake/Output Summary (Last 24 hours) at 9/30/2023 1015  Last data filed at 9/30/2023 0700  Gross per 24 hour   Intake 1080 ml   Output --   Net 1080 ml       EXAM: 124/74, 98.2.  She is in no distress, not using  muscles to breathe, lungs diminished bases but clear, heart regular rate and rhythm.  Possibly trace edema.      Diet: Cardiac Diets, Diabetic Diets; Healthy Heart (2-3 Na+); Consistent Carbohydrate; Texture: Regular Texture (IDDSI 7); Fluid Consistency: Thin (IDDSI 0)        LABS:     Lab Results (last 48 hours)       Procedure Component Value Units Date/Time    POC Glucose Once [[id number]]  (Normal) Collected: 09/30/23 0609    Specimen: Blood Updated: 09/30/23 0632     Glucose 123 mg/dL     POC Glucose Once [[id number]]  (Abnormal) Collected: 09/29/23 1606    Specimen: Blood Updated: 09/29/23 1612     Glucose 134 mg/dL     POC Glucose Once [[id number]]  (Normal) Collected: 09/29/23 1115    Specimen: Blood Updated: 09/29/23 1121     Glucose 122 mg/dL     POC Glucose Once [[id number]]  (Abnormal) Collected: 09/29/23 0602    Specimen: Blood Updated: 09/29/23 0620     Glucose 142 mg/dL     Basic Metabolic Panel  [[id number]]  (Abnormal) Collected: 09/29/23 0254    Specimen: Blood Updated: 09/29/23 0354     Glucose 137 mg/dL      BUN 27 mg/dL      Creatinine 1.08 mg/dL      Sodium 140 mmol/L      Potassium 3.7 mmol/L      Chloride 106 mmol/L      CO2 22.4 mmol/L      Calcium 8.8 mg/dL      BUN/Creatinine Ratio 25.0     Anion Gap 11.6 mmol/L      eGFR 53.3 mL/min/1.73     Narrative:      GFR Normal >60  Chronic Kidney Disease <60  Kidney Failure <15    The GFR formula is only valid for adults with stable renal function between ages 18 and 70.    CBC & Differential [[id number]]  (Abnormal) Collected: 09/29/23 0254    Specimen: Blood Updated: 09/29/23 0333    Narrative:      The following orders were created for panel order CBC & Differential.  Procedure                               Abnormality         Status                     ---------                               -----------         ------                     CBC Auto Differential[[id number]]        Abnormal            Final result                 Please view results for these tests on the individual orders.    CBC Auto Differential [[id number]]  (Abnormal) Collected: 09/29/23 0254    Specimen: Blood Updated: 09/29/23 0333     WBC 8.47 10*3/mm3      RBC 4.89 10*6/mm3      Hemoglobin 13.7 g/dL      Hematocrit 45.5 %      MCV 93.0 fL      MCH 28.0 pg      MCHC 30.1 g/dL      RDW 14.5 %      RDW-SD 49.8 fl      MPV 12.0 fL      Platelets 245 10*3/mm3      Neutrophil % 59.9 %      Lymphocyte % 27.0 %      Monocyte % 9.3 %      Eosinophil % 3.1 %      Basophil % 0.5 %      Immature Grans % 0.2 %      Neutrophils, Absolute 5.07 10*3/mm3      Lymphocytes, Absolute 2.29 10*3/mm3      Monocytes, Absolute 0.79 10*3/mm3      Eosinophils, Absolute 0.26 10*3/mm3      Basophils, Absolute 0.04 10*3/mm3      Immature Grans, Absolute 0.02 10*3/mm3      nRBC 0.0 /100 WBC     POC Glucose Once [[id number]]  (Normal) Collected: 09/28/23 2155    Specimen: Blood Updated: 09/28/23 2202     Glucose 99  mg/dL     POC Glucose Once [[id number]]  (Normal) Collected: 09/28/23 1534    Specimen: Blood Updated: 09/28/23 1540     Glucose 108 mg/dL     POC Glucose Once [[id number]]  (Normal) Collected: 09/28/23 1049    Specimen: Blood Updated: 09/28/23 1055     Glucose 116 mg/dL                  Radiology:    Imaging Results (Last 72 Hours)       ** No results found for the last 72 hours. **            Results for orders placed during the hospital encounter of 09/11/23    Adult Transthoracic Echo Complete W/ Cont if Necessary Per Protocol    Interpretation Summary    Left ventricular systolic function is mildly decreased. Calculated left ventricular EF = 44% Left ventricular ejection fraction appears to be 41 - 45%.    Left ventricular wall thickness is consistent with mild eccentric hypertrophy.    Left ventricular diastolic function is consistent with (grade I) impaired relaxation.    The left atrial cavity is moderately dilated.      Assessment/Plan:   Status post CVA, patient is on aspirin and Pravachol.  No new neurologic changes, she is working with all 3 modalities of therapy and certainly her speech is improving.  With PT, patient is standby assist for bed mobility, standby assist bed yesterday patient was able to walk 320 feet with a front wheel walker standby assist.  To chair and chair to bed, standby assist sit to stand and stand to sit.  With OT, OT working on ADLs, range of motion, strengthening, endurance, coordination and fine motor movements.    Urinary retention, peers to be resolved.    HFrEF, currently compensated    DVT prophylaxis, SCUDs    Diabetes, metformin stopped yesterday, glucoses remain controlled, continue to monitor    Depression, stable    COPD, stable    Jim Appiah MD

## 2023-10-01 LAB
GLUCOSE BLDC GLUCOMTR-MCNC: 108 MG/DL (ref 70–130)
GLUCOSE BLDC GLUCOMTR-MCNC: 127 MG/DL (ref 70–130)
GLUCOSE BLDC GLUCOMTR-MCNC: 134 MG/DL (ref 70–130)
GLUCOSE BLDC GLUCOMTR-MCNC: 97 MG/DL (ref 70–130)

## 2023-10-01 PROCEDURE — 82948 REAGENT STRIP/BLOOD GLUCOSE: CPT

## 2023-10-01 PROCEDURE — 94799 UNLISTED PULMONARY SVC/PX: CPT

## 2023-10-01 PROCEDURE — 94664 DEMO&/EVAL PT USE INHALER: CPT

## 2023-10-01 RX ADMIN — BUPROPION HYDROCHLORIDE 150 MG: 150 TABLET, EXTENDED RELEASE ORAL at 08:53

## 2023-10-01 RX ADMIN — MONTELUKAST SODIUM 10 MG: 10 TABLET, COATED ORAL at 20:44

## 2023-10-01 RX ADMIN — ASPIRIN 81 MG: 81 TABLET, COATED ORAL at 08:52

## 2023-10-01 RX ADMIN — PANTOPRAZOLE SODIUM 40 MG: 40 TABLET, DELAYED RELEASE ORAL at 08:56

## 2023-10-01 RX ADMIN — SACUBITRIL AND VALSARTAN 1 TABLET: 24; 26 TABLET, FILM COATED ORAL at 08:53

## 2023-10-01 RX ADMIN — NYSTATIN 1 APPLICATION: 100000 CREAM TOPICAL at 08:56

## 2023-10-01 RX ADMIN — VENLAFAXINE HYDROCHLORIDE 75 MG: 75 CAPSULE, EXTENDED RELEASE ORAL at 08:53

## 2023-10-01 RX ADMIN — TIOTROPIUM BROMIDE INHALATION SPRAY 2 PUFF: 3.12 SPRAY, METERED RESPIRATORY (INHALATION) at 07:38

## 2023-10-01 RX ADMIN — ISOSORBIDE MONONITRATE 30 MG: 30 TABLET, EXTENDED RELEASE ORAL at 08:53

## 2023-10-01 RX ADMIN — SACUBITRIL AND VALSARTAN 1 TABLET: 24; 26 TABLET, FILM COATED ORAL at 20:44

## 2023-10-01 RX ADMIN — PRAVASTATIN SODIUM 40 MG: 40 TABLET ORAL at 08:53

## 2023-10-01 RX ADMIN — METOPROLOL SUCCINATE 25 MG: 25 TABLET, EXTENDED RELEASE ORAL at 08:53

## 2023-10-01 RX ADMIN — CETIRIZINE HYDROCHLORIDE 5 MG: 10 TABLET, FILM COATED ORAL at 08:52

## 2023-10-01 RX ADMIN — NYSTATIN 1 APPLICATION: 100000 CREAM TOPICAL at 20:45

## 2023-10-01 NOTE — PROGRESS NOTES
Rehabilitation Nursing  Inpatient Rehabilitation Plan of Care Note    Plan of Care  Copy from [facility]    Toilet Transfers (Active)  Current Status (9/11/2023 7:00:00 PM): PATIENT WILL HAVE NO TRANSFER DIFFICULTY    Weekly Goal: NO DIFFICULT WITH TRANSFERS  Discharge Goal: TRANSFERS INDEPENDENTLY    Pain    Pain Management (Active)  Current Status (9/11/2023 7:00:00 PM): PATIENT WILL VOICE NO PAIN  Weekly Goal: PATIENT WILL BE PAIN FREE  Discharge Goal: FREE FROM PAIN    Safety    Potential for Injury (Active)  Current Status (9/11/2023 7:00:00 PM): PATIENT WILL HAVE NO INJURY THIS STAY  Weekly Goal: NO INJURY  Discharge Goal: PATIENT WILL DISCHARGE    Body Systems    Integumentary (Active)  Current Status (9/11/2023 7:00:00 PM): PATIENT WILL HAVE NO SKIN  BREAKDOWN THIS  STAY  Weekly Goal: SKIN WILL REMAIN INTACT.  Discharge Goal: NO SKIN ISSUES.    Signed by: Bertha Downs, Nurse

## 2023-10-02 LAB
FLUAV RNA RESP QL NAA+PROBE: NOT DETECTED
FLUBV RNA RESP QL NAA+PROBE: NOT DETECTED
GLUCOSE BLDC GLUCOMTR-MCNC: 122 MG/DL (ref 70–130)
GLUCOSE BLDC GLUCOMTR-MCNC: 122 MG/DL (ref 70–130)
GLUCOSE BLDC GLUCOMTR-MCNC: 128 MG/DL (ref 70–130)
GLUCOSE BLDC GLUCOMTR-MCNC: 148 MG/DL (ref 70–130)
SARS-COV-2 RNA RESP QL NAA+PROBE: DETECTED

## 2023-10-02 PROCEDURE — 97110 THERAPEUTIC EXERCISES: CPT | Performed by: OCCUPATIONAL THERAPIST

## 2023-10-02 PROCEDURE — 97110 THERAPEUTIC EXERCISES: CPT

## 2023-10-02 PROCEDURE — 82948 REAGENT STRIP/BLOOD GLUCOSE: CPT

## 2023-10-02 PROCEDURE — 97530 THERAPEUTIC ACTIVITIES: CPT

## 2023-10-02 PROCEDURE — 97535 SELF CARE MNGMENT TRAINING: CPT | Performed by: OCCUPATIONAL THERAPIST

## 2023-10-02 PROCEDURE — 97112 NEUROMUSCULAR REEDUCATION: CPT | Performed by: OCCUPATIONAL THERAPIST

## 2023-10-02 PROCEDURE — 97116 GAIT TRAINING THERAPY: CPT

## 2023-10-02 PROCEDURE — 94799 UNLISTED PULMONARY SVC/PX: CPT

## 2023-10-02 PROCEDURE — 87636 SARSCOV2 & INF A&B AMP PRB: CPT | Performed by: FAMILY MEDICINE

## 2023-10-02 RX ADMIN — BENZONATATE 200 MG: 100 CAPSULE ORAL at 16:48

## 2023-10-02 RX ADMIN — VENLAFAXINE HYDROCHLORIDE 75 MG: 75 CAPSULE, EXTENDED RELEASE ORAL at 09:10

## 2023-10-02 RX ADMIN — MONTELUKAST SODIUM 10 MG: 10 TABLET, COATED ORAL at 21:38

## 2023-10-02 RX ADMIN — BENZONATATE 200 MG: 100 CAPSULE ORAL at 21:37

## 2023-10-02 RX ADMIN — METOPROLOL SUCCINATE 25 MG: 25 TABLET, EXTENDED RELEASE ORAL at 09:11

## 2023-10-02 RX ADMIN — CETIRIZINE HYDROCHLORIDE 5 MG: 10 TABLET, FILM COATED ORAL at 09:11

## 2023-10-02 RX ADMIN — PANTOPRAZOLE SODIUM 40 MG: 40 TABLET, DELAYED RELEASE ORAL at 06:31

## 2023-10-02 RX ADMIN — FUROSEMIDE 20 MG: 20 TABLET ORAL at 09:11

## 2023-10-02 RX ADMIN — ACETAMINOPHEN 650 MG: 325 TABLET ORAL at 21:37

## 2023-10-02 RX ADMIN — ACETAMINOPHEN 650 MG: 325 TABLET ORAL at 16:44

## 2023-10-02 RX ADMIN — PRAVASTATIN SODIUM 40 MG: 40 TABLET ORAL at 09:11

## 2023-10-02 RX ADMIN — NYSTATIN 1 APPLICATION: 100000 CREAM TOPICAL at 22:00

## 2023-10-02 RX ADMIN — SACUBITRIL AND VALSARTAN 1 TABLET: 24; 26 TABLET, FILM COATED ORAL at 21:38

## 2023-10-02 RX ADMIN — BUPROPION HYDROCHLORIDE 150 MG: 150 TABLET, EXTENDED RELEASE ORAL at 09:11

## 2023-10-02 RX ADMIN — SACUBITRIL AND VALSARTAN 1 TABLET: 24; 26 TABLET, FILM COATED ORAL at 09:11

## 2023-10-02 RX ADMIN — HYOSCYAMINE SULFATE 125 MCG: 0.12 TABLET, ORALLY DISINTEGRATING ORAL at 16:44

## 2023-10-02 RX ADMIN — NYSTATIN 1 APPLICATION: 100000 CREAM TOPICAL at 09:12

## 2023-10-02 RX ADMIN — ISOSORBIDE MONONITRATE 30 MG: 30 TABLET, EXTENDED RELEASE ORAL at 09:11

## 2023-10-02 RX ADMIN — HYOSCYAMINE SULFATE 125 MCG: 0.12 TABLET, ORALLY DISINTEGRATING ORAL at 10:39

## 2023-10-02 RX ADMIN — ASPIRIN 81 MG: 81 TABLET, COATED ORAL at 09:11

## 2023-10-02 NOTE — PAYOR COMM NOTE
"Annalisa Garcia, RN   Utilization Review Nurse for Inpatient Rehab   Phone: [phone]  Fax: [phone]  Email: [email or web address]  [facility]  Facility NPI: [id number]     CLINICAL REVIEW FOR CONTINUED REHAB STAY APPROVAL  Member:  Ofelia Knox  :  1945  ID# [id number]   Auth# [id number]   Admission Date:  23  Planning for Discharge to SNF after COVID isolation in 10 days  *Requesting additional 10 days for new COVID diagnosis with symptoms      Ofelia Knox (78 y.o. Female)       Date of Birth   1945    Social Security Number       Address   [address] [place] [address]    Home Phone   [phone]    MRN   [id number]       Christianity   None    Marital Status                               Admission Date   23    Admission Type   Elective    Admitting Provider   Jim Appiah MD    Attending Provider   Jim Appiah MD    Department, Room/Bed   [facility], 104/1S       Discharge Date       Discharge Disposition       Discharge Destination                                 Attending Provider: Jim Appiah MD    Allergies: Codeine, Albuterol, Amoxicillin, Cephalexin, Clavulanic Acid, Levofloxacin    Isolation: Enh Drop/Con   Infection: COVID (confirmed) (10/02/23)   Code Status: CPR    Ht: 167.6 cm (66\")   Wt: 112 kg (248 lb)    Admission Cmt: None   Principal Problem: CVA (cerebral vascular accident) [I63.9]               Adwoa Sousa MSW     Case Management  Significant Note      Signed  Date of Service:  10/02/23 1017  Creation Time:  10/02/23 1017     Signed                           10/02/23 1016   Plan   Plan Josephine with [facility] says they could admit pt on day 11 after testing positive for COVID.  MD is aware.                    Adwoa Sousa MSW     Case Management  Significant Note      Signed  Date of Service:  10/02/23 1014  Creation Time:  10/02/23 1014     Signed  "                          23 1625   Plan   Plan Rehab  did not hear from insurance about continued rehab stay.  Discharge plan will be for pt to be admitted to [facility] on Monday 10-2-23; Josephine with [facility] is aware and agreeable.                    Adwoa Sousa, ERNA     Case Management  Significant Note      Signed  Date of Service:  10/02/23 1012  Creation Time:  10/02/23 101     Signed                           23 1050   Plan   Plan SS spoke to son about plans for pt to be discharged to [facility] on Monday 10-2-23 and he is agreeable.  Son will come to rehab around 12:00 pm or after to transport pt to [facility].                   Adwoa Sousa, ERNA     Case Management  Significant Note      Signed  Date of Service:  10/02/23 101  Creation Time:  10/02/23 101     Signed                           10/02/23 0950   Plan   Plan Pt's last covered day by insurance is today per Rehab .  Pt tested positive for COVID this am. Spoke to Josephine [facility] with this information who says she thinks pt cannot be admitted until 10 days from when they test positive but will check on this and follow-up with SS.  MD is aware of this information.  RN will contact daniele Bruno about pt testing postive for COVID.                       Abdullahi Sharpe MD   Physician  Hospitalist     Progress Notes      Signed     Date of Service: 10/02/23 101  Creation Time: 10/02/23 1015     Signed       Expand All [facility]  PROGRESS NOTE     Patient Identification:  Name:  Ofelia Knox  Age:  78 y.o.  Sex:  female  :  1945  MRN:  [id number]  Visit Number:  [id number]  ROOM: [facility]      Primary Care Provider:  Provider, No Known     Length of stay in inpatient status:  21     Subjective      Chief Compliant:  No chief complaint on file.        History of Presenting Illness:  78-year-old female who is status post CVA.  Patient has been scheduled to go to nursing home today.  Unfortunately patient developed some upper airway congestion and we did check and has tested positive for COVID-19.  Patient having no respiratory distress and and again only mild nasal congestion symptoms.     Objective      Current Hospital Meds:aspirin, 81 mg, Oral, Daily  buPROPion XL, 150 mg, Oral, Daily  cetirizine, 5 mg, Oral, Daily  furosemide, 20 mg, Oral, Every Other Day  isosorbide mononitrate, 30 mg, Oral, Q24H  metoprolol succinate XL, 25 mg, Oral, Q24H  montelukast, 10 mg, Oral, Nightly  nystatin, 1 application , Topical, Q12H  pantoprazole, 40 mg, Oral, QAM AC  pravastatin, 40 mg, Oral, Daily  sacubitril-valsartan, 1 tablet, Oral, Q12H  tiotropium bromide monohydrate, 2 puff, Inhalation, Daily - RT  venlafaxine XR, 75 mg, Oral, Daily With Breakfast     ----------------------------------------------------------------------------------------------------------------------  Vital Signs:  Temp:  [98.2 øF (36.8 øC)] 98.2 øF (36.8 øC)  Heart Rate:  [60-67] 67  Resp:  [18] 18  BP: (143-145)/(63-74) 143/74  SpO2:  [95 %] 95 %  on   ;   Device (Oxygen Therapy): room air  Body mass index is 40.03 kg/mý.         Wt Readings from Last 3 Encounters:   09/11/23 112 kg (248 lb)      Intake & Output (last 3 days)           09/29 0701 09/30 0700 09/30 0701  10/01 0700 10/01 0701  10/02 0700 10/02 0701  10/03 0700     P.O. 1320 1320 600 600     Total Intake(mL/kg) 1320 (11.8) 1320 (11.8) 600 (5.4) 600 (5.4)     Urine (mL/kg/hr)   101 (0) 300 (0.1)       Total Output   101 300       Net +1320 +1219 +300 +600                   Urine Unmeasured Occurrence 5 x 5 x 2 x 1 x     Stool Unmeasured Occurrence 1 x 1 x                 Diet: Cardiac Diets, Diabetic Diets; Healthy Heart (2-3 Na+); Consistent Carbohydrate; Texture: Regular Texture (IDDSI 7); Fluid Consistency: Thin (IDDSI  0)  ----------------------------------------------------------------------------------------------------------------------  Physical exam:  Constitutional: No acute distress  HEENT: Normocephalic atraumatic  Neck: Supple  Cardiovascular: Regular rate and rhythm  Pulmonary/Chest: Clear to auscultation  Abdominal: Positive bowel sounds soft.   Musculoskeletal: No arthropathy  Neurological: Some generalized weakness  Skin: No rash  Peripheral vascular: No edema  Genitourinary:  ----------------------------------------------------------------------------------------------------------------------     Last echocardiogram:  Results for orders placed during the hospital encounter of 09/11/23     Adult Transthoracic Echo Complete W/ Cont if Necessary Per Protocol     Interpretation Summary    Left ventricular systolic function is mildly decreased. Calculated left ventricular EF = 44% Left ventricular ejection fraction appears to be 41 - 45%.    Left ventricular wall thickness is consistent with mild eccentric hypertrophy.    Left ventricular diastolic function is consistent with (grade I) impaired relaxation.    The left atrial cavity is moderately dilated.     ----------------------------------------------------------------------------------------------------------------------        Results from last 7 days   Lab Units 09/29/23 0254 09/26/23 0034   WBC 10*3/mm3 8.47 11.95*   HEMOGLOBIN g/dL 13.7 13.8   HEMATOCRIT % 45.5 45.1   MCV fL 93.0 93.0   MCHC g/dL 30.1* 30.6*   PLATELETS 10*3/mm3 245 277                Results from last 7 days   Lab Units 09/29/23 0254 09/26/23 0034   SODIUM mmol/L 140 137   POTASSIUM mmol/L 3.7 4.1   CHLORIDE mmol/L 106 103   CO2 mmol/L 22.4 22.3   BUN mg/dL 27* 30*   CREATININE mg/dL 1.08* 1.15*   CALCIUM mg/dL 8.8 8.9   GLUCOSE mg/dL 137* 117*   Estimated Creatinine Clearance: 56.2 mL/min (A) (by C-G formula based on SCr of 1.08 mg/dL (H)).  No results found for: AMMONIA                     Glucose   Date/Time Value Ref Range Status   10/02/2023 0558 148 (H) 70 - 130 mg/dL Final   10/01/2023 1942 127 70 - 130 mg/dL Final   10/01/2023 1544 97 70 - 130 mg/dL Final   10/01/2023 1044 108 70 - 130 mg/dL Final   10/01/2023 0604 134 (H) 70 - 130 mg/dL Final   09/30/2023 2010 118 70 - 130 mg/dL Final   09/30/2023 1549 152 (H) 70 - 130 mg/dL Final   09/30/2023 1043 124 70 - 130 mg/dL Final            Lab Results   Component Value Date     TSH 1.470 09/12/2023     FREET4 1.15 09/12/2023      No results found for: PREGTESTUR, PREGSERUM, HCG, HCGQUANT  Pain Management Panel                 No data to display                   Brief Urine Lab Results  (Last result in the past 365 days)          Color   Clarity   Blood   Leuk Est   Nitrite   Protein   CREAT   Urine HCG         09/14/23 1935 Dark Yellow    Clear    Negative    Negative    Negative    Trace                          No results found for: BLOODCX      No results found for: URINECX  No results found for: WOUNDCX  No results found for: STOOLCX         I have personally looked at the labs and they are summarized above.  ----------------------------------------------------------------------------------------------------------------------  Detailed radiology reports for the last 24 hours:     Imaging Results (Last 24 Hours)         ** No results found for the last 24 hours. **             Final impressions for the last 30 days of radiology reports:     XR Chest 1 View     Result Date: 9/20/2023  Mild pulmonary vascular congestion.   This report was finalized on 9/20/2023 6:48 PM by Alex Pallas, DO.       XR Chest 1 View     Result Date: 9/14/2023  No radiographic evidence of acute cardiac or pulmonary disease.  This report was finalized on 9/14/2023 7:54 AM by Dr. Krunal Colvin MD.     I have personally looked at the radiology images and read the final radiology report.     Assessment & Plan    Status post CVA--currently on Pravachol and aspirin therapy.   Patient improving regarding her speech.  Requiring standby assist for bed mobility; standby assistance for transfers.  Ambulated 320 feet with front wheel walker and standby assist.  Has continue to work on motor skills.     CHF reduced EF compensated.     COVID-19--patient currently not requiring O2.  Patient does have some nasal congestion symptoms.  Will treat symptomatically and placed in isolation at this time.     Diabetes mellitus controlled     COPD stable     Depression stable     VTE Prophylaxis:   Mechanical Order History:           Ordered         23   Place Sequential Compression Device  Once             23   Maintain Sequential Compression Device  Continuous                               Pharmalogical Order History:         None                      Abdullahi Sharpe MD  [facility]  10/02/23  10:15 Verona Acevedo MS CCC-SLP   Speech and Language Pathologist  Specialty: Speech Pathology     Therapy Treatment Note      Signed     Date of Service: 23  Creation Time: 23     Signed       Expand All Collapse All    Inpatient Rehabilitation - Speech Language Pathology Treatment Note  [facility]     Patient Name: Ofelia Knox                : 1945                      MRN: [id number]  Today's Date: 2023                                                                        Admit Date: 2023      Ms. Knox was seen this am in [facility] for continued speech therapy. She was a/a and interactive, pleasant for all activities. She is noted w/ increased Wernicke's like jargon this date and increased word-finding difficulties. She is aware. She reports limited rest overnight.       Long Term Goal:  Patient will demonstrate adequate language skills to fully participate in adls at premorbid baseline level of function.       Short Term Goals:  1. GOAL MET:       2. GOAL MET:       3. GOAL MET:       4. GOAL MET:    5. GOAL MET:     6. Patient will identify(receptively/expressively) the appropriate object/picture/word following a verbal description w/ 80% accuracy over three consecutive sessions.   Deferred this date.      7. GOAL MET:    8. Patient will complete complex oe sentences w/ 90% accuracy and min cues over three consecutive sessions.   Deferred this date.      9. Patient will provide verbal 3+ descriptors of an object/picture/word w/ 80% accuracy and min cues over three consecutive sessions.   Able to provide 1 verbal descriptor of named common object w/ 100% accuracy. She then begins Wernicke's like jargon. Able to name additional descriptors via repetition of SLP.      10. Patient will perform divergent naming tasks w/ 5+ objects named of a given category in < 2 min over three consecutive sessions.   Deferred this date.      11.  Pt will increase verbal expression to 90% for expressing object functions w/ single word and/or phrase responses w/ min cues over three consecutive sessions.   Deferred this date.      12.  Pt will increase verbal expression to 90% accuracy for producing two-or-more-word responses during sentence completions/tasks w/ min cues over three consecutive sessions.   75% accuracy of 2-5 word responses during sentence completion tasks. Increased jargon this date.      13.  Pt will increase verbal expression to 80% accuracy independently while forming sentences relevant to current topic over three consecutive sessions.   Verbal expression of sentences relevant to current topic is on topic approx 50% of opp. Increased jargon this date.      14.  Pt will increase verbal expression to 90% accuracy, independently, over three consecutive sessions, for sentence formulation when given a target word to incorporate in the sentence.   Given noun: 2/3 opp independently. Requires imitation for third attempt.   Given verb: Requires imitation for all attempts.      Visit Dx:  Visit Diagnosis   No diagnosis found.     Problem  List       Patient Active Problem List   Diagnosis    CVA (cerebral vascular accident)         Medical History        Past Medical History:   Diagnosis Date    AMS (altered mental status)      Aphasia      CKD (chronic kidney disease)      COPD (chronic obstructive pulmonary disease)      CVA (cerebral vascular accident)      DM2 (diabetes mellitus, type 2)      HTN (hypertension)      Restrictive lung disease      Urinary tract infection due to ESBL Klebsiella           Surgical History         Past Surgical History:   Procedure Laterality Date    HYSTERECTOMY        JOINT REPLACEMENT             EDUCATION  The patient has been educated in the following areas:   Communication Impairment.     SLP Recommendation and Plan   Continue plan of care to allow for maximal opportunities for improvements.      Thank you   Verona Sarmiento M.S., CCC/SLP  Time Calculation:        Time Calculation- SLP         Row Name 23 1600                       Time Calculation- SLP     SLP Start Time 1055  -JR         SLP Stop Time 1140  -JR         SLP Time Calculation (min) 45 min  -         SLP - Next Appointment 10/02/23  -Twan Dimas PTA   Physical Therapist Assistant  Physical Therapy     Therapy Treatment Note      Signed     Date of Service: 23  Creation Time: 23     Signed       Expand All Collapse All    Inpatient Rehabilitation - Physical Therapy Treatment Note                                                              [facility]     Patient Name: Ofelia Knox                : 1945                      MRN: [id number]     Today's Date: 2023                                                                                            Admit Date: 2023                          Visit Dx:   Visit Diagnosis   No diagnosis found.        Problem List       Patient Active Problem List   Diagnosis    CVA (cerebral vascular accident)            Medical History         Past Medical History:   Diagnosis Date    AMS (altered mental status)      Aphasia      CKD (chronic kidney disease)      COPD (chronic obstructive pulmonary disease)      CVA (cerebral vascular accident)      DM2 (diabetes mellitus, type 2)      HTN (hypertension)      Restrictive lung disease      Urinary tract infection due to ESBL Klebsiella              Surgical History         Past Surgical History:   Procedure Laterality Date    HYSTERECTOMY        JOINT REPLACEMENT                PT ASSESSMENT (last 12 hours)            IRF PT Evaluation and Treatment         Row Name 09/29/23 1161                 PT Time and Intention     Document Type daily treatment  -RG       Mode of Treatment individual therapy;physical therapy  -RG       Patient/Family/Caregiver Comments/Observations Pt and nursing in agreement for skilled PT on this date.  Pt c/o fatigue secondary to not sleeping well the previous night.  Pt required extra rest breaks today.  -RG          Row Name 09/29/23 1356                 General Information     Existing Precautions/Restrictions fall  aphasia  -RG          Row Name 09/29/23 0941                 Pain Scale: FACES Pre/Post-Treatment     Pain: FACES Scale, Pretreatment 0-->no hurt  -RG       Posttreatment Pain Rating 0-->no hurt  -RG          Row Name 09/29/23 1351                 Cognition/Psychosocial     Affect/Mental Status (Cognition) WFL  -RG       Follows Commands (Cognition) verbal cues/prompting required;physical/tactile prompts required  mild aphasia and confusion, some directions need repeated but she participates well.  -RG       Personal Safety Interventions fall prevention program maintained;gait belt;nonskid shoes/slippers when out of bed  -RG          Row Name 09/29/23 4216                 Bed Mobility     Supine-Sit-Supine [place] (Bed Mobility) standby assist  -RG       Assistive Device (Bed Mobility) bed rails  -RG          Row Name 09/29/23 4878                  Bed-Chair Transfer     Bed-Chair [place] (Transfers) verbal cues;nonverbal cues (demo/gesture);standby assist  -RG       Assistive Device (Bed-Chair Transfers) wheelchair  -RG          Row Name 09/29/23 0830                 Chair-Bed Transfer     Chair-Bed [place] (Transfers) verbal cues;nonverbal cues (demo/gesture);standby assist  -RG       Assistive Device (Chair-Bed Transfers) wheelchair  -RG          Row Name 09/29/23 4052                 Sit-Stand Transfer     Sit-Stand [place] (Transfers) verbal cues;nonverbal cues (demo/gesture);standby assist  -RG       Assistive Device (Sit-Stand Transfers) walker, front-wheeled  -RG          Row Name 09/29/23 6184                 Stand-Sit Transfer     Stand-Sit [place] (Transfers) verbal cues;nonverbal cues (demo/gesture);standby assist  -RG       Assistive Device (Stand-Sit Transfers) walker, front-wheeled  -RG          Row Name 09/29/23 8664                 Gait/Stairs (Locomotion)     [place] Level (Gait) verbal cues;nonverbal cues (demo/gesture);standby assist  -RG       Assistive Device (Gait) walker, front-wheeled  -RG       Distance in Feet (Gait) 320'  -RG       Deviations/Abnormal Patterns (Gait) beatrice decreased;gait speed decreased;stride length decreased  -RG       Bilateral Gait Deviations forward flexed posture  -RG       Ascending Technique (Stairs) --  L knee is weaker from OA  -RG       Descending Technique (Stairs) --  L knee is weaker from OA  -RG       Comment, (Gait/Stairs) pt declined steps today secondary to fatigue  -RG          Row Name 09/29/23 8839                 Balance     Static Standing Balance contact guard;non-verbal cues (demo/gesture);verbal cues  -RG       Position/Device Used, Standing Balance parallel bars  -RG       Comment, Balance standing on foam block unsupported CGA from therapist  -RG          Row Name 09/29/23 7122                 Hip (Therapeutic Exercise)     Hip Strengthening (Therapeutic  Exercise) bilateral;flexion;aBduction;aDduction;marching while seated;marching while standing;sitting;standing;2 lb free weight;resistance band;green;10 repetitions;2 sets  -RG          Row Name 09/29/23 1357                 Knee (Therapeutic Exercise)     Knee Strengthening (Therapeutic Exercise) bilateral;flexion;extension;marching while seated;marching while standing;LAQ (long arc quad);sitting;standing;2 lb free weight;resistance band;green;10 repetitions;2 sets  -RG          Row Name 09/29/23 1357                 Ankle (Therapeutic Exercise)     Ankle Strengthening (Therapeutic Exercise) bilateral;dorsiflexion;plantarflexion;sitting;standing;10 repetitions;2 sets  -RG          Row Name 09/29/23 1357                 Positioning and Restraints     Pre-Treatment Position in bed  -RG       Post Treatment Position wheelchair  -RG       In Wheelchair notified nsg;sitting;with SLP;legs elevated  -RG                    User Key  (r) = Recorded By, (t) = Taken By, (c) = Cosigned By        Initials Name Provider Type     RG Twan Cunningham PTA Physical Therapist Assistant                               PT Recommendation and Plan     Frequency of Treatment (PT): 5 times per week  Anticipated Equipment Needs at Discharge (PT Eval):  (tbd)            Time Calculation:        PT Charges         Row Name 09/29/23 1403                       Time Calculation     Start Time 0915  -RG         Stop Time 1045  -RG         Time Calculation (min) 90 min  -RG         PT Received On 09/29/23  -RG                 Time Calculation- PT     Total Timed Code Minutes- PT 90 minute(s)  -Althea Arias, OT   Occupational Therapist  Occupational Therapy     Therapy Treatment Note      Signed     Date of Service: 09/29/23 1257  Creation Time: 09/29/23 1257     Signed       Expand All Collapse All    Inpatient Rehabilitation - Occupational Therapy Treatment Note     [facility]     Patient Name: Ofelia Knox                 : 1945                      MRN: [id number]     Today's Date: 2023                                                                             Admit Date: 2023        Visit Diagnosis   No diagnosis found.        Problem List       Patient Active Problem List   Diagnosis    CVA (cerebral vascular accident)            Medical History        Past Medical History:   Diagnosis Date    AMS (altered mental status)      Aphasia      CKD (chronic kidney disease)      COPD (chronic obstructive pulmonary disease)      CVA (cerebral vascular accident)      DM2 (diabetes mellitus, type 2)      HTN (hypertension)      Restrictive lung disease      Urinary tract infection due to ESBL Klebsiella              Surgical History         Past Surgical History:   Procedure Laterality Date    HYSTERECTOMY        JOINT REPLACEMENT                               IRF OT ASSESSMENT FLOWSHEET (last 12 hours)            IRF OT Evaluation and Treatment         Row Name 23 1200                 OT Time and Intention     Document Type daily treatment  -       Mode of Treatment individual therapy;occupational therapy  -       Patient Effort good  -          Row Name 23 1200                 General Information     Patient/Family/Caregiver Comments/Observations patient agreeable to therapy. patient states she did not rest well last night. patient tolerated therapy well with no complaints.  -          Row Name 23 1200                 Cognition/Psychosocial     Orientation Status (Cognition) oriented to;person;place;situation;verbal cues/prompts needed for orientation  -       Follows Commands (Cognition) follows one-step commands;physical/tactile prompts required;verbal cues/prompting required  -          Row Name 23 1200                 Motor Skills     Motor Skills coordination;functional endurance  -       Therapeutic Exercise shoulder;elbow/forearm;wrist;hand  BUE ROM, gmc,fmc,  strenghening, bilateral coordination  -          Row Name 09/29/23 1200                 Positioning and Restraints     Post Treatment Position wheelchair  -       In Wheelchair sitting;encouraged to call for assist;exit alarm on  nurses station  -                    User Key  (r) = Recorded By, (t) = Taken By, (c) = Cosigned By        Initials Name Effective Dates      Althea Downs, OT 07/11/23 -                                OT Recommendation and Plan        Time Calculation:        Time Calculation-          Row Name 09/29/23 1256                       Time Calculation-      OT Start Time 0735  -         OT Stop Time 0905  [facility]         OT Time Calculation (min) 90 min  [facility]

## 2023-10-02 NOTE — SIGNIFICANT NOTE
09/29/23 1629   Plan   Plan Rehab  did not hear from insurance about continued rehab stay.  Discharge plan will be for pt to be admitted to [facility] on Monday 10-2-23; Josephine with [facility] is aware and agreeable.

## 2023-10-02 NOTE — SIGNIFICANT NOTE
10/02/23 1546   Plan   Plan Spoke to son [phone] about [facility] unable to admit pt until day 11 of testing positive for COVID and Rehab  requesting additional rehab days.  Team conference will be held in the am.  Will follow.

## 2023-10-02 NOTE — PROGRESS NOTES
Rehabilitation Nursing  Inpatient Rehabilitation Plan of Care Note    Plan of Care  Copy from [facility]    Toilet Transfers (Active)  Current Status (9/11/2023 7:00:00 PM): PATIENT WILL HAVE NO TRANSFER DIFFICULTY    Weekly Goal: NO DIFFICULT WITH TRANSFERS  Discharge Goal: TRANSFERS INDEPENDENTLY    Pain    Pain Management (Active)  Current Status (9/11/2023 7:00:00 PM): PATIENT WILL VOICE NO PAIN  Weekly Goal: PATIENT WILL BE PAIN FREE  Discharge Goal: FREE FROM PAIN    Safety    Potential for Injury (Active)  Current Status (9/11/2023 7:00:00 PM): PATIENT WILL HAVE NO INJURY THIS STAY  Weekly Goal: NO INJURY  Discharge Goal: PATIENT WILL DISCHARGE    Body Systems    Integumentary (Active)  Current Status (9/11/2023 7:00:00 PM): PATIENT WILL HAVE NO SKIN  BREAKDOWN THIS  STAY  Weekly Goal: SKIN WILL REMAIN INTACT.  Discharge Goal: NO SKIN ISSUES.    Signed by: Felicity Burden, Nurse

## 2023-10-02 NOTE — SIGNIFICANT NOTE
10/02/23 1016   Plan   Charlie Burton with [facility] says they could admit pt on day 11 after testing positive for COVID.  MD is aware.

## 2023-10-02 NOTE — THERAPY TREATMENT NOTE
Inpatient Rehabilitation - Physical Therapy Treatment Note       ABENA Quiroz     Patient Name: Ofelia Knox  : 1945  MRN: [id number]    Today's Date: 10/2/2023                    Admit Date: 2023      Visit Dx:   No diagnosis found.    Patient Active Problem List   Diagnosis    CVA (cerebral vascular accident)       Past Medical History:   Diagnosis Date    AMS (altered mental status)     Aphasia     CKD (chronic kidney disease)     COPD (chronic obstructive pulmonary disease)     CVA (cerebral vascular accident)     DM2 (diabetes mellitus, type 2)     HTN (hypertension)     Restrictive lung disease     Urinary tract infection due to ESBL Klebsiella        Past Surgical History:   Procedure Laterality Date    HYSTERECTOMY      JOINT REPLACEMENT         PT ASSESSMENT (last 12 hours)       IRF PT Evaluation and Treatment       Row Name 10/02/23 1124          PT Time and Intention    Document Type daily treatment  -LB     Mode of Treatment individual therapy;physical therapy  -LB     Patient/Family/Caregiver Comments/Observations she tested + for Covid today. she is now in isolation.  -LB       Row Name 10/02/23 1124          General Information    Existing Precautions/Restrictions --  aphasia, covid isolation  -LB       Row Name 10/02/23 1124          Pain Scale: FACES Pre/Post-Treatment    Pain: FACES Scale, Pretreatment 0-->no hurt  -LB     Posttreatment Pain Rating 0-->no hurt  -LB     Pre/Posttreatment Pain Comment she has general c/o of not feeling well  -LB       Row Name 10/02/23 1124          Cognition/Psychosocial    Affect/Mental Status (Cognition) WFL  -LB     Follows Commands (Cognition) verbal cues/prompting required;physical/tactile prompts required  -LB     Personal Safety Interventions gait belt;nonskid shoes/slippers when out of bed;supervised activity  -LB       Row Name 10/02/23 1124          Bed Mobility    Supine-Sit-Supine [place] (Bed Mobility) supervision;verbal cues;nonverbal  cues (demo/gesture)  -LB     Assistive Device (Bed Mobility) bed rails  -LB       Row Name 10/02/23 1124          Bed-Chair Transfer    Bed-Chair [place] (Transfers) verbal cues;nonverbal cues (demo/gesture);supervision  -LB     Assistive Device (Bed-Chair Transfers) wheelchair  -LB       Row Name 10/02/23 1124          Chair-Bed Transfer    Chair-Bed [place] (Transfers) verbal cues;nonverbal cues (demo/gesture);supervision  -LB     Assistive Device (Chair-Bed Transfers) wheelchair  -LB       Row Name 10/02/23 1124          Sit-Stand Transfer    Sit-Stand [place] (Transfers) verbal cues;nonverbal cues (demo/gesture);supervision  -LB     Assistive Device (Sit-Stand Transfers) walker, front-wheeled  -LB       Row Name 10/02/23 1124          Stand-Sit Transfer    Stand-Sit [place] (Transfers) verbal cues;nonverbal cues (demo/gesture);supervision  -LB     Assistive Device (Stand-Sit Transfers) walker, front-wheeled  -LB       Row Name 10/02/23 1124          Gait/Stairs (Locomotion)    [place] Level (Gait) verbal cues;nonverbal cues (demo/gesture);supervision  -LB     Assistive Device (Gait) walker, front-wheeled  -LB     Distance in Feet (Gait) 350'  -LB     Deviations/Abnormal Patterns (Gait) beatrice decreased;gait speed decreased;stride length decreased  -LB     Bilateral Gait Deviations forward flexed posture  -LB     [place] Level (Stairs) --  -LB     Handrail Location (Stairs) --  -LB     Ascending Technique (Stairs) --  -LB     Descending Technique (Stairs) --  -LB       Row Name 10/02/23 1124          Motor Skills    Therapeutic Exercise --  sitting ex 2 sets  -LB     Additional Documentation --  frequent rest breaks needed due to increased work of breathing and fatigue  -LB       Row Name 10/02/23 1124          Positioning and Restraints    Pre-Treatment Position in bed  -LB     In Bed call light within reach;encouraged to call for assist;exit alarm on  -LB       Row Name 10/02/23  1124          Vital Signs    Intra SpO2 (%) 95  -LB     O2 Delivery Intra Treatment room air  -LB       Row Name 10/02/23 1124          Daily Progress Summary (PT)    Recommendations (PT) continue PT  -LB               User Key  (r) = Recorded By, (t) = Taken By, (c) = Cosigned By      Initials Name Provider Type    Andra Lea, MOISES Physical Therapist                     Physical Therapy Education       Title: PT OT SLP Therapies (In Progress)       Topic: Physical Therapy (Done)       Point: Mobility training (Done)       Learning Progress Summary             Patient Acceptance, E,D, VU,NR by RG at 9/29/2023 1403    Acceptance, E, VU,NR by LB at 9/28/2023 1501    Acceptance, E, VU,NR by LB at 9/27/2023 1532    Acceptance, E, VU,NR by LB at 9/26/2023 1548    Acceptance, E,D, VU,NR by RG at 9/25/2023 1423    Acceptance, TB, NR by DL at 9/24/2023 2200    Acceptance, E,D, NR,VU by RG at 9/22/2023 1315    Acceptance, E,D, VU,NR by RG at 9/21/2023 1307    Acceptance, E,D, VU,NR by RG at 9/20/2023 1258    Acceptance, E,D, VU,NR by RG at 9/19/2023 1258    Acceptance, E,D, VU,NR by RG at 9/18/2023 1338    Acceptance, E,TB, VU by RM at 9/15/2023 1532    Acceptance, E,TB, VU by RM at 9/14/2023 1555    Acceptance, E,D, VU,NR by RG at 9/13/2023 1528    Acceptance, TB, NR by DL at 9/12/2023 2003    Acceptance, E, VU,NR by LB at 9/12/2023 1135                         Point: Home exercise program (Done)       Learning Progress Summary             Patient Acceptance, E,D, VU,NR by RG at 9/29/2023 1403    Acceptance, E, VU,NR by LB at 9/28/2023 1501    Acceptance, E, VU,NR by LB at 9/27/2023 1532    Acceptance, E, VU,NR by LB at 9/26/2023 1548    Acceptance, E,D, VU,NR by RG at 9/25/2023 1423    Acceptance, TB, NR by DL at 9/24/2023 2200    Acceptance, E,D, NR,VU by RG at 9/22/2023 1315    Acceptance, E,D, VU,NR by RG at 9/21/2023 1307    Acceptance, E,D, VU,NR by RG at 9/20/2023 1258    Acceptance, E,D, VU,NR by RG at  9/19/2023 1258    Acceptance, E,D, VU,NR by RG at 9/18/2023 1338    Acceptance, E,TB, VU by RM at 9/15/2023 1532    Acceptance, E,TB, VU by RM at 9/14/2023 1555    Acceptance, E,D, VU,NR by RG at 9/13/2023 1528    Acceptance, TB, NR by DL at 9/12/2023 2003    Acceptance, E, VU,NR by LB at 9/12/2023 1135                         Point: Body mechanics (Done)       Learning Progress Summary             Patient Acceptance, E,D, VU,NR by RG at 9/29/2023 1403    Acceptance, E, VU,NR by LB at 9/28/2023 1501    Acceptance, E, VU,NR by LB at 9/27/2023 1532    Acceptance, E, VU,NR by LB at 9/26/2023 1548    Acceptance, E,D, VU,NR by RG at 9/25/2023 1423    Acceptance, TB, NR by DL at 9/24/2023 2200    Acceptance, E,D, NR,VU by RG at 9/22/2023 1315    Acceptance, E,D, VU,NR by RG at 9/21/2023 1307    Acceptance, E,D, VU,NR by RG at 9/20/2023 1258    Acceptance, E,D, VU,NR by RG at 9/19/2023 1258    Acceptance, E,D, VU,NR by RG at 9/18/2023 1338    Acceptance, E,TB, VU by RM at 9/15/2023 1532    Acceptance, E,TB, VU by RM at 9/14/2023 1555    Acceptance, E,D, VU,NR by RG at 9/13/2023 1528    Acceptance, TB, NR by DL at 9/12/2023 2003    Acceptance, E, VU,NR by LB at 9/12/2023 1135                         Point: Precautions (Done)       Learning Progress Summary             Patient Acceptance, E,D, VU,NR by RG at 9/29/2023 1403    Acceptance, E, VU,NR by LB at 9/28/2023 1501    Acceptance, E, VU,NR by LB at 9/27/2023 1532    Acceptance, E, VU,NR by LB at 9/26/2023 1548    Acceptance, E,D, VU,NR by RG at 9/25/2023 1423    Acceptance, TB, NR by DL at 9/24/2023 2200    Acceptance, E,D, NR,VU by RG at 9/22/2023 1315    Acceptance, E,D, VU,NR by RG at 9/21/2023 1307    Acceptance, E,D, VU,NR by RG at 9/20/2023 1258    Acceptance, E,D, VU,NR by RG at 9/19/2023 1258    Acceptance, E,D, VU,NR by RG at 9/18/2023 1338    Acceptance, E,TB, VU by RM at 9/15/2023 1532    Acceptance, E,TB, VU by RM at 9/14/2023 1555    Acceptance, E,D, VU,NR  by RG at 9/13/2023 1528    Acceptance, TB, NR by DL at 9/12/2023 2003    Acceptance, E, VU,NR by LB at 9/12/2023 1135                                         User Key       Initials Effective Dates Name Provider Type Discipline    LB 06/16/21 -  Andra Stewart, PT Physical Therapist PT    RM 02/17/23 -  Karis Obrien, PTA Physical Therapist Assistant PT    RG 06/16/21 -  Twan Cunningham PTA Physical Therapist Assistant PT    DL 03/16/22 -  Carolyn Ramirez, RN Registered Nurse Nurse                    PT Recommendation and Plan    Planned Therapy Interventions (PT): balance training, bed mobility training, gait training, neuromuscular re-education, patient/family education, strengthening, transfer training  Frequency of Treatment (PT): 5 times per week  Anticipated Equipment Needs at Discharge (PT Eval):  (tbd)  Daily Progress Summary (PT)  Recommendations (PT): continue PT               Time Calculation:      PT Charges       Row Name 10/02/23 1134             Time Calculation    Start Time 1000  -LB      Stop Time 1130  -LB      Time Calculation (min) 90 min  -LB      PT Received On 10/02/23  -LB                User Key  (r) = Recorded By, (t) = Taken By, (c) = Cosigned By      Initials Name Provider Type    LB Andra Stewart, PT Physical Therapist                    Therapy Charges for Today       Code Description Service Date Service Provider Modifiers Qty    40460016387 HC GAIT TRAINING EA 15 MIN 10/2/2023 Andra Stewart, PT GP 1    05315627474 HC PT THER PROC EA 15 MIN 10/2/2023 Andra Stewart, PT GP 3    94620165053 HC PT THERAPEUTIC ACT EA 15 MIN 10/2/2023 Andra Stewart, PT GP 2                     Andra Stewart, PT  10/2/2023

## 2023-10-02 NOTE — SIGNIFICANT NOTE
09/30/23 1050   Plan   Plan SS spoke to son about plans for pt to be discharged to [facility] on Monday 10-2-23 and he is agreeable.  Son will come to rehab around 12:00 pm or after to transport pt to [facility].

## 2023-10-02 NOTE — PROGRESS NOTES
Psychiatric  PROGRESS NOTE     Patient Identification:  Name:  Ofelia Knox  Age:  78 y.o.  Sex:  female  :  1945  MRN:  [id number]  Visit Number:  [id number]  ROOM: [facility]     Primary Care Provider:  Provider, No Known    Length of stay in inpatient status:  21    Subjective     Chief Compliant:  No chief complaint on file.      History of Presenting Illness: 78-year-old female who is status post CVA.  Patient has been scheduled to go to nursing home today.  Unfortunately patient developed some upper airway congestion and we did check and has tested positive for COVID-19.  Patient having no respiratory distress and and again only mild nasal congestion symptoms.    Objective     Current Hospital Meds:aspirin, 81 mg, Oral, Daily  buPROPion XL, 150 mg, Oral, Daily  cetirizine, 5 mg, Oral, Daily  furosemide, 20 mg, Oral, Every Other Day  isosorbide mononitrate, 30 mg, Oral, Q24H  metoprolol succinate XL, 25 mg, Oral, Q24H  montelukast, 10 mg, Oral, Nightly  nystatin, 1 application , Topical, Q12H  pantoprazole, 40 mg, Oral, QAM AC  pravastatin, 40 mg, Oral, Daily  sacubitril-valsartan, 1 tablet, Oral, Q12H  tiotropium bromide monohydrate, 2 puff, Inhalation, Daily - RT  venlafaxine XR, 75 mg, Oral, Daily With Breakfast       ----------------------------------------------------------------------------------------------------------------------  Vital Signs:  Temp:  [98.2 øF (36.8 øC)] 98.2 øF (36.8 øC)  Heart Rate:  [60-67] 67  Resp:  [18] 18  BP: (143-145)/(63-74) 143/74  SpO2:  [95 %] 95 %  on   ;   Device (Oxygen Therapy): room air  Body mass index is 40.03 kg/mý.    Wt Readings from Last 3 Encounters:   23 112 kg (248 lb)     Intake & Output (last 3 days)         09/29 0701  09/30 0700 09/30 0701  10/01 0700 10/01 0701  10/02 0700 10/02 0701  10/03 07    P.O. 1320 1320 600 600    Total Intake(mL/kg) 1320 (11.8) 1320 (11.8) 600 (5.4) 600 (5.4)    Urine (mL/kg/hr)  101 (0) 300 (0.1)      Total Output  101 300     Net +1320 +1219 +300 +600            Urine Unmeasured Occurrence 5 x 5 x 2 x 1 x    Stool Unmeasured Occurrence 1 x 1 x            Diet: Cardiac Diets, Diabetic Diets; Healthy Heart (2-3 Na+); Consistent Carbohydrate; Texture: Regular Texture (IDDSI 7); Fluid Consistency: Thin (IDDSI 0)  ----------------------------------------------------------------------------------------------------------------------  Physical exam:  Constitutional: No acute distress  HEENT: Normocephalic atraumatic  Neck: Supple  Cardiovascular: Regular rate and rhythm  Pulmonary/Chest: Clear to auscultation  Abdominal: Positive bowel sounds soft.   Musculoskeletal: No arthropathy  Neurological: Some generalized weakness  Skin: No rash  Peripheral vascular: No edema  Genitourinary:  ----------------------------------------------------------------------------------------------------------------------    Last echocardiogram:  Results for orders placed during the hospital encounter of 09/11/23    Adult Transthoracic Echo Complete W/ Cont if Necessary Per Protocol    Interpretation Summary    Left ventricular systolic function is mildly decreased. Calculated left ventricular EF = 44% Left ventricular ejection fraction appears to be 41 - 45%.    Left ventricular wall thickness is consistent with mild eccentric hypertrophy.    Left ventricular diastolic function is consistent with (grade I) impaired relaxation.    The left atrial cavity is moderately dilated.    ----------------------------------------------------------------------------------------------------------------------  Results from last 7 days   Lab Units 09/29/23 0254 09/26/23 0034   WBC 10*3/mm3 8.47 11.95*   HEMOGLOBIN g/dL 13.7 13.8   HEMATOCRIT % 45.5 45.1   MCV fL 93.0 93.0   MCHC g/dL 30.1* 30.6*   PLATELETS 10*3/mm3 245 277         Results from last 7 days   Lab Units 09/29/23 0254 09/26/23 0034   SODIUM mmol/L 140 137   POTASSIUM mmol/L 3.7 4.1    CHLORIDE mmol/L 106 103   CO2 mmol/L 22.4 22.3   BUN mg/dL 27* 30*   CREATININE mg/dL 1.08* 1.15*   CALCIUM mg/dL 8.8 8.9   GLUCOSE mg/dL 137* 117*   Estimated Creatinine Clearance: 56.2 mL/min (A) (by C-G formula based on SCr of 1.08 mg/dL (H)).  No results found for: AMMONIA              Glucose   Date/Time Value Ref Range Status   10/02/2023 0558 148 (H) 70 - 130 mg/dL Final   10/01/2023 1942 127 70 - 130 mg/dL Final   10/01/2023 1544 97 70 - 130 mg/dL Final   10/01/2023 1044 108 70 - 130 mg/dL Final   10/01/2023 0604 134 (H) 70 - 130 mg/dL Final   09/30/2023 2010 118 70 - 130 mg/dL Final   09/30/2023 1549 152 (H) 70 - 130 mg/dL Final   09/30/2023 1043 124 70 - 130 mg/dL Final     Lab Results   Component Value Date    TSH 1.470 09/12/2023    FREET4 1.15 09/12/2023     No results found for: PREGTESTUR, PREGSERUM, HCG, HCGQUANT  Pain Management Panel           No data to display              Brief Urine Lab Results  (Last result in the past 365 days)        Color   Clarity   Blood   Leuk Est   Nitrite   Protein   CREAT   Urine HCG        09/14/23 1935 Dark Yellow   Clear   Negative   Negative   Negative   Trace                 No results found for: BLOODCX      No results found for: URINECX  No results found for: WOUNDCX  No results found for: STOOLCX        I have personally looked at the labs and they are summarized above.  ----------------------------------------------------------------------------------------------------------------------  Detailed radiology reports for the last 24 hours:    Imaging Results (Last 24 Hours)       ** No results found for the last 24 hours. **          Final impressions for the last 30 days of radiology reports:    XR Chest 1 View    Result Date: 9/20/2023  Mild pulmonary vascular congestion.   This report was finalized on 9/20/2023 6:48 PM by Alex Pallas, DO.      XR Chest 1 View    Result Date: 9/14/2023  No radiographic evidence of acute cardiac or pulmonary disease.  This  report was finalized on 9/14/2023 7:54 AM by Dr. Krunal Colvin MD.     I have personally looked at the radiology images and read the final radiology report.    Assessment & Plan    Status post CVA--currently on Pravachol and aspirin therapy.  Patient improving regarding her speech.  Requiring standby assist for bed mobility; standby assistance for transfers.  Ambulated 320 feet with front wheel walker and standby assist.  Has continue to work on motor skills.    CHF reduced EF compensated.    COVID-19--patient currently not requiring O2.  Patient does have some nasal congestion symptoms.  Will treat symptomatically and placed in isolation at this time.    Diabetes mellitus controlled    COPD stable    Depression stable    VTE Prophylaxis:   Mechanical Order History:        Ordered        09/11/23 2023  Place Sequential Compression Device  Once            09/11/23 2023  Maintain Sequential Compression Device  Continuous                          Pharmalogical Order History:       None                Abdullahi Sharpe MD  [facility]  10/02/23  10:15 EDT

## 2023-10-02 NOTE — PLAN OF CARE
Problem: Rehabilitation (IRF) Plan of Care  Goal: Plan of Care Review  Outcome: Ongoing, Progressing  Flowsheets (Taken 10/1/2023 2214)  Progress: improving  Plan of Care Reviewed With: patient  Goal: Patient-Specific Goal (Individualized)  Outcome: Ongoing, Progressing  Goal: Absence of New-Onset Illness or Injury  Outcome: Ongoing, Progressing  Goal: Optimal Comfort and Wellbeing  Outcome: Ongoing, Progressing  Goal: Home and Community Transition Plan Established  Outcome: Ongoing, Progressing     Problem: Skin Injury Risk Increased  Goal: Skin Health and Integrity  Outcome: Ongoing, Progressing     Problem: Fall Injury Risk  Goal: Absence of Fall and Fall-Related Injury  Outcome: Ongoing, Progressing     Problem: Communication Impairment  Goal: Effective Communication Skills  Outcome: Ongoing, Progressing   Goal Outcome Evaluation:  Plan of Care Reviewed With: patient   Plan of Care Reviewed With: patient     Progress: improving             Male

## 2023-10-02 NOTE — THERAPY TREATMENT NOTE
Inpatient Rehabilitation - Occupational Therapy Treatment Note    ABENA Quiroz     Patient Name: Ofelia Knox  : 1945  MRN: [id number]    Today's Date: 10/2/2023                 Admit Date: 2023       No diagnosis found.    Patient Active Problem List   Diagnosis    CVA (cerebral vascular accident)       Past Medical History:   Diagnosis Date    AMS (altered mental status)     Aphasia     CKD (chronic kidney disease)     COPD (chronic obstructive pulmonary disease)     CVA (cerebral vascular accident)     DM2 (diabetes mellitus, type 2)     HTN (hypertension)     Restrictive lung disease     Urinary tract infection due to ESBL Klebsiella        Past Surgical History:   Procedure Laterality Date    HYSTERECTOMY      JOINT REPLACEMENT               IRF OT ASSESSMENT FLOWSHEET (last 12 hours)       IRF OT Evaluation and Treatment       Row Name 10/02/23 1317          OT Time and Intention    Document Type daily treatment  -BF     Mode of Treatment occupational therapy  -BF     Patient Effort good  -BF     Symptoms Noted During/After Treatment none  -BF       Row Name 10/02/23 131          General Information    Existing Precautions/Restrictions fall  aphasia, covid isolation  -BF     Limitations/Impairments aphasia;safety/cognitive  -BF       Row Name 10/02/23 1317          Cognition/Psychosocial    Orientation Status (Cognition) oriented to;person;place;situation  -BF     Follows Commands (Cognition) follows one-step commands;verbal cues/prompting required;repetition of directions required;physical/tactile prompts required  -BF       Row Name 10/02/23 131          Bed-Chair Transfer    Bed-Chair [place] (Transfers) standby assist  -BF     Assistive Device (Bed-Chair Transfers) wheelchair  -BF       Row Name 10/02/23 1317          Chair-Bed Transfer    Chair-Bed [place] (Transfers) standby assist  -BF     Assistive Device (Chair-Bed Transfers) wheelchair  -BF       Row Name 10/02/23 1312           Motor Skills    Motor Control/Coordination Interventions fine motor manipulation/dexterity activities;gross motor coordination activities;therapeutic exercise/ROM  BUE GMC/FMC theract; DRAKE dodd 15 lbs X10X3, wrist rolls  -BF       Row Name 10/02/23 1317          Positioning and Restraints    In Bed supine;call light within reach;encouraged to call for assist  -BF               User Key  (r) = Recorded By, (t) = Taken By, (c) = Cosigned By      Initials Name Effective Dates     Pam Peck OT 07/11/23 -                      Occupational Therapy Education       Title: PT OT SLP Therapies (Done)       Topic: Occupational Therapy (Done)       Point: ADL training (Done)       Description:   Instruct learner(s) on proper safety adaptation and remediation techniques during self care or transfers.   Instruct in proper use of assistive devices.                  Learning Progress Summary             Patient Acceptance, E, VU,NR by BF at 10/2/2023 1317    Acceptance, TB, NR by DL at 9/24/2023 2200    Acceptance, E, VU,NR by HB at 9/16/2023 1145    Acceptance, E, VU,NR by BF at 9/15/2023 1232                         Point: Precautions (Done)       Description:   Instruct learner(s) on prescribed precautions during self-care and functional transfers.                  Learning Progress Summary             Patient Acceptance, E, VU,NR by BF at 10/2/2023 1317    Acceptance, TB, NR by DL at 9/24/2023 2200    Acceptance, E, VU,NR by HB at 9/16/2023 1145    Acceptance, E, VU,NR by BF at 9/15/2023 1232                                         User Key       Initials Effective Dates Name Provider Type Discipline     07/11/23 -  Pam Peck, OT Occupational Therapist OT    HB 05/25/21 -  Xena Chinchilla OT Occupational Therapist OT    DL 03/16/22 -  Carolyn Ramirez, RN Registered Nurse Nurse                        OT Recommendation and Plan    Planned Therapy Interventions (OT): activity tolerance training,  adaptive equipment training, BADL retraining, neuromuscular control/coordination retraining, ROM/therapeutic exercise, strengthening exercise, transfer/mobility retraining, patient/caregiver education/training                    Time Calculation:      Time Calculation- OT       Row Name 10/02/23 1321             Time Calculation- OT    OT Start Time 0825  -BF      OT Stop Time 0955  -BF      OT Time Calculation (min) 90 min  -BF      Total Timed Code Minutes- OT 90 minute(s)  -BF      OT Non-Billable Time (min) 10 min  -BF                User Key  (r) = Recorded By, (t) = Taken By, (c) = Cosigned By      Initials Name Provider Type    BF Pam Peck OT Occupational Therapist                  Therapy Charges for Today       Code Description Service Date Service Provider Modifiers Qty    41259714534 HC OT SELF CARE/MGMT/TRAIN EA 15 MIN 10/2/2023 Pam Peck OT GO 1    88684940999 HC OT THER PROC EA 15 MIN 10/2/2023 Pam Peck OT GO 2    99039545801 HC OT NEUROMUSC RE EDUCATION EA 15 MIN 10/2/2023 Pam Peck OT GO 3                     Pam Peck OT  10/2/2023

## 2023-10-02 NOTE — SIGNIFICANT NOTE
10/02/23 0950   Plan   Plan Pt's last covered day by insurance is today per Rehab .  Pt tested positive for COVID this am. Spoke to Josephine with [facility] with this information who says she thinks pt cannot be admitted until 10 days from when they test positive but will check on this and follow-up with SS.  MD is aware of this information.  RN will contact daniele Bruno about pt testing postive for COVID.

## 2023-10-02 NOTE — PROGRESS NOTES
Inpatient Rehabilitation Functional Measures Assessment and Plan of Care    Plan of Care  Communication    [ST] Comprehension(Active)  Current Status(10/03/2023): Trace to mild auditory comprehension deficits  Weekly Goal(10/09/2023): receptive ID of object following verbal description  Discharge Goal: WFL communication and comprehension    [ST] Expression(Active)  Current Status(10/03/2023): Fluent aphasia w/ anomia  Weekly Goal(10/09/2023): Provide 3+ verbal descriptors of an object/pic/word  Discharge Goal: WFL communication    Functional Measures  JOHN Eating:  JOHN Grooming:  JOHN Bathing:  JOHN Upper Body Dressing:  JOHN Lower Body Dressing:  JOHN Toileting:    JOHN Bladder Management  Level of Assistance:  Frequency/Number of Accidents this Shift:    JOHN Bowel Management  Level of Assistance:  Frequency/Number of Accidents this Shift:    JOHN Bed/Chair/Wheelchair Transfer:  JOHN Toilet Transfer:  JOHN Tub/Shower Transfer:    Previously Documented Mode of Locomotion at Discharge:  JOHN Expected Mode of Locomotion at Discharge:  JOHN Walk/Wheelchair:  JOHN Stairs:    JOHN Comprehension:  JOHN Expression:  JOHN Social Interaction:  JOHN Problem Solving:  JOHN Memory:    Therapy Mode Minutes  Occupational Therapy:  Physical Therapy:  Speech Language Pathology: Individual: 0 minutes.    Discharge Functional Goals:    Signed by: JUSTO Schwartz

## 2023-10-02 NOTE — PROGRESS NOTES
Inpatient Rehabilitation Functional Measures Assessment and Plan of Care    Plan of Care  Self Care    [OT] Dressing (Lower)(Active)  Current Status(10/02/2023): Min/CGA  Weekly Goal(10/10/2023): cga  Discharge Goal: cga    Functional Measures  JOHN Eating:  JOHN Grooming:  JOHN Bathing:  JOHN Upper Body Dressing:  JOHN Lower Body Dressing:  JOHN Toileting:    JOHN Bladder Management  Level of Assistance:  Frequency/Number of Accidents this Shift:    JOHN Bowel Management  Level of Assistance:  Frequency/Number of Accidents this Shift:    JOHN Bed/Chair/Wheelchair Transfer:  JOHN Toilet Transfer:  JOHN Tub/Shower Transfer:    Previously Documented Mode of Locomotion at Discharge:  JOHN Expected Mode of Locomotion at Discharge:  JOHN Walk/Wheelchair:  JOHN Stairs:    JOHN Comprehension:  JOHN Expression:  JOHN Social Interaction:  JOHN Problem Solving:  JOHN Memory:    Therapy Mode Minutes  Occupational Therapy: Individual: 90 minutes.  Physical Therapy:  Speech Language Pathology:    Discharge Functional Goals:    Signed by: Pam Peck OT

## 2023-10-03 ENCOUNTER — APPOINTMENT (OUTPATIENT)
Dept: GENERAL RADIOLOGY | Facility: HOSPITAL | Age: 78
DRG: 056 | End: 2023-10-03
Payer: MEDICARE

## 2023-10-03 LAB
ALBUMIN SERPL-MCNC: 3.5 G/DL (ref 3.5–5.2)
ALBUMIN/GLOB SERPL: 1 G/DL
ALP SERPL-CCNC: 80 U/L (ref 39–117)
ALT SERPL W P-5'-P-CCNC: 16 U/L (ref 1–33)
ANION GAP SERPL CALCULATED.3IONS-SCNC: 9.4 MMOL/L (ref 5–15)
AST SERPL-CCNC: 15 U/L (ref 1–32)
BASOPHILS # BLD AUTO: 0.02 10*3/MM3 (ref 0–0.2)
BASOPHILS NFR BLD AUTO: 0.2 % (ref 0–1.5)
BILIRUB SERPL-MCNC: 0.4 MG/DL (ref 0–1.2)
BUN SERPL-MCNC: 16 MG/DL (ref 8–23)
BUN/CREAT SERPL: 16.8 (ref 7–25)
CALCIUM SPEC-SCNC: 9.2 MG/DL (ref 8.6–10.5)
CHLORIDE SERPL-SCNC: 102 MMOL/L (ref 98–107)
CO2 SERPL-SCNC: 26.6 MMOL/L (ref 22–29)
CREAT SERPL-MCNC: 0.95 MG/DL (ref 0.57–1)
D DIMER PPP FEU-MCNC: 0.43 MCGFEU/ML (ref 0–0.76)
DEPRECATED RDW RBC AUTO: 50.9 FL (ref 37–54)
EGFRCR SERPLBLD CKD-EPI 2021: 62.2 ML/MIN/1.73
EOSINOPHIL # BLD AUTO: 0.1 10*3/MM3 (ref 0–0.4)
EOSINOPHIL NFR BLD AUTO: 1.1 % (ref 0.3–6.2)
ERYTHROCYTE [DISTWIDTH] IN BLOOD BY AUTOMATED COUNT: 14.7 % (ref 12.3–15.4)
GEN 5 2HR TROPONIN T REFLEX: 18 NG/L
GLOBULIN UR ELPH-MCNC: 3.4 GM/DL
GLUCOSE BLDC GLUCOMTR-MCNC: 113 MG/DL (ref 70–130)
GLUCOSE BLDC GLUCOMTR-MCNC: 137 MG/DL (ref 70–130)
GLUCOSE BLDC GLUCOMTR-MCNC: 142 MG/DL (ref 70–130)
GLUCOSE BLDC GLUCOMTR-MCNC: 173 MG/DL (ref 70–130)
GLUCOSE SERPL-MCNC: 117 MG/DL (ref 65–99)
HCT VFR BLD AUTO: 46.6 % (ref 34–46.6)
HGB BLD-MCNC: 14.2 G/DL (ref 12–15.9)
IMM GRANULOCYTES # BLD AUTO: 0.04 10*3/MM3 (ref 0–0.05)
IMM GRANULOCYTES NFR BLD AUTO: 0.5 % (ref 0–0.5)
LYMPHOCYTES # BLD AUTO: 1.17 10*3/MM3 (ref 0.7–3.1)
LYMPHOCYTES NFR BLD AUTO: 13.2 % (ref 19.6–45.3)
MCH RBC QN AUTO: 28.5 PG (ref 26.6–33)
MCHC RBC AUTO-ENTMCNC: 30.5 G/DL (ref 31.5–35.7)
MCV RBC AUTO: 93.6 FL (ref 79–97)
MONOCYTES # BLD AUTO: 1.03 10*3/MM3 (ref 0.1–0.9)
MONOCYTES NFR BLD AUTO: 11.6 % (ref 5–12)
NEUTROPHILS NFR BLD AUTO: 6.5 10*3/MM3 (ref 1.7–7)
NEUTROPHILS NFR BLD AUTO: 73.4 % (ref 42.7–76)
NRBC BLD AUTO-RTO: 0 /100 WBC (ref 0–0.2)
PLATELET # BLD AUTO: 228 10*3/MM3 (ref 140–450)
PMV BLD AUTO: 12.1 FL (ref 6–12)
POTASSIUM SERPL-SCNC: 3.7 MMOL/L (ref 3.5–5.2)
PROT SERPL-MCNC: 6.9 G/DL (ref 6–8.5)
RBC # BLD AUTO: 4.98 10*6/MM3 (ref 3.77–5.28)
SODIUM SERPL-SCNC: 138 MMOL/L (ref 136–145)
TROPONIN T DELTA: 1 NG/L
TROPONIN T SERPL HS-MCNC: 17 NG/L
WBC NRBC COR # BLD: 8.86 10*3/MM3 (ref 3.4–10.8)

## 2023-10-03 PROCEDURE — 85379 FIBRIN DEGRADATION QUANT: CPT | Performed by: FAMILY MEDICINE

## 2023-10-03 PROCEDURE — 25810000003 SODIUM CHLORIDE 0.9 % SOLUTION: Performed by: INTERNAL MEDICINE

## 2023-10-03 PROCEDURE — 97535 SELF CARE MNGMENT TRAINING: CPT | Performed by: OCCUPATIONAL THERAPIST

## 2023-10-03 PROCEDURE — 97530 THERAPEUTIC ACTIVITIES: CPT

## 2023-10-03 PROCEDURE — 71045 X-RAY EXAM CHEST 1 VIEW: CPT | Performed by: RADIOLOGY

## 2023-10-03 PROCEDURE — 63710000001 DEXAMETHASONE PER 0.25 MG: Performed by: FAMILY MEDICINE

## 2023-10-03 PROCEDURE — 80053 COMPREHEN METABOLIC PANEL: CPT | Performed by: FAMILY MEDICINE

## 2023-10-03 PROCEDURE — 99222 1ST HOSP IP/OBS MODERATE 55: CPT | Performed by: INTERNAL MEDICINE

## 2023-10-03 PROCEDURE — C1751 CATH, INF, PER/CENT/MIDLINE: HCPCS

## 2023-10-03 PROCEDURE — 94799 UNLISTED PULMONARY SVC/PX: CPT

## 2023-10-03 PROCEDURE — 71045 X-RAY EXAM CHEST 1 VIEW: CPT

## 2023-10-03 PROCEDURE — 82948 REAGENT STRIP/BLOOD GLUCOSE: CPT

## 2023-10-03 PROCEDURE — 97110 THERAPEUTIC EXERCISES: CPT

## 2023-10-03 PROCEDURE — 97112 NEUROMUSCULAR REEDUCATION: CPT | Performed by: OCCUPATIONAL THERAPIST

## 2023-10-03 PROCEDURE — 25010000002 REMDESIVIR 100 MG RECONSTITUTED SOLUTION: Performed by: INTERNAL MEDICINE

## 2023-10-03 PROCEDURE — XW033E5 INTRODUCTION OF REMDESIVIR ANTI-INFECTIVE INTO PERIPHERAL VEIN, PERCUTANEOUS APPROACH, NEW TECHNOLOGY GROUP 5: ICD-10-PCS | Performed by: INTERNAL MEDICINE

## 2023-10-03 PROCEDURE — 97110 THERAPEUTIC EXERCISES: CPT | Performed by: OCCUPATIONAL THERAPIST

## 2023-10-03 PROCEDURE — 25010000002 ENOXAPARIN PER 10 MG: Performed by: FAMILY MEDICINE

## 2023-10-03 PROCEDURE — 85025 COMPLETE CBC W/AUTO DIFF WBC: CPT | Performed by: FAMILY MEDICINE

## 2023-10-03 PROCEDURE — 84484 ASSAY OF TROPONIN QUANT: CPT | Performed by: FAMILY MEDICINE

## 2023-10-03 PROCEDURE — 36410 VNPNXR 3YR/> PHY/QHP DX/THER: CPT

## 2023-10-03 PROCEDURE — 93005 ELECTROCARDIOGRAM TRACING: CPT | Performed by: FAMILY MEDICINE

## 2023-10-03 RX ORDER — SODIUM CHLORIDE 0.9 % (FLUSH) 0.9 %
10 SYRINGE (ML) INJECTION AS NEEDED
Status: DISCONTINUED | OUTPATIENT
Start: 2023-10-03 | End: 2023-10-12 | Stop reason: HOSPADM

## 2023-10-03 RX ORDER — DEXAMETHASONE 4 MG/1
6 TABLET ORAL
Status: DISCONTINUED | OUTPATIENT
Start: 2023-10-03 | End: 2023-10-06

## 2023-10-03 RX ORDER — SODIUM CHLORIDE 9 MG/ML
40 INJECTION, SOLUTION INTRAVENOUS AS NEEDED
Status: DISCONTINUED | OUTPATIENT
Start: 2023-10-03 | End: 2023-10-12 | Stop reason: HOSPADM

## 2023-10-03 RX ORDER — NITROGLYCERIN 0.4 MG/1
TABLET SUBLINGUAL
Status: DISPENSED
Start: 2023-10-03 | End: 2023-10-03

## 2023-10-03 RX ORDER — NITROGLYCERIN 0.4 MG/1
0.4 TABLET SUBLINGUAL
Status: DISCONTINUED | OUTPATIENT
Start: 2023-10-03 | End: 2023-10-12 | Stop reason: HOSPADM

## 2023-10-03 RX ORDER — ENOXAPARIN SODIUM 100 MG/ML
40 INJECTION SUBCUTANEOUS EVERY 24 HOURS
Status: DISCONTINUED | OUTPATIENT
Start: 2023-10-03 | End: 2023-10-12 | Stop reason: HOSPADM

## 2023-10-03 RX ORDER — SODIUM CHLORIDE 0.9 % (FLUSH) 0.9 %
10 SYRINGE (ML) INJECTION EVERY 12 HOURS SCHEDULED
Status: DISCONTINUED | OUTPATIENT
Start: 2023-10-03 | End: 2023-10-12 | Stop reason: HOSPADM

## 2023-10-03 RX ORDER — OXYMETAZOLINE HYDROCHLORIDE 0.05 G/100ML
2 SPRAY NASAL 2 TIMES DAILY
Status: DISPENSED | OUTPATIENT
Start: 2023-10-03 | End: 2023-10-06

## 2023-10-03 RX ADMIN — ENOXAPARIN SODIUM 40 MG: 40 INJECTION SUBCUTANEOUS at 11:24

## 2023-10-03 RX ADMIN — MONTELUKAST SODIUM 10 MG: 10 TABLET, COATED ORAL at 20:59

## 2023-10-03 RX ADMIN — MUPIROCIN 1 APPLICATION: 20 OINTMENT TOPICAL at 15:15

## 2023-10-03 RX ADMIN — Medication 2 SPRAY: at 11:17

## 2023-10-03 RX ADMIN — REMDESIVIR 200 MG: 100 INJECTION, POWDER, LYOPHILIZED, FOR SOLUTION INTRAVENOUS at 13:22

## 2023-10-03 RX ADMIN — MUPIROCIN 1 APPLICATION: 20 OINTMENT TOPICAL at 21:00

## 2023-10-03 RX ADMIN — ACETAMINOPHEN 650 MG: 325 TABLET ORAL at 20:58

## 2023-10-03 RX ADMIN — NYSTATIN 1 APPLICATION: 100000 CREAM TOPICAL at 20:59

## 2023-10-03 RX ADMIN — BENZONATATE 200 MG: 100 CAPSULE ORAL at 10:21

## 2023-10-03 RX ADMIN — BUPROPION HYDROCHLORIDE 150 MG: 150 TABLET, EXTENDED RELEASE ORAL at 10:03

## 2023-10-03 RX ADMIN — SACUBITRIL AND VALSARTAN 1 TABLET: 24; 26 TABLET, FILM COATED ORAL at 20:59

## 2023-10-03 RX ADMIN — BENZONATATE 200 MG: 100 CAPSULE ORAL at 15:16

## 2023-10-03 RX ADMIN — VENLAFAXINE HYDROCHLORIDE 75 MG: 75 CAPSULE, EXTENDED RELEASE ORAL at 10:01

## 2023-10-03 RX ADMIN — Medication 2 SPRAY: at 21:00

## 2023-10-03 RX ADMIN — TIOTROPIUM BROMIDE INHALATION SPRAY 2 PUFF: 3.12 SPRAY, METERED RESPIRATORY (INHALATION) at 07:48

## 2023-10-03 RX ADMIN — BENZONATATE 200 MG: 100 CAPSULE ORAL at 21:01

## 2023-10-03 RX ADMIN — NYSTATIN 1 APPLICATION: 100000 CREAM TOPICAL at 10:05

## 2023-10-03 RX ADMIN — PANTOPRAZOLE SODIUM 40 MG: 40 TABLET, DELAYED RELEASE ORAL at 06:32

## 2023-10-03 RX ADMIN — PRAVASTATIN SODIUM 40 MG: 40 TABLET ORAL at 10:02

## 2023-10-03 RX ADMIN — Medication 10 ML: at 13:23

## 2023-10-03 RX ADMIN — HYOSCYAMINE SULFATE 125 MCG: 0.12 TABLET, ORALLY DISINTEGRATING ORAL at 10:21

## 2023-10-03 RX ADMIN — ASPIRIN 81 MG: 81 TABLET, COATED ORAL at 10:03

## 2023-10-03 RX ADMIN — Medication 10 ML: at 21:00

## 2023-10-03 RX ADMIN — NITROGLYCERIN 0.4 MG: 0.4 TABLET, ORALLY DISINTEGRATING SUBLINGUAL at 10:00

## 2023-10-03 RX ADMIN — SACUBITRIL AND VALSARTAN 1 TABLET: 24; 26 TABLET, FILM COATED ORAL at 10:03

## 2023-10-03 RX ADMIN — ISOSORBIDE MONONITRATE 30 MG: 30 TABLET, EXTENDED RELEASE ORAL at 10:02

## 2023-10-03 RX ADMIN — CETIRIZINE HYDROCHLORIDE 5 MG: 10 TABLET, FILM COATED ORAL at 10:02

## 2023-10-03 RX ADMIN — ACETAMINOPHEN 650 MG: 325 TABLET ORAL at 15:16

## 2023-10-03 RX ADMIN — DEXAMETHASONE 6 MG: 4 TABLET ORAL at 13:22

## 2023-10-03 NOTE — PROGRESS NOTES
Occupational Therapy: Individual: 90 minutes.    Physical Therapy:    Speech Language Pathology:    Signed by: Pam Peck OT

## 2023-10-03 NOTE — NURSING NOTE
Patient complaining of chest pain  and shortness of breath. Notified Dr Sharpe, see orders .patient stable Dr Greene in route. Sitting at bedside

## 2023-10-03 NOTE — CONSULTS
INFECTIOUS DISEASE CONSULTATION REPORT        Patient Identification:  Name:  Ofelia Knox  Age:  78 y.o.  Sex:  female  :  1945  MRN:  [id number]   Visit Number:  [id number]  Primary Care Physician:  Provider, No Known        Referring Provider:  Dr. Appiah    Reason for consult: Covid Pneumonia       LOS: 22 days        Subjective       Subjective     History of present illness:      Thank you Dr. Appiah for allowing us to participate in the care of your patient.  As you well know, Ms. Ofelia Knox is a 78 y.o. female with past medical history significant for diabetes, hypertension, home O2 dependent COPD, who presented to [facility] Emergency Department on 2023 for altered mental status.  This history was obtained from the patient's son as the patient is unable to give good history.  The patient was  12 years ago, the patient's son speaks to her on the phone every day.  On a phone call in  the patient's son stated the patient was somewhat confused on the phone it is oriented but he thought she may be tired.  , September 10 the patient's son went to check on her and she answered the door confused, he noted she had defecated and urinated on herself.  An ambulance was called.  The son stated the patient's baseline was alert and oriented with good memory.  CT head done and work-up showed a hypoattenuation in the left periventricular basal ganglia white matter.  Subsequent MRI showed acute infarct in the left putamen and anterior limb of the internal capsule.  CTA head and neck was without significant stenosis.  She was started on high-dose statin and aspirin.  The patient was noted to still have significant aphasia.  She became increasingly confused while there and was found to have UTI which was ESBL and was treated on meropenem course through .  Her confusion progressed to the point where she could not identify her children.  The patient was  transferred for inpatient physical rehab following CVA.    On inpatient physical rehab day 11, the patient developed congestion and cough and was tested.  COVID-19 and flu A/B PCR was positive for COVID-19.    This morning the patient is awake and alert, states she does not feel well.  She complains of body aches, sore throat and cough.  Is on 2 L nasal cannula, which is her home oxygen rate.  Afebrile.  Denies diarrhea.  Lung exam with mild bilateral rhonchi and decreased sounds to the bases bilaterally.  Abdomen is soft and nontender with normoactive bowel sounds.  Mild bilateral lower extremity edema noted.  Most recent WBC from 9/29 was normal at 8.47.      Infectious Disease consultation was requested for antimicrobial management.      ---------------------------------------------------------------------------------------------------------------------     Review Of Systems:    Constitutional: no fever, chills and night sweats. No appetite change or unexpected weight change.  General malaise, body aches  Eyes: no eye drainage, itching or redness.  HEENT: no mouth sores, dysphagia or nose bleed.  Respiratory: no for shortness of breath, sore throat and cough  Cardiovascular: no chest pain, no palpitations, no orthopnea.  Gastrointestinal: no nausea, vomiting or diarrhea. No abdominal pain, hematemesis or rectal bleeding.  Genitourinary: no dysuria or polyuria.  Hematologic/lymphatic: no lymph node abnormalities, no easy bruising or easy bleeding.  Musculoskeletal: no muscle or joint pain.  Skin: No rash and no itching.  Neurological: no loss of consciousness, no seizure, no headache.  Behavioral/Psych: no depression or suicidal ideation.  Endocrine: no hot flashes.  Immunologic: negative.    ---------------------------------------------------------------------------------------------------------------------     Past Medical History    Past Medical History:   Diagnosis Date    AMS (altered mental status)      Aphasia     CKD (chronic kidney disease)     COPD (chronic obstructive pulmonary disease)     CVA (cerebral vascular accident)     DM2 (diabetes mellitus, type 2)     HTN (hypertension)     Restrictive lung disease     Urinary tract infection due to ESBL Klebsiella        Past Surgical History    Past Surgical History:   Procedure Laterality Date    HYSTERECTOMY      JOINT REPLACEMENT         Family History    History reviewed. No pertinent family history.    Social History    Social History     Tobacco Use    Smoking status: Never     Passive exposure: Never    Smokeless tobacco: Never   Vaping Use    Vaping Use: Unknown   Substance Use Topics    Alcohol use: Never    Drug use: Never       Allergies    Codeine, Albuterol, Amoxicillin, Cephalexin, Clavulanic acid, and Levofloxacin  ---------------------------------------------------------------------------------------------------------------------     Home Medications:    Prior to Admission Medications       Prescriptions Last Dose Informant Patient Reported? Taking?    albuterol sulfate  (90 Base) MCG/ACT inhaler Unknown Self, Other Yes No    Inhale 2 puffs Every 4 (Four) Hours As Needed for Wheezing.    azelastine (ASTELIN) 0.1 % nasal spray Unknown Self, Other Yes No    2 sprays into the nostril(s) as directed by provider 2 (Two) Times a Day As Needed for Rhinitis. Use in each nostril as directed    buPROPion XL (WELLBUTRIN XL) 150 MG 24 hr tablet Unknown Self, Other Yes No    Take 1 tablet by mouth Daily.    estradiol (ESTRACE) 1 MG tablet Unknown Self, Other Yes No    Take 1 tablet by mouth Daily.    fluticasone (FLONASE) 50 MCG/ACT nasal spray Unknown Self, Other Yes No    2 sprays into the nostril(s) as directed by provider Daily As Needed for Rhinitis.    furosemide (LASIX) 20 MG tablet Unknown Self, Other Yes No    Take 1 tablet by mouth Daily.    gabapentin (NEURONTIN) 800 MG tablet Unknown Self, Other Yes No    Take 1 tablet by mouth 2 (Two) Times  a Day.    glipizide (GLUCOTROL) 5 MG tablet Unknown Self, Other Yes No    Take 1 tablet by mouth 2 (Two) Times a Day Before Meals.    isosorbide mononitrate (IMDUR) 30 MG 24 hr tablet Unknown Self, Other Yes No    Take 1 tablet by mouth Daily.    lansoprazole (PREVACID) 30 MG capsule Unknown Self, Other Yes No    Take 1 capsule by mouth Daily.    metFORMIN ER (GLUCOPHAGE-XR) 500 MG 24 hr tablet Unknown Self, Other Yes No    Take 1 tablet by mouth 2 (Two) Times a Day.    metoprolol succinate XL (TOPROL-XL) 25 MG 24 hr tablet Unknown Self, Other Yes No    Take 1 tablet by mouth Daily.    pravastatin (PRAVACHOL) 40 MG tablet  Self, Other Yes No    Take 1 tablet by mouth Daily.    tiotropium bromide monohydrate (SPIRIVA RESPIMAT) 2.5 MCG/ACT aerosol solution inhaler Unknown Self, Other Yes No    Inhale 2 puffs Daily.    venlafaxine XR (EFFEXOR-XR) 75 MG 24 hr capsule Unknown Self, Other Yes No    Take 3 capsules by mouth Daily.          ---------------------------------------------------------------------------------------------------------------------    Objective       Objective     Hospital Scheduled Meds:  aspirin, 81 mg, Oral, Daily  buPROPion XL, 150 mg, Oral, Daily  cetirizine, 5 mg, Oral, Daily  furosemide, 20 mg, Oral, Every Other Day  isosorbide mononitrate, 30 mg, Oral, Q24H  metoprolol succinate XL, 25 mg, Oral, Q24H  montelukast, 10 mg, Oral, Nightly  nystatin, 1 application , Topical, Q12H  pantoprazole, 40 mg, Oral, QAM AC  pravastatin, 40 mg, Oral, Daily  remdesivir, 200 mg, Intravenous, Q24H   Followed by  [START ON 10/4/2023] remdesivir, 100 mg, Intravenous, Q24H  sacubitril-valsartan, 1 tablet, Oral, Q12H  tiotropium bromide monohydrate, 2 puff, Inhalation, Daily - RT  venlafaxine XR, 75 mg, Oral, Daily With Breakfast      Pharmacy Consult - Remdesivir,       ---------------------------------------------------------------------------------------------------------------------   Vital Signs:  Temp:   [98.1 øF (36.7 øC)-100 øF (37.8 øC)] 98.3 øF (36.8 øC)  Heart Rate:  [73] 73  Resp:  [18] 18  BP: (138)/(84) 138/84  No data found.  SpO2 Percentage    10/02/23 0910 10/02/23 1900 10/03/23 0748   SpO2: 96% 94% 94%     SpO2:  [94 %] 94 %  on   ;   Device (Oxygen Therapy): room air    Body mass index is 40.03 kg/mý.  Wt Readings from Last 3 Encounters:   09/11/23 112 kg (248 lb)     ---------------------------------------------------------------------------------------------------------------------     Physical Exam:    Constitutional: Generally ill-appearing elderly female, resting comfortably in bed on 2 L nasal cannula, which is her home oxygen rate.  Complains of general malaise, body aches, cough and sore throat  HENT:  Head: Normocephalic and atraumatic.  Mouth:  Moist mucous membranes.    Eyes:  Conjunctivae and EOM are normal.  No scleral icterus.  Neck:  Neck supple.  No JVD present.    Cardiovascular:  Normal rate, regular rhythm and normal heart sounds with no murmur. No edema.  Pulmonary/Chest: Mild bilateral rhonchi with decreased bases bilaterally  Abdominal:  Soft.  Bowel sounds are normal.  No distension and no tenderness.   Musculoskeletal: Mild BLE edema, no tenderness, and no deformity.  No swelling or redness of joints.  Neurological:  Alert and oriented to person, place, and time.  No facial droop.  No slurred speech.   Skin:  Skin is warm and dry.  No rash noted.  No pallor.   Psychiatric:  Normal mood and affect.  Behavior is normal.    ---------------------------------------------------------------------------------------------------------------------              Results from last 7 days   Lab Units 09/29/23  0254   WBC 10*3/mm3 8.47   HEMOGLOBIN g/dL 13.7   HEMATOCRIT % 45.5   MCV fL 93.0   MCHC g/dL 30.1*   PLATELETS 10*3/mm3 245     Results from last 7 days   Lab Units 09/29/23  0254   SODIUM mmol/L 140   POTASSIUM mmol/L 3.7   CHLORIDE mmol/L 106   CO2 mmol/L 22.4   BUN mg/dL 27*    CREATININE mg/dL 1.08*   CALCIUM mg/dL 8.8   GLUCOSE mg/dL 137*   Estimated Creatinine Clearance: 56.2 mL/min (A) (by C-G formula based on SCr of 1.08 mg/dL (H)).  No results found for: AMMONIA    Glucose   Date/Time Value Ref Range Status   10/03/2023 0625 137 (H) 70 - 130 mg/dL Final   10/02/2023 2128 122 70 - 130 mg/dL Final   10/02/2023 1610 122 70 - 130 mg/dL Final   10/02/2023 1107 128 70 - 130 mg/dL Final   10/02/2023 0558 148 (H) 70 - 130 mg/dL Final   10/01/2023 1942 127 70 - 130 mg/dL Final   10/01/2023 1544 97 70 - 130 mg/dL Final   10/01/2023 1044 108 70 - 130 mg/dL Final     Lab Results   Component Value Date    HGBA1C 6.80 (H) 09/12/2023     Lab Results   Component Value Date    TSH 1.470 09/12/2023    FREET4 1.15 09/12/2023       No results found for: BLOODCX  No results found for: URINECX  No results found for: WOUNDCX  No results found for: STOOLCX  No results found for: RESPCX  Pain Management Panel           No data to display              I have personally reviewed the above laboratory results.   ---------------------------------------------------------------------------------------------------------------------  Imaging Results (Last 7 Days)       ** No results found for the last 168 hours. **          I have personally reviewed the above radiology results.   ---------------------------------------------------------------------------------------------------------------------      Pertinent Infectious Disease Results    Ms. Ofelia Knox is a 78 y.o. female with past medical history significant for diabetes, hypertension, home O2 dependent COPD, who presented to [facility] Emergency Department on 9/11/2023 for altered mental status.  This history was obtained from the patient's son as the patient is unable to give good history.  The patient was  12 years ago, the patient's son speaks to her on the phone every day.  On a phone call in September 7 the patient's son stated the  patient was somewhat confused on the phone it is oriented but he thought she may be tired.  Cesar, September 10 the patient's son went to check on her and she answered the door confused, he noted she had defecated and urinated on herself.  An ambulance was called.  The son stated the patient's baseline was alert and oriented with good memory.  CT head done and work-up showed a hypoattenuation in the left periventricular basal ganglia white matter.  Subsequent MRI showed acute infarct in the left putamen and anterior limb of the internal capsule.  CTA head and neck was without significant stenosis.  She was started on high-dose statin and aspirin.  The patient was noted to still have significant aphasia.  She became increasingly confused while there and was found to have UTI which was ESBL and was treated on meropenem course through September 23.  Her confusion progressed to the point where she could not identify her children.  The patient was transferred for inpatient physical rehab following CVA.    On inpatient physical rehab day 11, the patient developed congestion and cough and was tested.  COVID-19 and flu A/B PCR was positive for COVID-19.        Assessment & Plan      Assessment      COVID-19 infection        Plan      I saw and examined the patient myself this morning with MADDIE Hayden and discussed the plan of care with her and primary RN and here are my findings:    Patient is awake and alert with no acute distress she overall does not feel well.  She complains of body aches sore throat and cough and her cough seems to be significant and mostly dry.  Currently on 2 L nasal cannula which is her home oxygen rate.  No fever or diarrhea reported by nursing staff.    On exam lungs revealed bilateral significant rhonchi and decreased breath sounds at the bases with mild wheezing.  Abdomen is soft with nontender mildly distended no guarding.  Patient has mild bilateral lower extremity edema.    Most  recent WBC from 9/29/2023 was normal at 8.47.    Based on her risk factors including age cardiovascular, pulmonary and BMI risk factors, I initiated a 3-day course of remdesivir for her new onset COVID-19 infection as she would be considered high risk.  I had the pleasure to discuss the case with the primary team.      ANTIMICROBIAL THERAPY    remdesivir - 100 mg/270 mL, 100 mg/270 mL       Again, thank you Dr. Appiah for allowing us to participate in the care of your patient and please feel free to call for any questions you may have.        Code Status:     Code Status and Medical Interventions:   Ordered at: 09/11/23 2023     Code Status (Patient has no pulse and is not breathing):    CPR (Attempt to Resuscitate)     Medical Interventions (Patient has pulse or is breathing):    Full Support         MADDIE Link  10/03/23  09:23 EDT    Electronically signed by MADDIE Link, 10/03/23, 9:44 AM EDT.      Electronically signed by Roque Rodríguez MD, 10/03/23, 1:09 PM EDT.

## 2023-10-03 NOTE — THERAPY TREATMENT NOTE
"Inpatient Rehabilitation - Occupational Therapy Treatment Note    ABENA Quiroz     Patient Name: Ofelia Knox  : 1945  MRN: [id number]    Today's Date: 10/3/2023                 Admit Date: 2023       No diagnosis found.    Patient Active Problem List   Diagnosis    CVA (cerebral vascular accident)       Past Medical History:   Diagnosis Date    AMS (altered mental status)     Aphasia     CKD (chronic kidney disease)     COPD (chronic obstructive pulmonary disease)     CVA (cerebral vascular accident)     DM2 (diabetes mellitus, type 2)     HTN (hypertension)     Restrictive lung disease     Urinary tract infection due to ESBL Klebsiella        Past Surgical History:   Procedure Laterality Date    HYSTERECTOMY      JOINT REPLACEMENT               IRF OT ASSESSMENT FLOWSHEET (last 12 hours)       IRF OT Evaluation and Treatment       Row Name 10/03/23 1436          OT Time and Intention    Document Type daily treatment  -BF     Mode of Treatment occupational therapy  -BF     Patient Effort good  -BF     Symptoms Noted During/After Treatment shortness of breath;other (see comments)  Pt c/o SOA with chest pain, along with feeling \"strange\". RN notified and addressing situation. Please refer to ns for details.  -BF       Row Name 10/03/23 1436          General Information    Existing Precautions/Restrictions fall  aphasia, covid isolation  -BF     Limitations/Impairments aphasia;safety/cognitive  -BF       Row Name 10/03/23 1436          Cognition/Psychosocial    Orientation Status (Cognition) oriented to;person;place;situation  -BF     Follows Commands (Cognition) follows one-step commands;verbal cues/prompting required;repetition of directions required;physical/tactile prompts required  -BF       Row Name 10/03/23 1436          Grooming    [place] Level (Grooming) set up;supervision;verbal cues  -BF       Row Name 10/03/23 1436          Motor Skills    Motor Control/Coordination Interventions fine motor " manipulation/dexterity activities;gross motor coordination activities;therapeutic exercise/ROM  BUE GMC/FMC theract, strengthening; BUE theraband therex, wrist rolls  -BF       Row Name 10/03/23 1436          Positioning and Restraints    In Bed fowlers;call light within reach;encouraged to call for assist;with nsg  -BF               User Key  (r) = Recorded By, (t) = Taken By, (c) = Cosigned By      Initials Name Effective Dates     Pam Peck OT 07/11/23 -                      Occupational Therapy Education       Title: PT OT SLP Therapies (Done)       Topic: Occupational Therapy (Done)       Point: ADL training (Done)       Description:   Instruct learner(s) on proper safety adaptation and remediation techniques during self care or transfers.   Instruct in proper use of assistive devices.                  Learning Progress Summary             Patient Acceptance, E, VU,NR by BF at 10/3/2023 1436    Acceptance, E, VU,NR by BF at 10/2/2023 1317    Acceptance, TB, NR by DL at 9/24/2023 2200    Acceptance, E, VU,NR by HB at 9/16/2023 1145    Acceptance, E, VU,NR by BF at 9/15/2023 1232                         Point: Precautions (Done)       Description:   Instruct learner(s) on prescribed precautions during self-care and functional transfers.                  Learning Progress Summary             Patient Acceptance, E, VU,NR by BF at 10/3/2023 1436    Acceptance, E, VU,NR by BF at 10/2/2023 1317    Acceptance, TB, NR by DL at 9/24/2023 2200    Acceptance, E, VU,NR by HB at 9/16/2023 1145    Acceptance, E, VU,NR by BF at 9/15/2023 1232                                         User Key       Initials Effective Dates Name Provider Type Discipline    BF 07/11/23 -  Pam Peck OT Occupational Therapist OT    HB 05/25/21 -  Xena Chinchilla OT Occupational Therapist OT    DL 03/16/22 -  Carolyn Ramirez, RN Registered Nurse Nurse                        OT Recommendation and Plan    Planned Therapy  Interventions (OT): activity tolerance training, adaptive equipment training, BADL retraining, neuromuscular control/coordination retraining, ROM/therapeutic exercise, strengthening exercise, transfer/mobility retraining, patient/caregiver education/training                    Time Calculation:      Time Calculation- OT       Row Name 10/03/23 1504             Time Calculation- OT    OT Start Time 0830  -BF      OT Stop Time 1000  -BF      OT Time Calculation (min) 90 min  -BF      Total Timed Code Minutes- OT 90 minute(s)  -BF      OT Non-Billable Time (min) 10 min  -BF                User Key  (r) = Recorded By, (t) = Taken By, (c) = Cosigned By      Initials Name Provider Type    BF Pam Peck OT Occupational Therapist                  Therapy Charges for Today       Code Description Service Date Service Provider Modifiers Qty    74447252660 HC OT SELF CARE/MGMT/TRAIN EA 15 MIN 10/2/2023 Pam Peck OT GO 1    02389923742 HC OT THER PROC EA 15 MIN 10/2/2023 Pam Peck, OT GO 2    31134914991 HC OT NEUROMUSC RE EDUCATION EA 15 MIN 10/2/2023 Pam Peck, OT GO 3    05581150530 HC OT SELF CARE/MGMT/TRAIN EA 15 MIN 10/3/2023 Pam Peck OT GO 1    87912481877 HC OT THER PROC EA 15 MIN 10/3/2023 Pam Peck, OT GO 3    28304743549 HC OT NEUROMUSC RE EDUCATION EA 15 MIN 10/3/2023 Pam Peck, OT GO 2                     Pam Peck OT  10/3/2023

## 2023-10-03 NOTE — PROGRESS NOTES
Occupational Therapy:    Physical Therapy: Individual: 45 minutes.    Speech Language Pathology:    Signed by: Andra Stewart, Physical Therapist

## 2023-10-03 NOTE — THERAPY PROGRESS REPORT/RE-CERT
Inpatient Rehabilitation - Physical Therapy Progress Note       ABENA Quiroz     Patient Name: Ofelia Knox  : 1945  MRN: [id number]    Today's Date: 10/3/2023                    Admit Date: 2023      Visit Dx:   No diagnosis found.    Patient Active Problem List   Diagnosis    CVA (cerebral vascular accident)       Past Medical History:   Diagnosis Date    AMS (altered mental status)     Aphasia     CKD (chronic kidney disease)     COPD (chronic obstructive pulmonary disease)     CVA (cerebral vascular accident)     DM2 (diabetes mellitus, type 2)     HTN (hypertension)     Restrictive lung disease     Urinary tract infection due to ESBL Klebsiella        Past Surgical History:   Procedure Laterality Date    HYSTERECTOMY      JOINT REPLACEMENT         PT ASSESSMENT (last 12 hours)       IRF PT Evaluation and Treatment       Row Name 10/03/23 1456          PT Time and Intention    Document Type progress note  -LB     Mode of Treatment individual therapy;physical therapy  -LB     Patient/Family/Caregiver Comments/Observations she was seen in am then later was on hold for 2nd session due to testing  -LB       Row Name 10/03/23 1456          General Information    Existing Precautions/Restrictions --  aphasia, covid isolation  -LB       Row Name 10/03/23 1456          Pain Scale: FACES Pre/Post-Treatment    Pain: FACES Scale, Pretreatment 0-->no hurt  -LB     Posttreatment Pain Rating 0-->no hurt  -LB       Row Name 10/03/23 1456          Cognition/Psychosocial    Affect/Mental Status (Cognition) WFL  -LB     Follows Commands (Cognition) verbal cues/prompting required;physical/tactile prompts required  -LB     Personal Safety Interventions gait belt;nonskid shoes/slippers when out of bed;supervised activity  -LB       Row Name 10/03/23 1456          Bed Mobility    Supine-Sit-Supine [place] (Bed Mobility) verbal cues;nonverbal cues (demo/gesture);modified independence  -LB     Assistive Device (Bed  Mobility) bed rails  -LB       Row Name 10/03/23 1456          Bed-Chair Transfer    Bed-Chair [place] (Transfers) verbal cues;nonverbal cues (demo/gesture);supervision  -LB     Assistive Device (Bed-Chair Transfers) wheelchair  -LB       Row Name 10/03/23 1456          Chair-Bed Transfer    Chair-Bed [place] (Transfers) verbal cues;nonverbal cues (demo/gesture);supervision  -LB     Assistive Device (Chair-Bed Transfers) wheelchair  -LB       Row Name 10/03/23 1456          Gait/Stairs (Locomotion)    Comment, (Gait/Stairs) she did not walk in am due to not feeling well, was on hold for 2nd session  -LB       Row Name 10/03/23 1456          Motor Skills    Therapeutic Exercise --  sitting ex 2 sets  -LB       Row Name 10/03/23 1456          Positioning and Restraints    Pre-Treatment Position in bed  -LB     In Bed call light within reach;encouraged to call for assist;heels elevated  -LB       Row Name 10/03/23 1456          Vital Signs    Intra SpO2 (%) 96  -LB     O2 Delivery Intra Treatment supplemental O2  2L  -LB       Row Name 10/03/23 1456          Daily Progress Summary (PT)    Recommendations (PT) continue PT  -LB       Row Name 10/03/23 1456          Weekly Progress Summary (PT)    Weekly Progress Summary (PT) She has met goals and was going to be d/c yesterday but now she is sick and has covid. d/c plans are now undetermined.  -LB     Recommendations (PT) continue PT while in Rehab  -LB     Plan for Continued Service After Discharge (PT) SNF or home with HH and assist  -LB       Row Name 10/03/23 1456          Bed Mobility Goal 1 (PT-IRF)    Progress/Outcomes (Bed Mobility Goal 1, PT-IRF) goal met  -LB       Row Name 10/03/23 1456          Transfer Goal 1 (PT-IRF)    Progress/Outcomes (Transfer Goal 1, PT-IRF) goal met  -LB       Row Name 10/03/23 1456          Gait/Walking Locomotion Goal 1 (PT-IRF)    Progress/Outcomes (Gait/Walking Locomotion Goal 1, PT-IRF) goal met  -LB               User  Key  (r) = Recorded By, (t) = Taken By, (c) = Cosigned By      Initials Name Provider Type    Andra Lea PT Physical Therapist                     Physical Therapy Education       Title: PT OT SLP Therapies (Done)       Topic: Physical Therapy (Done)       Point: Mobility training (Done)       Learning Progress Summary             Patient Acceptance, E, VU,NR by LB at 10/3/2023 1502    Acceptance, E,D, VU,NR by RG at 9/29/2023 1403    Acceptance, E, VU,NR by LB at 9/28/2023 1501    Acceptance, E, VU,NR by LB at 9/27/2023 1532    Acceptance, E, VU,NR by LB at 9/26/2023 1548    Acceptance, E,D, VU,NR by RG at 9/25/2023 1423    Acceptance, TB, NR by DL at 9/24/2023 2200    Acceptance, E,D, NR,VU by RG at 9/22/2023 1315    Acceptance, E,D, VU,NR by RG at 9/21/2023 1307    Acceptance, E,D, VU,NR by RG at 9/20/2023 1258    Acceptance, E,D, VU,NR by RG at 9/19/2023 1258    Acceptance, E,D, VU,NR by RG at 9/18/2023 1338    Acceptance, E,TB, VU by RM at 9/15/2023 1532    Acceptance, E,TB, VU by RM at 9/14/2023 1555    Acceptance, E,D, VU,NR by RG at 9/13/2023 1528    Acceptance, TB, NR by DL at 9/12/2023 2003    Acceptance, E, VU,NR by LB at 9/12/2023 1135                         Point: Home exercise program (Done)       Learning Progress Summary             Patient Acceptance, E, VU,NR by LB at 10/3/2023 1502    Acceptance, E,D, VU,NR by RG at 9/29/2023 1403    Acceptance, E, VU,NR by LB at 9/28/2023 1501    Acceptance, E, VU,NR by LB at 9/27/2023 1532    Acceptance, E, VU,NR by LB at 9/26/2023 1548    Acceptance, E,D, VU,NR by RG at 9/25/2023 1423    Acceptance, TB, NR by DL at 9/24/2023 2200    Acceptance, E,D, NR,VU by RG at 9/22/2023 1315    Acceptance, E,D, VU,NR by RG at 9/21/2023 1307    Acceptance, E,D, VU,NR by RG at 9/20/2023 1258    Acceptance, E,D, VU,NR by RG at 9/19/2023 1258    Acceptance, E,D, VU,NR by RG at 9/18/2023 1338    Acceptance, E,TB, VU by RM at 9/15/2023 1532    Acceptance, E,TB, VU  by RM at 9/14/2023 1555    Acceptance, E,D, VU,NR by RG at 9/13/2023 1528    Acceptance, TB, NR by DL at 9/12/2023 2003    Acceptance, E, VU,NR by LB at 9/12/2023 1135                         Point: Body mechanics (Done)       Learning Progress Summary             Patient Acceptance, E, VU,NR by LB at 10/3/2023 1502    Acceptance, E,D, VU,NR by RG at 9/29/2023 1403    Acceptance, E, VU,NR by LB at 9/28/2023 1501    Acceptance, E, VU,NR by LB at 9/27/2023 1532    Acceptance, E, VU,NR by LB at 9/26/2023 1548    Acceptance, E,D, VU,NR by RG at 9/25/2023 1423    Acceptance, TB, NR by DL at 9/24/2023 2200    Acceptance, E,D, NR,VU by RG at 9/22/2023 1315    Acceptance, E,D, VU,NR by RG at 9/21/2023 1307    Acceptance, E,D, VU,NR by RG at 9/20/2023 1258    Acceptance, E,D, VU,NR by RG at 9/19/2023 1258    Acceptance, E,D, VU,NR by RG at 9/18/2023 1338    Acceptance, E,TB, VU by RM at 9/15/2023 1532    Acceptance, E,TB, VU by RM at 9/14/2023 1555    Acceptance, E,D, VU,NR by RG at 9/13/2023 1528    Acceptance, TB, NR by DL at 9/12/2023 2003    Acceptance, E, VU,NR by LB at 9/12/2023 1135                         Point: Precautions (Done)       Learning Progress Summary             Patient Acceptance, E, VU,NR by LB at 10/3/2023 1502    Acceptance, E,D, VU,NR by RG at 9/29/2023 1403    Acceptance, E, VU,NR by LB at 9/28/2023 1501    Acceptance, E, VU,NR by LB at 9/27/2023 1532    Acceptance, E, VU,NR by LB at 9/26/2023 1548    Acceptance, E,D, VU,NR by RG at 9/25/2023 1423    Acceptance, TB, NR by DL at 9/24/2023 2200    Acceptance, E,D, NR,VU by RG at 9/22/2023 1315    Acceptance, E,D, VU,NR by RG at 9/21/2023 1307    Acceptance, E,D, VU,NR by RG at 9/20/2023 1258    Acceptance, E,D, VU,NR by RG at 9/19/2023 1258    Acceptance, E,D, VU,NR by RG at 9/18/2023 1338    Acceptance, E,TB, VU by RM at 9/15/2023 1532    Acceptance, E,TB, VU by RM at 9/14/2023 1555    Acceptance, E,D, VU,NR by RG at 9/13/2023 1528    Acceptance, TB,  NR by DL at 9/12/2023 2003    Acceptance, E, VU,NR by LB at 9/12/2023 1135                                         User Key       Initials Effective Dates Name Provider Type Discipline    LB 06/16/21 -  nAdra Stewart, PT Physical Therapist PT    RM 02/17/23 -  Karis Obrien, PTA Physical Therapist Assistant PT    RG 06/16/21 -  Twan Cunningham PTA Physical Therapist Assistant PT    DL 03/16/22 -  Carolyn Ramirez, RN Registered Nurse Nurse                    PT Recommendation and Plan    Planned Therapy Interventions (PT): balance training, bed mobility training, gait training, neuromuscular re-education, patient/family education, strengthening, transfer training  Frequency of Treatment (PT): 5 times per week  Anticipated Equipment Needs at Discharge (PT Eval):  (tbd)  Daily Progress Summary (PT)  Recommendations (PT): continue PT               Time Calculation:      PT Charges       Row Name 10/03/23 1502             Time Calculation    Start Time 0745  -LB      Stop Time 0830  -LB      Time Calculation (min) 45 min  -LB      PT Received On 10/03/23  -LB      PT Goal Re-Cert Due Date 10/10/23  -LB                User Key  (r) = Recorded By, (t) = Taken By, (c) = Cosigned By      Initials Name Provider Type    LB Andra Stewart, PT Physical Therapist                    Therapy Charges for Today       Code Description Service Date Service Provider Modifiers Qty    47798709196 HC GAIT TRAINING EA 15 MIN 10/2/2023 Andra Stewart, PT GP 1    80831038243 HC PT THER PROC EA 15 MIN 10/2/2023 Andra Stewart, PT GP 3    37577800668 HC PT THERAPEUTIC ACT EA 15 MIN 10/2/2023 Andra Stewart, PT GP 2    10713598040 HC PT THER PROC EA 15 MIN 10/3/2023 Andra Stewart, PT GP 2    69890385578 HC PT THERAPEUTIC ACT EA 15 MIN 10/3/2023 Andra Stewart, PT GP 1                     Andra Stewart, PT  10/3/2023

## 2023-10-03 NOTE — PROGRESS NOTES
Rehabilitation Nursing  Inpatient Rehabilitation Plan of Care Note    Plan of Care  Copy from [facility]    Toilet Transfers (Active)  Current Status (9/11/2023 7:00:00 PM): PATIENT WILL HAVE NO TRANSFER DIFFICULTY    Weekly Goal: NO DIFFICULT WITH TRANSFERS  Discharge Goal: TRANSFERS INDEPENDENTLY    Pain    Pain Management (Active)  Current Status (9/11/2023 7:00:00 PM): PATIENT WILL VOICE NO PAIN  Weekly Goal: PATIENT WILL BE PAIN FREE  Discharge Goal: FREE FROM PAIN    Safety    Potential for Injury (Active)  Current Status (9/11/2023 7:00:00 PM): PATIENT WILL HAVE NO INJURY THIS STAY  Weekly Goal: NO INJURY  Discharge Goal: PATIENT WILL DISCHARGE    Body Systems    Integumentary (Active)  Current Status (9/11/2023 7:00:00 PM): PATIENT WILL HAVE NO SKIN  BREAKDOWN THIS  STAY  Weekly Goal: SKIN WILL REMAIN INTACT.  Discharge Goal: NO SKIN ISSUES.    Signed by: Carolyn Ramirez RN

## 2023-10-03 NOTE — PROGRESS NOTES
[facility]  PROGRESS NOTE     Patient Identification:  Name:  Ofelia Knox  Age:  78 y.o.  Sex:  female  :  1945  MRN:  [id number]  Visit Number:  [id number]  ROOM: [id number]     Primary Care Provider:  Provider, No Known    Length of stay in inpatient status:  22    Subjective     Chief Compliant:  No chief complaint on file.      History of Presenting Illness: 78-year-old female being treated for with recent CVA, CHF and diabetes.  Patient has history of COPD as well.  Patient developed COVID-19 yesterday did have a low-grade fever yesterday.  This morning has developed acute or worsening shortness of breath and chest discomfort.    Objective     Current Hospital Meds:aspirin, 81 mg, Oral, Daily  buPROPion XL, 150 mg, Oral, Daily  cetirizine, 5 mg, Oral, Daily  enoxaparin, 40 mg, Subcutaneous, Q24H  furosemide, 20 mg, Oral, Every Other Day  isosorbide mononitrate, 30 mg, Oral, Q24H  metoprolol succinate XL, 25 mg, Oral, Q24H  montelukast, 10 mg, Oral, Nightly  nitroglycerin, , ,   nystatin, 1 application , Topical, Q12H  pantoprazole, 40 mg, Oral, QAM AC  pravastatin, 40 mg, Oral, Daily  remdesivir, 200 mg, Intravenous, Q24H   Followed by  [START ON 10/4/2023] remdesivir, 100 mg, Intravenous, Q24H  sacubitril-valsartan, 1 tablet, Oral, Q12H  tiotropium bromide monohydrate, 2 puff, Inhalation, Daily - RT  venlafaxine XR, 75 mg, Oral, Daily With Breakfast    Pharmacy Consult - Remdesivir,       ----------------------------------------------------------------------------------------------------------------------  Vital Signs:  Temp:  [98.1 øF (36.7 øC)-100 øF (37.8 øC)] 98.3 øF (36.8 øC)  Heart Rate:  [69-77] 77  Resp:  [18] 18  BP: (108-148)/(49-84) 108/49  SpO2:  [94 %] 94 %  on   ;   Device (Oxygen Therapy): room air  Body mass index is 40.03 kg/mý.    Wt Readings from Last 3 Encounters:   23 112 kg (248 lb)     Intake & Output (last 3 days)          0701  10/01 0700 10/01  0701  10/02 0700 10/02 0701  10/03 0700 10/03 0701  10/04 0700    P.O. 2752 477 6881 240    Total Intake(mL/kg) 1320 (11.8) 600 (5.4) 1200 (10.7) 240 (2.1)    Urine (mL/kg/hr) 101 (0) 300 (0.1) 600 (0.2)     Total Output 101 300 600     Net +1219 +300 +600 +240            Urine Unmeasured Occurrence 5 x 2 x 5 x     Stool Unmeasured Occurrence 1 x             Diet: Cardiac Diets, Diabetic Diets; Healthy Heart (2-3 Na+); Consistent Carbohydrate; Texture: Regular Texture (IDDSI 7); Fluid Consistency: Thin (IDDSI 0)  ----------------------------------------------------------------------------------------------------------------------  Physical exam:  Constitutional: No acute distress  HEENT: Normocephalic atraumatic  Neck: Supple  Cardiovascular: Regular rate and rhythm  Pulmonary/Chest: Fairly clear to auscultation but may be an occasional wheeze noted  Abdominal:  .  Positive bowel sounds soft  Musculoskeletal: No obvious arthropathy  Neurological: No focal deficits  Skin: No rash  Peripheral vascular: No edema  Genitourinary:  ----------------------------------------------------------------------------------------------------------------------    Last echocardiogram:  Results for orders placed during the hospital encounter of 09/11/23    Adult Transthoracic Echo Complete W/ Cont if Necessary Per Protocol    Interpretation Summary    Left ventricular systolic function is mildly decreased. Calculated left ventricular EF = 44% Left ventricular ejection fraction appears to be 41 - 45%.    Left ventricular wall thickness is consistent with mild eccentric hypertrophy.    Left ventricular diastolic function is consistent with (grade I) impaired relaxation.    The left atrial cavity is moderately dilated.    ----------------------------------------------------------------------------------------------------------------------  Results from last 7 days   Lab Units 09/29/23  0254   WBC 10*3/mm3 8.47   HEMOGLOBIN g/dL 13.7    HEMATOCRIT % 45.5   MCV fL 93.0   MCHC g/dL 30.1*   PLATELETS 10*3/mm3 245         Results from last 7 days   Lab Units 09/29/23  0254   SODIUM mmol/L 140   POTASSIUM mmol/L 3.7   CHLORIDE mmol/L 106   CO2 mmol/L 22.4   BUN mg/dL 27*   CREATININE mg/dL 1.08*   CALCIUM mg/dL 8.8   GLUCOSE mg/dL 137*   Estimated Creatinine Clearance: 56.2 mL/min (A) (by C-G formula based on SCr of 1.08 mg/dL (H)).  No results found for: AMMONIA              Glucose   Date/Time Value Ref Range Status   10/03/2023 0625 137 (H) 70 - 130 mg/dL Final   10/02/2023 2128 122 70 - 130 mg/dL Final   10/02/2023 1610 122 70 - 130 mg/dL Final   10/02/2023 1107 128 70 - 130 mg/dL Final   10/02/2023 0558 148 (H) 70 - 130 mg/dL Final   10/01/2023 1942 127 70 - 130 mg/dL Final   10/01/2023 1544 97 70 - 130 mg/dL Final   10/01/2023 1044 108 70 - 130 mg/dL Final     Lab Results   Component Value Date    TSH 1.470 09/12/2023    FREET4 1.15 09/12/2023     No results found for: PREGTESTUR, PREGSERUM, HCG, HCGQUANT  Pain Management Panel           No data to display              Brief Urine Lab Results  (Last result in the past 365 days)        Color   Clarity   Blood   Leuk Est   Nitrite   Protein   CREAT   Urine HCG        09/14/23 1935 Dark Yellow   Clear   Negative   Negative   Negative   Trace                 No results found for: BLOODCX      No results found for: URINECX  No results found for: WOUNDCX  No results found for: STOOLCX        I have personally looked at the labs and they are summarized above.  ----------------------------------------------------------------------------------------------------------------------  Detailed radiology reports for the last 24 hours:    Imaging Results (Last 24 Hours)       ** No results found for the last 24 hours. **          Final impressions for the last 30 days of radiology reports:    XR Chest 1 View    Result Date: 9/20/2023  Mild pulmonary vascular congestion.   This report was finalized on 9/20/2023  6:48 PM by Alex Pallas, DO.      XR Chest 1 View    Result Date: 9/14/2023  No radiographic evidence of acute cardiac or pulmonary disease.  This report was finalized on 9/14/2023 7:54 AM by Dr. Krunal Colvin MD.     I have personally looked at the radiology images and read the final radiology report.    Assessment & Plan    Status post CVA continue aspirin and Pravachol therapy.  Patient is modified independent for bed mobility and supervision for transfers.  Ambulated 350 feet with rolling walker yesterday contact-guard for ADLs.    Acute dyspnea--patient has been diagnosed with COVID-19 and has been restarted on remdesivir.  Will check chest x-ray, EKG, troponin and D-dimer level at this time.  Patient has been on mechanical prophylaxis.  Will start Lovenox 40 mg daily at this time.  If D-dimer is elevated we will arrange for CT scan of the chest for PE protocol.  We will reevaluate as EKG and other lab work becomes available.  Patient's O2 sats are 91 to 92% on 2 L.    Diabetes mellitus fair control    COPD has been stable.  Will monitor closely    Depression stable    VTE Prophylaxis:   Mechanical Order History:        Ordered        09/11/23 2023  Place Sequential Compression Device  Once            09/11/23 2023  Maintain Sequential Compression Device  Continuous                          Pharmalogical Order History:        Ordered     Dose Route Frequency Stop    10/03/23 1010  Enoxaparin Sodium (LOVENOX) syringe 40 mg         40 mg SC Every 24 Hours --                        Abdullahi Sharpe MD  [facility]  10/03/23  10:11 EDT

## 2023-10-03 NOTE — SIGNIFICANT NOTE
10/03/23 3920   Plan   Plan Team conference held today.  Pt is pending SNF placement due to testing positive for COVID on 10-2-23.  [facility] is unable to accept for admission until day 11.  Spoke to son [phone] about how pt is doing in therapy and pending discharge date.  Rehab  will inform SS when she insurance makes decision about continued rehab stay.  Will follow.   Patient/Family in Agreement with Plan yes

## 2023-10-03 NOTE — PLAN OF CARE
Problem: Rehabilitation (IRF) Plan of Care  Goal: Plan of Care Review  Outcome: Ongoing, Progressing  Flowsheets (Taken 10/3/2023 0007)  Plan of Care Reviewed With: patient  Goal: Patient-Specific Goal (Individualized)  Outcome: Ongoing, Progressing  Goal: Absence of New-Onset Illness or Injury  Outcome: Ongoing, Progressing  Intervention: Prevent Fall and Fall Injury  Recent Flowsheet Documentation  Taken 10/2/2023 1925 by Chantell Shearer RN  Safety Promotion/Fall Prevention: safety round/check completed  Intervention: Prevent Infection  Recent Flowsheet Documentation  Taken 10/2/2023 1925 by Chantell Shearer RN  Infection Prevention: hand hygiene promoted  Intervention: Prevent VTE (Venous Thromboembolism)  Recent Flowsheet Documentation  Taken 10/2/2023 1925 by Chantell Shearer RN  VTE Prevention/Management: sequential compression devices off  Goal: Optimal Comfort and Wellbeing  Outcome: Ongoing, Progressing  Goal: Home and Community Transition Plan Established  Outcome: Ongoing, Progressing     Problem: Skin Injury Risk Increased  Goal: Skin Health and Integrity  Outcome: Ongoing, Progressing  Intervention: Promote and Optimize Oral Intake  Recent Flowsheet Documentation  Taken 10/2/2023 1925 by Chantell Shearer RN  Oral Nutrition Promotion: rest periods promoted  Intervention: Optimize Skin Protection  Recent Flowsheet Documentation  Taken 10/2/2023 1925 by Chantell Shearer RN  Pressure Reduction Techniques:   frequent weight shift encouraged   weight shift assistance provided  Pressure Reduction Devices:   pressure-redistributing mattress utilized   positioning supports utilized   heel offloading device utilized     Problem: Fall Injury Risk  Goal: Absence of Fall and Fall-Related Injury  Outcome: Ongoing, Progressing  Intervention: Identify and Manage Contributors  Recent Flowsheet Documentation  Taken 10/2/2023 1925 by Chantell Shearer RN  Medication Review/Management: medications  reviewed  Intervention: Promote Injury-Free Environment  Recent Flowsheet Documentation  Taken 10/2/2023 1925 by Chantell Shearer, RN  Safety Promotion/Fall Prevention: safety round/check completed     Problem: Communication Impairment  Goal: Effective Communication Skills  Outcome: Ongoing, Progressing  Intervention: Optimize Communication Skills  Recent Flowsheet Documentation  Taken 10/2/2023 1925 by Chantell Shearer, RN  Communication Enhancement Strategies: call light answered in person   Goal Outcome Evaluation:  Plan of Care Reviewed With: patient      Continue with plan of care.  Progress: improving

## 2023-10-03 NOTE — CONSULTS
Assessment:  Diagnosis: CVA    Allergies   Allergen Reactions    Codeine Nausea Only    Albuterol Rash    Amoxicillin Rash    Cephalexin Rash    Clavulanic Acid Rash    Levofloxacin Rash       Order Date/Time: 10/3/2023 1121  Indications: iv abx; difficult access  LABS:  No results found for: INR, PROTIME  No results found for: PTT  Lab Results   Component Value Date    WBC 8.86 10/03/2023    HGB 14.2 10/03/2023    HCT 46.6 10/03/2023    MCV 93.6 10/03/2023     10/03/2023     Lab Results   Component Value Date    BUN 16 10/03/2023     Lab Results   Component Value Date    CREATININE 0.95 10/03/2023     No results found for: EGFRIFNONA, EGFRIFAFRI  Labs Reviewed: all labs reviewed    Contraindications for PICC/Midline:  No contraindications noted    Recommendations:  PowerGlide Pro Midline Catheter; 18 g; 10 cm  Upper Left Cephalic    Procedure Time Out:  Time out Time: 1245  Correct Patient Identity: Yes  Correct Surgical Side and Site Are Marked: Yes  Agreement on Procedure to be done: Yes  Antibiotic Given: N/A  RN: HAILE Nettles RN    PowerGlide Pro Midline Catheter placed with ultrasound guidance and verified by blood return. Minimal blood loss noted. Catheter flushes easily. Blood return noted. Patient nurse, Felicity GLASS, made aware that midline is in upper L arm and ready for use.      Glory Nettles RN

## 2023-10-03 NOTE — SIGNIFICANT NOTE
10/03/23 1011   OTHER   Discipline speech language pathologist   Rehab Time/Intention   Session Not Performed patient unavailable for treatment  (Chest pain reported during prior tx session this date. Currently undergoing EKG. SLP to f/u when clear to participate.)   Recommendations   SLP - Next Appointment 10/04/23

## 2023-10-04 LAB
GLUCOSE BLDC GLUCOMTR-MCNC: 129 MG/DL (ref 70–130)
GLUCOSE BLDC GLUCOMTR-MCNC: 152 MG/DL (ref 70–130)
GLUCOSE BLDC GLUCOMTR-MCNC: 178 MG/DL (ref 70–130)
GLUCOSE BLDC GLUCOMTR-MCNC: 188 MG/DL (ref 70–130)
QT INTERVAL: 410 MS
QTC INTERVAL: 435 MS

## 2023-10-04 PROCEDURE — 94799 UNLISTED PULMONARY SVC/PX: CPT

## 2023-10-04 PROCEDURE — 97530 THERAPEUTIC ACTIVITIES: CPT

## 2023-10-04 PROCEDURE — 97535 SELF CARE MNGMENT TRAINING: CPT

## 2023-10-04 PROCEDURE — 25010000002 ENOXAPARIN PER 10 MG: Performed by: FAMILY MEDICINE

## 2023-10-04 PROCEDURE — 94664 DEMO&/EVAL PT USE INHALER: CPT

## 2023-10-04 PROCEDURE — 97110 THERAPEUTIC EXERCISES: CPT

## 2023-10-04 PROCEDURE — 97116 GAIT TRAINING THERAPY: CPT

## 2023-10-04 PROCEDURE — 82948 REAGENT STRIP/BLOOD GLUCOSE: CPT

## 2023-10-04 PROCEDURE — 99232 SBSQ HOSP IP/OBS MODERATE 35: CPT | Performed by: INTERNAL MEDICINE

## 2023-10-04 PROCEDURE — 94761 N-INVAS EAR/PLS OXIMETRY MLT: CPT

## 2023-10-04 PROCEDURE — 25810000003 SODIUM CHLORIDE 0.9 % SOLUTION: Performed by: INTERNAL MEDICINE

## 2023-10-04 PROCEDURE — 25010000002 REMDESIVIR 100 MG RECONSTITUTED SOLUTION: Performed by: INTERNAL MEDICINE

## 2023-10-04 PROCEDURE — 63710000001 DEXAMETHASONE PER 0.25 MG: Performed by: FAMILY MEDICINE

## 2023-10-04 PROCEDURE — 92507 TX SP LANG VOICE COMM INDIV: CPT

## 2023-10-04 RX ADMIN — NYSTATIN 1 APPLICATION: 100000 CREAM TOPICAL at 20:43

## 2023-10-04 RX ADMIN — MONTELUKAST SODIUM 10 MG: 10 TABLET, COATED ORAL at 20:43

## 2023-10-04 RX ADMIN — SACUBITRIL AND VALSARTAN 1 TABLET: 24; 26 TABLET, FILM COATED ORAL at 20:43

## 2023-10-04 RX ADMIN — BENZONATATE 200 MG: 100 CAPSULE ORAL at 20:42

## 2023-10-04 RX ADMIN — METOPROLOL SUCCINATE 25 MG: 25 TABLET, EXTENDED RELEASE ORAL at 09:09

## 2023-10-04 RX ADMIN — ACETAMINOPHEN 650 MG: 325 TABLET ORAL at 20:42

## 2023-10-04 RX ADMIN — TIOTROPIUM BROMIDE INHALATION SPRAY 2 PUFF: 3.12 SPRAY, METERED RESPIRATORY (INHALATION) at 08:38

## 2023-10-04 RX ADMIN — NYSTATIN 1 APPLICATION: 100000 CREAM TOPICAL at 09:12

## 2023-10-04 RX ADMIN — BUPROPION HYDROCHLORIDE 150 MG: 150 TABLET, EXTENDED RELEASE ORAL at 09:10

## 2023-10-04 RX ADMIN — Medication 2 SPRAY: at 20:44

## 2023-10-04 RX ADMIN — PRAVASTATIN SODIUM 40 MG: 40 TABLET ORAL at 09:09

## 2023-10-04 RX ADMIN — MUPIROCIN 1 APPLICATION: 20 OINTMENT TOPICAL at 09:12

## 2023-10-04 RX ADMIN — ENOXAPARIN SODIUM 40 MG: 40 INJECTION SUBCUTANEOUS at 11:40

## 2023-10-04 RX ADMIN — Medication 10 ML: at 20:43

## 2023-10-04 RX ADMIN — SACUBITRIL AND VALSARTAN 1 TABLET: 24; 26 TABLET, FILM COATED ORAL at 09:09

## 2023-10-04 RX ADMIN — REMDESIVIR 100 MG: 100 INJECTION, POWDER, LYOPHILIZED, FOR SOLUTION INTRAVENOUS at 09:09

## 2023-10-04 RX ADMIN — PANTOPRAZOLE SODIUM 40 MG: 40 TABLET, DELAYED RELEASE ORAL at 06:31

## 2023-10-04 RX ADMIN — BENZONATATE 200 MG: 100 CAPSULE ORAL at 05:13

## 2023-10-04 RX ADMIN — MUPIROCIN 1 APPLICATION: 20 OINTMENT TOPICAL at 20:44

## 2023-10-04 RX ADMIN — VENLAFAXINE HYDROCHLORIDE 75 MG: 75 CAPSULE, EXTENDED RELEASE ORAL at 09:09

## 2023-10-04 RX ADMIN — Medication 2 SPRAY: at 09:14

## 2023-10-04 RX ADMIN — Medication 10 ML: at 09:11

## 2023-10-04 RX ADMIN — DEXAMETHASONE 6 MG: 4 TABLET ORAL at 09:09

## 2023-10-04 RX ADMIN — FUROSEMIDE 20 MG: 20 TABLET ORAL at 09:09

## 2023-10-04 RX ADMIN — ASPIRIN 81 MG: 81 TABLET, COATED ORAL at 09:10

## 2023-10-04 RX ADMIN — CETIRIZINE HYDROCHLORIDE 5 MG: 10 TABLET, FILM COATED ORAL at 09:10

## 2023-10-04 RX ADMIN — ISOSORBIDE MONONITRATE 30 MG: 30 TABLET, EXTENDED RELEASE ORAL at 09:09

## 2023-10-04 NOTE — THERAPY TREATMENT NOTE
Inpatient Rehabilitation - Occupational Therapy Treatment Note     [place]     Patient Name: Ofelia Knox  : 1945  MRN: [id number]    Today's Date: 10/4/2023                 Admit Date: 2023       No diagnosis found.    Patient Active Problem List   Diagnosis    CVA (cerebral vascular accident)       Past Medical History:   Diagnosis Date    AMS (altered mental status)     Aphasia     CKD (chronic kidney disease)     COPD (chronic obstructive pulmonary disease)     CVA (cerebral vascular accident)     DM2 (diabetes mellitus, type 2)     HTN (hypertension)     Restrictive lung disease     Urinary tract infection due to ESBL Klebsiella        Past Surgical History:   Procedure Laterality Date    HYSTERECTOMY      JOINT REPLACEMENT               IRF OT ASSESSMENT FLOWSHEET (last 12 hours)       IRF OT Evaluation and Treatment       Row Name 10/04/23 1140          OT Time and Intention    Document Type daily treatment  -HB     Mode of Treatment individual therapy;occupational therapy  -HB     Total Minutes, Occupational Therapy 90  -HB     Patient Effort good  -HB     Symptoms Noted During/After Treatment none  -HB       Row Name 10/04/23 1140          General Information    Patient Profile Reviewed yes  -HB     Patient/Family/Caregiver Comments/Observations Patient tolerated OT well with no adverse reactions.  -HB     General Observations of Patient Patient and RN okd OT this date.  -HB     Existing Precautions/Restrictions fall  covid isolation ; aphasia  -HB     Limitations/Impairments aphasia;safety/cognitive  -HB       Row Name 10/04/23 1140          Pain Assessment    Pretreatment Pain Rating 0/10 - no pain  -HB     Posttreatment Pain Rating 0/10 - no pain  -HB       Row Name 10/04/23 1140          Cognition/Psychosocial    Affect/Mental Status (Cognition) WFL  -HB     Orientation Status (Cognition) oriented x 3  -HB     Follows Commands (Cognition) verbal cues/prompting required;physical/tactile  prompts required  -       Row Name 10/04/23 1140          Grooming    [place] Level (Grooming) set up;supervision;verbal cues  -     Position (Grooming) supported sitting  -       Row Name 10/04/23 1140          Motor Skills    Motor Skills motor control/coordination interventions;functional endurance  -     Motor Control/Coordination Interventions therapeutic exercise/ROM;gross motor coordination activities  -     Therapeutic Exercise shoulder;elbow/forearm;wrist;hand  -       Row Name 10/04/23 1140          Positioning and Restraints    Pre-Treatment Position in bed  -     Post Treatment Position bed  -     In Bed encouraged to call for assist;call light within reach  -               User Key  (r) = Recorded By, (t) = Taken By, (c) = Cosigned By      Initials Name Effective Dates     Xena Chinchilla, BINH 05/25/21 -                      Occupational Therapy Education       Title: PT OT SLP Therapies (Done)       Topic: Occupational Therapy (Done)       Point: ADL training (Done)       Description:   Instruct learner(s) on proper safety adaptation and remediation techniques during self care or transfers.   Instruct in proper use of assistive devices.                  Learning Progress Summary             Patient Acceptance, E, VU,NR by HB at 10/4/2023 1450    Acceptance, E, VU,NR by BF at 10/3/2023 1436    Acceptance, E, VU,NR by BF at 10/2/2023 1317    Acceptance, TB, NR by DL at 9/24/2023 2200    Acceptance, E, VU,NR by HB at 9/16/2023 1145    Acceptance, E, VU,NR by BF at 9/15/2023 1232                         Point: Precautions (Done)       Description:   Instruct learner(s) on prescribed precautions during self-care and functional transfers.                  Learning Progress Summary             Patient Acceptance, E, VU,NR by HB at 10/4/2023 1450    Acceptance, E, VU,NR by BF at 10/3/2023 1436    Acceptance, E, VU,NR by BF at 10/2/2023 1317    Acceptance, TB, NR by DL at 9/24/2023 2200     Acceptance, E, VU,NR by HB at 9/16/2023 1145    Acceptance, E, VU,NR by BF at 9/15/2023 1232                                         User Key       Initials Effective Dates Name Provider Type Discipline    BF 07/11/23 -  Pam Peck OT Occupational Therapist OT    HB 05/25/21 -  Xena Chinchilla OT Occupational Therapist OT    DL 03/16/22 -  Carolyn Ramirez, RN Registered Nurse Nurse                        OT Recommendation and Plan                         Time Calculation:      Time Calculation- OT       Row Name 10/04/23 1451 10/04/23 1450          Time Calculation- OT    OT Start Time 1330  -HB 0915  -HB     OT Stop Time 1415  -HB 1000  -HB     OT Time Calculation (min) 45 min  -HB 45 min  -HB     Total Timed Code Minutes- OT 45 minute(s)  -HB 45 minute(s)  -HB               User Key  (r) = Recorded By, (t) = Taken By, (c) = Cosigned By      Initials Name Provider Type     Xena Chinchilla OT Occupational Therapist                  Therapy Charges for Today       Code Description Service Date Service Provider Modifiers Qty    22103048307 HC OT SELF CARE/MGMT/TRAIN EA 15 MIN 10/4/2023 Xena Chinchilla OT GO 2    21030096148 HC OT THER PROC EA 15 MIN 10/4/2023 Xena Chinchilla OT GO 1    39156579882 HC OT THERAPEUTIC ACT EA 15 MIN 10/4/2023 Xena Chinchilla OT GO 3                     Xena Chinchilla OT  10/4/2023

## 2023-10-04 NOTE — PROGRESS NOTES
Rehabilitation Nursing  Inpatient Rehabilitation Plan of Care Note    Plan of Care  Copy from [facility]    Toilet Transfers (Active)  Current Status (9/11/2023 7:00:00 PM): PATIENT WILL HAVE NO TRANSFER DIFFICULTY    Weekly Goal: NO DIFFICULT WITH TRANSFERS  Discharge Goal: TRANSFERS INDEPENDENTLY    Pain    Pain Management (Active)  Current Status (9/11/2023 7:00:00 PM): PATIENT WILL VOICE NO PAIN  Weekly Goal: PATIENT WILL BE PAIN FREE  Discharge Goal: FREE FROM PAIN    Safety    Potential for Injury (Active)  Current Status (9/11/2023 7:00:00 PM): PATIENT WILL HAVE NO INJURY THIS STAY  Weekly Goal: NO INJURY  Discharge Goal: PATIENT WILL DISCHARGE    Body Systems    Integumentary (Active)  Current Status (9/11/2023 7:00:00 PM): PATIENT WILL HAVE NO SKIN  BREAKDOWN THIS  STAY  Weekly Goal: SKIN WILL REMAIN INTACT.  Discharge Goal: NO SKIN ISSUES.    Signed by: Vicki Daniel, Nurse

## 2023-10-04 NOTE — PROGRESS NOTES
[facility]  PROGRESS NOTE     Patient Identification:  Name:  Ofelia Knox  Age:  78 y.o.  Sex:  female  :  1945  MRN:  [id number]  Visit Number:  [id number]  ROOM: [facility]     Primary Care Provider:  Provider, No Known    Length of stay in inpatient status:  23    Subjective     Chief Compliant:  No chief complaint on file.      History of Presenting Illness: 78-year-old female who is status post CVA.  Patient diagnosed with COVID-19 a couple of days ago but seems to feel some better today.  Patient states the cough seems to be improved and she is breathing easier.  No new complaints otherwise    Objective     Current Hospital Meds:aspirin, 81 mg, Oral, Daily  buPROPion XL, 150 mg, Oral, Daily  cetirizine, 5 mg, Oral, Daily  dexAMETHasone, 6 mg, Oral, Daily With Breakfast  enoxaparin, 40 mg, Subcutaneous, Q24H  furosemide, 20 mg, Oral, Every Other Day  isosorbide mononitrate, 30 mg, Oral, Q24H  metoprolol succinate XL, 25 mg, Oral, Q24H  montelukast, 10 mg, Oral, Nightly  mupirocin, 1 application , Each Nare, BID  nystatin, 1 application , Topical, Q12H  oxymetazoline, 2 spray, Each Nare, BID  pantoprazole, 40 mg, Oral, QAM AC  pravastatin, 40 mg, Oral, Daily  remdesivir, 100 mg, Intravenous, Q24H  sacubitril-valsartan, 1 tablet, Oral, Q12H  sodium chloride, 10 mL, Intravenous, Q12H  tiotropium bromide monohydrate, 2 puff, Inhalation, Daily - RT  venlafaxine XR, 75 mg, Oral, Daily With Breakfast    Pharmacy Consult - Remdesivir,       ----------------------------------------------------------------------------------------------------------------------  Vital Signs:  Temp:  [97.4 øF (36.3 øC)-97.6 øF (36.4 øC)] 97.6 øF (36.4 øC)  Heart Rate:  [61-77] 61  Resp:  [18-20] 20  BP: (108-124)/(49-77) 124/68  SpO2:  [91 %-93 %] 93 %  on  Flow (L/min):  [2] 2;   Device (Oxygen Therapy): nasal cannula  Body mass index is 40.03 kg/mý.    Wt Readings from Last 3 Encounters:   23 112 kg (248 lb)      Intake & Output (last 3 days)         10/01 0701  10/02 0700 10/02 0701  10/03 0700 10/03 0701  10/04 0700 10/04 0701  10/05 0700    P.O. 600 1200 1320     Total Intake(mL/kg) 600 (5.4) 1200 (10.7) 1320 (11.8)     Urine (mL/kg/hr) 300 (0.1) 600 (0.2)      Total Output 300 600      Net +300 +600 +1320             Urine Unmeasured Occurrence 2 x 5 x 6 x           Diet: Cardiac Diets, Diabetic Diets; Healthy Heart (2-3 Na+); Consistent Carbohydrate; Texture: Regular Texture (IDDSI 7); Fluid Consistency: Thin (IDDSI 0)  ----------------------------------------------------------------------------------------------------------------------  Physical exam:  Constitutional: No acute distress  HEENT: Normocephalic atraumatic  Neck:   Supple  Cardiovascular: Regular rate and rhythm  Pulmonary/Chest: Fairly clear to auscultation this morning  Abdominal:  .  Positive bowel sounds soft  Musculoskeletal: No arthropathy  Neurological: No obvious focal deficits.  Skin: No rash  Peripheral vascular: No edema  Genitourinary:  ----------------------------------------------------------------------------------------------------------------------    Last echocardiogram:  Results for orders placed during the hospital encounter of 09/11/23    Adult Transthoracic Echo Complete W/ Cont if Necessary Per Protocol    Interpretation Summary    Left ventricular systolic function is mildly decreased. Calculated left ventricular EF = 44% Left ventricular ejection fraction appears to be 41 - 45%.    Left ventricular wall thickness is consistent with mild eccentric hypertrophy.    Left ventricular diastolic function is consistent with (grade I) impaired relaxation.    The left atrial cavity is moderately dilated.    ----------------------------------------------------------------------------------------------------------------------  Results from last 7 days   Lab Units 10/03/23  1055 09/29/23  0254   WBC 10*3/mm3 8.86 8.47   HEMOGLOBIN g/dL 14.2  13.7   HEMATOCRIT % 46.6 45.5   MCV fL 93.6 93.0   MCHC g/dL 30.5* 30.1*   PLATELETS 10*3/mm3 228 245         Results from last 7 days   Lab Units 10/03/23  1055 09/29/23  0254   SODIUM mmol/L 138 140   POTASSIUM mmol/L 3.7 3.7   CHLORIDE mmol/L 102 106   CO2 mmol/L 26.6 22.4   BUN mg/dL 16 27*   CREATININE mg/dL 0.95 1.08*   CALCIUM mg/dL 9.2 8.8   GLUCOSE mg/dL 117* 137*   ALBUMIN g/dL 3.5  --    BILIRUBIN mg/dL 0.4  --    ALK PHOS U/L 80  --    AST (SGOT) U/L 15  --    ALT (SGPT) U/L 16  --    Estimated Creatinine Clearance: 63.9 mL/min (by C-G formula based on SCr of 0.95 mg/dL).  No results found for: AMMONIA  Results from last 7 days   Lab Units 10/03/23  1308 10/03/23  1055   HSTROP T ng/L 18* 17*             Glucose   Date/Time Value Ref Range Status   10/04/2023 0639 152 (H) 70 - 130 mg/dL Final   10/03/2023 1927 173 (H) 70 - 130 mg/dL Final   10/03/2023 1615 142 (H) 70 - 130 mg/dL Final   10/03/2023 1048 113 70 - 130 mg/dL Final   10/03/2023 0625 137 (H) 70 - 130 mg/dL Final   10/02/2023 2128 122 70 - 130 mg/dL Final   10/02/2023 1610 122 70 - 130 mg/dL Final   10/02/2023 1107 128 70 - 130 mg/dL Final     Lab Results   Component Value Date    TSH 1.470 09/12/2023    FREET4 1.15 09/12/2023     No results found for: PREGTESTUR, PREGSERUM, HCG, HCGQUANT  Pain Management Panel           No data to display              Brief Urine Lab Results  (Last result in the past 365 days)        Color   Clarity   Blood   Leuk Est   Nitrite   Protein   CREAT   Urine HCG        09/14/23 1935 Dark Yellow   Clear   Negative   Negative   Negative   Trace                 No results found for: BLOODCX      No results found for: URINECX  No results found for: WOUNDCX  No results found for: STOOLCX        I have personally looked at the labs and they are summarized above.  ----------------------------------------------------------------------------------------------------------------------  Detailed radiology reports for the last  24 hours:    Imaging Results (Last 24 Hours)       Procedure Component Value Units Date/Time    XR Chest 1 View [[id number]] Collected: 10/03/23 1055     Updated: 10/03/23 1057    Narrative:      XR CHEST 1 VW-     CLINICAL INDICATION: shortness of breath        COMPARISON: 9/20/2023     TECHNIQUE: Single frontal view of the chest.     FINDINGS:     LUNGS: Lungs are adequately aerated.      HEART AND MEDIASTINUM: Heart and mediastinal contours are unremarkable        SKELETON: Bony and soft tissue structures are unremarkable.             Impression:      No radiographic evidence of acute cardiac or pulmonary disease.           This report was finalized on 10/3/2023 10:55 AM by Dr. Krunal Colvin MD.             Final impressions for the last 30 days of radiology reports:    XR Chest 1 View    Result Date: 10/3/2023  No radiographic evidence of acute cardiac or pulmonary disease.    This report was finalized on 10/3/2023 10:55 AM by Dr. Krunal Colvin MD.      XR Chest 1 View    Result Date: 9/20/2023  Mild pulmonary vascular congestion.   This report was finalized on 9/20/2023 6:48 PM by Alex Pallas, DO.      XR Chest 1 View    Result Date: 9/14/2023  No radiographic evidence of acute cardiac or pulmonary disease.  This report was finalized on 9/14/2023 7:54 AM by Dr. Krunal Colvin MD.     I have personally looked at the radiology images and read the final radiology report.    Assessment & Plan    Status post CVA patient doing well with her therapy goals as currently modified and for bed mobility and supervision for transfers.  Able to ambulate 350 feet with rolling walker and requiring contact-guard assistance for ADLs.  Continue aspirin and Pravachol therapy.    COVID-19--continue dexamethasone and remdesivir at this time.  Appears to be improving    Diabetes mellitus fair control    COPD stable    Depression stable    VTE Prophylaxis:   Mechanical Order History:        Ordered        09/11/23 2023  Place Sequential  Compression Device  Once            09/11/23 2023  Maintain Sequential Compression Device  Continuous                          Pharmalogical Order History:        Ordered     Dose Route Frequency Stop    10/03/23 1010  Enoxaparin Sodium (LOVENOX) syringe 40 mg         40 mg SC Every 24 Hours --                        Abdullahi Sharpe MD  [facility]  10/04/23  10:01 EDT

## 2023-10-04 NOTE — THERAPY TREATMENT NOTE
Inpatient Rehabilitation - Physical Therapy Treatment Note       ABENA Quiroz     Patient Name: Ofelia Knox  : 1945  MRN: [id number]    Today's Date: 10/4/2023                    Admit Date: 2023      Visit Dx:   No diagnosis found.    Patient Active Problem List   Diagnosis    CVA (cerebral vascular accident)       Past Medical History:   Diagnosis Date    AMS (altered mental status)     Aphasia     CKD (chronic kidney disease)     COPD (chronic obstructive pulmonary disease)     CVA (cerebral vascular accident)     DM2 (diabetes mellitus, type 2)     HTN (hypertension)     Restrictive lung disease     Urinary tract infection due to ESBL Klebsiella        Past Surgical History:   Procedure Laterality Date    HYSTERECTOMY      JOINT REPLACEMENT         PT ASSESSMENT (last 12 hours)       IRF PT Evaluation and Treatment       Row Name 10/04/23 1533          PT Time and Intention    Document Type daily treatment  -LB     Mode of Treatment individual therapy;physical therapy  -LB       Row Name 10/04/23 1533          General Information    Existing Precautions/Restrictions --  aphasia, covid isolation  -LB       Row Name 10/04/23 1533          Pain Scale: FACES Pre/Post-Treatment    Pain: FACES Scale, Pretreatment 0-->no hurt  -LB     Posttreatment Pain Rating 0-->no hurt  -LB       Row Name 10/04/23 1533          Cognition/Psychosocial    Affect/Mental Status (Cognition) WFL  -LB     Follows Commands (Cognition) verbal cues/prompting required;physical/tactile prompts required  -LB     Personal Safety Interventions gait belt;nonskid shoes/slippers when out of bed;supervised activity  -LB       Row Name 10/04/23 1533          Bed Mobility    Supine-Sit-Supine [place] (Bed Mobility) verbal cues;nonverbal cues (demo/gesture);modified independence  -LB     Assistive Device (Bed Mobility) bed rails  -LB       Row Name 10/04/23 1533          Bed-Chair Transfer    Bed-Chair [place] (Transfers) verbal  cues;nonverbal cues (demo/gesture);supervision  -LB     Assistive Device (Bed-Chair Transfers) wheelchair  -LB       Row Name 10/04/23 1533          Chair-Bed Transfer    Chair-Bed [place] (Transfers) verbal cues;nonverbal cues (demo/gesture);supervision  -LB     Assistive Device (Chair-Bed Transfers) wheelchair  -LB       Row Name 10/04/23 1533          Sit-Stand Transfer    Sit-Stand [place] (Transfers) supervision;verbal cues;nonverbal cues (demo/gesture)  -LB     Assistive Device (Sit-Stand Transfers) walker, front-wheeled  -LB       Row Name 10/04/23 1533          Stand-Sit Transfer    Stand-Sit [place] (Transfers) supervision;verbal cues;nonverbal cues (demo/gesture)  -LB     Assistive Device (Stand-Sit Transfers) walker, front-wheeled  -LB       Row Name 10/04/23 1533          Gait/Stairs (Locomotion)    [place] Level (Gait) standby assist;verbal cues;nonverbal cues (demo/gesture)  -LB     Assistive Device (Gait) walker, front-wheeled  amb in room due to isolation  -LB     Distance in Feet (Gait) 50' bid  -LB     Deviations/Abnormal Patterns (Gait) beatrice decreased;gait speed decreased;stride length decreased  -LB     Bilateral Gait Deviations forward flexed posture  -LB       Row Name 10/04/23 1533          Motor Skills    Therapeutic Exercise --  sitting ex 2 sets, bid  -LB       Row Name 10/04/23 1533          Positioning and Restraints    Pre-Treatment Position in bed  -LB     In Bed call light within reach;encouraged to call for assist;heels elevated  -LB       Row Name 10/04/23 1533          Vital Signs    Intra SpO2 (%) 92  after amb  -LB     O2 Delivery Intra Treatment supplemental O2  -LB       Row Name 10/04/23 1533          Daily Progress Summary (PT)    Recommendations (PT) continue PT  -LB       Row Name 10/04/23 1533          Bed Mobility Goal 1 (PT-IRF)    Progress/Outcomes (Bed Mobility Goal 1, PT-IRF) goal met  -LB       Row Name 10/04/23 1533          Transfer Goal 1  (PT-IRF)    Progress/Outcomes (Transfer Goal 1, PT-IRF) goal met  -LB       Row Name 10/04/23 1533          Gait/Walking Locomotion Goal 1 (PT-IRF)    Progress/Outcomes (Gait/Walking Locomotion Goal 1, PT-IRF) goal met  -LB               User Key  (r) = Recorded By, (t) = Taken By, (c) = Cosigned By      Initials Name Provider Type    Andra Lea, PT Physical Therapist                     Physical Therapy Education       Title: PT OT SLP Therapies (Done)       Topic: Physical Therapy (Done)       Point: Mobility training (Done)       Learning Progress Summary             Patient Acceptance, E, VU,NR by LB at 10/4/2023 1536    Acceptance, E, VU,NR by LB at 10/3/2023 1502    Acceptance, E,D, VU,NR by RG at 9/29/2023 1403    Acceptance, E, VU,NR by LB at 9/28/2023 1501    Acceptance, E, VU,NR by LB at 9/27/2023 1532    Acceptance, E, VU,NR by LB at 9/26/2023 1548    Acceptance, E,D, VU,NR by RG at 9/25/2023 1423    Acceptance, TB, NR by DL at 9/24/2023 2200    Acceptance, E,D, NR,VU by RG at 9/22/2023 1315    Acceptance, E,D, VU,NR by RG at 9/21/2023 1307    Acceptance, E,D, VU,NR by RG at 9/20/2023 1258    Acceptance, E,D, VU,NR by RG at 9/19/2023 1258    Acceptance, E,D, VU,NR by RG at 9/18/2023 1338    Acceptance, E,TB, VU by RM at 9/15/2023 1532    Acceptance, E,TB, VU by RM at 9/14/2023 1555    Acceptance, E,D, VU,NR by RG at 9/13/2023 1528    Acceptance, TB, NR by DL at 9/12/2023 2003    Acceptance, E, VU,NR by LB at 9/12/2023 1135                         Point: Home exercise program (Done)       Learning Progress Summary             Patient Acceptance, E, VU,NR by LB at 10/4/2023 1536    Acceptance, E, VU,NR by LB at 10/3/2023 1502    Acceptance, E,D, VU,NR by RG at 9/29/2023 1403    Acceptance, E, VU,NR by LB at 9/28/2023 1501    Acceptance, E, VU,NR by LB at 9/27/2023 1532    Acceptance, E, VU,NR by LB at 9/26/2023 1548    Acceptance, E,D, VU,NR by RG at 9/25/2023 1423    Acceptance, TB, NR by DL  at 9/24/2023 2200    Acceptance, E,D, NR,VU by RG at 9/22/2023 1315    Acceptance, E,D, VU,NR by RG at 9/21/2023 1307    Acceptance, E,D, VU,NR by RG at 9/20/2023 1258    Acceptance, E,D, VU,NR by RG at 9/19/2023 1258    Acceptance, E,D, VU,NR by RG at 9/18/2023 1338    Acceptance, E,TB, VU by RM at 9/15/2023 1532    Acceptance, E,TB, VU by RM at 9/14/2023 1555    Acceptance, E,D, VU,NR by RG at 9/13/2023 1528    Acceptance, TB, NR by DL at 9/12/2023 2003    Acceptance, E, VU,NR by LB at 9/12/2023 1135                         Point: Body mechanics (Done)       Learning Progress Summary             Patient Acceptance, E, VU,NR by LB at 10/4/2023 1536    Acceptance, E, VU,NR by LB at 10/3/2023 1502    Acceptance, E,D, VU,NR by RG at 9/29/2023 1403    Acceptance, E, VU,NR by LB at 9/28/2023 1501    Acceptance, E, VU,NR by LB at 9/27/2023 1532    Acceptance, E, VU,NR by LB at 9/26/2023 1548    Acceptance, E,D, VU,NR by RG at 9/25/2023 1423    Acceptance, TB, NR by DL at 9/24/2023 2200    Acceptance, E,D, NR,VU by RG at 9/22/2023 1315    Acceptance, E,D, VU,NR by RG at 9/21/2023 1307    Acceptance, E,D, VU,NR by RG at 9/20/2023 1258    Acceptance, E,D, VU,NR by RG at 9/19/2023 1258    Acceptance, E,D, VU,NR by RG at 9/18/2023 1338    Acceptance, E,TB, VU by RM at 9/15/2023 1532    Acceptance, E,TB, VU by RM at 9/14/2023 1555    Acceptance, E,D, VU,NR by RG at 9/13/2023 1528    Acceptance, TB, NR by DL at 9/12/2023 2003    Acceptance, E, VU,NR by LB at 9/12/2023 1135                         Point: Precautions (Done)       Learning Progress Summary             Patient Acceptance, E, VU,NR by LB at 10/4/2023 1536    Acceptance, E, VU,NR by LB at 10/3/2023 1502    Acceptance, E,D, VU,NR by RG at 9/29/2023 1403    Acceptance, E, VU,NR by LB at 9/28/2023 1501    Acceptance, E, VU,NR by LB at 9/27/2023 1532    Acceptance, E, VU,NR by LB at 9/26/2023 1548    Acceptance, E,D, VU,NR by RG at 9/25/2023 1423    Acceptance, TB, NR by  DL at 9/24/2023 2200    Acceptance, E,D, NR,VU by RG at 9/22/2023 1315    Acceptance, E,D, VU,NR by RG at 9/21/2023 1307    Acceptance, E,D, VU,NR by RG at 9/20/2023 1258    Acceptance, E,D, VU,NR by RG at 9/19/2023 1258    Acceptance, E,D, VU,NR by RG at 9/18/2023 1338    Acceptance, E,TB, VU by RM at 9/15/2023 1532    Acceptance, E,TB, VU by RM at 9/14/2023 1555    Acceptance, E,D, VU,NR by RG at 9/13/2023 1528    Acceptance, TB, NR by DL at 9/12/2023 2003    Acceptance, E, VU,NR by LB at 9/12/2023 1135                                         User Key       Initials Effective Dates Name Provider Type Discipline     06/16/21 -  Andra Stewart, PT Physical Therapist PT    RM 02/17/23 -  Karis Obrien, PTA Physical Therapist Assistant PT    RG 06/16/21 -  Twan Cunningham PTA Physical Therapist Assistant PT    DL 03/16/22 -  Carolyn Ramirez RN Registered Nurse Nurse                    PT Recommendation and Plan    Planned Therapy Interventions (PT): balance training, bed mobility training, gait training, neuromuscular re-education, patient/family education, strengthening, transfer training  Frequency of Treatment (PT): 5 times per week  Anticipated Equipment Needs at Discharge (PT Eval):  (tbd)  Daily Progress Summary (PT)  Recommendations (PT): continue PT               Time Calculation:      PT Charges       Row Name 10/04/23 1537 10/04/23 1536          Time Calculation    Start Time 1245  -LB 0830  -LB     Stop Time 1330  -LB 0915  -LB     Time Calculation (min) 45 min  -LB 45 min  -LB     PT Received On -- 10/04/23  -LB               User Key  (r) = Recorded By, (t) = Taken By, (c) = Cosigned By      Initials Name Provider Type    LB Andra Stewart, PT Physical Therapist                    Therapy Charges for Today       Code Description Service Date Service Provider Modifiers Qty    10420656603  PT THER PROC EA 15 MIN 10/3/2023 Andra Stewart, PT GP 2    [id number]  PT  THERAPEUTIC ACT EA 15 MIN 10/3/2023 Andra Stewart, PT GP 1    03220346088 HC GAIT TRAINING EA 15 MIN 10/4/2023 Andra Stewart, PT GP 1    98040846109 HC PT THER PROC EA 15 MIN 10/4/2023 Andra Stewart, PT GP 3    18950966607 HC PT THERAPEUTIC ACT EA 15 MIN 10/4/2023 Andra Stewart, PT GP 2                     Andra Stewart, PT  10/4/2023

## 2023-10-04 NOTE — PROGRESS NOTES
PROGRESS NOTE         Patient Identification:  Name:  Ofelia Knox  Age:  78 y.o.  Sex:  female  :  1945  MRN:  [id number]  Visit Number:  [id number]  Primary Care Provider:  Provider, No Known         LOS: 23 days       ----------------------------------------------------------------------------------------------------------------------  Subjective       Chief Complaints:    No chief complaint on file.        Interval History:      Patient continues with dry cough but overall feels better.  No sore throat and much improved shortness of breath.    Review of Systems:    Constitutional: no fever, chills and night sweats. No appetite change or unexpected weight change.  General malaise, body aches  Eyes: no eye drainage, itching or redness.  HEENT: no mouth sores, dysphagia or nose bleed.  Respiratory: no for shortness of breath, sore throat resolved but persistent dry cough.  Cardiovascular: no chest pain, no palpitations, no orthopnea.  Gastrointestinal: no nausea, vomiting or diarrhea. No abdominal pain, hematemesis or rectal bleeding.  Genitourinary: no dysuria or polyuria.  Hematologic/lymphatic: no lymph node abnormalities, no easy bruising or easy bleeding.  Musculoskeletal: no muscle or joint pain.  Skin: No rash and no itching.  Neurological: no loss of consciousness, no seizure, no headache.  Behavioral/Psych: no depression or suicidal ideation.  Endocrine: no hot flashes.  Immunologic: negative.    ----------------------------------------------------------------------------------------------------------------------      Objective       Current [facility] Meds:  aspirin, 81 mg, Oral, Daily  buPROPion XL, 150 mg, Oral, Daily  cetirizine, 5 mg, Oral, Daily  dexAMETHasone, 6 mg, Oral, Daily With Breakfast  enoxaparin, 40 mg, Subcutaneous, Q24H  furosemide, 20 mg, Oral, Every Other Day  isosorbide mononitrate, 30 mg, Oral, Q24H  metoprolol succinate XL, 25 mg, Oral, Q24H  montelukast, 10 mg,  Oral, Nightly  mupirocin, 1 application , Each Nare, BID  nystatin, 1 application , Topical, Q12H  oxymetazoline, 2 spray, Each Nare, BID  pantoprazole, 40 mg, Oral, QAM AC  pravastatin, 40 mg, Oral, Daily  remdesivir, 100 mg, Intravenous, Q24H  sacubitril-valsartan, 1 tablet, Oral, Q12H  sodium chloride, 10 mL, Intravenous, Q12H  tiotropium bromide monohydrate, 2 puff, Inhalation, Daily - RT  venlafaxine XR, 75 mg, Oral, Daily With Breakfast      Pharmacy Consult - Remdesivir,       ----------------------------------------------------------------------------------------------------------------------    Vital Signs:  Temp:  [97.4 øF (36.3 øC)-98.8 øF (37.1 øC)] 97.4 øF (36.3 øC)  Heart Rate:  [65-77] 69  Resp:  [18] 18  BP: (108-148)/(49-77) 123/66  Mean Arterial Pressure (Non-Invasive) for the past 24 hrs (Last 3 readings):   Noninvasive MAP (mmHg)   10/03/23 0748 85     SpO2 Percentage    10/03/23 1021 10/03/23 1031 10/03/23 1900   SpO2: 91% 91% 91%     SpO2:  [91 %-94 %] 91 %  on  Flow (L/min):  [2] 2;   Device (Oxygen Therapy): nasal cannula    Body mass index is 40.03 kg/mý.  Wt Readings from Last 3 Encounters:   09/11/23 112 kg (248 lb)        Intake/Output Summary (Last 24 hours) at 10/4/2023 0701  Last data filed at 10/4/2023 0600  Gross per 24 hour   Intake 1320 ml   Output --   Net 1320 ml     Diet: Cardiac Diets, Diabetic Diets; Healthy Heart (2-3 Na+); Consistent Carbohydrate; Texture: Regular Texture (IDDSI 7); Fluid Consistency: Thin (IDDSI 0)  ----------------------------------------------------------------------------------------------------------------------      Physical Exam:    Constitutional: Overall patient is feeling much better with resolved sore throat and resolved shortness of breath but continues with dry cough without any sputum production or hemoptysis.  Denies any fever or chills and no diarrhea.  HENT:  Head: Normocephalic and atraumatic.  Mouth:  Moist mucous membranes.    Eyes:   Conjunctivae and EOM are normal.  No scleral icterus.  Neck:  Neck supple.  No JVD present.    Cardiovascular:  Normal rate, regular rhythm and normal heart sounds with no murmur. No edema.  Pulmonary/Chest: Clear to auscultation bilaterally with no rhonchi wheezing or crackles.  Abdominal:  Soft.  Bowel sounds are normal.  No distension and no tenderness.   Musculoskeletal: Mild BLE edema, no tenderness, and no deformity.  No swelling or redness of joints.  Neurological:  Alert and oriented to person, place, and time.  No facial droop.  No slurred speech.   Skin:  Skin is warm and dry.  No rash noted.  No pallor.   Psychiatric:  Normal mood and affect.  Behavior is normal.        ----------------------------------------------------------------------------------------------------------------------  Results from last 7 days   Lab Units 10/03/23  1308 10/03/23  1055   HSTROP T ng/L 18* 17*           Results from last 7 days   Lab Units 10/03/23  1055 09/29/23  0254   WBC 10*3/mm3 8.86 8.47   HEMOGLOBIN g/dL 14.2 13.7   HEMATOCRIT % 46.6 45.5   MCV fL 93.6 93.0   MCHC g/dL 30.5* 30.1*   PLATELETS 10*3/mm3 228 245     Results from last 7 days   Lab Units 10/03/23  1055 09/29/23  0254   SODIUM mmol/L 138 140   POTASSIUM mmol/L 3.7 3.7   CHLORIDE mmol/L 102 106   CO2 mmol/L 26.6 22.4   BUN mg/dL 16 27*   CREATININE mg/dL 0.95 1.08*   CALCIUM mg/dL 9.2 8.8   GLUCOSE mg/dL 117* 137*   ALBUMIN g/dL 3.5  --    BILIRUBIN mg/dL 0.4  --    ALK PHOS U/L 80  --    AST (SGOT) U/L 15  --    ALT (SGPT) U/L 16  --    Estimated Creatinine Clearance: 63.9 mL/min (by C-G formula based on SCr of 0.95 mg/dL).  No results found for: AMMONIA    Glucose   Date/Time Value Ref Range Status   10/04/2023 0639 152 (H) 70 - 130 mg/dL Final   10/03/2023 1927 173 (H) 70 - 130 mg/dL Final   10/03/2023 1615 142 (H) 70 - 130 mg/dL Final   10/03/2023 1048 113 70 - 130 mg/dL Final   10/03/2023 0625 137 (H) 70 - 130 mg/dL Final   10/02/2023 2128 122 70 -  130 mg/dL Final   10/02/2023 1610 122 70 - 130 mg/dL Final   10/02/2023 1107 128 70 - 130 mg/dL Final     Lab Results   Component Value Date    HGBA1C 6.80 (H) 09/12/2023     Lab Results   Component Value Date    TSH 1.470 09/12/2023    FREET4 1.15 09/12/2023       No results found for: BLOODCX  No results found for: URINECX  No results found for: WOUNDCX  No results found for: STOOLCX  No results found for: RESPCX  Pain Management Panel           No data to display                  ----------------------------------------------------------------------------------------------------------------------  Imaging Results (Last 24 Hours)       Procedure Component Value Units Date/Time    XR Chest 1 View [[id number]] Collected: 10/03/23 1055     Updated: 10/03/23 1057    Narrative:      XR CHEST 1 VW-     CLINICAL INDICATION: shortness of breath        COMPARISON: 9/20/2023     TECHNIQUE: Single frontal view of the chest.     FINDINGS:     LUNGS: Lungs are adequately aerated.      HEART AND MEDIASTINUM: Heart and mediastinal contours are unremarkable        SKELETON: Bony and soft tissue structures are unremarkable.             Impression:      No radiographic evidence of acute cardiac or pulmonary disease.           This report was finalized on 10/3/2023 10:55 AM by Dr. Krunal Colvin MD.               ----------------------------------------------------------------------------------------------------------------------    Pertinent Infectious Disease Results                Assessment/Plan       Assessment     COVID-19 infection        Plan      Overall patient feels better remains on 2 L nasal cannula with persistent dry cough.  Lungs are clear to auscultation at this time and overall feeling better.  No fever or diarrhea reported overnight.  Based on her risk factors including age, cardiovascular, pulmonary and elevated BMI, I initiated a 3-day course of remdesivir and she is showing significant improvement overall.  WBC  on 10/3/2023 was normal at 8.86.  Patient to complete her 3-day course of remdesivir.      ANTIMICROBIAL THERAPY    remdesivir - 100 mg/270 mL     Code Status:   Code Status and Medical Interventions:   Ordered at: 09/11/23 2023     Code Status (Patient has no pulse and is not breathing):    CPR (Attempt to Resuscitate)     Medical Interventions (Patient has pulse or is breathing):    Full Support       Roque Rodríguez MD  10/04/23  07:01 EDT

## 2023-10-04 NOTE — SIGNIFICANT NOTE
10/04/23 1300   Plan   [facility]  is still waiting on insurance decision about approval for continued stay.

## 2023-10-04 NOTE — PLAN OF CARE
Goal Outcome Evaluation:  Plan of Care Reviewed With: patient        Progress: no change  Outcome Evaluation: COVID ISOLATION MAINTAINED; BED ALARM NOTED; CONTINUE PLAN OF CARE; MONITOR

## 2023-10-04 NOTE — THERAPY TREATMENT NOTE
"Inpatient Rehabilitation - Speech Language Pathology Treatment Note  [facility]     Patient Name: Ofelia Knox  : 1945  MRN: [id number]  Today's Date: 10/4/2023             Admit Date: 2023     Ms. Knox was seen this am in pt room per enhanced droplet/contact isolation as she tested positive for COVID-19 on 10/2/23. She reports feeling significantly better this date.       Long Term Goal:  Patient will demonstrate adequate language skills to fully participate in adls at premorbid baseline level of function.       Short Term Goals:  1. GOAL MET:       2. GOAL MET:       3. GOAL MET:       4. GOAL MET:    5. GOAL MET:    6. Patient will identify(receptively/expressively) the appropriate object/picture/word following a verbal description w/ 80% accuracy over three consecutive sessions.   Deferred this date.      7. GOAL MET:    8. Patient will complete complex oe sentences w/ 90% accuracy and min cues over three consecutive sessions.   Completed complex oe sentences w/ 90% accuracy      9. Patient will provide verbal 3+ descriptors of an object/picture/word w/ 80% accuracy and min cues over three consecutive sessions.   When provided picture, she is able to label characters and items present w/ 100% accuracy. Increased word-finding difficulties w/ explaining \"what's happening\" and drawing conclusions.       10. Patient will perform divergent naming tasks w/ 5+ objects named of a given category in < 2 min over three consecutive sessions.   Deferred this date.      11.  Pt will increase verbal expression to 90% for expressing object functions w/ single word and/or phrase responses w/ min cues over three consecutive sessions.   Phrases responses w/ 95%      12.  Pt will increase verbal expression to 90% accuracy for producing two-or-more-word responses during sentence completions/tasks w/ min cues over three consecutive sessions.   Decreased jargon this date. 90% w/ 3+ word responses.     13.  Pt will increase " verbal expression to 80% accuracy independently while forming sentences relevant to current topic over three consecutive sessions.   Verbal expression of sentences relevant to current topic is on topic approx 100% even w/ intermittent interruptions she is able to return to topic. 95% accuracy to retell prior life events such as son's birth, husbands work in coal mines, early schooling of son     14.  Pt will increase verbal expression to 90% accuracy, independently, over three consecutive sessions, for sentence formulation when given a target word to incorporate in the sentence.   Some difficulty noted w/ verbs continues. 100% w/ nouns. 90% w/ verbs.     Visit Dx:  No diagnosis found.  Patient Active Problem List   Diagnosis    CVA (cerebral vascular accident)     Past Medical History:   Diagnosis Date    AMS (altered mental status)     Aphasia     CKD (chronic kidney disease)     COPD (chronic obstructive pulmonary disease)     CVA (cerebral vascular accident)     DM2 (diabetes mellitus, type 2)     HTN (hypertension)     Restrictive lung disease     Urinary tract infection due to ESBL Klebsiella      Past Surgical History:   Procedure Laterality Date    HYSTERECTOMY      JOINT REPLACEMENT       EDUCATION  The patient has been educated in the following areas:   Communication Impairment.    SLP Recommendation and Plan   Continue plan of care to allow for maximal opportunities for improvements.     Thank you   Verona Sarmiento M.S., CCC/SLP        Time Calculation:      Time Calculation- SLP       Row Name 10/04/23 1105             Time Calculation- SLP    SLP Start Time 1000  -JR      SLP Stop Time 1100  -      SLP Time Calculation (min) 60 min  -      SLP - Next Appointment 10/05/23  -                User Key  (r) = Recorded By, (t) = Taken By, (c) = Cosigned By      Initials Name Provider Type    Verona Pizarro MS CCC-SLP Speech and Language Pathologist                    Therapy Charges for Today       Code  Description Service Date Service Provider Modifiers Qty    27703367204  ST TREATMENT SPEECH 4 10/4/2023 Verona Sarmiento, MS CCC-SLP GN 1                       Verona Sarmiento MS CCC-SLP  10/4/2023

## 2023-10-04 NOTE — PLAN OF CARE
Goal Outcome Evaluation:  Plan of Care Reviewed With: patient        Progress: improving  Outcome Evaluation: COVID ISOLATION MAINTAINED; BED ALARM NOTED; PROGRESSING WITH CURRENT THERAPIES; CONTINUE PLAN OF CARE; MONITOR

## 2023-10-05 LAB
GLUCOSE BLDC GLUCOMTR-MCNC: 120 MG/DL (ref 70–130)
GLUCOSE BLDC GLUCOMTR-MCNC: 155 MG/DL (ref 70–130)
GLUCOSE BLDC GLUCOMTR-MCNC: 162 MG/DL (ref 70–130)
GLUCOSE BLDC GLUCOMTR-MCNC: 95 MG/DL (ref 70–130)

## 2023-10-05 PROCEDURE — 97110 THERAPEUTIC EXERCISES: CPT | Performed by: OCCUPATIONAL THERAPIST

## 2023-10-05 PROCEDURE — 94799 UNLISTED PULMONARY SVC/PX: CPT

## 2023-10-05 PROCEDURE — 94664 DEMO&/EVAL PT USE INHALER: CPT

## 2023-10-05 PROCEDURE — 94761 N-INVAS EAR/PLS OXIMETRY MLT: CPT

## 2023-10-05 PROCEDURE — 97116 GAIT TRAINING THERAPY: CPT

## 2023-10-05 PROCEDURE — 94760 N-INVAS EAR/PLS OXIMETRY 1: CPT

## 2023-10-05 PROCEDURE — 97112 NEUROMUSCULAR REEDUCATION: CPT | Performed by: OCCUPATIONAL THERAPIST

## 2023-10-05 PROCEDURE — 63710000001 DEXAMETHASONE PER 0.25 MG: Performed by: FAMILY MEDICINE

## 2023-10-05 PROCEDURE — 25810000003 SODIUM CHLORIDE 0.9 % SOLUTION: Performed by: INTERNAL MEDICINE

## 2023-10-05 PROCEDURE — 99232 SBSQ HOSP IP/OBS MODERATE 35: CPT | Performed by: NURSE PRACTITIONER

## 2023-10-05 PROCEDURE — 97535 SELF CARE MNGMENT TRAINING: CPT | Performed by: OCCUPATIONAL THERAPIST

## 2023-10-05 PROCEDURE — 97530 THERAPEUTIC ACTIVITIES: CPT

## 2023-10-05 PROCEDURE — 25010000002 ENOXAPARIN PER 10 MG: Performed by: FAMILY MEDICINE

## 2023-10-05 PROCEDURE — 82948 REAGENT STRIP/BLOOD GLUCOSE: CPT

## 2023-10-05 PROCEDURE — 97110 THERAPEUTIC EXERCISES: CPT

## 2023-10-05 PROCEDURE — 25010000002 REMDESIVIR 100 MG RECONSTITUTED SOLUTION: Performed by: INTERNAL MEDICINE

## 2023-10-05 RX ORDER — AMOXICILLIN 250 MG
2 CAPSULE ORAL 2 TIMES DAILY
Status: DISCONTINUED | OUTPATIENT
Start: 2023-10-05 | End: 2023-10-12

## 2023-10-05 RX ORDER — BISACODYL 5 MG/1
10 TABLET, DELAYED RELEASE ORAL DAILY PRN
Status: DISCONTINUED | OUTPATIENT
Start: 2023-10-05 | End: 2023-10-12 | Stop reason: HOSPADM

## 2023-10-05 RX ORDER — POLYETHYLENE GLYCOL 3350 17 G/17G
17 POWDER, FOR SOLUTION ORAL DAILY
Status: DISCONTINUED | OUTPATIENT
Start: 2023-10-05 | End: 2023-10-12 | Stop reason: HOSPADM

## 2023-10-05 RX ADMIN — Medication 10 ML: at 10:04

## 2023-10-05 RX ADMIN — BENZONATATE 200 MG: 100 CAPSULE ORAL at 04:25

## 2023-10-05 RX ADMIN — SACUBITRIL AND VALSARTAN 1 TABLET: 24; 26 TABLET, FILM COATED ORAL at 20:40

## 2023-10-05 RX ADMIN — NYSTATIN 1 APPLICATION: 100000 CREAM TOPICAL at 20:40

## 2023-10-05 RX ADMIN — ISOSORBIDE MONONITRATE 30 MG: 30 TABLET, EXTENDED RELEASE ORAL at 09:29

## 2023-10-05 RX ADMIN — BISACODYL 10 MG: 5 TABLET, COATED ORAL at 20:39

## 2023-10-05 RX ADMIN — CETIRIZINE HYDROCHLORIDE 5 MG: 10 TABLET, FILM COATED ORAL at 09:28

## 2023-10-05 RX ADMIN — DEXAMETHASONE 6 MG: 4 TABLET ORAL at 09:28

## 2023-10-05 RX ADMIN — POLYETHYLENE GLYCOL (3350) 17 G: 17 POWDER, FOR SOLUTION ORAL at 10:05

## 2023-10-05 RX ADMIN — ACETAMINOPHEN 650 MG: 325 TABLET ORAL at 20:39

## 2023-10-05 RX ADMIN — DOCUSATE SODIUM 50 MG AND SENNOSIDES 8.6 MG 2 TABLET: 8.6; 5 TABLET, FILM COATED ORAL at 20:40

## 2023-10-05 RX ADMIN — VENLAFAXINE HYDROCHLORIDE 75 MG: 75 CAPSULE, EXTENDED RELEASE ORAL at 09:28

## 2023-10-05 RX ADMIN — PANTOPRAZOLE SODIUM 40 MG: 40 TABLET, DELAYED RELEASE ORAL at 06:32

## 2023-10-05 RX ADMIN — ASPIRIN 81 MG: 81 TABLET, COATED ORAL at 09:28

## 2023-10-05 RX ADMIN — ENOXAPARIN SODIUM 40 MG: 40 INJECTION SUBCUTANEOUS at 11:00

## 2023-10-05 RX ADMIN — PRAVASTATIN SODIUM 40 MG: 40 TABLET ORAL at 09:29

## 2023-10-05 RX ADMIN — MUPIROCIN 1 APPLICATION: 20 OINTMENT TOPICAL at 10:05

## 2023-10-05 RX ADMIN — REMDESIVIR 100 MG: 100 INJECTION, POWDER, LYOPHILIZED, FOR SOLUTION INTRAVENOUS at 10:12

## 2023-10-05 RX ADMIN — BUPROPION HYDROCHLORIDE 150 MG: 150 TABLET, EXTENDED RELEASE ORAL at 09:28

## 2023-10-05 RX ADMIN — BENZONATATE 200 MG: 100 CAPSULE ORAL at 20:39

## 2023-10-05 RX ADMIN — DOCUSATE SODIUM 50 MG AND SENNOSIDES 8.6 MG 2 TABLET: 8.6; 5 TABLET, FILM COATED ORAL at 10:05

## 2023-10-05 RX ADMIN — MUPIROCIN 1 APPLICATION: 20 OINTMENT TOPICAL at 20:41

## 2023-10-05 RX ADMIN — Medication 10 ML: at 20:40

## 2023-10-05 RX ADMIN — SACUBITRIL AND VALSARTAN 1 TABLET: 24; 26 TABLET, FILM COATED ORAL at 09:29

## 2023-10-05 RX ADMIN — METOPROLOL SUCCINATE 25 MG: 25 TABLET, EXTENDED RELEASE ORAL at 09:29

## 2023-10-05 RX ADMIN — TIOTROPIUM BROMIDE INHALATION SPRAY 2 PUFF: 3.12 SPRAY, METERED RESPIRATORY (INHALATION) at 08:54

## 2023-10-05 RX ADMIN — Medication 2 SPRAY: at 20:41

## 2023-10-05 RX ADMIN — NYSTATIN 1 APPLICATION: 100000 CREAM TOPICAL at 09:28

## 2023-10-05 RX ADMIN — MONTELUKAST SODIUM 10 MG: 10 TABLET, COATED ORAL at 20:40

## 2023-10-05 NOTE — THERAPY TREATMENT NOTE
Inpatient Rehabilitation - Occupational Therapy Treatment Note    ABENA Silverado     Patient Name: Ofelia Knox  : 1945  MRN: [id number]    Today's Date: 10/5/2023                 Admit Date: 2023       No diagnosis found.    Patient Active Problem List   Diagnosis    CVA (cerebral vascular accident)       Past Medical History:   Diagnosis Date    AMS (altered mental status)     Aphasia     CKD (chronic kidney disease)     COPD (chronic obstructive pulmonary disease)     CVA (cerebral vascular accident)     DM2 (diabetes mellitus, type 2)     HTN (hypertension)     Restrictive lung disease     Urinary tract infection due to ESBL Klebsiella        Past Surgical History:   Procedure Laterality Date    HYSTERECTOMY      JOINT REPLACEMENT               IRF OT ASSESSMENT FLOWSHEET (last 12 hours)       IRF OT Evaluation and Treatment       Row Name 10/05/23 1449          OT Time and Intention    Document Type daily treatment  -BF     Mode of Treatment occupational therapy  -BF     Patient Effort good  -BF     Symptoms Noted During/After Treatment none  -BF       Row Name 10/05/23 1449          General Information    Patient/Family/Caregiver Comments/Observations Pt reports she is feeling much better this date, alert and agreeable to OT. Improved communication noted this date. Pt's son present for PM treatment.  -BF     Existing Precautions/Restrictions fall  covid isolation  -BF     Limitations/Impairments safety/cognitive;aphasia  aphasia improving  -BF       Row Name 10/05/23 1449          Cognition/Psychosocial    Orientation Status (Cognition) oriented x 3  -BF     Follows Commands (Cognition) follows one-step commands;verbal cues/prompting required;repetition of directions required;physical/tactile prompts required  -BF       Row Name 10/05/23 1449          Lower Body Dressing    [place] Level (Lower Body Dressing) contact guard assist;verbal cues;nonverbal cues (demo/gesture)  -BF     Position (Lower  Body Dressing) edge of bed sitting  -BF     Comment (Lower Body Dressing) CGA  -BF       Row Name 10/05/23 1449          Motor Skills    Motor Control/Coordination Interventions fine motor manipulation/dexterity activities;gross motor coordination activities;therapeutic exercise/ROM  BUE GMC/FMC theract, strengthening; theraband therex, wrist rolls  -BF       Row Name 10/05/23 1449          Positioning and Restraints    In Bed supine;call light within reach;encouraged to call for assist;with family/caregiver  In PM  -BF     In Wheelchair sitting;encouraged to call for assist;call light within reach  In AM  -BF               User Key  (r) = Recorded By, (t) = Taken By, (c) = Cosigned By      Initials Name Effective Dates    BF Pam Peck OT 07/11/23 -                      Occupational Therapy Education       Title: PT OT SLP Therapies (Done)       Topic: Occupational Therapy (Done)       Point: ADL training (Done)       Description:   Instruct learner(s) on proper safety adaptation and remediation techniques during self care or transfers.   Instruct in proper use of assistive devices.                  Learning Progress Summary             Patient Acceptance, E, VU,NR by BF at 10/5/2023 1448    Acceptance, E, VU,NR by HB at 10/4/2023 1450    Acceptance, E, VU,NR by BF at 10/3/2023 1436    Acceptance, E, VU,NR by BF at 10/2/2023 1317    Acceptance, TB, NR by DL at 9/24/2023 2200    Acceptance, E, VU,NR by HB at 9/16/2023 1145    Acceptance, E, VU,NR by BF at 9/15/2023 1232                         Point: Precautions (Done)       Description:   Instruct learner(s) on prescribed precautions during self-care and functional transfers.                  Learning Progress Summary             Patient Acceptance, E, VU,NR by BF at 10/5/2023 1448    Acceptance, E, VU,NR by HB at 10/4/2023 1450    Acceptance, E, VU,NR by BF at 10/3/2023 1436    Acceptance, E, VU,NR by BF at 10/2/2023 1317    Acceptance, TB, NR by DL  at 9/24/2023 2200    Acceptance, E, VU,NR by HB at 9/16/2023 1145    Acceptance, E, VU,NR by BF at 9/15/2023 1232                                         User Key       Initials Effective Dates Name Provider Type Discipline    BF 07/11/23 -  Pam Peck OT Occupational Therapist OT    HB 05/25/21 -  Xena Chinchilla OT Occupational Therapist OT    DL 03/16/22 -  Carolyn Ramirez, RN Registered Nurse Nurse                        OT Recommendation and Plan    Planned Therapy Interventions (OT): activity tolerance training, adaptive equipment training, BADL retraining, neuromuscular control/coordination retraining, ROM/therapeutic exercise, strengthening exercise, transfer/mobility retraining, patient/caregiver education/training                    Time Calculation:      Time Calculation- OT       Row Name 10/05/23 1455 10/05/23 1000          Time Calculation- OT    OT Start Time 1330  -BF 1000  -BF     OT Stop Time 1415  -BF 1045  -BF     OT Time Calculation (min) 45 min  -BF 45 min  -BF     Total Timed Code Minutes- OT 45 minute(s)  -BF 45 minute(s)  -BF     OT Non-Billable Time (min) -- 10 min  -BF               User Key  (r) = Recorded By, (t) = Taken By, (c) = Cosigned By      Initials Name Provider Type    BF Pam Pcek OT Occupational Therapist                  Therapy Charges for Today       Code Description Service Date Service Provider Modifiers Qty    90503609092 HC OT SELF CARE/MGMT/TRAIN EA 15 MIN 10/5/2023 Pam Peck OT GO 1    94483616462 HC OT THER PROC EA 15 MIN 10/5/2023 Pam Peck OT GO 3    70735634991 HC OT NEUROMUSC RE EDUCATION EA 15 MIN 10/5/2023 Pam Peck OT GO 2                     Pam Peck OT  10/5/2023

## 2023-10-05 NOTE — PROGRESS NOTES
Rehabilitation Nursing  Inpatient Rehabilitation Plan of Care Note    Plan of Care  Copy from [facility]    Toilet Transfers (Active)  Current Status (9/11/2023 7:00:00 PM): PATIENT WILL HAVE NO TRANSFER DIFFICULTY    Weekly Goal: NO DIFFICULT WITH TRANSFERS  Discharge Goal: TRANSFERS INDEPENDENTLY    Pain    Pain Management (Active)  Current Status (9/11/2023 7:00:00 PM): PATIENT WILL VOICE NO PAIN  Weekly Goal: PATIENT WILL BE PAIN FREE  Discharge Goal: FREE FROM PAIN    Safety    Potential for Injury (Active)  Current Status (9/11/2023 7:00:00 PM): PATIENT WILL HAVE NO INJURY THIS STAY  Weekly Goal: NO INJURY  Discharge Goal: PATIENT WILL DISCHARGE    Body Systems    Integumentary (Active)  Current Status (9/11/2023 7:00:00 PM): PATIENT WILL HAVE NO SKIN  BREAKDOWN THIS  STAY  Weekly Goal: SKIN WILL REMAIN INTACT.  Discharge Goal: NO SKIN ISSUES.    Signed by: Carolyn Ramirez RN

## 2023-10-05 NOTE — THERAPY TREATMENT NOTE
Inpatient Rehabilitation - Physical Therapy Treatment Note       ABENA Quiroz     Patient Name: Ofelia Knox  : 1945  MRN: [id number]    Today's Date: 10/5/2023                    Admit Date: 2023      Visit Dx:   No diagnosis found.    Patient Active Problem List   Diagnosis    CVA (cerebral vascular accident)       Past Medical History:   Diagnosis Date    AMS (altered mental status)     Aphasia     CKD (chronic kidney disease)     COPD (chronic obstructive pulmonary disease)     CVA (cerebral vascular accident)     DM2 (diabetes mellitus, type 2)     HTN (hypertension)     Restrictive lung disease     Urinary tract infection due to ESBL Klebsiella        Past Surgical History:   Procedure Laterality Date    HYSTERECTOMY      JOINT REPLACEMENT         PT ASSESSMENT (last 12 hours)       IRF PT Evaluation and Treatment       Row Name 10/05/23 1144          PT Time and Intention    Document Type daily treatment  -LB     Mode of Treatment individual therapy;physical therapy  -LB       Row Name 10/05/23 1144          General Information    Existing Precautions/Restrictions --  aphasia, covid isolation  -LB       Row Name 10/05/23 1144          Pain Scale: FACES Pre/Post-Treatment    Pain: FACES Scale, Pretreatment 0-->no hurt  -LB     Posttreatment Pain Rating 0-->no hurt  -LB       Row Name 10/05/23 1144          Cognition/Psychosocial    Affect/Mental Status (Cognition) WFL  -LB     Follows Commands (Cognition) verbal cues/prompting required;physical/tactile prompts required  -LB     Personal Safety Interventions gait belt;nonskid shoes/slippers when out of bed;supervised activity  -LB       Row Name 10/05/23 1144          Sit-Stand Transfer    Sit-Stand [place] (Transfers) supervision;verbal cues;nonverbal cues (demo/gesture)  -LB     Assistive Device (Sit-Stand Transfers) walker, front-wheeled  -LB       Row Name 10/05/23 1144          Stand-Sit Transfer    Stand-Sit [place] (Transfers)  supervision;verbal cues;nonverbal cues (demo/gesture)  -LB     Assistive Device (Stand-Sit Transfers) walker, front-wheeled  -LB       Row Name 10/05/23 1144          Stand Pivot/Stand Step Transfer    Stand Pivot/Stand Step [place] (Transfers) supervision;verbal cues;nonverbal cues (demo/gesture)  -LB     Assistive Device (Stand Pivot Stand Step Transfer) walker, front-wheeled  -LB       Row Name 10/05/23 1144          Gait/Stairs (Locomotion)    [place] Level (Gait) verbal cues;nonverbal cues (demo/gesture);supervision  -LB     Assistive Device (Gait) walker, front-wheeled  amb in room due to isolation  -LB     Distance in Feet (Gait) am-100', pm-125'  -LB     Deviations/Abnormal Patterns (Gait) beatrice decreased;gait speed decreased;stride length decreased  -LB     Bilateral Gait Deviations forward flexed posture  -LB       Row Name 10/05/23 1144          Motor Skills    Therapeutic Exercise --  sitting ex bid  -LB       Row Name 10/05/23 1144          Positioning and Restraints    Pre-Treatment Position --  am-EOB, pm-w/c  -LB     In Wheelchair call light within reach;encouraged to call for assist  -LB       Row Name 10/05/23 1144          Vital Signs    Intra SpO2 (%) 99  -LB     O2 Delivery Intra Treatment room air  -LB       Row Name 10/05/23 1144          Daily Progress Summary (PT)    Recommendations (PT) continue PT  -LB               User Key  (r) = Recorded By, (t) = Taken By, (c) = Cosigned By      Initials Name Provider Type    Andra Lea, PT Physical Therapist                     Physical Therapy Education       Title: PT OT SLP Therapies (Done)       Topic: Physical Therapy (Done)       Point: Mobility training (Done)       Learning Progress Summary             Patient Acceptance, E, VU,NR by LB at 10/4/2023 1536    Acceptance, E, VU,NR by LB at 10/3/2023 1502    Acceptance, E,D, VU,NR by RG at 9/29/2023 1403    Acceptance, E, VU,NR by LB at 9/28/2023 1501    Acceptance, E,  VU,NR by LB at 9/27/2023 1532    Acceptance, E, VU,NR by LB at 9/26/2023 1548    Acceptance, E,D, VU,NR by RG at 9/25/2023 1423    Acceptance, TB, NR by DL at 9/24/2023 2200    Acceptance, E,D, NR,VU by RG at 9/22/2023 1315    Acceptance, E,D, VU,NR by RG at 9/21/2023 1307    Acceptance, E,D, VU,NR by RG at 9/20/2023 1258    Acceptance, E,D, VU,NR by RG at 9/19/2023 1258    Acceptance, E,D, VU,NR by RG at 9/18/2023 1338    Acceptance, E,TB, VU by RM at 9/15/2023 1532    Acceptance, E,TB, VU by RM at 9/14/2023 1555    Acceptance, E,D, VU,NR by RG at 9/13/2023 1528    Acceptance, TB, NR by DL at 9/12/2023 2003    Acceptance, E, VU,NR by LB at 9/12/2023 1135                         Point: Home exercise program (Done)       Learning Progress Summary             Patient Acceptance, E, VU,NR by LB at 10/4/2023 1536    Acceptance, E, VU,NR by LB at 10/3/2023 1502    Acceptance, E,D, VU,NR by RG at 9/29/2023 1403    Acceptance, E, VU,NR by LB at 9/28/2023 1501    Acceptance, E, VU,NR by LB at 9/27/2023 1532    Acceptance, E, VU,NR by LB at 9/26/2023 1548    Acceptance, E,D, VU,NR by RG at 9/25/2023 1423    Acceptance, TB, NR by DL at 9/24/2023 2200    Acceptance, E,D, NR,VU by RG at 9/22/2023 1315    Acceptance, E,D, VU,NR by RG at 9/21/2023 1307    Acceptance, E,D, VU,NR by RG at 9/20/2023 1258    Acceptance, E,D, VU,NR by RG at 9/19/2023 1258    Acceptance, E,D, VU,NR by RG at 9/18/2023 1338    Acceptance, E,TB, VU by RM at 9/15/2023 1532    Acceptance, E,TB, VU by RM at 9/14/2023 1555    Acceptance, E,D, VU,NR by RG at 9/13/2023 1528    Acceptance, TB, NR by DL at 9/12/2023 2003    Acceptance, E, VU,NR by LB at 9/12/2023 1135                         Point: Body mechanics (Done)       Learning Progress Summary             Patient Acceptance, E, VU,NR by LB at 10/4/2023 1536    Acceptance, E, VU,NR by LB at 10/3/2023 1502    Acceptance, E,D, VU,NR by RG at 9/29/2023 1403    Acceptance, E, VU,NR by LB at 9/28/2023 1501     Acceptance, E, VU,NR by LB at 9/27/2023 1532    Acceptance, E, VU,NR by LB at 9/26/2023 1548    Acceptance, E,D, VU,NR by RG at 9/25/2023 1423    Acceptance, TB, NR by DL at 9/24/2023 2200    Acceptance, E,D, NR,VU by RG at 9/22/2023 1315    Acceptance, E,D, VU,NR by RG at 9/21/2023 1307    Acceptance, E,D, VU,NR by RG at 9/20/2023 1258    Acceptance, E,D, VU,NR by RG at 9/19/2023 1258    Acceptance, E,D, VU,NR by RG at 9/18/2023 1338    Acceptance, E,TB, VU by RM at 9/15/2023 1532    Acceptance, E,TB, VU by RM at 9/14/2023 1555    Acceptance, E,D, VU,NR by RG at 9/13/2023 1528    Acceptance, TB, NR by DL at 9/12/2023 2003    Acceptance, E, VU,NR by LB at 9/12/2023 1135                         Point: Precautions (Done)       Learning Progress Summary             Patient Acceptance, E, VU,NR by LB at 10/4/2023 1536    Acceptance, E, VU,NR by LB at 10/3/2023 1502    Acceptance, E,D, VU,NR by RG at 9/29/2023 1403    Acceptance, E, VU,NR by LB at 9/28/2023 1501    Acceptance, E, VU,NR by LB at 9/27/2023 1532    Acceptance, E, VU,NR by LB at 9/26/2023 1548    Acceptance, E,D, VU,NR by RG at 9/25/2023 1423    Acceptance, TB, NR by DL at 9/24/2023 2200    Acceptance, E,D, NR,VU by RG at 9/22/2023 1315    Acceptance, E,D, VU,NR by RG at 9/21/2023 1307    Acceptance, E,D, VU,NR by RG at 9/20/2023 1258    Acceptance, E,D, VU,NR by RG at 9/19/2023 1258    Acceptance, E,D, VU,NR by RG at 9/18/2023 1338    Acceptance, E,TB, VU by RM at 9/15/2023 1532    Acceptance, E,TB, VU by RM at 9/14/2023 1555    Acceptance, E,D, VU,NR by RG at 9/13/2023 1528    Acceptance, TB, NR by DL at 9/12/2023 2003    Acceptance, E, VU,NR by LB at 9/12/2023 1135                                         User Key       Initials Effective Dates Name Provider Type Discipline     06/16/21 -  Andra Stewart, PT Physical Therapist PT     02/17/23 -  Karis Obrien, PTA Physical Therapist Assistant PT     06/16/21 -  Twan Cunningham, PTA  Physical Therapist Assistant PT    DL 03/16/22 -  Carolyn Ramirez RN Registered Nurse Nurse                    PT Recommendation and Plan    Planned Therapy Interventions (PT): balance training, bed mobility training, gait training, neuromuscular re-education, patient/family education, strengthening, transfer training  Frequency of Treatment (PT): 5 times per week  Anticipated Equipment Needs at Discharge (PT Eval):  (tbd)  Daily Progress Summary (PT)  Recommendations (PT): continue PT               Time Calculation:      PT Charges       Row Name 10/05/23 1150 10/05/23 1149          Time Calculation    Start Time 1045  -LB 0830  -LB     Stop Time 1130  -LB 0915  -LB     Time Calculation (min) 45 min  -LB 45 min  -LB     PT Received On -- 10/05/23  -LB               User Key  (r) = Recorded By, (t) = Taken By, (c) = Cosigned By      Initials Name Provider Type    Andra Lea, PT Physical Therapist                    Therapy Charges for Today       Code Description Service Date Service Provider Modifiers Qty    81846758099 HC GAIT TRAINING EA 15 MIN 10/4/2023 Andra Stewart, PT GP 1    27615860535 HC PT THER PROC EA 15 MIN 10/4/2023 Andra Stewart, PT GP 3    37656149117 HC PT THERAPEUTIC ACT EA 15 MIN 10/4/2023 Andra Stewart, PT GP 2    79311946226 HC GAIT TRAINING EA 15 MIN 10/5/2023 Andra Stewart, PT GP 2    63467123538 HC PT THER PROC EA 15 MIN 10/5/2023 Andra Stewart, PT GP 3    30957103556 HC PT THERAPEUTIC ACT EA 15 MIN 10/5/2023 Andra Stewart, PT GP 1                     Andra Stewart, PT  10/5/2023

## 2023-10-05 NOTE — SIGNIFICANT NOTE
10/05/23 0915   [facility]   [facility]  is still waiting for insurance decision about continued rehab stay.  Spoke to Josephine with [phone] who says pt can be accepted on 10-12-23 in a private room.  Will follow.

## 2023-10-05 NOTE — PROGRESS NOTES
Occupational Therapy:    Physical Therapy:    Speech Language Pathology: Individual: 60 minutes.    Signed by: JUSTO Schwartz

## 2023-10-05 NOTE — PROGRESS NOTES
PROGRESS NOTE         Patient Identification:  Name:  Ofelia Knox  Age:  78 y.o.  Sex:  female  :  1945  MRN:  [id number]  Visit Number:  [id number]  Primary Care Provider:  Provider, No Known         LOS: 24 days       ----------------------------------------------------------------------------------------------------------------------  Subjective       Chief Complaints:    No chief complaint on file.        Interval History:      The patient is awake and alert, sitting up comfortably in wheelchair in her room.  On room air with no apparent distress.  She states she is feeling much better today but continues with dry cough.  Shortness of breath improving.  Lung exam is diminished to auscultation bilaterally.  Abdomen soft and rounded with normoactive bowel sounds.    Review of Systems:    Constitutional: no fever, chills and night sweats. No appetite change or unexpected weight change.    Eyes: no eye drainage, itching or redness.  HEENT: no mouth sores, dysphagia or nose bleed.  Respiratory: no for shortness of breath, persistent dry cough continues.  Cardiovascular: no chest pain, no palpitations, no orthopnea.  Gastrointestinal: no nausea, vomiting or diarrhea. No abdominal pain, hematemesis or rectal bleeding.  Genitourinary: no dysuria or polyuria.  Hematologic/lymphatic: no lymph node abnormalities, no easy bruising or easy bleeding.  Musculoskeletal: no muscle or joint pain.  Skin: No rash and no itching.  Neurological: no loss of consciousness, no seizure, no headache.  Behavioral/Psych: no depression or suicidal ideation.  Endocrine: no hot flashes.  Immunologic: negative.    ----------------------------------------------------------------------------------------------------------------------      Objective       [facility] Meds:  aspirin, 81 mg, Oral, Daily  buPROPion XL, 150 mg, Oral, Daily  cetirizine, 5 mg, Oral, Daily  dexAMETHasone, 6 mg, Oral, Daily With  Breakfast  enoxaparin, 40 mg, Subcutaneous, Q24H  furosemide, 20 mg, Oral, Every Other Day  isosorbide mononitrate, 30 mg, Oral, Q24H  metoprolol succinate XL, 25 mg, Oral, Q24H  montelukast, 10 mg, Oral, Nightly  mupirocin, 1 application , Each Nare, BID  nystatin, 1 application , Topical, Q12H  oxymetazoline, 2 spray, Each Nare, BID  pantoprazole, 40 mg, Oral, QAM AC  polyethylene glycol, 17 g, Oral, Daily  pravastatin, 40 mg, Oral, Daily  remdesivir, 100 mg, Intravenous, Q24H  sacubitril-valsartan, 1 tablet, Oral, Q12H  senna-docusate sodium, 2 tablet, Oral, BID  sodium chloride, 10 mL, Intravenous, Q12H  tiotropium bromide monohydrate, 2 puff, Inhalation, Daily - RT  venlafaxine XR, 75 mg, Oral, Daily With Breakfast      Pharmacy Consult - Remdesivir,       ----------------------------------------------------------------------------------------------------------------------    Vital Signs:  Temp:  [96.8 øF (36 øC)] 96.8 øF (36 øC)  Heart Rate:  [72] 72  Resp:  [16] 16  BP: (149)/(82) 149/82  No data found.    SpO2 Percentage    10/04/23 0745 10/04/23 0838 10/04/23 1900   SpO2: 93% 94% 92%     SpO2:  [92 %] 92 %  on   ;   Device (Oxygen Therapy): room air    Body mass index is 40.03 kg/mý.  Wt Readings from Last 3 Encounters:   09/11/23 112 kg (248 lb)        Intake/Output Summary (Last 24 hours) at 10/5/2023 0958  Last data filed at 10/5/2023 0500  Gross per 24 hour   Intake 840 ml   Output --   Net 840 ml       Diet: Cardiac Diets, Diabetic Diets; Healthy Heart (2-3 Na+); Consistent Carbohydrate; Texture: Regular Texture (IDDSI 7); Fluid Consistency: Thin (IDDSI 0)  ----------------------------------------------------------------------------------------------------------------------      Physical Exam:    Constitutional: The patient states she is feeling much better.  Continues with persistent dry cough.  Denies any shortness of breath, chest pain or abdominal pain.  On room air with no apparent distress  HENT:   Head: Normocephalic and atraumatic.  Mouth:  Moist mucous membranes.    Eyes:  Conjunctivae and EOM are normal.  No scleral icterus.  Neck:  Neck supple.  No JVD present.    Cardiovascular:  Normal rate, regular rhythm and normal heart sounds with no murmur. No edema.  Pulmonary/Chest: Diminished bilaterally to auscultation  Abdominal:  Soft.  Bowel sounds are normal.  No distension and no tenderness.   Musculoskeletal: Mild BLE edema, no tenderness, and no deformity.  No swelling or redness of joints.  Neurological:  Alert and oriented to person, place, and time.  No facial droop.  No slurred speech.   Skin:  Skin is warm and dry.  No rash noted.  No pallor.   Psychiatric:  Normal mood and affect.  Behavior is normal.        ----------------------------------------------------------------------------------------------------------------------  Results from last 7 days   Lab Units 10/03/23  1308 10/03/23  1055   HSTROP T ng/L 18* 17*             Results from last 7 days   Lab Units 10/03/23  1055 09/29/23  0254   WBC 10*3/mm3 8.86 8.47   HEMOGLOBIN g/dL 14.2 13.7   HEMATOCRIT % 46.6 45.5   MCV fL 93.6 93.0   MCHC g/dL 30.5* 30.1*   PLATELETS 10*3/mm3 228 245       Results from last 7 days   Lab Units 10/03/23  1055 09/29/23  0254   SODIUM mmol/L 138 140   POTASSIUM mmol/L 3.7 3.7   CHLORIDE mmol/L 102 106   CO2 mmol/L 26.6 22.4   BUN mg/dL 16 27*   CREATININE mg/dL 0.95 1.08*   CALCIUM mg/dL 9.2 8.8   GLUCOSE mg/dL 117* 137*   ALBUMIN g/dL 3.5  --    BILIRUBIN mg/dL 0.4  --    ALK PHOS U/L 80  --    AST (SGOT) U/L 15  --    ALT (SGPT) U/L 16  --      Estimated Creatinine Clearance: 63.9 mL/min (by C-G formula based on SCr of 0.95 mg/dL).  No results found for: AMMONIA    Glucose   Date/Time Value Ref Range Status   10/05/2023 0606 120 70 - 130 mg/dL Final   10/04/2023 1958 188 (H) 70 - 130 mg/dL Final   10/04/2023 1608 178 (H) 70 - 130 mg/dL Final   10/04/2023 1106 129 70 - 130 mg/dL Final   10/04/2023 0639 152 (H)  70 - 130 mg/dL Final   10/03/2023 1927 173 (H) 70 - 130 mg/dL Final   10/03/2023 1615 142 (H) 70 - 130 mg/dL Final   10/03/2023 1048 113 70 - 130 mg/dL Final     Lab Results   Component Value Date    HGBA1C 6.80 (H) 09/12/2023     Lab Results   Component Value Date    TSH 1.470 09/12/2023    FREET4 1.15 09/12/2023       No results found for: BLOODCX  No results found for: URINECX  No results found for: WOUNDCX  No results found for: STOOLCX  No results found for: RESPCX  Pain Management Panel           No data to display                  ----------------------------------------------------------------------------------------------------------------------  Imaging Results (Last 24 Hours)       ** No results found for the last 24 hours. **            ----------------------------------------------------------------------------------------------------------------------    Pertinent Infectious Disease Results                Assessment/Plan       Assessment     COVID-19 infection        Plan      The patient is awake and alert, sitting up comfortably in wheelchair in her room.  On room air with no apparent distress.  She states she is feeling much better today but continues with dry cough.  Shortness of breath improving.  Lung exam is diminished to auscultation bilaterally.  Abdomen soft and rounded with normoactive bowel sounds.    The patient is on room air, overall states she feels better.  Continues with persistent dry cough.  Completing remdesivir course today.  No new lab values available this morning.  We will continue to monitor closely.      ANTIMICROBIAL THERAPY    remdesivir - 100 mg/270 mL     Code Status:   Code Status and Medical Interventions:   Ordered at: 09/11/23 2023     Code Status (Patient has no pulse and is not breathing):    CPR (Attempt to Resuscitate)     Medical Interventions (Patient has pulse or is breathing):    Full Support       MADDIE Link  10/05/23  09:58 EDT

## 2023-10-05 NOTE — PLAN OF CARE
Problem: Rehabilitation (IRF) Plan of Care  Goal: Patient-Specific Goal (Individualized)  Outcome: Ongoing, Progressing   Goal Outcome Evaluation:                         Fall with Harm Risk

## 2023-10-05 NOTE — PROGRESS NOTES
Case Management  Inpatient Rehabilitation Team Conference    Conference Date/Time: 10/3/2023 6:50:48 AM    Team Conference Attendees:  MD Viviana Nash, TONEY Burden, MARIA LUISA Stewart, PT  Pam Peck, OT  Verona Sarmiento, JUSTO    Demographics            Age: 78Y            Gender: Female    Admission Date: 9/11/2023 6:31:00 PM  Rehabilitation Diagnosis:  CVA  Comorbidities: N39.0 Urinary tract infection, site not specified  I63.9 Cerebral infarction, unspecified  R47.01 Aphasia  E11.9 Type 2 diabetes mellitus without complications  I10 Essential (primary) hypertension  D75.1 Secondary polycythemia  J44.9 Chronic obstructive pulmonary disease, unspecified  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  Medical Necessity Expected Level Rationale: good  Intensity and Duration: an average of 3 hours/5 days per week  Medical Supervision and 24 Hour Rehab Nursing: x  Physical Therapy: x  PT Intensity/Duration: PT 1-1.5 hours per day/5 days per week  Occupational Therapy: x  OT Intensity/Duration: OT 1-1.5 hours per day/5 days per week  Speech and Language Therapy: x  SLP Intensity/Duration: SLP 30 mins-90 mins per day/5 days per week  Social Work: x  Therapeutic Recreation: x  Updated (if changes indicated)    Anticipated Discharge Date/Estimated Length of Stay:   Pending SNF      Discharge Plan of Care:    Based on the patient's medical and functional status, their  prognosis and  expected level of functional improvement is: good      Interdisciplinary Problem/Goals/Status  Mobility    [RN] Toilet Transfers(Active)  Current Status(09/11/2023): PATIENT WILL HAVE NO TRANSFER DIFFICULTY  Weekly Goal(10/10/2023): NO DIFFICULT WITH TRANSFERS  Discharge Goal: TRANSFERS INDEPENDENTLY    [PT] Bed/Chair/Wheelchair(Active)  Current Status(09/12/2023): min-mod A  Weekly Goal(09/19/2023): Sup  Discharge Goal: Sup    [PT] Walk(Active)  Current Status(09/12/2023): amb 160' RW CGA  Weekly Goal(09/19/2023): amb 300' RW Sup  Discharge Goal: amb 300' RW Sup        Safety    [RN] Potential for Injury(Active)  Current Status(09/11/2023): PATIENT WILL HAVE NO INJURY THIS STAY  Weekly Goal(10/03/2023): NO INJURY  Discharge Goal: PATIENT WILL DISCHARGE        Pain    [RN] Pain Management(Active)  Current Status(09/11/2023): PATIENT WILL VOICE NO PAIN  Weekly Goal(10/10/2023): PATIENT WILL BE PAIN FREE  Discharge Goal: FREE FROM PAIN        Body Systems    [RN] Integumentary(Active)  Current Status(09/11/2023): PATIENT WILL HAVE NO SKIN  BREAKDOWN THIS STAY  Weekly Goal(10/10/2023): SKIN WILL REMAIN INTACT.  Discharge Goal: NO SKIN ISSUES.        Self Care    [OT] Dressing (Lower)(Active)  Current Status(10/02/2023): Min/CGA  Weekly Goal(10/10/2023): cga  Discharge Goal: cga        Communication    [ST] Comprehension(Active)  Current Status(10/03/2023): Trace to mild auditory comprehension deficits  Weekly Goal(10/09/2023): receptive ID of object following verbal description  Discharge Goal: WFL communication and comprehension    [ST] Expression(Active)  Current Status(10/03/2023): Fluent aphasia w/ anomia  Weekly Goal(10/09/2023): Provide 3+ verbal descriptors of an object/pic/word  Discharge Goal: WFL communication    Comments: Pt was accepted at [facility] and scheduled to be discharged to their  facility on 10-2-23, however, pt tested positive for COVID on this date.  [facility] is  unable to admit pt  until day 11.    Signed by: TONEY Griffin    Physician CoSigned By: Abdullahi Sharpe 10/05/2023 06:56:12

## 2023-10-06 LAB
GLUCOSE BLDC GLUCOMTR-MCNC: 108 MG/DL (ref 70–130)
GLUCOSE BLDC GLUCOMTR-MCNC: 112 MG/DL (ref 70–130)
GLUCOSE BLDC GLUCOMTR-MCNC: 173 MG/DL (ref 70–130)
GLUCOSE BLDC GLUCOMTR-MCNC: 212 MG/DL (ref 70–130)

## 2023-10-06 PROCEDURE — 82948 REAGENT STRIP/BLOOD GLUCOSE: CPT

## 2023-10-06 PROCEDURE — 97110 THERAPEUTIC EXERCISES: CPT

## 2023-10-06 PROCEDURE — 97530 THERAPEUTIC ACTIVITIES: CPT | Performed by: OCCUPATIONAL THERAPIST

## 2023-10-06 PROCEDURE — 97110 THERAPEUTIC EXERCISES: CPT | Performed by: OCCUPATIONAL THERAPIST

## 2023-10-06 PROCEDURE — 63710000001 DEXAMETHASONE PER 0.25 MG: Performed by: FAMILY MEDICINE

## 2023-10-06 PROCEDURE — 99232 SBSQ HOSP IP/OBS MODERATE 35: CPT | Performed by: INTERNAL MEDICINE

## 2023-10-06 PROCEDURE — 25010000002 ENOXAPARIN PER 10 MG: Performed by: FAMILY MEDICINE

## 2023-10-06 PROCEDURE — 99232 SBSQ HOSP IP/OBS MODERATE 35: CPT

## 2023-10-06 PROCEDURE — 97535 SELF CARE MNGMENT TRAINING: CPT | Performed by: OCCUPATIONAL THERAPIST

## 2023-10-06 PROCEDURE — 97530 THERAPEUTIC ACTIVITIES: CPT

## 2023-10-06 PROCEDURE — 94761 N-INVAS EAR/PLS OXIMETRY MLT: CPT

## 2023-10-06 PROCEDURE — 94799 UNLISTED PULMONARY SVC/PX: CPT

## 2023-10-06 PROCEDURE — 94664 DEMO&/EVAL PT USE INHALER: CPT

## 2023-10-06 PROCEDURE — 97116 GAIT TRAINING THERAPY: CPT

## 2023-10-06 RX ADMIN — BENZONATATE 200 MG: 100 CAPSULE ORAL at 20:43

## 2023-10-06 RX ADMIN — Medication 10 ML: at 09:50

## 2023-10-06 RX ADMIN — DEXAMETHASONE 6 MG: 4 TABLET ORAL at 09:30

## 2023-10-06 RX ADMIN — TIOTROPIUM BROMIDE INHALATION SPRAY 2 PUFF: 3.12 SPRAY, METERED RESPIRATORY (INHALATION) at 08:35

## 2023-10-06 RX ADMIN — NYSTATIN 1 APPLICATION: 100000 CREAM TOPICAL at 09:50

## 2023-10-06 RX ADMIN — NYSTATIN 1 APPLICATION: 100000 CREAM TOPICAL at 20:45

## 2023-10-06 RX ADMIN — MONTELUKAST SODIUM 10 MG: 10 TABLET, COATED ORAL at 20:44

## 2023-10-06 RX ADMIN — Medication 10 ML: at 20:45

## 2023-10-06 RX ADMIN — SACUBITRIL AND VALSARTAN 1 TABLET: 24; 26 TABLET, FILM COATED ORAL at 09:30

## 2023-10-06 RX ADMIN — METOPROLOL SUCCINATE 25 MG: 25 TABLET, EXTENDED RELEASE ORAL at 09:30

## 2023-10-06 RX ADMIN — BUPROPION HYDROCHLORIDE 150 MG: 150 TABLET, EXTENDED RELEASE ORAL at 09:30

## 2023-10-06 RX ADMIN — POLYETHYLENE GLYCOL (3350) 17 G: 17 POWDER, FOR SOLUTION ORAL at 09:29

## 2023-10-06 RX ADMIN — MUPIROCIN 1 APPLICATION: 20 OINTMENT TOPICAL at 20:45

## 2023-10-06 RX ADMIN — FUROSEMIDE 20 MG: 20 TABLET ORAL at 09:30

## 2023-10-06 RX ADMIN — PRAVASTATIN SODIUM 40 MG: 40 TABLET ORAL at 09:30

## 2023-10-06 RX ADMIN — CETIRIZINE HYDROCHLORIDE 5 MG: 10 TABLET, FILM COATED ORAL at 09:30

## 2023-10-06 RX ADMIN — PANTOPRAZOLE SODIUM 40 MG: 40 TABLET, DELAYED RELEASE ORAL at 06:31

## 2023-10-06 RX ADMIN — MUPIROCIN 1 APPLICATION: 20 OINTMENT TOPICAL at 09:50

## 2023-10-06 RX ADMIN — ISOSORBIDE MONONITRATE 30 MG: 30 TABLET, EXTENDED RELEASE ORAL at 09:30

## 2023-10-06 RX ADMIN — VENLAFAXINE HYDROCHLORIDE 75 MG: 75 CAPSULE, EXTENDED RELEASE ORAL at 09:30

## 2023-10-06 RX ADMIN — DOCUSATE SODIUM 50 MG AND SENNOSIDES 8.6 MG 2 TABLET: 8.6; 5 TABLET, FILM COATED ORAL at 09:30

## 2023-10-06 RX ADMIN — SACUBITRIL AND VALSARTAN 1 TABLET: 24; 26 TABLET, FILM COATED ORAL at 20:44

## 2023-10-06 RX ADMIN — ACETAMINOPHEN 650 MG: 325 TABLET ORAL at 20:43

## 2023-10-06 RX ADMIN — ASPIRIN 81 MG: 81 TABLET, COATED ORAL at 09:30

## 2023-10-06 RX ADMIN — BENZONATATE 200 MG: 100 CAPSULE ORAL at 04:46

## 2023-10-06 RX ADMIN — ENOXAPARIN SODIUM 40 MG: 40 INJECTION SUBCUTANEOUS at 11:05

## 2023-10-06 NOTE — THERAPY TREATMENT NOTE
Inpatient Rehabilitation - Physical Therapy Treatment Note       ABENA Quiroz     Patient Name: Ofelia Knox  : 1945  MRN: [id number]    Today's Date: 10/6/2023                    Admit Date: 2023      Visit Dx:   No diagnosis found.    Patient Active Problem List   Diagnosis    CVA (cerebral vascular accident)       Past Medical History:   Diagnosis Date    AMS (altered mental status)     Aphasia     CKD (chronic kidney disease)     COPD (chronic obstructive pulmonary disease)     CVA (cerebral vascular accident)     DM2 (diabetes mellitus, type 2)     HTN (hypertension)     Restrictive lung disease     Urinary tract infection due to ESBL Klebsiella        Past Surgical History:   Procedure Laterality Date    HYSTERECTOMY      JOINT REPLACEMENT         PT ASSESSMENT (last 12 hours)       IRF PT Evaluation and Treatment       Row Name 10/06/23 1428          PT Time and Intention    Document Type daily treatment  -LB     Mode of Treatment individual therapy;physical therapy  -LB       Row Name 10/06/23 1428          General Information    Existing Precautions/Restrictions --  aphasia, covid isolation  -LB       Row Name 10/06/23 1428          Pain Scale: FACES Pre/Post-Treatment    Pain: FACES Scale, Pretreatment 0-->no hurt  -LB     Posttreatment Pain Rating 0-->no hurt  -LB       Row Name 10/06/23 1428          Cognition/Psychosocial    Affect/Mental Status (Cognition) WFL  -LB     Follows Commands (Cognition) verbal cues/prompting required;physical/tactile prompts required  -LB     Personal Safety Interventions gait belt;nonskid shoes/slippers when out of bed;supervised activity  -LB       Row Name 10/06/23 1428          Bed Mobility    Supine-Sit-Supine [place] (Bed Mobility) modified independence  -LB     Assistive Device (Bed Mobility) bed rails  -LB       Row Name 10/06/23 1428          Bed-Chair Transfer    Bed-Chair [place] (Transfers) supervision;verbal cues;nonverbal cues (demo/gesture)   -LB     Assistive Device (Bed-Chair Transfers) walker, front-wheeled  -LB       Row Name 10/06/23 1428          Chair-Bed Transfer    Chair-Bed [place] (Transfers) supervision;verbal cues;nonverbal cues (demo/gesture)  -LB     Assistive Device (Chair-Bed Transfers) walker, front-wheeled  -LB       Row Name 10/06/23 1428          Sit-Stand Transfer    Sit-Stand [place] (Transfers) supervision;verbal cues;nonverbal cues (demo/gesture)  -LB     Assistive Device (Sit-Stand Transfers) walker, front-wheeled  -LB       Row Name 10/06/23 1428          Stand-Sit Transfer    Stand-Sit [place] (Transfers) supervision;verbal cues;nonverbal cues (demo/gesture)  -LB     Assistive Device (Stand-Sit Transfers) walker, front-wheeled  -LB       Row Name 10/06/23 1428          Stand Pivot/Stand Step Transfer    Stand Pivot/Stand Step [place] (Transfers) supervision;verbal cues;nonverbal cues (demo/gesture)  -LB     Assistive Device (Stand Pivot Stand Step Transfer) walker, front-wheeled  -LB       Row Name 10/06/23 1428          Gait/Stairs (Locomotion)    [place] Level (Gait) verbal cues;nonverbal cues (demo/gesture);supervision  -LB     Assistive Device (Gait) walker, front-wheeled  amb in room due to isolation  -LB     Distance in Feet (Gait) 150' bid  -LB     Deviations/Abnormal Patterns (Gait) beatrice decreased;gait speed decreased;stride length decreased  -LB     Bilateral Gait Deviations forward flexed posture  -LB       Row Name 10/06/23 1428          Motor Skills    Therapeutic Exercise --  sitting ex bid  -LB       Row Name 10/06/23 1428          Positioning and Restraints    Pre-Treatment Position --  am-w/c, pm-bed  -LB     In Bed call light within reach;encouraged to call for assist;side lying left;heels elevated  pm  -LB     In Wheelchair call light within reach;encouraged to call for assist;exit alarm on  am  -LB       Row Name 10/06/23 1428          Vital Signs    Intra SpO2 (%) 95  after amb   -LB     O2 Delivery Intra Treatment room air  -LB       Row Name 10/06/23 1428          Daily Progress Summary (PT)    Recommendations (PT) continue PT  -LB               User Key  (r) = Recorded By, (t) = Taken By, (c) = Cosigned By      Initials Name Provider Type    Andra Lea, PT Physical Therapist                     Physical Therapy Education       Title: PT OT SLP Therapies (Done)       Topic: Physical Therapy (Done)       Point: Mobility training (Done)       Learning Progress Summary             Patient Acceptance, E, VU,NR by LB at 10/6/2023 1430    Acceptance, E, VU,NR by LB at 10/4/2023 1536    Acceptance, E, VU,NR by LB at 10/3/2023 1502    Acceptance, E,D, VU,NR by RG at 9/29/2023 1403    Acceptance, E, VU,NR by LB at 9/28/2023 1501    Acceptance, E, VU,NR by LB at 9/27/2023 1532    Acceptance, E, VU,NR by LB at 9/26/2023 1548    Acceptance, E,D, VU,NR by RG at 9/25/2023 1423    Acceptance, TB, NR by DL at 9/24/2023 2200    Acceptance, E,D, NR,VU by RG at 9/22/2023 1315    Acceptance, E,D, VU,NR by RG at 9/21/2023 1307    Acceptance, E,D, VU,NR by RG at 9/20/2023 1258    Acceptance, E,D, VU,NR by RG at 9/19/2023 1258    Acceptance, E,D, VU,NR by RG at 9/18/2023 1338    Acceptance, E,TB, VU by RM at 9/15/2023 1532    Acceptance, E,TB, VU by RM at 9/14/2023 1555    Acceptance, E,D, VU,NR by RG at 9/13/2023 1528    Acceptance, TB, NR by DL at 9/12/2023 2003    Acceptance, E, VU,NR by LB at 9/12/2023 1135                         Point: Home exercise program (Done)       Learning Progress Summary             Patient Acceptance, E, VU,NR by LB at 10/6/2023 1430    Acceptance, E, VU,NR by LB at 10/4/2023 1536    Acceptance, E, VU,NR by LB at 10/3/2023 1502    Acceptance, E,D, VU,NR by RG at 9/29/2023 1403    Acceptance, E, VU,NR by LB at 9/28/2023 1501    Acceptance, E, VU,NR by LB at 9/27/2023 1532    Acceptance, E, VU,NR by LB at 9/26/2023 1548    Acceptance, E,D, VU,NR by RG at 9/25/2023 1423     Acceptance, TB, NR by DL at 9/24/2023 2200    Acceptance, E,D, NR,VU by RG at 9/22/2023 1315    Acceptance, E,D, VU,NR by RG at 9/21/2023 1307    Acceptance, E,D, VU,NR by RG at 9/20/2023 1258    Acceptance, E,D, VU,NR by RG at 9/19/2023 1258    Acceptance, E,D, VU,NR by RG at 9/18/2023 1338    Acceptance, E,TB, VU by RM at 9/15/2023 1532    Acceptance, E,TB, VU by RM at 9/14/2023 1555    Acceptance, E,D, VU,NR by RG at 9/13/2023 1528    Acceptance, TB, NR by DL at 9/12/2023 2003    Acceptance, E, VU,NR by LB at 9/12/2023 1135                         Point: Body mechanics (Done)       Learning Progress Summary             Patient Acceptance, E, VU,NR by LB at 10/6/2023 1430    Acceptance, E, VU,NR by LB at 10/4/2023 1536    Acceptance, E, VU,NR by LB at 10/3/2023 1502    Acceptance, E,D, VU,NR by RG at 9/29/2023 1403    Acceptance, E, VU,NR by LB at 9/28/2023 1501    Acceptance, E, VU,NR by LB at 9/27/2023 1532    Acceptance, E, VU,NR by LB at 9/26/2023 1548    Acceptance, E,D, VU,NR by RG at 9/25/2023 1423    Acceptance, TB, NR by DL at 9/24/2023 2200    Acceptance, E,D, NR,VU by RG at 9/22/2023 1315    Acceptance, E,D, VU,NR by RG at 9/21/2023 1307    Acceptance, E,D, VU,NR by RG at 9/20/2023 1258    Acceptance, E,D, VU,NR by RG at 9/19/2023 1258    Acceptance, E,D, VU,NR by RG at 9/18/2023 1338    Acceptance, E,TB, VU by RM at 9/15/2023 1532    Acceptance, E,TB, VU by RM at 9/14/2023 1555    Acceptance, E,D, VU,NR by RG at 9/13/2023 1528    Acceptance, TB, NR by DL at 9/12/2023 2003    Acceptance, E, VU,NR by LB at 9/12/2023 1135                         Point: Precautions (Done)       Learning Progress Summary             Patient Acceptance, E, VU,NR by LB at 10/6/2023 1430    Acceptance, E, VU,NR by LB at 10/4/2023 1536    Acceptance, E, VU,NR by LB at 10/3/2023 1502    Acceptance, E,D, VU,NR by RG at 9/29/2023 1403    Acceptance, E, VU,NR by LB at 9/28/2023 1501    Acceptance, E, VU,NR by LB at 9/27/2023  1532    Acceptance, E, VU,NR by LB at 9/26/2023 1548    Acceptance, E,D, VU,NR by RG at 9/25/2023 1423    Acceptance, TB, NR by DL at 9/24/2023 2200    Acceptance, E,D, NR,VU by RG at 9/22/2023 1315    Acceptance, E,D, VU,NR by RG at 9/21/2023 1307    Acceptance, E,D, VU,NR by RG at 9/20/2023 1258    Acceptance, E,D, VU,NR by RG at 9/19/2023 1258    Acceptance, E,D, VU,NR by RG at 9/18/2023 1338    Acceptance, E,TB, VU by RM at 9/15/2023 1532    Acceptance, E,TB, VU by RM at 9/14/2023 1555    Acceptance, E,D, VU,NR by RG at 9/13/2023 1528    Acceptance, TB, NR by DL at 9/12/2023 2003    Acceptance, E, VU,NR by LB at 9/12/2023 1135                                         User Key       Initials Effective Dates Name Provider Type Discipline    LB 06/16/21 -  Andra Stewart, PT Physical Therapist PT    RM 02/17/23 -  Karis Obrien, PTA Physical Therapist Assistant PT    RG 06/16/21 -  Twan Cunningham, ESSIE Physical Therapist Assistant PT    DL 03/16/22 -  Carolyn Ramirez, RN Registered Nurse Nurse                    PT Recommendation and Plan    Planned Therapy Interventions (PT): balance training, bed mobility training, gait training, neuromuscular re-education, patient/family education, strengthening, transfer training  Frequency of Treatment (PT): 5 times per week  Anticipated Equipment Needs at Discharge (PT Eval):  (tbd)  Daily Progress Summary (PT)  Recommendations (PT): continue PT               Time Calculation:      PT Charges       Row Name 10/06/23 1432 10/06/23 1431          Time Calculation    Start Time 1330  -LB 1045  -LB     Stop Time 1415  -LB 1130  -LB     Time Calculation (min) 45 min  -LB 45 min  -LB     PT Received On -- 10/07/23  -LB               User Key  (r) = Recorded By, (t) = Taken By, (c) = Cosigned By      Initials Name Provider Type    LB Andra Stewart, PT Physical Therapist                    Therapy Charges for Today       Code Description Service Date Service  Provider Modifiers Qty    63132611561 HC GAIT TRAINING EA 15 MIN 10/5/2023 Andra Stewart, PT GP 2    24775577321 HC PT THER PROC EA 15 MIN 10/5/2023 Andra Stewart, PT GP 3    62005022322 HC PT THERAPEUTIC ACT EA 15 MIN 10/5/2023 Andra Stewart, PT GP 1    58930476530 HC GAIT TRAINING EA 15 MIN 10/6/2023 Andra Stewart, PT GP 2    54912003640 HC PT THER PROC EA 15 MIN 10/6/2023 Andra Stewart, PT GP 3    03797726406 HC PT THERAPEUTIC ACT EA 15 MIN 10/6/2023 Andra Stewart, PT GP 1                     Andra Stewart, PT  10/6/2023

## 2023-10-06 NOTE — SIGNIFICANT NOTE
10/06/23 1226   Plan   Plan Insurance approved continued rehab stay until 10-12-23.  Pt can be admitted to [facility] on 10-12-23.  SS reviewed this information with son [phone].  Pt has an evaluation at [facility] for assisted living on 10-17-23.  Will follow.   Patient/Family in Agreement with Plan yes

## 2023-10-06 NOTE — PROGRESS NOTES
Occupational Therapy: Individual: 100 minutes.    Physical Therapy:    Speech Language Pathology:    Signed by: Pam Peck OT

## 2023-10-06 NOTE — SIGNIFICANT NOTE
10/06/23 1226   Plan   Plan Insurance approved continued rehab stay until 10-12-23.  Pt can be admitted to [facility] on 10-12-23.  SS reviewed this information with son [phone].

## 2023-10-06 NOTE — NURSING NOTE
NO OUTPUT SINCE 2100; PATIENT UNABLE TO VOID; BLADDER SCANNED FOR 566ML; I&O CATHED FOR 500ML ODOROUS, YELLOW URINE; TOLERATED WELL

## 2023-10-06 NOTE — PROGRESS NOTES
[facility]  PROGRESS NOTE     Patient Identification:  Name:  Ofelia Knox  Age:  78 y.o.  Sex:  female  :  1945  MRN:  [id number]  Visit Number:  [id number]  ROOM: [facility]     Primary Care Provider:  Provider, No Known    Length of stay in inpatient status:  25    Subjective     Chief Compliant:  No chief complaint on file.      History of Presenting Illness: 78-year-old female who is status post CVA.  Patient was diagnosed with COVID-19 earlier this week and she is done some better with her breathing.  Patient currently on between room air and 2 L which is her baseline.  Patient has completed her remdesivir course.  Patient states otherwise she is doing very well at this time    Objective     Current Hospital Meds:aspirin, 81 mg, Oral, Daily  buPROPion XL, 150 mg, Oral, Daily  cetirizine, 5 mg, Oral, Daily  dexAMETHasone, 6 mg, Oral, Daily With Breakfast  enoxaparin, 40 mg, Subcutaneous, Q24H  furosemide, 20 mg, Oral, Every Other Day  isosorbide mononitrate, 30 mg, Oral, Q24H  metoprolol succinate XL, 25 mg, Oral, Q24H  montelukast, 10 mg, Oral, Nightly  mupirocin, 1 application , Each Nare, BID  nystatin, 1 application , Topical, Q12H  pantoprazole, 40 mg, Oral, QAM AC  polyethylene glycol, 17 g, Oral, Daily  pravastatin, 40 mg, Oral, Daily  sacubitril-valsartan, 1 tablet, Oral, Q12H  senna-docusate sodium, 2 tablet, Oral, BID  sodium chloride, 10 mL, Intravenous, Q12H  tiotropium bromide monohydrate, 2 puff, Inhalation, Daily - RT  venlafaxine XR, 75 mg, Oral, Daily With Breakfast       ----------------------------------------------------------------------------------------------------------------------  Vital Signs:  Temp:  [97.7 øF (36.5 øC)] 97.7 øF (36.5 øC)  Heart Rate:  [58] 58  Resp:  [20] 20  BP: (127)/(72) 127/72  SpO2:  [94 %] 94 %  on   ;   Device (Oxygen Therapy): room air  Body mass index is 40.03 kg/mý.    Wt Readings from Last 3 Encounters:   23 112 kg (248 lb)      Intake & Output (last 3 days)         10/03 0701  10/04 0700 10/04 0701  10/05 0700 10/05 0701  10/06 0700 10/06 0701  10/07 0700    P.O. 1320 1200 600     Total Intake(mL/kg) 1320 (11.8) 1200 (10.7) 600 (5.4)     Urine (mL/kg/hr)   500 (0.2)     Total Output   500     Net +1320 +1200 +100             Urine Unmeasured Occurrence 6 x 5 x 1 x           Diet: Cardiac Diets, Diabetic Diets; Healthy Heart (2-3 Na+); Consistent Carbohydrate; Texture: Regular Texture (IDDSI 7); Fluid Consistency: Thin (IDDSI 0)  ----------------------------------------------------------------------------------------------------------------------  Physical exam:  Constitutional: No acute distress  HEENT: Normocephalic atraumatic  Neck:   Supple  Cardiovascular: Regular rate and rhythm  Pulmonary/Chest: Clear to auscultation  Abdominal: Positive bowel sounds soft.   Musculoskeletal: No arthropathy  Neurological: No obvious focal deficits  Skin: No rash  Peripheral vascular: No edema  Genitourinary:  ----------------------------------------------------------------------------------------------------------------------    Last echocardiogram:  Results for orders placed during the hospital encounter of 09/11/23    Adult Transthoracic Echo Complete W/ Cont if Necessary Per Protocol    Interpretation Summary    Left ventricular systolic function is mildly decreased. Calculated left ventricular EF = 44% Left ventricular ejection fraction appears to be 41 - 45%.    Left ventricular wall thickness is consistent with mild eccentric hypertrophy.    Left ventricular diastolic function is consistent with (grade I) impaired relaxation.    The left atrial cavity is moderately dilated.    ----------------------------------------------------------------------------------------------------------------------  Results from last 7 days   Lab Units 10/03/23  1055   WBC 10*3/mm3 8.86   HEMOGLOBIN g/dL 14.2   HEMATOCRIT % 46.6   MCV fL 93.6   MCHC g/dL 30.5*    PLATELETS 10*3/mm3 228         Results from last 7 days   Lab Units 10/03/23  1055   SODIUM mmol/L 138   POTASSIUM mmol/L 3.7   CHLORIDE mmol/L 102   CO2 mmol/L 26.6   BUN mg/dL 16   CREATININE mg/dL 0.95   CALCIUM mg/dL 9.2   GLUCOSE mg/dL 117*   ALBUMIN g/dL 3.5   BILIRUBIN mg/dL 0.4   ALK PHOS U/L 80   AST (SGOT) U/L 15   ALT (SGPT) U/L 16   Estimated Creatinine Clearance: 63.9 mL/min (by C-G formula based on SCr of 0.95 mg/dL).  No results found for: AMMONIA  Results from last 7 days   Lab Units 10/03/23  1308 10/03/23  1055   HSTROP T ng/L 18* 17*             Glucose   Date/Time Value Ref Range Status   10/06/2023 0606 112 70 - 130 mg/dL Final   10/05/2023 2014 155 (H) 70 - 130 mg/dL Final   10/05/2023 1554 162 (H) 70 - 130 mg/dL Final   10/05/2023 1022 95 70 - 130 mg/dL Final   10/05/2023 0606 120 70 - 130 mg/dL Final   10/04/2023 1958 188 (H) 70 - 130 mg/dL Final   10/04/2023 1608 178 (H) 70 - 130 mg/dL Final   10/04/2023 1106 129 70 - 130 mg/dL Final     Lab Results   Component Value Date    TSH 1.470 09/12/2023    FREET4 1.15 09/12/2023     No results found for: PREGTESTUR, PREGSERUM, HCG, HCGQUANT  Pain Management Panel           No data to display              Brief Urine Lab Results  (Last result in the past 365 days)        Color   Clarity   Blood   Leuk Est   Nitrite   Protein   CREAT   Urine HCG        09/14/23 1935 Dark Yellow   Clear   Negative   Negative   Negative   Trace                 No results found for: BLOODCX      No results found for: URINECX  No results found for: WOUNDCX  No results found for: STOOLCX        I have personally looked at the labs and they are summarized above.  ----------------------------------------------------------------------------------------------------------------------  Detailed radiology reports for the last 24 hours:    Imaging Results (Last 24 Hours)       ** No results found for the last 24 hours. **          Final impressions for the last 30 days of  radiology reports:    XR Chest 1 View    Result Date: 10/3/2023  No radiographic evidence of acute cardiac or pulmonary disease.    This report was finalized on 10/3/2023 10:55 AM by Dr. Krunal Colvin MD.      XR Chest 1 View    Result Date: 9/20/2023  Mild pulmonary vascular congestion.   This report was finalized on 9/20/2023 6:48 PM by Alex Pallas, DO.      XR Chest 1 View    Result Date: 9/14/2023  No radiographic evidence of acute cardiac or pulmonary disease.  This report was finalized on 9/14/2023 7:54 AM by Dr. Krunal Colvin MD.     I have personally looked at the radiology images and read the final radiology report.    Assessment & Plan    Status post CVA--requiring contact-guard assistance for lower body dressing continues work on motor skills to improve ADLs and functional mobility.  Supervision required for transfers.  Ambulated 100 feet in the morning 125 feet in the afternoon with front wheel walker and supervision.    COVID-19--patient is completed remdesivir.  Patient's respiratory status appears to be very good at this time.  Will consider discontinuing dexamethasone today.    Diabetes mellitus reasonably well controlled.    COPD stable    CHF with reduced EF compensated    Depression stable    VTE Prophylaxis:   Mechanical Order History:        Ordered        09/11/23 2023  Place Sequential Compression Device  Once            09/11/23 2023  Maintain Sequential Compression Device  Continuous                          Pharmalogical Order History:        Ordered     Dose Route Frequency Stop    10/03/23 1010  Enoxaparin Sodium (LOVENOX) syringe 40 mg         40 mg SC Every 24 Hours --                        Abdullahi Sharpe MD  [facility] Hospitalist  10/06/23  09:52 EDT

## 2023-10-06 NOTE — PROGRESS NOTES
PROGRESS NOTE         Patient Identification:  Name:  Ofelia Knox  Age:  78 y.o.  Sex:  female  :  1945  MRN:  [id number]  Visit Number:  [id number]  Primary Care Provider:  Provider, No Known         LOS: 25 days       ----------------------------------------------------------------------------------------------------------------------  Subjective       Chief Complaints:    No chief complaint on file.        Interval History:      Patient sitting up in bed eating breakfast this morning.  She is pleasant and appears to be in good spirits today.  She does not have any complaints today.  She says she is doing much better.  She is looking forward to going home.  She is afebrile.  Patient denies nausea, vomiting or diarrhea.  Lungs were clear but diminished to auscultation bilaterally.  Abdomen soft and nontender to palpation.  Active bowel sounds in all quadrants.  She has not had new labs drawn today.  Per nursing, she is still having some episodes of confusion.    Review of Systems:    Constitutional: no fever, chills and night sweats. No appetite change or unexpected weight change.    Eyes: no eye drainage, itching or redness.  HEENT: no mouth sores, dysphagia or nose bleed.  Respiratory: no for shortness of breath, denies cough  Cardiovascular: no chest pain, no palpitations, no orthopnea.  Gastrointestinal: no nausea, vomiting or diarrhea. No abdominal pain, hematemesis or rectal bleeding.  Genitourinary: no dysuria or polyuria.  Hematologic/lymphatic: no lymph node abnormalities, no easy bruising or easy bleeding.  Musculoskeletal: no muscle or joint pain.  Skin: No rash and no itching.  Neurological: no loss of consciousness, no seizure, no headache.  Behavioral/Psych: no depression or suicidal ideation.  Endocrine: no hot flashes.  Immunologic: negative.    ----------------------------------------------------------------------------------------------------------------------      Objective        Current Hospital Meds:  aspirin, 81 mg, Oral, Daily  buPROPion XL, 150 mg, Oral, Daily  cetirizine, 5 mg, Oral, Daily  enoxaparin, 40 mg, Subcutaneous, Q24H  furosemide, 20 mg, Oral, Every Other Day  isosorbide mononitrate, 30 mg, Oral, Q24H  metoprolol succinate XL, 25 mg, Oral, Q24H  montelukast, 10 mg, Oral, Nightly  mupirocin, 1 application , Each Nare, BID  nystatin, 1 application , Topical, Q12H  pantoprazole, 40 mg, Oral, QAM AC  polyethylene glycol, 17 g, Oral, Daily  pravastatin, 40 mg, Oral, Daily  sacubitril-valsartan, 1 tablet, Oral, Q12H  senna-docusate sodium, 2 tablet, Oral, BID  sodium chloride, 10 mL, Intravenous, Q12H  tiotropium bromide monohydrate, 2 puff, Inhalation, Daily - RT  venlafaxine XR, 75 mg, Oral, Daily With Breakfast           ----------------------------------------------------------------------------------------------------------------------    Vital Signs:  Temp:  [97.7 øF (36.5 øC)-98 øF (36.7 øC)] 98 øF (36.7 øC)  Heart Rate:  [58-62] 62  Resp:  [18-20] 18  BP: (124-127)/(70-72) 124/70  No data found.    SpO2 Percentage    10/04/23 1900 10/05/23 0854 10/05/23 1900   SpO2: 92% 96% 94%     SpO2:  [94 %] 94 %  on   ;   Device (Oxygen Therapy): room air    Body mass index is 40.03 kg/mý.  Wt Readings from Last 3 Encounters:   09/11/23 112 kg (248 lb)        Intake/Output Summary (Last 24 hours) at 10/6/2023 1143  Last data filed at 10/6/2023 0900  Gross per 24 hour   Intake 840 ml   Output 500 ml   Net 340 ml       Diet: Cardiac Diets, Diabetic Diets; Healthy Heart (2-3 Na+); Consistent Carbohydrate; Texture: Regular Texture (IDDSI 7); Fluid Consistency: Thin (IDDSI 0)  ----------------------------------------------------------------------------------------------------------------------      Physical Exam:    Constitutional: Patient sitting up in bed eating breakfast this morning.  She is pleasant and appears to be in good spirits.  She does not have any complaints today.  She  says she is doing much better.  Looking forward to going home.  She states her cough has improved.  She is on room air without apparent distress.  No fever.  She denies nausea, vomiting and diarrhea.  HENT:  Head: Normocephalic and atraumatic.  Mouth:  Moist mucous membranes.    Eyes:  Conjunctivae and EOM are normal.  No scleral icterus.  Neck:  Neck supple.  No JVD present.    Cardiovascular:  Normal rate, regular rhythm and normal heart sounds with no murmur. No edema.  Pulmonary/Chest: No cough present, clear but diminished to auscultation bilaterally.  Abdominal:  Soft.  Bowel sounds are normal.  No distension and no tenderness.   Musculoskeletal: No edema, no tenderness, and no deformity.  No swelling or redness of joints.  Neurological:  Alert and oriented to person, place, and time.  No facial droop.  No slurred speech.   Skin:  Skin is warm and dry.  No rash noted.  No pallor.   Psychiatric:  Normal mood and affect.  Behavior is normal.        ----------------------------------------------------------------------------------------------------------------------  Results from last 7 days   Lab Units 10/03/23  1308 10/03/23  1055   HSTROP T ng/L 18* 17*             Results from last 7 days   Lab Units 10/03/23  1055   WBC 10*3/mm3 8.86   HEMOGLOBIN g/dL 14.2   HEMATOCRIT % 46.6   MCV fL 93.6   MCHC g/dL 30.5*   PLATELETS 10*3/mm3 228       Results from last 7 days   Lab Units 10/03/23  1055   SODIUM mmol/L 138   POTASSIUM mmol/L 3.7   CHLORIDE mmol/L 102   CO2 mmol/L 26.6   BUN mg/dL 16   CREATININE mg/dL 0.95   CALCIUM mg/dL 9.2   GLUCOSE mg/dL 117*   ALBUMIN g/dL 3.5   BILIRUBIN mg/dL 0.4   ALK PHOS U/L 80   AST (SGOT) U/L 15   ALT (SGPT) U/L 16     Estimated Creatinine Clearance: 63.9 mL/min (by C-G formula based on SCr of 0.95 mg/dL).  No results found for: AMMONIA    Glucose   Date/Time Value Ref Range Status   10/06/2023 1054 108 70 - 130 mg/dL Final   10/06/2023 0606 112 70 - 130 mg/dL Final    10/05/2023 2014 155 (H) 70 - 130 mg/dL Final   10/05/2023 1554 162 (H) 70 - 130 mg/dL Final   10/05/2023 1022 95 70 - 130 mg/dL Final   10/05/2023 0606 120 70 - 130 mg/dL Final   10/04/2023 1958 188 (H) 70 - 130 mg/dL Final   10/04/2023 1608 178 (H) 70 - 130 mg/dL Final     Lab Results   Component Value Date    HGBA1C 6.80 (H) 09/12/2023     Lab Results   Component Value Date    TSH 1.470 09/12/2023    FREET4 1.15 09/12/2023       No results found for: BLOODCX  No results found for: URINECX  No results found for: WOUNDCX  No results found for: STOOLCX  No results found for: RESPCX  Pain Management Panel           No data to display                  ----------------------------------------------------------------------------------------------------------------------  Imaging Results (Last 24 Hours)       ** No results found for the last 24 hours. **            ----------------------------------------------------------------------------------------------------------------------    Pertinent Infectious Disease Results        Assessment/Plan       Assessment     COVID-19 infection        Plan      I saw and examined the patient myself this morning with MADDIE Hayden and discussed the plan of care with her and primary RN and here are my findings:    Patient is sitting in bed eating breakfast with no acute distress very pleasant in good spirits today.  No complaints today.  Overall showing significant improvement with no fever or diarrhea reported overnight.  Patient denies any nausea vomiting.  On her physical exam lungs are clear but diminished breath sounds at the bases and abdomen soft with no tenderness.  No new labs.  Patient continues to have episodes of confusion throughout the night.    Patient has completed her course of COVID treatment with remdesivir patient states that she has shown significant improvement and very happy with her progress no labs today.  Would recommend to continue to monitor off  coverage no evidence of clinical sepsis at this time.      ANTIMICROBIAL THERAPY    This patient does not have an active medication from one of the medication groupers.     Code Status:   Code Status and Medical Interventions:   Ordered at: 09/11/23 2023     Code Status (Patient has no pulse and is not breathing):    CPR (Attempt to Resuscitate)     Medical Interventions (Patient has pulse or is breathing):    Full Support       Josefa Srivastava PA-C  10/06/23  11:43 EDT  Electronically signed by Josefa Srivastava PA-C, 10/06/23, 11:49 AM EDT.     Electronically signed by Roque Rodríguez MD, 10/06/23, 1:01 PM EDT.

## 2023-10-06 NOTE — THERAPY TREATMENT NOTE
Inpatient Rehabilitation - Occupational Therapy Progress Note and Treatment Note    ABENA Quiroz     Patient Name: Ofelia Knox  : 1945  MRN: [id number]    Today's Date: 10/6/2023                 Admit Date: 2023       No diagnosis found.    Patient Active Problem List   Diagnosis    CVA (cerebral vascular accident)       Past Medical History:   Diagnosis Date    AMS (altered mental status)     Aphasia     CKD (chronic kidney disease)     COPD (chronic obstructive pulmonary disease)     CVA (cerebral vascular accident)     DM2 (diabetes mellitus, type 2)     HTN (hypertension)     Restrictive lung disease     Urinary tract infection due to ESBL Klebsiella        Past Surgical History:   Procedure Laterality Date    HYSTERECTOMY      JOINT REPLACEMENT               IRF OT ASSESSMENT FLOWSHEET (last 12 hours)       IRF OT Evaluation and Treatment       Row Name 10/06/23 1235          OT Time and Intention    Document Type daily treatment;progress note  -BF     Mode of Treatment occupational therapy  -BF     Patient Effort good  -BF     Symptoms Noted During/After Treatment none  -BF       Row Name 10/06/23 1235          General Information    Existing Precautions/Restrictions fall  covid isolation  -BF     Limitations/Impairments safety/cognitive;aphasia  aphasia improving  -BF       Row Name 10/06/23 1235          Cognition/Psychosocial    Orientation Status (Cognition) oriented x 3  -BF     Follows Commands (Cognition) follows one-step commands;verbal cues/prompting required;repetition of directions required  -BF       Row Name 10/06/23 1235          Bathing    [place] Level (Bathing) contact guard assist;standby assist  -BF     Position (Bathing) edge of bed sitting  -BF     Comment (Bathing) CGA/SBA  -BF       Row Name 10/06/23 1235          Upper Body Dressing    [place] Level (Upper Body Dressing) set up assistance  -BF     Position (Upper Body Dressing) edge of bed sitting  -BF     Comment  (Upper Body Dressing) Set-up  -BF       Row Name 10/06/23 1235          Lower Body Dressing    [place] Level (Lower Body Dressing) contact guard assist;verbal cues;nonverbal cues (demo/gesture)  -BF     Position (Lower Body Dressing) edge of bed sitting  -BF     Comment (Lower Body Dressing) CGA  -       Row Name 10/06/23 1235          Grooming    [place] Level (Grooming) set up  -BF     Position (Grooming) supported sitting  -BF     Comment (Grooming) Set-up  -BF       Row Name 10/06/23 1235          Toileting    Comment (Toileting) CGA  -       Row Name 10/06/23 1235          Bed-Chair Transfer    Bed-Chair [place] (Transfers) contact guard;standby assist  -     Assistive Device (Bed-Chair Transfers) wheelchair  -       Row Name 10/06/23 1235          Motor Skills    Motor Control/Coordination Interventions fine motor manipulation/dexterity activities;gross motor coordination activities;therapeutic exercise/ROM  BUE GMC/FMC theract, strengthening; BUE wrist rolls  -       Row Name 10/06/23 1235          Positioning and Restraints    In Wheelchair sitting;call light within reach;encouraged to call for assist;exit alarm on  -               User Key  (r) = Recorded By, (t) = Taken By, (c) = Cosigned By      Initials Name Effective Dates     Pam Peck OT 07/11/23 -                      Occupational Therapy Education       Title: PT OT SLP Therapies (Done)       Topic: Occupational Therapy (Done)       Point: ADL training (Done)       Description:   Instruct learner(s) on proper safety adaptation and remediation techniques during self care or transfers.   Instruct in proper use of assistive devices.                  Learning Progress Summary             Patient Acceptance, E, VU,NR by  at 10/6/2023 1234    Acceptance, E, VU,NR by BF at 10/5/2023 1448    Acceptance, E, VU,NR by  at 10/4/2023 1450    Acceptance, E, VU,NR by  at 10/3/2023 1436    Acceptance, E, VU,NR by BF  at 10/2/2023 1317    Acceptance, TB, NR by DL at 9/24/2023 2200    Acceptance, E, VU,NR by HB at 9/16/2023 1145    Acceptance, E, VU,NR by BF at 9/15/2023 1232                         Point: Precautions (Done)       Description:   Instruct learner(s) on prescribed precautions during self-care and functional transfers.                  Learning Progress Summary             Patient Acceptance, E, VU,NR by BF at 10/6/2023 1234    Acceptance, E, VU,NR by BF at 10/5/2023 1448    Acceptance, E, VU,NR by HB at 10/4/2023 1450    Acceptance, E, VU,NR by BF at 10/3/2023 1436    Acceptance, E, VU,NR by BF at 10/2/2023 1317    Acceptance, TB, NR by DL at 9/24/2023 2200    Acceptance, E, VU,NR by HB at 9/16/2023 1145    Acceptance, E, VU,NR by BF at 9/15/2023 1232                                         User Key       Initials Effective Dates Name Provider Type Discipline    BF 07/11/23 -  Pam Peck, BINH Occupational Therapist OT    HB 05/25/21 -  Xena Chinchilla OT Occupational Therapist OT    DL 03/16/22 -  Carolyn Ramirez RN Registered Nurse Nurse                        OT Recommendation and Plan    Planned Therapy Interventions (OT): activity tolerance training, adaptive equipment training, BADL retraining, neuromuscular control/coordination retraining, ROM/therapeutic exercise, strengthening exercise, transfer/mobility retraining, patient/caregiver education/training                    Time Calculation:      Time Calculation- OT       Row Name 10/06/23 1240             Time Calculation- OT    OT Start Time 0820  -BF      OT Stop Time 1000  -BF      OT Time Calculation (min) 100 min  -BF      Total Timed Code Minutes-  minute(s)  -BF      OT Non-Billable Time (min) 10 min  -BF                User Key  (r) = Recorded By, (t) = Taken By, (c) = Cosigned By      Initials Name Provider Type    BF Pma Peck OT Occupational Therapist                  Therapy Charges for Today       Code Description  Service Date Service Provider Modifiers Qty    65381621209 HC OT SELF CARE/MGMT/TRAIN EA 15 MIN 10/5/2023 Pam Peck, OT GO 1    48160599129 HC OT THER PROC EA 15 MIN 10/5/2023 Pam Peck, OT GO 3    37431153922 HC OT NEUROMUSC RE EDUCATION EA 15 MIN 10/5/2023 Pam Peck, OT GO 2    61031611088 HC OT SELF CARE/MGMT/TRAIN EA 15 MIN 10/6/2023 Pam Peck, OT GO 4    88372748494 HC OT THERAPEUTIC ACT EA 15 MIN 10/6/2023 Pam Peck, OT GO 2    27558911483 HC OT THER PROC EA 15 MIN 10/6/2023 Pam Peck, OT GO 1                     Pam Peck, OT  10/6/2023

## 2023-10-06 NOTE — PLAN OF CARE
Problem: Rehabilitation (IRF) Plan of Care  Goal: Absence of New-Onset Illness or Injury  Outcome: Ongoing, Progressing  Intervention: Prevent Fall and Fall Injury  Recent Flowsheet Documentation  Taken 10/6/2023 1000 by Renee Matute, RN  Safety Promotion/Fall Prevention:   fall prevention program maintained   nonskid shoes/slippers when out of bed   safety round/check completed  Taken 10/6/2023 0800 by Renee Matute, RN  Safety Promotion/Fall Prevention:   fall prevention program maintained   nonskid shoes/slippers when out of bed   safety round/check completed  Taken 10/6/2023 0721 by Renee Matute, RN  Safety Promotion/Fall Prevention:   fall prevention program maintained   nonskid shoes/slippers when out of bed   safety round/check completed   Goal Outcome Evaluation:

## 2023-10-07 LAB
GLUCOSE BLDC GLUCOMTR-MCNC: 101 MG/DL (ref 70–130)
GLUCOSE BLDC GLUCOMTR-MCNC: 109 MG/DL (ref 70–130)
GLUCOSE BLDC GLUCOMTR-MCNC: 127 MG/DL (ref 70–130)
GLUCOSE BLDC GLUCOMTR-MCNC: 142 MG/DL (ref 70–130)

## 2023-10-07 PROCEDURE — 99232 SBSQ HOSP IP/OBS MODERATE 35: CPT | Performed by: NURSE PRACTITIONER

## 2023-10-07 PROCEDURE — 94761 N-INVAS EAR/PLS OXIMETRY MLT: CPT

## 2023-10-07 PROCEDURE — 94799 UNLISTED PULMONARY SVC/PX: CPT

## 2023-10-07 PROCEDURE — 25010000002 ENOXAPARIN PER 10 MG: Performed by: FAMILY MEDICINE

## 2023-10-07 PROCEDURE — 82948 REAGENT STRIP/BLOOD GLUCOSE: CPT

## 2023-10-07 PROCEDURE — 94664 DEMO&/EVAL PT USE INHALER: CPT

## 2023-10-07 RX ADMIN — NYSTATIN 1 APPLICATION: 100000 CREAM TOPICAL at 09:24

## 2023-10-07 RX ADMIN — PRAVASTATIN SODIUM 40 MG: 40 TABLET ORAL at 09:22

## 2023-10-07 RX ADMIN — MUPIROCIN 1 APPLICATION: 20 OINTMENT TOPICAL at 20:33

## 2023-10-07 RX ADMIN — PANTOPRAZOLE SODIUM 40 MG: 40 TABLET, DELAYED RELEASE ORAL at 06:39

## 2023-10-07 RX ADMIN — DOCUSATE SODIUM 50 MG AND SENNOSIDES 8.6 MG 2 TABLET: 8.6; 5 TABLET, FILM COATED ORAL at 09:22

## 2023-10-07 RX ADMIN — NYSTATIN 1 APPLICATION: 100000 CREAM TOPICAL at 20:34

## 2023-10-07 RX ADMIN — POLYETHYLENE GLYCOL (3350) 17 G: 17 POWDER, FOR SOLUTION ORAL at 09:21

## 2023-10-07 RX ADMIN — Medication 10 ML: at 09:24

## 2023-10-07 RX ADMIN — BUPROPION HYDROCHLORIDE 150 MG: 150 TABLET, EXTENDED RELEASE ORAL at 09:22

## 2023-10-07 RX ADMIN — TIOTROPIUM BROMIDE INHALATION SPRAY 2 PUFF: 3.12 SPRAY, METERED RESPIRATORY (INHALATION) at 07:10

## 2023-10-07 RX ADMIN — Medication 10 ML: at 20:33

## 2023-10-07 RX ADMIN — VENLAFAXINE HYDROCHLORIDE 75 MG: 75 CAPSULE, EXTENDED RELEASE ORAL at 09:22

## 2023-10-07 RX ADMIN — MONTELUKAST SODIUM 10 MG: 10 TABLET, COATED ORAL at 20:33

## 2023-10-07 RX ADMIN — ISOSORBIDE MONONITRATE 30 MG: 30 TABLET, EXTENDED RELEASE ORAL at 09:22

## 2023-10-07 RX ADMIN — MUPIROCIN 1 APPLICATION: 20 OINTMENT TOPICAL at 09:24

## 2023-10-07 RX ADMIN — SACUBITRIL AND VALSARTAN 1 TABLET: 24; 26 TABLET, FILM COATED ORAL at 09:22

## 2023-10-07 RX ADMIN — METOPROLOL SUCCINATE 25 MG: 25 TABLET, EXTENDED RELEASE ORAL at 09:22

## 2023-10-07 RX ADMIN — CETIRIZINE HYDROCHLORIDE 5 MG: 10 TABLET, FILM COATED ORAL at 09:22

## 2023-10-07 RX ADMIN — ASPIRIN 81 MG: 81 TABLET, COATED ORAL at 09:22

## 2023-10-07 RX ADMIN — DOCUSATE SODIUM 50 MG AND SENNOSIDES 8.6 MG 2 TABLET: 8.6; 5 TABLET, FILM COATED ORAL at 20:33

## 2023-10-07 RX ADMIN — SACUBITRIL AND VALSARTAN 1 TABLET: 24; 26 TABLET, FILM COATED ORAL at 20:33

## 2023-10-07 RX ADMIN — ENOXAPARIN SODIUM 40 MG: 40 INJECTION SUBCUTANEOUS at 11:46

## 2023-10-07 NOTE — PLAN OF CARE
Problem: Rehabilitation (IRF) Plan of Care  Goal: Absence of New-Onset Illness or Injury  Intervention: Prevent Fall and Fall Injury  Recent Flowsheet Documentation  Taken 10/7/2023 1000 by Renee Matute, RN  Safety Promotion/Fall Prevention:   fall prevention program maintained   nonskid shoes/slippers when out of bed   safety round/check completed  Taken 10/7/2023 0800 by Renee Matute, RN  Safety Promotion/Fall Prevention:   fall prevention program maintained   nonskid shoes/slippers when out of bed   safety round/check completed  Taken 10/7/2023 0727 by Renee Matute, RN  Safety Promotion/Fall Prevention:   fall prevention program maintained   nonskid shoes/slippers when out of bed   safety round/check completed   Goal Outcome Evaluation:

## 2023-10-07 NOTE — PROGRESS NOTES
[facility]  PROGRESS NOTE     Patient Identification:  Name:  Ofelia Knox  Age:  78 y.o.  Sex:  female  :  1945  MRN:  [id number]  Visit Number:  [id number]  ROOM: [facility]     Primary Care Provider:  Provider, No Known    Length of stay in inpatient status:  26    Subjective     Chief Compliant:  No chief complaint on file.      History of Presenting Illness: 78-year-old female status post CVA.  Patient has been treated for COVID-19 this week.  Patient states she is feeling much better today.  Breathing easily at this time    Objective     Current Hospital Meds:aspirin, 81 mg, Oral, Daily  buPROPion XL, 150 mg, Oral, Daily  cetirizine, 5 mg, Oral, Daily  enoxaparin, 40 mg, Subcutaneous, Q24H  furosemide, 20 mg, Oral, Every Other Day  isosorbide mononitrate, 30 mg, Oral, Q24H  metoprolol succinate XL, 25 mg, Oral, Q24H  montelukast, 10 mg, Oral, Nightly  mupirocin, 1 application , Each Nare, BID  nystatin, 1 application , Topical, Q12H  pantoprazole, 40 mg, Oral, QAM AC  polyethylene glycol, 17 g, Oral, Daily  pravastatin, 40 mg, Oral, Daily  sacubitril-valsartan, 1 tablet, Oral, Q12H  senna-docusate sodium, 2 tablet, Oral, BID  sodium chloride, 10 mL, Intravenous, Q12H  tiotropium bromide monohydrate, 2 puff, Inhalation, Daily - RT  venlafaxine XR, 75 mg, Oral, Daily With Breakfast       ----------------------------------------------------------------------------------------------------------------------  Vital Signs:  Temp:  [98 øF (36.7 øC)] 98 øF (36.7 øC)  Heart Rate:  [54-59] 54  Resp:  [18-20] 18  BP: (136)/(75) 136/75  SpO2:  [92 %-95 %] 95 %  on   ;   Device (Oxygen Therapy): room air  Body mass index is 40.03 kg/mý.    Wt Readings from Last 3 Encounters:   23 112 kg (248 lb)     Intake & Output (last 3 days)         10/04 0701  10/05 0700 10/05 0701  10/06 0700 10/06 0701  10/07 0700 10/07 0701  10/08 0700    P.O. 4610 394 2980 240    Total Intake(mL/kg) 1200 (10.7) 720 (6.4)  1320 (11.8) 240 (2.1)    Urine (mL/kg/hr)  500 (0.2)      Total Output  500      Net +1200 +220 +1320 +240            Urine Unmeasured Occurrence 5 x 1 x 5 x     Stool Unmeasured Occurrence   2 x           Diet: Cardiac Diets, Diabetic Diets; Healthy Heart (2-3 Na+); Consistent Carbohydrate; Texture: Regular Texture (IDDSI 7); Fluid Consistency: Thin (IDDSI 0)  ----------------------------------------------------------------------------------------------------------------------  Physical exam:  Constitutional: No acute distress  HEENT: Normocephalic atraumatic  Neck: Supple  Cardiovascular: Regular rate and rhythm  Pulmonary/Chest: Clear to auscultation  Abdominal: Positive bowel sounds soft.   Musculoskeletal: No arthropathy  Neurological: No focal deficits, still some generalized weakness  Skin: No rash   peripheral vascular: No edema  Genitourinary:  ----------------------------------------------------------------------------------------------------------------------    Last echocardiogram:  Results for orders placed during the hospital encounter of 09/11/23    Adult Transthoracic Echo Complete W/ Cont if Necessary Per Protocol    Interpretation Summary    Left ventricular systolic function is mildly decreased. Calculated left ventricular EF = 44% Left ventricular ejection fraction appears to be 41 - 45%.    Left ventricular wall thickness is consistent with mild eccentric hypertrophy.    Left ventricular diastolic function is consistent with (grade I) impaired relaxation.    The left atrial cavity is moderately dilated.    ----------------------------------------------------------------------------------------------------------------------  Results from last 7 days   Lab Units 10/03/23  1055   WBC 10*3/mm3 8.86   HEMOGLOBIN g/dL 14.2   HEMATOCRIT % 46.6   MCV fL 93.6   MCHC g/dL 30.5*   PLATELETS 10*3/mm3 228         Results from last 7 days   Lab Units 10/03/23  1055   SODIUM mmol/L 138   POTASSIUM mmol/L 3.7  "  CHLORIDE mmol/L 102   CO2 mmol/L 26.6   BUN mg/dL 16   CREATININE mg/dL 0.95   CALCIUM mg/dL 9.2   GLUCOSE mg/dL 117*   ALBUMIN g/dL 3.5   BILIRUBIN mg/dL 0.4   ALK PHOS U/L 80   AST (SGOT) U/L 15   ALT (SGPT) U/L 16   Estimated Creatinine Clearance: 63.9 mL/min (by C-G formula based on SCr of 0.95 mg/dL).  No results found for: \"AMMONIA\"  Results from last 7 days   Lab Units 10/03/23  1308 10/03/23  1055   HSTROP T ng/L 18* 17*             Glucose   Date/Time Value Ref Range Status   10/07/2023 0654 109 70 - 130 mg/dL Final   10/06/2023 1936 173 (H) 70 - 130 mg/dL Final   10/06/2023 1601 212 (H) 70 - 130 mg/dL Final   10/06/2023 1054 108 70 - 130 mg/dL Final   10/06/2023 0606 112 70 - 130 mg/dL Final   10/05/2023 2014 155 (H) 70 - 130 mg/dL Final   10/05/2023 1554 162 (H) 70 - 130 mg/dL Final   10/05/2023 1022 95 70 - 130 mg/dL Final     Lab Results   Component Value Date    TSH 1.470 09/12/2023    FREET4 1.15 09/12/2023     No results found for: \"PREGTESTUR\", \"PREGSERUM\", \"HCG\", \"HCGQUANT\"  Pain Management Panel           No data to display              Brief Urine Lab Results  (Last result in the past 365 days)        Color   Clarity   Blood   Leuk Est   Nitrite   Protein   CREAT   Urine HCG        09/14/23 1935 Dark Yellow   Clear   Negative   Negative   Negative   Trace                 No results found for: \"BLOODCX\"      No results found for: \"URINECX\"  No results found for: \"WOUNDCX\"  No results found for: \"STOOLCX\"        I have personally looked at the labs and they are summarized above.  ----------------------------------------------------------------------------------------------------------------------  Detailed radiology reports for the last 24 hours:    Imaging Results (Last 24 Hours)       ** No results found for the last 24 hours. **          Final impressions for the last 30 days of radiology reports:    XR Chest 1 View    Result Date: 10/3/2023  No radiographic evidence of acute cardiac or " pulmonary disease.    This report was finalized on 10/3/2023 10:55 AM by Dr. Krunal Colvin MD.      XR Chest 1 View    Result Date: 9/20/2023  Mild pulmonary vascular congestion.   This report was finalized on 9/20/2023 6:48 PM by Alex Pallas, DO.      XR Chest 1 View    Result Date: 9/14/2023  No radiographic evidence of acute cardiac or pulmonary disease.  This report was finalized on 9/14/2023 7:54 AM by Dr. Krunal Colvin MD.     I have personally looked at the radiology images and read the final radiology report.    Assessment & Plan    Status post CVA--modified independent for bed mobility; supervision for transfers; ambulated 150 feet twice with front wheel walker and supervision yesterday.  Requiring contact-guard for lower body dressing contact-guard for  toileting.    COVID-19--has completed remdesivir.  Have also discontinued dexamethasone.  Will monitor closely    Diabetes mellitus controlled    CHF reduced EF compensated    Depression    VTE Prophylaxis:   Mechanical Order History:        Ordered        09/11/23 2023  Place Sequential Compression Device  Once            09/11/23 2023  Maintain Sequential Compression Device  Continuous                          Pharmalogical Order History:        Ordered     Dose Route Frequency Stop    10/03/23 1010  Enoxaparin Sodium (LOVENOX) syringe 40 mg         40 mg SC Every 24 Hours --                      Abdullhai Sharpe MD  [facility]  10/07/23  09:30 EDT

## 2023-10-07 NOTE — PROGRESS NOTES
PROGRESS NOTE         Patient Identification:  Name:  Ofelia Knox  Age:  78 y.o.  Sex:  female  :  1945  MRN:  [id number]  Visit Number:  [id number]  Primary Care Provider:  Provider, No Known         LOS: 26 days       ----------------------------------------------------------------------------------------------------------------------  Subjective       Chief Complaints:    No chief complaint on file.        Interval History:      The patient is awake and alert, resting comfortably in bed.  She is very pleasant and states she feels very well today.  She denies any cough or other complaints.  Afebrile.  Denies diarrhea.  Denies nausea/vomiting.  Lungs were clear with diminished bases bilaterally.  Abdomen soft and nontender, rounded with normoactive bowel sounds.  No new lab is available today.      Review of Systems:    Constitutional: no fever, chills and night sweats. No appetite change or unexpected weight change.    Eyes: no eye drainage, itching or redness.  HEENT: no mouth sores, dysphagia or nose bleed.  Respiratory: no for shortness of breath, denies cough  Cardiovascular: no chest pain, no palpitations, no orthopnea.  Gastrointestinal: no nausea, vomiting or diarrhea. No abdominal pain, hematemesis or rectal bleeding.  Genitourinary: no dysuria or polyuria.  Hematologic/lymphatic: no lymph node abnormalities, no easy bruising or easy bleeding.  Musculoskeletal: no muscle or joint pain.  Skin: No rash and no itching.  Neurological: no loss of consciousness, no seizure, no headache.  Behavioral/Psych: no depression or suicidal ideation.  Endocrine: no hot flashes.  Immunologic: negative.    ----------------------------------------------------------------------------------------------------------------------      Objective       [facility] [place] Meds:  aspirin, 81 mg, Oral, Daily  buPROPion XL, 150 mg, Oral, Daily  cetirizine, 5 mg, Oral, Daily  enoxaparin, 40 mg, Subcutaneous,  Q24H  furosemide, 20 mg, Oral, Every Other Day  isosorbide mononitrate, 30 mg, Oral, Q24H  metoprolol succinate XL, 25 mg, Oral, Q24H  montelukast, 10 mg, Oral, Nightly  mupirocin, 1 application , Each Nare, BID  nystatin, 1 application , Topical, Q12H  pantoprazole, 40 mg, Oral, QAM AC  polyethylene glycol, 17 g, Oral, Daily  pravastatin, 40 mg, Oral, Daily  sacubitril-valsartan, 1 tablet, Oral, Q12H  senna-docusate sodium, 2 tablet, Oral, BID  sodium chloride, 10 mL, Intravenous, Q12H  tiotropium bromide monohydrate, 2 puff, Inhalation, Daily - RT  venlafaxine XR, 75 mg, Oral, Daily With Breakfast           ----------------------------------------------------------------------------------------------------------------------    Vital Signs:  Temp:  [98 øF (36.7 øC)] 98 øF (36.7 øC)  Heart Rate:  [54-59] 54  Resp:  [18-20] 18  BP: (136)/(75) 136/75  No data found.    SpO2 Percentage    10/06/23 0835 10/06/23 1900 10/07/23 0710   SpO2: 96% 92% 95%     SpO2:  [92 %-95 %] 95 %  on   ;   Device (Oxygen Therapy): room air    Body mass index is 40.03 kg/mý.  Wt Readings from Last 3 Encounters:   09/11/23 112 kg (248 lb)        Intake/Output Summary (Last 24 hours) at 10/7/2023 1014  Last data filed at 10/7/2023 0900  Gross per 24 hour   Intake 1320 ml   Output --   Net 1320 ml     Diet: Cardiac Diets, Diabetic Diets; Healthy Heart (2-3 Na+); Consistent Carbohydrate; Texture: Regular Texture (IDDSI 7); Fluid Consistency: Thin (IDDSI 0)  ----------------------------------------------------------------------------------------------------------------------      Physical Exam:    Constitutional: Sitting up comfortably in bed.  On room air with no apparent distress.  Afebrile.  Denies diarrhea.  Denies any complaints.  States she feels very well.  Very pleasant this morning.  HENT:  Head: Normocephalic and atraumatic.  Mouth:  Moist mucous membranes.    Eyes:  Conjunctivae and EOM are normal.  No scleral icterus.  Neck:  Neck  "supple.  No JVD present.    Cardiovascular:  Normal rate, regular rhythm and normal heart sounds with no murmur. No edema.  Pulmonary/Chest: Clear with diminished bases bilaterally to auscultation  Abdominal:  Soft.  Bowel sounds are normal.  No distension and no tenderness.   Musculoskeletal: No edema, no tenderness, and no deformity.  No swelling or redness of joints.  Neurological:  Alert and oriented to person, place, and time.  No facial droop.  No slurred speech.   Skin:  Skin is warm and dry.  No rash noted.  No pallor.   Psychiatric:  Normal mood and affect.  Behavior is normal.        ----------------------------------------------------------------------------------------------------------------------  Results from last 7 days   Lab Units 10/03/23  1308 10/03/23  1055   HSTROP T ng/L 18* 17*           Results from last 7 days   Lab Units 10/03/23  1055   WBC 10*3/mm3 8.86   HEMOGLOBIN g/dL 14.2   HEMATOCRIT % 46.6   MCV fL 93.6   MCHC g/dL 30.5*   PLATELETS 10*3/mm3 228     Results from last 7 days   Lab Units 10/03/23  1055   SODIUM mmol/L 138   POTASSIUM mmol/L 3.7   CHLORIDE mmol/L 102   CO2 mmol/L 26.6   BUN mg/dL 16   CREATININE mg/dL 0.95   CALCIUM mg/dL 9.2   GLUCOSE mg/dL 117*   ALBUMIN g/dL 3.5   BILIRUBIN mg/dL 0.4   ALK PHOS U/L 80   AST (SGOT) U/L 15   ALT (SGPT) U/L 16   Estimated Creatinine Clearance: 63.9 mL/min (by C-G formula based on SCr of 0.95 mg/dL).  No results found for: \"AMMONIA\"    Glucose   Date/Time Value Ref Range Status   10/07/2023 0654 109 70 - 130 mg/dL Final   10/06/2023 1936 173 (H) 70 - 130 mg/dL Final   10/06/2023 1601 212 (H) 70 - 130 mg/dL Final   10/06/2023 1054 108 70 - 130 mg/dL Final   10/06/2023 0606 112 70 - 130 mg/dL Final   10/05/2023 2014 155 (H) 70 - 130 mg/dL Final   10/05/2023 1554 162 (H) 70 - 130 mg/dL Final   10/05/2023 1022 95 70 - 130 mg/dL Final     Lab Results   Component Value Date    HGBA1C 6.80 (H) 09/12/2023     Lab Results   Component Value Date "    TSH 1.470 09/12/2023    FREET4 1.15 09/12/2023       No results found for: BLOODCX  No results found for: URINECX  No results found for: WOUNDCX  No results found for: STOOLCX  No results found for: RESPCX  Pain Management Panel           No data to display                  ----------------------------------------------------------------------------------------------------------------------  Imaging Results (Last 24 Hours)       ** No results found for the last 24 hours. **            ----------------------------------------------------------------------------------------------------------------------    Pertinent Infectious Disease Results        Assessment/Plan       Assessment     COVID-19 infection        Plan      The patient is awake and alert, resting comfortably in bed.  She is very pleasant and states she feels very well today.  She denies any cough or other complaints.  Afebrile.  Denies diarrhea.  Denies nausea/vomiting.  Lungs were clear with diminished bases bilaterally.  Abdomen soft and nontender, rounded with normoactive bowel sounds.  No new lab is available today.    The patient completed COVID treatment with remdesivir course.  States that she feels she is significantly improved and is happy with her progress.  As the patient does not have any evidence of clinical sepsis, the infectious disease team will sign off the case for now.  Please call us back with any questions, concerns, or changes with the patient's status.  Thank you.    ANTIMICROBIAL THERAPY    This patient does not have an active medication from one of the medication groupers.     Code Status:   Code Status and Medical Interventions:   Ordered at: 09/11/23 2023     Code Status (Patient has no pulse and is not breathing):    CPR (Attempt to Resuscitate)     Medical Interventions (Patient has pulse or is breathing):    Full Support       MADDIE Link  10/07/23  10:14 EDT

## 2023-10-08 LAB
GLUCOSE BLDC GLUCOMTR-MCNC: 109 MG/DL (ref 70–130)
GLUCOSE BLDC GLUCOMTR-MCNC: 123 MG/DL (ref 70–130)
GLUCOSE BLDC GLUCOMTR-MCNC: 127 MG/DL (ref 70–130)
GLUCOSE BLDC GLUCOMTR-MCNC: 128 MG/DL (ref 70–130)

## 2023-10-08 PROCEDURE — 82948 REAGENT STRIP/BLOOD GLUCOSE: CPT

## 2023-10-08 PROCEDURE — 94799 UNLISTED PULMONARY SVC/PX: CPT

## 2023-10-08 PROCEDURE — 25010000002 ENOXAPARIN PER 10 MG: Performed by: FAMILY MEDICINE

## 2023-10-08 PROCEDURE — 94664 DEMO&/EVAL PT USE INHALER: CPT

## 2023-10-08 PROCEDURE — 94761 N-INVAS EAR/PLS OXIMETRY MLT: CPT

## 2023-10-08 RX ADMIN — ASPIRIN 81 MG: 81 TABLET, COATED ORAL at 08:58

## 2023-10-08 RX ADMIN — BENZONATATE 200 MG: 100 CAPSULE ORAL at 20:35

## 2023-10-08 RX ADMIN — ISOSORBIDE MONONITRATE 30 MG: 30 TABLET, EXTENDED RELEASE ORAL at 08:57

## 2023-10-08 RX ADMIN — Medication 10 ML: at 09:00

## 2023-10-08 RX ADMIN — MONTELUKAST SODIUM 10 MG: 10 TABLET, COATED ORAL at 20:36

## 2023-10-08 RX ADMIN — SACUBITRIL AND VALSARTAN 1 TABLET: 24; 26 TABLET, FILM COATED ORAL at 08:58

## 2023-10-08 RX ADMIN — FUROSEMIDE 20 MG: 20 TABLET ORAL at 08:58

## 2023-10-08 RX ADMIN — ACETAMINOPHEN 650 MG: 325 TABLET ORAL at 20:35

## 2023-10-08 RX ADMIN — Medication 10 ML: at 20:37

## 2023-10-08 RX ADMIN — BUPROPION HYDROCHLORIDE 150 MG: 150 TABLET, EXTENDED RELEASE ORAL at 08:58

## 2023-10-08 RX ADMIN — DOCUSATE SODIUM 50 MG AND SENNOSIDES 8.6 MG 2 TABLET: 8.6; 5 TABLET, FILM COATED ORAL at 20:36

## 2023-10-08 RX ADMIN — METOPROLOL SUCCINATE 25 MG: 25 TABLET, EXTENDED RELEASE ORAL at 08:58

## 2023-10-08 RX ADMIN — SACUBITRIL AND VALSARTAN 1 TABLET: 24; 26 TABLET, FILM COATED ORAL at 20:35

## 2023-10-08 RX ADMIN — PANTOPRAZOLE SODIUM 40 MG: 40 TABLET, DELAYED RELEASE ORAL at 08:56

## 2023-10-08 RX ADMIN — NYSTATIN 1 APPLICATION: 100000 CREAM TOPICAL at 20:36

## 2023-10-08 RX ADMIN — TIOTROPIUM BROMIDE INHALATION SPRAY 2 PUFF: 3.12 SPRAY, METERED RESPIRATORY (INHALATION) at 07:16

## 2023-10-08 RX ADMIN — VENLAFAXINE HYDROCHLORIDE 75 MG: 75 CAPSULE, EXTENDED RELEASE ORAL at 08:57

## 2023-10-08 RX ADMIN — PRAVASTATIN SODIUM 40 MG: 40 TABLET ORAL at 08:57

## 2023-10-08 RX ADMIN — CETIRIZINE HYDROCHLORIDE 5 MG: 10 TABLET, FILM COATED ORAL at 08:58

## 2023-10-08 RX ADMIN — DOCUSATE SODIUM 50 MG AND SENNOSIDES 8.6 MG 2 TABLET: 8.6; 5 TABLET, FILM COATED ORAL at 08:58

## 2023-10-08 RX ADMIN — ENOXAPARIN SODIUM 40 MG: 40 INJECTION SUBCUTANEOUS at 12:35

## 2023-10-08 NOTE — PLAN OF CARE
Goal Outcome Evaluation:              Outcome Evaluation: Patient has rested in bed,  no other complaints or request at this time, Will continue plan of care.

## 2023-10-09 LAB
GLUCOSE BLDC GLUCOMTR-MCNC: 112 MG/DL (ref 70–130)
GLUCOSE BLDC GLUCOMTR-MCNC: 113 MG/DL (ref 70–130)
GLUCOSE BLDC GLUCOMTR-MCNC: 131 MG/DL (ref 70–130)
GLUCOSE BLDC GLUCOMTR-MCNC: 91 MG/DL (ref 70–130)

## 2023-10-09 PROCEDURE — 94664 DEMO&/EVAL PT USE INHALER: CPT

## 2023-10-09 PROCEDURE — 97112 NEUROMUSCULAR REEDUCATION: CPT | Performed by: OCCUPATIONAL THERAPIST

## 2023-10-09 PROCEDURE — 97110 THERAPEUTIC EXERCISES: CPT

## 2023-10-09 PROCEDURE — 97530 THERAPEUTIC ACTIVITIES: CPT

## 2023-10-09 PROCEDURE — 97535 SELF CARE MNGMENT TRAINING: CPT | Performed by: OCCUPATIONAL THERAPIST

## 2023-10-09 PROCEDURE — 82948 REAGENT STRIP/BLOOD GLUCOSE: CPT

## 2023-10-09 PROCEDURE — 97116 GAIT TRAINING THERAPY: CPT

## 2023-10-09 PROCEDURE — 97530 THERAPEUTIC ACTIVITIES: CPT | Performed by: OCCUPATIONAL THERAPIST

## 2023-10-09 PROCEDURE — 25010000002 ENOXAPARIN PER 10 MG: Performed by: FAMILY MEDICINE

## 2023-10-09 PROCEDURE — 94760 N-INVAS EAR/PLS OXIMETRY 1: CPT

## 2023-10-09 PROCEDURE — 94799 UNLISTED PULMONARY SVC/PX: CPT

## 2023-10-09 RX ADMIN — ACETAMINOPHEN 650 MG: 325 TABLET ORAL at 20:27

## 2023-10-09 RX ADMIN — PRAVASTATIN SODIUM 40 MG: 40 TABLET ORAL at 08:50

## 2023-10-09 RX ADMIN — Medication 10 ML: at 20:29

## 2023-10-09 RX ADMIN — Medication 10 ML: at 08:53

## 2023-10-09 RX ADMIN — ENOXAPARIN SODIUM 40 MG: 40 INJECTION SUBCUTANEOUS at 11:59

## 2023-10-09 RX ADMIN — TIOTROPIUM BROMIDE INHALATION SPRAY 2 PUFF: 3.12 SPRAY, METERED RESPIRATORY (INHALATION) at 07:30

## 2023-10-09 RX ADMIN — NYSTATIN 1 APPLICATION: 100000 CREAM TOPICAL at 15:42

## 2023-10-09 RX ADMIN — ASPIRIN 81 MG: 81 TABLET, COATED ORAL at 08:50

## 2023-10-09 RX ADMIN — DOCUSATE SODIUM 50 MG AND SENNOSIDES 8.6 MG 2 TABLET: 8.6; 5 TABLET, FILM COATED ORAL at 20:28

## 2023-10-09 RX ADMIN — NYSTATIN 1 APPLICATION: 100000 CREAM TOPICAL at 20:28

## 2023-10-09 RX ADMIN — BUPROPION HYDROCHLORIDE 150 MG: 150 TABLET, EXTENDED RELEASE ORAL at 08:51

## 2023-10-09 RX ADMIN — CETIRIZINE HYDROCHLORIDE 5 MG: 10 TABLET, FILM COATED ORAL at 08:51

## 2023-10-09 RX ADMIN — PANTOPRAZOLE SODIUM 40 MG: 40 TABLET, DELAYED RELEASE ORAL at 06:31

## 2023-10-09 RX ADMIN — SACUBITRIL AND VALSARTAN 1 TABLET: 24; 26 TABLET, FILM COATED ORAL at 20:28

## 2023-10-09 RX ADMIN — MONTELUKAST SODIUM 10 MG: 10 TABLET, COATED ORAL at 20:28

## 2023-10-09 RX ADMIN — VENLAFAXINE HYDROCHLORIDE 75 MG: 75 CAPSULE, EXTENDED RELEASE ORAL at 08:50

## 2023-10-09 NOTE — PROGRESS NOTES
Inpatient Rehabilitation Functional Measures Assessment and Plan of Care    Plan of Care  Self Care    [OT] Dressing (Lower)(Active)  Current Status(10/09/2023): CGA  Weekly Goal(10/17/2023): SBA  Discharge Goal: SBA    Functional Measures  JOHN Eating:  JOHN Grooming:  JOHN Bathing:  JOHN Upper Body Dressing:  JOHN Lower Body Dressing:  JOHN Toileting:    JOHN Bladder Management  Level of Assistance:  Frequency/Number of Accidents this Shift:    JOHN Bowel Management  Level of Assistance:  Frequency/Number of Accidents this Shift:    JOHN Bed/Chair/Wheelchair Transfer:  JOHN Toilet Transfer:  JOHN Tub/Shower Transfer:    Previously Documented Mode of Locomotion at Discharge:  JOHN Expected Mode of Locomotion at Discharge:  JOHN Walk/Wheelchair:  JOHN Stairs:    JOHN Comprehension:  JOHN Expression:  JOHN Social Interaction:  JOHN Problem Solving:  JOHN Memory:    Therapy Mode Minutes  Occupational Therapy: Individual: 100 minutes.  Physical Therapy:  Speech Language Pathology:    Discharge Functional Goals:    Signed by: Pam Peck OT

## 2023-10-09 NOTE — PROGRESS NOTES
Assisted By: Patient was seen with Felicity GLASS.    CC: Follow-up on CVA    Interview History/HPI: Patient states she is doing well, does states that she still has a cough but no phlegm, no shortness of breath no chest pain.  She is sitting up self-feeding.          Current Hospital Meds:  aspirin, 81 mg, Oral, Daily  buPROPion XL, 150 mg, Oral, Daily  cetirizine, 5 mg, Oral, Daily  enoxaparin, 40 mg, Subcutaneous, Q24H  furosemide, 20 mg, Oral, Every Other Day  isosorbide mononitrate, 30 mg, Oral, Q24H  metoprolol succinate XL, 25 mg, Oral, Q24H  montelukast, 10 mg, Oral, Nightly  nystatin, 1 application , Topical, Q12H  pantoprazole, 40 mg, Oral, QAM AC  polyethylene glycol, 17 g, Oral, Daily  pravastatin, 40 mg, Oral, Daily  sacubitril-valsartan, 1 tablet, Oral, Q12H  senna-docusate sodium, 2 tablet, Oral, BID  sodium chloride, 10 mL, Intravenous, Q12H  tiotropium bromide monohydrate, 2 puff, Inhalation, Daily - RT  venlafaxine XR, 75 mg, Oral, Daily With Breakfast         Vitals:    10/09/23 0850   BP: 99/65   Pulse: 62   Resp: 18   Temp: 97.6 øF (36.4 øC)   SpO2: 91%         Intake/Output Summary (Last 24 hours) at 10/9/2023 1142  Last data filed at 10/9/2023 0852  Gross per 24 hour   Intake 1200 ml   Output --   Net 1200 ml       EXAM: Patient speech has progressed even further than when I last saw her.  Strength overall appears symmetric, lungs clear heart regular rate and rhythm patient was seen under COVID isolation.      Diet: Cardiac Diets, Diabetic Diets; Healthy Heart (2-3 Na+); Consistent Carbohydrate; Texture: Regular Texture (IDDSI 7); Fluid Consistency: Thin (IDDSI 0)        LABS:     Lab Results (last 48 hours)       Procedure Component Value Units Date/Time    POC Glucose Once [[id number]]  (Normal) Collected: 10/09/23 1107    Specimen: Blood Updated: 10/09/23 1116     Glucose 112 mg/dL     POC Glucose Once [[id number]]  (Normal) Collected: 10/09/23 0555    Specimen: Blood Updated: 10/09/23 0603      Glucose 113 mg/dL     POC Glucose Once [[id number]]  (Normal) Collected: 10/08/23 2006    Specimen: Blood Updated: 10/08/23 2015     Glucose 123 mg/dL     POC Glucose Once [[id number]]  (Normal) Collected: 10/08/23 1619    Specimen: Blood Updated: 10/08/23 1626     Glucose 128 mg/dL     POC Glucose Once [[id number]]  (Normal) Collected: 10/08/23 1106    Specimen: Blood Updated: 10/08/23 1112     Glucose 127 mg/dL     POC Glucose Once [[id number]]  (Normal) Collected: 10/08/23 0620    Specimen: Blood Updated: 10/08/23 0627     Glucose 109 mg/dL     POC Glucose Once [[id number]]  (Abnormal) Collected: 10/07/23 1935    Specimen: Blood Updated: 10/07/23 1942     Glucose 142 mg/dL     POC Glucose Once [[id number]]  (Normal) Collected: 10/07/23 1602    Specimen: Blood Updated: 10/07/23 1610     Glucose 127 mg/dL                  Radiology:    Imaging Results (Last 72 Hours)       ** No results found for the last 72 hours. **            Results for orders placed during the hospital encounter of 09/11/23    Adult Transthoracic Echo Complete W/ Cont if Necessary Per Protocol    Interpretation Summary    Left ventricular systolic function is mildly decreased. Calculated left ventricular EF = 44% Left ventricular ejection fraction appears to be 41 - 45%.    Left ventricular wall thickness is consistent with mild eccentric hypertrophy.    Left ventricular diastolic function is consistent with (grade I) impaired relaxation.    The left atrial cavity is moderately dilated.      Assessment/Plan:   Status post CVA, continue aspirin and Pravachol.  Patient is improving from a speech standpoint, patient is modified independent with bed mobility, supervision bed to chair and chair to bed, supervision stand to sit and stand pivot as well as sit to stand.  Patient walked 150 feet twice front wheeled walker in room due to isolation supervision.    COVID-19, minimally symptomatic, should resolve without symptoms.  Continue isolation patient  completed 3 days of remdesivir.    HFrEF, compensated, parameters put on Entresto, follow.    DVT prophylaxis, SCUDs    Diabetes, A1c 6.8, controlled off medications at this time.    Recheck electrolytes and CBC in a.m.    History of COPD, stable even with COVID currently.    Jim Appiah MD

## 2023-10-09 NOTE — THERAPY TREATMENT NOTE
Inpatient Rehabilitation - Physical Therapy Treatment Note       ABENA Quiroz     Patient Name: Ofelia Knox  : 1945  MRN: [id number]    Today's Date: 10/9/2023                    Admit Date: 2023      Visit Dx:   No diagnosis found.    Patient Active Problem List   Diagnosis    CVA (cerebral vascular accident)       Past Medical History:   Diagnosis Date    AMS (altered mental status)     Aphasia     CKD (chronic kidney disease)     COPD (chronic obstructive pulmonary disease)     CVA (cerebral vascular accident)     DM2 (diabetes mellitus, type 2)     HTN (hypertension)     Restrictive lung disease     Urinary tract infection due to ESBL Klebsiella        Past Surgical History:   Procedure Laterality Date    HYSTERECTOMY      JOINT REPLACEMENT         PT ASSESSMENT (last 12 hours)       IRF PT Evaluation and Treatment       Row Name 10/09/23 153          PT Time and Intention    Document Type re-evaluation;daily treatment  -LB     Mode of Treatment individual therapy;physical therapy  -LB       Row Name 10/09/23 153          General Information    Existing Precautions/Restrictions fall  confusion  -LB       Row Name 10/09/23 153          Pain Scale: FACES Pre/Post-Treatment    Pain: FACES Scale, Pretreatment 0-->no hurt  -LB     Posttreatment Pain Rating 0-->no hurt  -LB       Row Name 10/09/23 153          Cognition/Psychosocial    Affect/Mental Status (Cognition) confused  alert and cooperative  -LB     Follows Commands (Cognition) physical/tactile prompts required  -LB     Personal Safety Interventions gait belt;nonskid shoes/slippers when out of bed;supervised activity  -LB       Row Name 10/09/23 153          Bed Mobility    Supine-Sit-Supine [place] (Bed Mobility) modified independence  -LB     Assistive Device (Bed Mobility) bed rails  -LB       Row Name 10/09/23 153          Bed-Chair Transfer    Bed-Chair [place] (Transfers) verbal cues;nonverbal cues (demo/gesture);supervision   -LB     Assistive Device (Bed-Chair Transfers) wheelchair  -LB       Row Name 10/09/23 1532          Chair-Bed Transfer    Chair-Bed [place] (Transfers) verbal cues;nonverbal cues (demo/gesture);supervision  -LB     Assistive Device (Chair-Bed Transfers) wheelchair  -LB       Row Name 10/09/23 1532          Sit-Stand Transfer    Sit-Stand [place] (Transfers) verbal cues;nonverbal cues (demo/gesture);supervision  -LB     Assistive Device (Sit-Stand Transfers) walker, front-wheeled  -LB       Row Name 10/09/23 1532          Stand-Sit Transfer    Stand-Sit [place] (Transfers) verbal cues;nonverbal cues (demo/gesture);supervision  -LB     Assistive Device (Stand-Sit Transfers) walker, front-wheeled  -LB       Row Name 10/09/23 1532          Stand Pivot/Stand Step Transfer    Stand Pivot/Stand Step [place] (Transfers) supervision;verbal cues;nonverbal cues (demo/gesture)  -LB     Assistive Device (Stand Pivot Stand Step Transfer) walker, front-wheeled  -LB       Row Name 10/09/23 1532          Gait/Stairs (Locomotion)    [place] Level (Gait) verbal cues;nonverbal cues (demo/gesture);supervision  -LB     Assistive Device (Gait) walker, front-wheeled  -LB     Distance in Feet (Gait) 200' bid in room due to isolation  -LB     Deviations/Abnormal Patterns (Gait) beatrice decreased;gait speed decreased;stride length decreased  -LB     Bilateral Gait Deviations forward flexed posture  -LB       Row Name 10/09/23 1532          Motor Skills    Therapeutic Exercise --  sitting ex bid  -LB       Row Name 10/09/23 1532          Positioning and Restraints    Pre-Treatment Position --  am-w/c with alarm,  pm- bed  -LB     In Bed call light within reach;encouraged to call for assist;exit alarm on;heels elevated  pm  -LB     In Wheelchair call light within reach;encouraged to call for assist;exit alarm on;notified nsg  am  -LB       Row Name 10/09/23 1532          Vital Signs    Intra SpO2 (%) 97  after amb  -LB      O2 Delivery Intra Treatment room air  -LB       Row Name 10/09/23 1532          Weekly Progress Summary (PT)    Functional Goal Overall Progress (PT) progressing toward functional goals as expected  -LB     Weekly Progress Summary (PT) she had met goals for therapy at last re-eval but then became covid + and had a small set back. she is feeling better now and has almost returned to prior level.  -LB     Impairments Still Limiting Function (PT) strength deficit;functional activity tolerance impairment  -LB     Recommendations (PT) continue PT  -LB     Plan for Continued Service After Discharge (PT) tbd  -LB       Row Name 10/09/23 1532          Bed Mobility Goal 1 (PT-IRF)    Progress/Outcomes (Bed Mobility Goal 1, PT-IRF) goal met  -LB       Row Name 10/09/23 1532          Transfer Goal 1 (PT-IRF)    Progress/Outcomes (Transfer Goal 1, PT-IRF) goal met  -LB       Row Name 10/09/23 1532          Gait/Walking Locomotion Goal 1 (PT-IRF)    Progress/Outcomes (Gait/Walking Locomotion Goal 1, PT-IRF) goal ongoing  -LB               User Key  (r) = Recorded By, (t) = Taken By, (c) = Cosigned By      Initials Name Provider Type    Andra Lea, PT Physical Therapist                     Physical Therapy Education       Title: PT OT SLP Therapies (Done)       Topic: Physical Therapy (Done)       Point: Mobility training (Done)       Learning Progress Summary             Patient Acceptance, E, VU,NR by LB at 10/9/2023 1537    Acceptance, E, VU,NR by LB at 10/6/2023 1430    Acceptance, E, VU,NR by LB at 10/4/2023 1536    Acceptance, E, VU,NR by LB at 10/3/2023 1502    Acceptance, E,D, VU,NR by RG at 9/29/2023 1403    Acceptance, E, VU,NR by LB at 9/28/2023 1501    Acceptance, E, VU,NR by LB at 9/27/2023 1532    Acceptance, E, VU,NR by LB at 9/26/2023 1548    Acceptance, E,D, VU,NR by RG at 9/25/2023 1423    Acceptance, TB, NR by DL at 9/24/2023 2200    Acceptance, E,D, NR,VU by RG at 9/22/2023 1315     Acceptance, E,D, VU,NR by RG at 9/21/2023 1307    Acceptance, E,D, VU,NR by RG at 9/20/2023 1258    Acceptance, E,D, VU,NR by RG at 9/19/2023 1258    Acceptance, E,D, VU,NR by RG at 9/18/2023 1338    Acceptance, E,TB, VU by RM at 9/15/2023 1532    Acceptance, E,TB, VU by RM at 9/14/2023 1555    Acceptance, E,D, VU,NR by RG at 9/13/2023 1528    Acceptance, TB, NR by DL at 9/12/2023 2003    Acceptance, E, VU,NR by LB at 9/12/2023 1135                         Point: Home exercise program (Done)       Learning Progress Summary             Patient Acceptance, E, VU,NR by LB at 10/9/2023 1537    Acceptance, E, VU,NR by LB at 10/6/2023 1430    Acceptance, E, VU,NR by LB at 10/4/2023 1536    Acceptance, E, VU,NR by LB at 10/3/2023 1502    Acceptance, E,D, VU,NR by RG at 9/29/2023 1403    Acceptance, E, VU,NR by LB at 9/28/2023 1501    Acceptance, E, VU,NR by LB at 9/27/2023 1532    Acceptance, E, VU,NR by LB at 9/26/2023 1548    Acceptance, E,D, VU,NR by RG at 9/25/2023 1423    Acceptance, TB, NR by DL at 9/24/2023 2200    Acceptance, E,D, NR,VU by RG at 9/22/2023 1315    Acceptance, E,D, VU,NR by RG at 9/21/2023 1307    Acceptance, E,D, VU,NR by RG at 9/20/2023 1258    Acceptance, E,D, VU,NR by RG at 9/19/2023 1258    Acceptance, E,D, VU,NR by RG at 9/18/2023 1338    Acceptance, E,TB, VU by RM at 9/15/2023 1532    Acceptance, E,TB, VU by RM at 9/14/2023 1555    Acceptance, E,D, VU,NR by RG at 9/13/2023 1528    Acceptance, TB, NR by DL at 9/12/2023 2003    Acceptance, E, VU,NR by LB at 9/12/2023 1135                         Point: Body mechanics (Done)       Learning Progress Summary             Patient Acceptance, E, VU,NR by LB at 10/9/2023 1537    Acceptance, E, VU,NR by LB at 10/6/2023 1430    Acceptance, E, VU,NR by LB at 10/4/2023 1536    Acceptance, E, VU,NR by LB at 10/3/2023 1502    Acceptance, E,D, VU,NR by RG at 9/29/2023 1403    Acceptance, E, VU,NR by LB at 9/28/2023 1501    Acceptance, E, VU,NR by LB at  9/27/2023 1532    Acceptance, E, VU,NR by LB at 9/26/2023 1548    Acceptance, E,D, VU,NR by RG at 9/25/2023 1423    Acceptance, TB, NR by DL at 9/24/2023 2200    Acceptance, E,D, NR,VU by RG at 9/22/2023 1315    Acceptance, E,D, VU,NR by RG at 9/21/2023 1307    Acceptance, E,D, VU,NR by RG at 9/20/2023 1258    Acceptance, E,D, VU,NR by RG at 9/19/2023 1258    Acceptance, E,D, VU,NR by RG at 9/18/2023 1338    Acceptance, E,TB, VU by RM at 9/15/2023 1532    Acceptance, E,TB, VU by RM at 9/14/2023 1555    Acceptance, E,D, VU,NR by RG at 9/13/2023 1528    Acceptance, TB, NR by DL at 9/12/2023 2003    Acceptance, E, VU,NR by LB at 9/12/2023 1135                         Point: Precautions (Done)       Learning Progress Summary             Patient Acceptance, E, VU,NR by LB at 10/9/2023 1537    Acceptance, E, VU,NR by LB at 10/6/2023 1430    Acceptance, E, VU,NR by LB at 10/4/2023 1536    Acceptance, E, VU,NR by LB at 10/3/2023 1502    Acceptance, E,D, VU,NR by RG at 9/29/2023 1403    Acceptance, E, VU,NR by LB at 9/28/2023 1501    Acceptance, E, VU,NR by LB at 9/27/2023 1532    Acceptance, E, VU,NR by LB at 9/26/2023 1548    Acceptance, E,D, VU,NR by RG at 9/25/2023 1423    Acceptance, TB, NR by DL at 9/24/2023 2200    Acceptance, E,D, NR,VU by RG at 9/22/2023 1315    Acceptance, E,D, VU,NR by RG at 9/21/2023 1307    Acceptance, E,D, VU,NR by RG at 9/20/2023 1258    Acceptance, E,D, VU,NR by RG at 9/19/2023 1258    Acceptance, E,D, VU,NR by RG at 9/18/2023 1338    Acceptance, E,TB, VU by RM at 9/15/2023 1532    Acceptance, E,TB, VU by RM at 9/14/2023 1555    Acceptance, E,D, VU,NR by RG at 9/13/2023 1528    Acceptance, TB, NR by DL at 9/12/2023 2003    Acceptance, E, VU,NR by LB at 9/12/2023 1135                                         User Key       Initials Effective Dates Name Provider Type Discipline    LB 06/16/21 -  Andra Stewart, PT Physical Therapist PT    RM 02/17/23 -  Karis Obrien, PTA  Physical Therapist Assistant PT    RG 06/16/21 -  Twan Cunningham PTA Physical Therapist Assistant PT    DL 03/16/22 -  Carolyn Ramirez, RN Registered Nurse Nurse                    PT Recommendation and Plan    Planned Therapy Interventions (PT): balance training, bed mobility training, gait training, neuromuscular re-education, patient/family education, strengthening, transfer training  Frequency of Treatment (PT): 5 times per week  Anticipated Equipment Needs at Discharge (PT Eval):  (tbd)  Daily Progress Summary (PT)  Recommendations (PT): continue PT               Time Calculation:      PT Charges       Row Name 10/09/23 1540 10/09/23 1538          Time Calculation    Start Time 1330  -LB 1045  -LB     Stop Time 1415  -LB 1130  -LB     Time Calculation (min) 45 min  -LB 45 min  -LB     PT Received On -- 10/09/23  -LB     PT Goal Re-Cert Due Date -- 10/17/23  -LB               User Key  (r) = Recorded By, (t) = Taken By, (c) = Cosigned By      Initials Name Provider Type    LB Andra Stewart, PT Physical Therapist                    Therapy Charges for Today       Code Description Service Date Service Provider Modifiers Qty    53343679991 HC GAIT TRAINING EA 15 MIN 10/9/2023 Andra Stewart, PT GP 2    95282582984 HC PT THER PROC EA 15 MIN 10/9/2023 Andra Stewart, PT GP 3    45071069259 HC PT THERAPEUTIC ACT EA 15 MIN 10/9/2023 Andra Stewart, PT GP 1              PT G-Codes  AM-PAC 6 Clicks Score (PT): 17      Andra Stewart PT  10/9/2023

## 2023-10-09 NOTE — THERAPY TREATMENT NOTE
Inpatient Rehabilitation - Occupational Therapy Treatment Note     Richie     Patient Name: Ofelia Knox  : 1945  MRN: [id number]    Today's Date: 10/9/2023                 Admit Date: 2023       No diagnosis found.    Patient Active Problem List   Diagnosis    CVA (cerebral vascular accident)       Past Medical History:   Diagnosis Date    AMS (altered mental status)     Aphasia     CKD (chronic kidney disease)     COPD (chronic obstructive pulmonary disease)     CVA (cerebral vascular accident)     DM2 (diabetes mellitus, type 2)     HTN (hypertension)     Restrictive lung disease     Urinary tract infection due to ESBL Klebsiella        Past Surgical History:   Procedure Laterality Date    HYSTERECTOMY      JOINT REPLACEMENT               IRF OT ASSESSMENT FLOWSHEET (last 12 hours)       IRF OT Evaluation and Treatment       Row Name 10/09/23 1429          OT Time and Intention    Document Type daily treatment  -BF     Mode of Treatment occupational therapy  -BF     Patient Effort good  -BF     Symptoms Noted During/After Treatment none  -BF       Row Name 10/09/23 1429          General Information    Existing Precautions/Restrictions fall  aphasia, covid isolation  -BF     Limitations/Impairments safety/cognitive;aphasia  aphasia improving  -BF       Row Name 10/09/23 1429          Cognition/Psychosocial    Orientation Status (Cognition) oriented x 3  -BF     Follows Commands (Cognition) follows one-step commands;verbal cues/prompting required;repetition of directions required  -BF       Row Name 10/09/23 1429          Bed-Chair Transfer    Bed-Chair [place] (Transfers) standby assist;verbal cues  -BF     Assistive Device (Bed-Chair Transfers) wheelchair  -BF       Row Name 10/09/23 1429          Chair-Bed Transfer    Chair-Bed [place] (Transfers) standby assist;verbal cues  -BF     Assistive Device (Chair-Bed Transfers) wheelchair  -BF       Row Name 10/09/23 1429          Motor Skills     Motor Control/Coordination Interventions fine motor manipulation/dexterity activities;gross motor coordination activities;therapeutic exercise/ROM  BUE GMC/FMC theract, strengthening  -BF       Row Name 10/09/23 1429          Positioning and Restraints    In Bed sitting EOB;call light within reach;encouraged to call for assist;exit alarm on  In PM  -BF     In Wheelchair sitting;call light within reach;encouraged to call for assist;exit alarm on  In AM  -BF               User Key  (r) = Recorded By, (t) = Taken By, (c) = Cosigned By      Initials Name Effective Dates    BF Pam Peck, OT 07/11/23 -                      Occupational Therapy Education       Title: PT OT SLP Therapies (Done)       Topic: Occupational Therapy (Done)       Point: ADL training (Done)       Description:   Instruct learner(s) on proper safety adaptation and remediation techniques during self care or transfers.   Instruct in proper use of assistive devices.                  Learning Progress Summary             Patient Acceptance, E, VU,NR by BF at 10/9/2023 1429    Acceptance, E, VU,NR by BF at 10/6/2023 1234    Acceptance, E, VU,NR by BF at 10/5/2023 1448    Acceptance, E, VU,NR by HB at 10/4/2023 1450    Acceptance, E, VU,NR by BF at 10/3/2023 1436    Acceptance, E, VU,NR by BF at 10/2/2023 1317    Acceptance, TB, NR by DL at 9/24/2023 2200    Acceptance, E, VU,NR by HB at 9/16/2023 1145    Acceptance, E, VU,NR by BF at 9/15/2023 1232                         Point: Precautions (Done)       Description:   Instruct learner(s) on prescribed precautions during self-care and functional transfers.                  Learning Progress Summary             Patient Acceptance, E, VU,NR by BF at 10/9/2023 1429    Acceptance, E, VU,NR by BF at 10/6/2023 1234    Acceptance, E, VU,NR by BF at 10/5/2023 1448    Acceptance, E, VU,NR by HB at 10/4/2023 1450    Acceptance, E, VU,NR by BF at 10/3/2023 1436    Acceptance, E, VU,NR by BF at  10/2/2023 1317    Acceptance, TB, NR by DL at 9/24/2023 2200    Acceptance, E, VU,NR by HB at 9/16/2023 1145    Acceptance, E, VU,NR by BF at 9/15/2023 1232                                         User Key       Initials Effective Dates Name Provider Type Discipline    BF 07/11/23 -  Pam Peck OT Occupational Therapist OT    HB 05/25/21 -  Xena Chinchilla OT Occupational Therapist OT    DL 03/16/22 -  Carolyn Ramirez RN Registered Nurse Nurse                        OT Recommendation and Plan    Planned Therapy Interventions (OT): activity tolerance training, adaptive equipment training, BADL retraining, neuromuscular control/coordination retraining, ROM/therapeutic exercise, strengthening exercise, transfer/mobility retraining, patient/caregiver education/training                    Time Calculation:      Time Calculation- OT       Row Name 10/09/23 1432 10/09/23 0915          Time Calculation- OT    OT Start Time 1240  -BF 0915  -BF     OT Stop Time 1335  -BF 1000  -BF     OT Time Calculation (min) 55 min  -BF 45 min  -BF     Total Timed Code Minutes- OT 55 minute(s)  -BF 45 minute(s)  -BF     OT Non-Billable Time (min) -- 10 min  -BF               User Key  (r) = Recorded By, (t) = Taken By, (c) = Cosigned By      Initials Name Provider Type    BF Peck Pam Fierro OT Occupational Therapist                  Therapy Charges for Today       Code Description Service Date Service Provider Modifiers Qty    65815804999 HC OT SELF CARE/MGMT/TRAIN EA 15 MIN 10/9/2023 Pam Peck OT GO 1    03332727909 HC OT THERAPEUTIC ACT EA 15 MIN 10/9/2023 Pam Peck OT GO 3    [id number] HC OT NEUROMUSC RE EDUCATION EA 15 MIN 10/9/2023 Pam Peck OT GO 3                     Pam Peck OT  10/9/2023

## 2023-10-10 LAB
ALBUMIN SERPL-MCNC: 3 G/DL (ref 3.5–5.2)
ALBUMIN/GLOB SERPL: 1 G/DL
ALP SERPL-CCNC: 63 U/L (ref 39–117)
ALT SERPL W P-5'-P-CCNC: 28 U/L (ref 1–33)
ANION GAP SERPL CALCULATED.3IONS-SCNC: 8.6 MMOL/L (ref 5–15)
AST SERPL-CCNC: 15 U/L (ref 1–32)
BASOPHILS # BLD AUTO: 0.04 10*3/MM3 (ref 0–0.2)
BASOPHILS NFR BLD AUTO: 0.3 % (ref 0–1.5)
BILIRUB SERPL-MCNC: 0.3 MG/DL (ref 0–1.2)
BUN SERPL-MCNC: 30 MG/DL (ref 8–23)
BUN/CREAT SERPL: 24 (ref 7–25)
CALCIUM SPEC-SCNC: 9.1 MG/DL (ref 8.6–10.5)
CHLORIDE SERPL-SCNC: 105 MMOL/L (ref 98–107)
CO2 SERPL-SCNC: 28.4 MMOL/L (ref 22–29)
CREAT SERPL-MCNC: 1.25 MG/DL (ref 0.57–1)
DEPRECATED RDW RBC AUTO: 49.1 FL (ref 37–54)
EGFRCR SERPLBLD CKD-EPI 2021: 44.8 ML/MIN/1.73
EOSINOPHIL # BLD AUTO: 0.93 10*3/MM3 (ref 0–0.4)
EOSINOPHIL NFR BLD AUTO: 7.6 % (ref 0.3–6.2)
ERYTHROCYTE [DISTWIDTH] IN BLOOD BY AUTOMATED COUNT: 14.6 % (ref 12.3–15.4)
GLOBULIN UR ELPH-MCNC: 2.9 GM/DL
GLUCOSE BLDC GLUCOMTR-MCNC: 107 MG/DL (ref 70–130)
GLUCOSE BLDC GLUCOMTR-MCNC: 119 MG/DL (ref 70–130)
GLUCOSE BLDC GLUCOMTR-MCNC: 135 MG/DL (ref 70–130)
GLUCOSE BLDC GLUCOMTR-MCNC: 141 MG/DL (ref 70–130)
GLUCOSE SERPL-MCNC: 131 MG/DL (ref 65–99)
HCT VFR BLD AUTO: 46.3 % (ref 34–46.6)
HGB BLD-MCNC: 14.3 G/DL (ref 12–15.9)
IMM GRANULOCYTES # BLD AUTO: 0.04 10*3/MM3 (ref 0–0.05)
IMM GRANULOCYTES NFR BLD AUTO: 0.3 % (ref 0–0.5)
LYMPHOCYTES # BLD AUTO: 3.44 10*3/MM3 (ref 0.7–3.1)
LYMPHOCYTES NFR BLD AUTO: 28.2 % (ref 19.6–45.3)
MCH RBC QN AUTO: 28.2 PG (ref 26.6–33)
MCHC RBC AUTO-ENTMCNC: 30.9 G/DL (ref 31.5–35.7)
MCV RBC AUTO: 91.3 FL (ref 79–97)
MONOCYTES # BLD AUTO: 1.08 10*3/MM3 (ref 0.1–0.9)
MONOCYTES NFR BLD AUTO: 8.9 % (ref 5–12)
NEUTROPHILS NFR BLD AUTO: 54.7 % (ref 42.7–76)
NEUTROPHILS NFR BLD AUTO: 6.67 10*3/MM3 (ref 1.7–7)
NRBC BLD AUTO-RTO: 0 /100 WBC (ref 0–0.2)
PLATELET # BLD AUTO: 284 10*3/MM3 (ref 140–450)
PMV BLD AUTO: 11.1 FL (ref 6–12)
POTASSIUM SERPL-SCNC: 4 MMOL/L (ref 3.5–5.2)
PROT SERPL-MCNC: 5.9 G/DL (ref 6–8.5)
RBC # BLD AUTO: 5.07 10*6/MM3 (ref 3.77–5.28)
SODIUM SERPL-SCNC: 142 MMOL/L (ref 136–145)
WBC NRBC COR # BLD: 12.2 10*3/MM3 (ref 3.4–10.8)

## 2023-10-10 PROCEDURE — 97112 NEUROMUSCULAR REEDUCATION: CPT

## 2023-10-10 PROCEDURE — 92507 TX SP LANG VOICE COMM INDIV: CPT

## 2023-10-10 PROCEDURE — 97116 GAIT TRAINING THERAPY: CPT

## 2023-10-10 PROCEDURE — 97530 THERAPEUTIC ACTIVITIES: CPT | Performed by: OCCUPATIONAL THERAPIST

## 2023-10-10 PROCEDURE — 85025 COMPLETE CBC W/AUTO DIFF WBC: CPT | Performed by: INTERNAL MEDICINE

## 2023-10-10 PROCEDURE — 97530 THERAPEUTIC ACTIVITIES: CPT

## 2023-10-10 PROCEDURE — 97110 THERAPEUTIC EXERCISES: CPT

## 2023-10-10 PROCEDURE — 97535 SELF CARE MNGMENT TRAINING: CPT | Performed by: OCCUPATIONAL THERAPIST

## 2023-10-10 PROCEDURE — 97110 THERAPEUTIC EXERCISES: CPT | Performed by: OCCUPATIONAL THERAPIST

## 2023-10-10 PROCEDURE — 25010000002 ENOXAPARIN PER 10 MG: Performed by: FAMILY MEDICINE

## 2023-10-10 PROCEDURE — 80053 COMPREHEN METABOLIC PANEL: CPT | Performed by: INTERNAL MEDICINE

## 2023-10-10 PROCEDURE — 82948 REAGENT STRIP/BLOOD GLUCOSE: CPT

## 2023-10-10 RX ADMIN — PANTOPRAZOLE SODIUM 40 MG: 40 TABLET, DELAYED RELEASE ORAL at 06:32

## 2023-10-10 RX ADMIN — VENLAFAXINE HYDROCHLORIDE 75 MG: 75 CAPSULE, EXTENDED RELEASE ORAL at 08:55

## 2023-10-10 RX ADMIN — BUPROPION HYDROCHLORIDE 150 MG: 150 TABLET, EXTENDED RELEASE ORAL at 08:55

## 2023-10-10 RX ADMIN — Medication 10 ML: at 09:00

## 2023-10-10 RX ADMIN — CETIRIZINE HYDROCHLORIDE 5 MG: 10 TABLET, FILM COATED ORAL at 08:55

## 2023-10-10 RX ADMIN — NYSTATIN 1 APPLICATION: 100000 CREAM TOPICAL at 20:47

## 2023-10-10 RX ADMIN — BENZONATATE 200 MG: 100 CAPSULE ORAL at 20:46

## 2023-10-10 RX ADMIN — PRAVASTATIN SODIUM 40 MG: 40 TABLET ORAL at 08:54

## 2023-10-10 RX ADMIN — TIOTROPIUM BROMIDE INHALATION SPRAY 2 PUFF: 3.12 SPRAY, METERED RESPIRATORY (INHALATION) at 07:51

## 2023-10-10 RX ADMIN — DOCUSATE SODIUM 50 MG AND SENNOSIDES 8.6 MG 2 TABLET: 8.6; 5 TABLET, FILM COATED ORAL at 20:46

## 2023-10-10 RX ADMIN — ENOXAPARIN SODIUM 40 MG: 40 INJECTION SUBCUTANEOUS at 13:22

## 2023-10-10 RX ADMIN — ASPIRIN 81 MG: 81 TABLET, COATED ORAL at 08:55

## 2023-10-10 RX ADMIN — Medication 10 ML: at 20:47

## 2023-10-10 RX ADMIN — MONTELUKAST SODIUM 10 MG: 10 TABLET, COATED ORAL at 20:46

## 2023-10-10 NOTE — SIGNIFICANT NOTE
10/10/23 1247   Plan   Plan Team conference held today.  Spoke to Sanna at [facility] [phone] about plans for pt to be discharged on 10-12-23 to their facility.  Spoke to son [phone] and pt via room phone about plans for discharge to [facility] on 10-12-23 and how she has progressed in therapy.  Informed pt insurance approved continued rehab stay until 10-12-23.  Son will come to rehab around 12:00 pm for discharge and transport to NH.  Son has pt an appointment at [facility] for assisted living evaluation on 10-17-23.  Pt will go to assisted living from [facility] if she meets criteria for admission.  Will follow.   Patient/Family in Agreement with Plan yes

## 2023-10-10 NOTE — PROGRESS NOTES
Assisted By: Patient seen under COVID isolation    CC: Follow-up on CVA    Interview History/HPI: Patient is sitting up self-feeding, no complaints, she feels like her congestion is getting better, no significant cough no shortness of breath.          Current Hospital Meds:  aspirin, 81 mg, Oral, Daily  buPROPion XL, 150 mg, Oral, Daily  cetirizine, 5 mg, Oral, Daily  enoxaparin, 40 mg, Subcutaneous, Q24H  isosorbide mononitrate, 30 mg, Oral, Q24H  metoprolol succinate XL, 25 mg, Oral, Q24H  montelukast, 10 mg, Oral, Nightly  nystatin, 1 application , Topical, Q12H  pantoprazole, 40 mg, Oral, QAM AC  polyethylene glycol, 17 g, Oral, Daily  pravastatin, 40 mg, Oral, Daily  senna-docusate sodium, 2 tablet, Oral, BID  sodium chloride, 10 mL, Intravenous, Q12H  tiotropium bromide monohydrate, 2 puff, Inhalation, Daily - RT  venlafaxine XR, 75 mg, Oral, Daily With Breakfast         Vitals:    10/10/23 0855   BP: 100/80   Pulse: 80   Resp:    Temp:    SpO2:          Intake/Output Summary (Last 24 hours) at 10/10/2023 1115  Last data filed at 10/10/2023 0450  Gross per 24 hour   Intake 720 ml   Output --   Net 720 ml       EXAM: Temperature is 98.9.  Saturation has been in the 90s on room air mood is good speech has improved significantly over admission, lungs are clear anterior and posterior, heart regular rate and rhythm, extremities without edema.      Diet: Cardiac Diets, Diabetic Diets; Healthy Heart (2-3 Na+); Consistent Carbohydrate; Texture: Regular Texture (IDDSI 7); Fluid Consistency: Thin (IDDSI 0)        LABS:     Lab Results (last 48 hours)       Procedure Component Value Units Date/Time    POC Glucose Once [[id number]]  (Abnormal) Collected: 10/10/23 1048    Specimen: Blood Updated: 10/10/23 1054     Glucose 135 mg/dL     POC Glucose Once [[id number]]  (Normal) Collected: 10/10/23 0606    Specimen: Blood Updated: 10/10/23 0614     Glucose 119 mg/dL     Comprehensive Metabolic Panel [[id number]]  (Abnormal)  Collected: 10/10/23 0124    Specimen: Blood Updated: 10/10/23 0308     Glucose 131 mg/dL      BUN 30 mg/dL      Creatinine 1.25 mg/dL      Sodium 142 mmol/L      Potassium 4.0 mmol/L      Chloride 105 mmol/L      CO2 28.4 mmol/L      Calcium 9.1 mg/dL      Total Protein 5.9 g/dL      Albumin 3.0 g/dL      ALT (SGPT) 28 U/L      AST (SGOT) 15 U/L      Alkaline Phosphatase 63 U/L      Total Bilirubin 0.3 mg/dL      Globulin 2.9 gm/dL      A/G Ratio 1.0 g/dL      BUN/Creatinine Ratio 24.0     Anion Gap 8.6 mmol/L      eGFR 44.8 mL/min/1.73     Narrative:      GFR Normal >60  Chronic Kidney Disease <60  Kidney Failure <15    The GFR formula is only valid for adults with stable renal function between ages 18 and 70.    CBC & Differential [[id number]]  (Abnormal) Collected: 10/10/23 0124    Specimen: Blood Updated: 10/10/23 0244    Narrative:      The following orders were created for panel order CBC & Differential.  Procedure                               Abnormality         Status                     ---------                               -----------         ------                     CBC Auto Differential[[id number]]        Abnormal            Final result                 Please view results for these tests on the individual orders.    CBC Auto Differential [[id number]]  (Abnormal) Collected: 10/10/23 0124    Specimen: Blood Updated: 10/10/23 0244     WBC 12.20 10*3/mm3      RBC 5.07 10*6/mm3      Hemoglobin 14.3 g/dL      Hematocrit 46.3 %      MCV 91.3 fL      MCH 28.2 pg      MCHC 30.9 g/dL      RDW 14.6 %      RDW-SD 49.1 fl      MPV 11.1 fL      Platelets 284 10*3/mm3      Neutrophil % 54.7 %      Lymphocyte % 28.2 %      Monocyte % 8.9 %      Eosinophil % 7.6 %      Basophil % 0.3 %      Immature Grans % 0.3 %      Neutrophils, Absolute 6.67 10*3/mm3      Lymphocytes, Absolute 3.44 10*3/mm3      Monocytes, Absolute 1.08 10*3/mm3      Eosinophils, Absolute 0.93 10*3/mm3      Basophils, Absolute 0.04 10*3/mm3       Immature Grans, Absolute 0.04 10*3/mm3      nRBC 0.0 /100 WBC     POC Glucose Once [[id number]]  (Abnormal) Collected: 10/09/23 2009    Specimen: Blood Updated: 10/09/23 2016     Glucose 131 mg/dL     POC Glucose Once [[id number]]  (Normal) Collected: 10/09/23 1608    Specimen: Blood Updated: 10/09/23 1614     Glucose 91 mg/dL     POC Glucose Once [[id number]]  (Normal) Collected: 10/09/23 1107    Specimen: Blood Updated: 10/09/23 1116     Glucose 112 mg/dL     POC Glucose Once [[id number]]  (Normal) Collected: 10/09/23 0555    Specimen: Blood Updated: 10/09/23 0603     Glucose 113 mg/dL     POC Glucose Once [[id number]]  (Normal) Collected: 10/08/23 2006    Specimen: Blood Updated: 10/08/23 2015     Glucose 123 mg/dL     POC Glucose Once [[id number]]  (Normal) Collected: 10/08/23 1619    Specimen: Blood Updated: 10/08/23 1626     Glucose 128 mg/dL                  Radiology:    Imaging Results (Last 72 Hours)       ** No results found for the last 72 hours. **            Results for orders placed during the hospital encounter of 09/11/23    Adult Transthoracic Echo Complete W/ Cont if Necessary Per Protocol    Interpretation Summary    Left ventricular systolic function is mildly decreased. Calculated left ventricular EF = 44% Left ventricular ejection fraction appears to be 41 - 45%.    Left ventricular wall thickness is consistent with mild eccentric hypertrophy.    Left ventricular diastolic function is consistent with (grade I) impaired relaxation.    The left atrial cavity is moderately dilated.      Assessment/Plan:   Status post CVA, patient was discussed in case conference, she is progressing very well, overall she has met her goals with PT, she is supervision for mobility and mobilization.  Patient is overall contact-guard to supervision for ADLs.  Speech therapist feels like the patient has improved significant with her speech which I concurred.  She occasionally gets around more but realizes this can correct  herself.  We are waiting on COVID isolation to terminate and patient be placed in skilled nursing facility.    COVID-19, status post 3 days of remdesivir, improving and stable    Mild elevation in creatinine over baseline.  I have stopped Entresto and Lasix, recheck basic profile in AM.    DVT prophylaxis, subcu Lovenox    History of COPD, stable    HFrEF, I had to stop Entresto and Lasix as above, follow.  EF 44%.    Glucoses remain controlled off medication    Mild leukocytosis with benign differential, doubt significance, has been elevated intermittently through her stay.    Jim Appiah MD

## 2023-10-10 NOTE — THERAPY TREATMENT NOTE
Inpatient Rehabilitation - Occupational Therapy Treatment Note     [facility]     Patient Name: Ofelia Knox  : 1945  MRN: [id number]    Today's Date: 10/10/2023                 Admit Date: 2023       No diagnosis found.    Patient Active Problem List   Diagnosis    CVA (cerebral vascular accident)       Past Medical History:   Diagnosis Date    AMS (altered mental status)     Aphasia     CKD (chronic kidney disease)     COPD (chronic obstructive pulmonary disease)     CVA (cerebral vascular accident)     DM2 (diabetes mellitus, type 2)     HTN (hypertension)     Restrictive lung disease     Urinary tract infection due to ESBL Klebsiella        Past Surgical History:   Procedure Laterality Date    HYSTERECTOMY      JOINT REPLACEMENT               IRF OT ASSESSMENT FLOWSHEET (last 12 hours)       IRF OT Evaluation and Treatment       Row Name 10/10/23 1400          OT Time and Intention    Document Type daily treatment  -     Mode of Treatment individual therapy;occupational therapy  -     Patient Effort good  -       Row Name 10/10/23 1400          General Information    Patient/Family/Caregiver Comments/Observations patient agreeable to therapy. patient tolerated session well with complaints. patient seen in room sitting EOB for UE ther ex/functional activities due to covid isolation.  -     Existing Precautions/Restrictions fall  -       Row Name 10/10/23 1400          Cognition/Psychosocial    Affect/Mental Status (Cognition) [facility]  -     Orientation Status (Cognition) oriented to;person;place;situation  -     Follows Commands (Cognition) WFL  -       Row Name 10/10/23 1400          Lower Body Dressing    [place] Level (Lower Body Dressing) don;shoes/slippers;supervision;contact guard assist  -       Row Name 10/10/23 1400          Motor Skills    Motor Skills coordination;functional endurance  -     Therapeutic Exercise shoulder;elbow/forearm;wrist;hand  BUE ROM, gmc,fmc,dowel  ex, flex bar, handgripper, bilateral coordination  -AH       Row Name 10/10/23 1400          Positioning and Restraints    Post Treatment Position bed  -AH     In Bed sitting EOB;with family/caregiver;call light within reach;encouraged to call for assist  -AH               User Key  (r) = Recorded By, (t) = Taken By, (c) = Cosigned By      Initials Name Effective Dates    Althea Das, OT 07/11/23 -                      Occupational Therapy Education       Title: PT OT SLP Therapies (Done)       Topic: Occupational Therapy (Done)       Point: ADL training (Done)       Description:   Instruct learner(s) on proper safety adaptation and remediation techniques during self care or transfers.   Instruct in proper use of assistive devices.                  Learning Progress Summary             Patient Acceptance, E, VU,NR by BF at 10/9/2023 1429    Acceptance, E, VU,NR by BF at 10/6/2023 1234    Acceptance, E, VU,NR by BF at 10/5/2023 1448    Acceptance, E, VU,NR by HB at 10/4/2023 1450    Acceptance, E, VU,NR by BF at 10/3/2023 1436    Acceptance, E, VU,NR by BF at 10/2/2023 1317    Acceptance, TB, NR by DL at 9/24/2023 2200    Acceptance, E, VU,NR by HB at 9/16/2023 1145    Acceptance, E, VU,NR by BF at 9/15/2023 1232                         Point: Precautions (Done)       Description:   Instruct learner(s) on prescribed precautions during self-care and functional transfers.                  Learning Progress Summary             Patient Acceptance, E, VU,NR by BF at 10/9/2023 1429    Acceptance, E, VU,NR by BF at 10/6/2023 1234    Acceptance, E, VU,NR by BF at 10/5/2023 1448    Acceptance, E, VU,NR by HB at 10/4/2023 1450    Acceptance, E, VU,NR by BF at 10/3/2023 1436    Acceptance, E, VU,NR by BF at 10/2/2023 1317    Acceptance, TB, NR by DL at 9/24/2023 2200    Acceptance, E, VU,NR by HB at 9/16/2023 1145    Acceptance, E, VU,NR by BF at 9/15/2023 1232                                         User Key        Initials Effective Dates Name Provider Type Discipline    BF 07/11/23 -  Pam Peck OT Occupational Therapist OT    HB 05/25/21 -  Xena Chinchilla OT Occupational Therapist OT    DL 03/16/22 -  Carolyn Ramirez, RN Registered Nurse Nurse                        OT Recommendation and Plan                         Time Calculation:      Time Calculation- OT       Row Name 10/10/23 1405 10/10/23 1404          Time Calculation- OT    OT Start Time 1245  - 0915  -     OT Stop Time 1330  -AH 1000  -     OT Time Calculation (min) 45 min  - 45 min  -               User Key  (r) = Recorded By, (t) = Taken By, (c) = Cosigned By      Initials Name Provider Type     Althea Downs OT Occupational Therapist                  Therapy Charges for Today       Code Description Service Date Service Provider Modifiers Qty    56297169243 HC OT THERAPEUTIC ACT EA 15 MIN 10/10/2023 Althea Downs OT GO 2    16314090437 HC OT SELF CARE/MGMT/TRAIN EA 15 MIN 10/10/2023 Althea Downs OT GO 1    26266597655 HC OT THER PROC EA 15 MIN 10/10/2023 Althea Downs OT GO 3                     Althea Downs OT  10/10/2023

## 2023-10-10 NOTE — THERAPY TREATMENT NOTE
Inpatient Rehabilitation - Speech Language Pathology Treatment Note  [facility]     Patient Name: Ofelia Knox  : 1945  MRN: [id number]  Today's Date: 10/10/2023             Admit Date: 2023     Ms. Knox was seen this am in pt room per enhanced droplet/contact isolation as she tested positive for COVID-19 on 10/2/23.       Long Term Goal:  Patient will demonstrate adequate language skills to fully participate in adls at premorbid baseline level of function.       Short Term Goals:  1. GOAL MET:       2. GOAL MET:       3. GOAL MET:       4. GOAL MET:    5. GOAL MET:    6. Patient will identify(receptively/expressively) the appropriate object/picture/word following a verbal description w/ 80% accuracy over three consecutive sessions.   Expressively ID a common object when given verbal description w/ 75% accuracy. Prior to naming the specified object, she is noted w/ increasing jargon (Wernicke's like). This increases w/ items she has more difficulty recalling name of.       7. GOAL MET:    8. Patient will complete complex oe sentences w/ 90% accuracy and min cues over three consecutive sessions.   Completed complex oe sentences w/ 90% accuracy      9. Patient will provide verbal 3+ descriptors of an object/picture/word w/ 80% accuracy and min cues over three consecutive sessions.   Deferred this date.       10. Patient will perform divergent naming tasks w/ 5+ objects named of a given category in < 2 min over three consecutive sessions.   Able to name 5 items in 3 minutes. Initial three items are named in a timely fashion, then periods of jargon are interjected before naming additional two items.      11.  GOAL MET:    12.  GOAL MET:       13.  GOAL MET:       14.  Pt will increase verbal expression to 90% accuracy, independently, over three consecutive sessions, for sentence formulation when given a target word to incorporate in the sentence.   Some difficulty noted w/ verbs continues. 100% w/ nouns. 90%  w/ verbs.     Visit Dx:  No diagnosis found.  Patient Active Problem List   Diagnosis    CVA (cerebral vascular accident)     Past Medical History:   Diagnosis Date    AMS (altered mental status)     Aphasia     CKD (chronic kidney disease)     COPD (chronic obstructive pulmonary disease)     CVA (cerebral vascular accident)     DM2 (diabetes mellitus, type 2)     HTN (hypertension)     Restrictive lung disease     Urinary tract infection due to ESBL Klebsiella      Past Surgical History:   Procedure Laterality Date    HYSTERECTOMY      JOINT REPLACEMENT       EDUCATION  The patient has been educated in the following areas:   Communication Impairment.    SLP Recommendation and Plan   Continue plan of care to allow for maximal opportunities for improvements.     Thank you   Verona Sarmiento M.S., CCC/SLP        Time Calculation:      Time Calculation- SLP       Row Name 10/10/23 1434             Time Calculation- SLP    SLP Start Time 1230  -      SLP Stop Time 1247  -      SLP Time Calculation (min) 17 min  -      SLP - Next Appointment 10/11/23  -                User Key  (r) = Recorded By, (t) = Taken By, (c) = Cosigned By      Initials Name Provider Type    Verona Pizarro MS CCC-SLP Speech and Language Pathologist                    Therapy Charges for Today       Code Description Service Date Service Provider Modifiers Qty    94410536072  ST TREATMENT SPEECH 1 10/10/2023 Verona Sarmiento MS CCC-SLP GN 1                       Verona Sarmiento MS CCC-JUSTO  10/10/2023

## 2023-10-10 NOTE — PROGRESS NOTES
Case Management  Inpatient Rehabilitation Team Conference    Conference Date/Time: 10/10/2023 6:49:42 AM    Team Conference Attendees:  MD Viviana Pickett SW Jessica Bill, RN,   Felicity Burden, MARIA LUISA Stewart, PT  Keli Downs, OT  Verona Sarmiento, JUSTO    Demographics            Age: 78Y            Gender: Female    Admission Date: 9/11/2023 6:31:00 PM  Rehabilitation Diagnosis:  CVA  Comorbidities: N39.0 Urinary tract infection, site not specified  I63.9 Cerebral infarction, unspecified  R47.01 Aphasia  E11.9 Type 2 diabetes mellitus without complications  I10 Essential (primary) hypertension  D75.1 Secondary polycythemia  J44.9 Chronic obstructive pulmonary disease, unspecified  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  Medical Necessity Expected Level Rationale: good  Intensity and Duration: an average of 3 hours/5 days per week  Medical Supervision and 24 Hour Rehab Nursing: x  Physical Therapy: x  PT Intensity/Duration: PT 1-1.5 hours per day/5 days per week  Occupational Therapy: x  OT Intensity/Duration: OT 1-1.5 hours per day/5 days per week  Speech and Language Therapy: x  SLP Intensity/Duration: SLP 30 mins-90 mins per day/5 days per week  Social Work: x  Therapeutic Recreation: x  Updated (if changes indicated)    Anticipated Discharge Date/Estimated Length of Stay:   10-12-23      Discharge Plan of Care:    Based on the patient's  medical and functional status, their prognosis and  expected level of functional improvement is: good      Interdisciplinary Problem/Goals/Status  Mobility    [RN] Toilet Transfers(Active)  Current Status(09/11/2023): PATIENT WILL HAVE NO TRANSFER DIFFICULTY  Weekly Goal(10/13/2023): NO DIFFICULT WITH TRANSFERS  Discharge Goal: TRANSFERS INDEPENDENTLY    [PT] Bed/Chair/Wheelchair(Active)  Current Status(09/12/2023): min-mod A  Weekly Goal(09/19/2023): Sup  Discharge Goal: Sup    [PT] Walk(Active)  Current Status(09/12/2023): amb 160' RW CGA  Weekly Goal(09/19/2023): amb 300' RW Sup  Discharge Goal: amb 300' RW Sup        Safety    [RN] Potential for Injury(Active)  Current Status(09/11/2023): PATIENT WILL HAVE NO INJURY THIS STAY  Weekly Goal(10/13/2023): NO INJURY  Discharge Goal: PATIENT WILL DISCHARGE        Pain    [RN] Pain Management(Active)  Current Status(09/11/2023): PATIENT WILL VOICE NO PAIN  Weekly Goal(10/13/2023): PATIENT WILL BE PAIN FREE  Discharge Goal: FREE FROM PAIN        Body Systems    [RN] Integumentary(Active)  Current Status(09/11/2023): PATIENT WILL HAVE NO SKIN  BREAKDOWN THIS STAY  Weekly Goal(10/13/2023): SKIN WILL REMAIN INTACT.  Discharge Goal: NO SKIN ISSUES.        Self Care    [OT] Dressing (Lower)(Active)  Current Status(10/09/2023): CGA  Weekly Goal(10/17/2023): SBA  Discharge Goal: SBA        Communication    [ST] Comprehension(Active)  Current Status(10/03/2023): Trace to mild auditory comprehension deficits  Weekly Goal(10/09/2023): receptive ID of object following verbal description  Discharge Goal: WFL communication and comprehension    [ST] Expression(Active)  Current Status(10/03/2023): Fluent aphasia w/ anomia  Weekly Goal(10/09/2023): Provide 3+ verbal descriptors of an object/pic/word  Discharge Goal: WFL communication    Comments: Pt will be discharged to [facility] on 10-12-23 for continued  rehab/nursing care.  Son will take pt to be evaluated for assisted living  in  [place] next week.    Signed by: TONEY Griffin    Physician CoSigned By: Jim Appiah 10/10/2023 15:14:27

## 2023-10-10 NOTE — PROGRESS NOTES
Occupational Therapy:    Physical Therapy: Individual: 90 minutes.    Speech Language Pathology:    Signed by: Eliza Ramirez PTA

## 2023-10-10 NOTE — SIGNIFICANT NOTE
10/09/23 8100   Plan   Plan Spoke to Josephine at [facility] [phone] who will have bed for pt on 10-12-23.  Team conference will be held on 10-10-23.  Will follow.   Patient/Family in Agreement with Plan yes

## 2023-10-11 PROBLEM — D89.831 CYTOKINE RELEASE SYNDROME, GRADE 1: Status: ACTIVE | Noted: 2023-10-11

## 2023-10-11 LAB
GLUCOSE BLDC GLUCOMTR-MCNC: 109 MG/DL (ref 70–130)
GLUCOSE BLDC GLUCOMTR-MCNC: 116 MG/DL (ref 70–130)
GLUCOSE BLDC GLUCOMTR-MCNC: 118 MG/DL (ref 70–130)

## 2023-10-11 PROCEDURE — 94799 UNLISTED PULMONARY SVC/PX: CPT

## 2023-10-11 PROCEDURE — 97110 THERAPEUTIC EXERCISES: CPT

## 2023-10-11 PROCEDURE — 94664 DEMO&/EVAL PT USE INHALER: CPT

## 2023-10-11 PROCEDURE — 97530 THERAPEUTIC ACTIVITIES: CPT

## 2023-10-11 PROCEDURE — 97535 SELF CARE MNGMENT TRAINING: CPT

## 2023-10-11 PROCEDURE — 97112 NEUROMUSCULAR REEDUCATION: CPT

## 2023-10-11 PROCEDURE — 92507 TX SP LANG VOICE COMM INDIV: CPT | Performed by: SPEECH-LANGUAGE PATHOLOGIST

## 2023-10-11 PROCEDURE — 94761 N-INVAS EAR/PLS OXIMETRY MLT: CPT

## 2023-10-11 PROCEDURE — 25010000002 ENOXAPARIN PER 10 MG: Performed by: FAMILY MEDICINE

## 2023-10-11 PROCEDURE — 82948 REAGENT STRIP/BLOOD GLUCOSE: CPT

## 2023-10-11 PROCEDURE — 97116 GAIT TRAINING THERAPY: CPT

## 2023-10-11 RX ORDER — AMOXICILLIN 250 MG
2 CAPSULE ORAL 2 TIMES DAILY
Status: CANCELLED
Start: 2023-10-11

## 2023-10-11 RX ADMIN — Medication 10 ML: at 10:13

## 2023-10-11 RX ADMIN — METOPROLOL SUCCINATE 25 MG: 25 TABLET, EXTENDED RELEASE ORAL at 09:21

## 2023-10-11 RX ADMIN — Medication 10 ML: at 21:08

## 2023-10-11 RX ADMIN — TIOTROPIUM BROMIDE INHALATION SPRAY 2 PUFF: 3.12 SPRAY, METERED RESPIRATORY (INHALATION) at 07:59

## 2023-10-11 RX ADMIN — ENOXAPARIN SODIUM 40 MG: 40 INJECTION SUBCUTANEOUS at 12:38

## 2023-10-11 RX ADMIN — ASPIRIN 81 MG: 81 TABLET, COATED ORAL at 09:21

## 2023-10-11 RX ADMIN — NYSTATIN 1 APPLICATION: 100000 CREAM TOPICAL at 21:07

## 2023-10-11 RX ADMIN — DOCUSATE SODIUM 50 MG AND SENNOSIDES 8.6 MG 2 TABLET: 8.6; 5 TABLET, FILM COATED ORAL at 21:07

## 2023-10-11 RX ADMIN — PRAVASTATIN SODIUM 40 MG: 40 TABLET ORAL at 09:20

## 2023-10-11 RX ADMIN — BUPROPION HYDROCHLORIDE 150 MG: 150 TABLET, EXTENDED RELEASE ORAL at 09:21

## 2023-10-11 RX ADMIN — DOCUSATE SODIUM 50 MG AND SENNOSIDES 8.6 MG 2 TABLET: 8.6; 5 TABLET, FILM COATED ORAL at 09:20

## 2023-10-11 RX ADMIN — MONTELUKAST SODIUM 10 MG: 10 TABLET, COATED ORAL at 21:07

## 2023-10-11 RX ADMIN — VENLAFAXINE HYDROCHLORIDE 75 MG: 75 CAPSULE, EXTENDED RELEASE ORAL at 09:21

## 2023-10-11 RX ADMIN — ISOSORBIDE MONONITRATE 30 MG: 30 TABLET, EXTENDED RELEASE ORAL at 09:21

## 2023-10-11 RX ADMIN — CETIRIZINE HYDROCHLORIDE 5 MG: 10 TABLET, FILM COATED ORAL at 09:21

## 2023-10-11 RX ADMIN — NYSTATIN 1 APPLICATION: 100000 CREAM TOPICAL at 10:13

## 2023-10-11 RX ADMIN — PANTOPRAZOLE SODIUM 40 MG: 40 TABLET, DELAYED RELEASE ORAL at 06:33

## 2023-10-11 NOTE — THERAPY TREATMENT NOTE
Inpatient Rehabilitation - Occupational Therapy Treatment Note    [facility]z     Patient Name: Ofelia Knox  : 1945  MRN: [id number]    Today's Date: 10/11/2023                 Admit Date: 2023       No diagnosis found.    Patient Active Problem List   Diagnosis    CVA (cerebral vascular accident)    Cytokine release syndrome, grade 1       Past Medical History:   Diagnosis Date    AMS (altered mental status)     Aphasia     CKD (chronic kidney disease)     COPD (chronic obstructive pulmonary disease)     CVA (cerebral vascular accident)     DM2 (diabetes mellitus, type 2)     HTN (hypertension)     Restrictive lung disease     Urinary tract infection due to ESBL Klebsiella        Past Surgical History:   Procedure Laterality Date    HYSTERECTOMY      JOINT REPLACEMENT               IRF OT ASSESSMENT FLOWSHEET (last 12 hours)       IRF OT Evaluation and Treatment       Row Name 10/11/23 1340          OT Time and Intention    Document Type daily treatment  -HB     Mode of Treatment individual therapy;occupational therapy  -HB     Total Minutes, Occupational Therapy 90  -HB     Patient Effort good  -HB       Row Name 10/11/23 1345          General Information    Patient Profile Reviewed yes  -HB     Patient/Family/Caregiver Comments/Observations Patient and RN okd OT this date.  -HB     General Observations of Patient Patient tolerated OT well with no adverse reactions. Pt with improved endurance this date.  -HB     Existing Precautions/Restrictions fall  -HB     Limitations/Impairments safety/cognitive;aphasia  -HB       Row Name 10/11/23 1340          Pain Assessment    Pretreatment Pain Rating 0/10 - no pain  -HB     Posttreatment Pain Rating 0/10 - no pain  -HB       Row Name 10/11/23 1348          Cognition/Psychosocial    Orientation Status (Cognition) oriented to;person;place;situation  -HB     Follows Commands (Cognition) verbal cues/prompting required;physical/tactile prompts required;follows  one-step commands  -       Row Name 10/11/23 1343          Upper Body Dressing    Forest Level (Upper Body Dressing) upper body dressing skills;set up assistance  -HB     Position (Upper Body Dressing) edge of bed sitting  -       Row Name 10/11/23 1343          Lower Body Dressing    [place]st Level (Lower Body Dressing) don;shoes/slippers;supervision;contact guard assist  -HB     Position (Lower Body Dressing) edge of bed sitting  -HB       Row Name 10/11/23 1343          Grooming    [place]st Level (Grooming) grooming skills;set up  -HB     Position (Grooming) edge of bed sitting  -HB       Row Name 10/11/23 [facility]          Bed Mobility    Supine-Sit [place] (Bed Mobility) standby assist  -HB     Sit-Supine [place] (Bed Mobility) standby assist  -       Row Name 10/11/23 [facility]          Motor Skills    Motor Skills coordination;functional endurance;motor control/coordination interventions;neuro-muscular function  -     Motor Control/Coordination Interventions fine motor manipulation/dexterity activities;gross motor coordination activities;neuro-muscular re-education;motor pattern re-training;therapeutic exercise/ROM  -     Therapeutic Exercise shoulder;elbow/forearm;wrist;hand  -     Additional Documentation --  BUE TA to increase ADL status/endurance; rest between tasks  -       Row Name 10/11/23 1343          Positioning and Restraints    Pre-Treatment Position --  EOB  -HB     Post Treatment Position bed  -HB     In Bed call light within reach;encouraged to call for assist;exit alarm on  -               User Key  (r) = Recorded By, (t) = Taken By, (c) = Cosigned By      Initials Name Effective Dates     Xena Chinchilla OT 05/25/21 -                      Occupational Therapy Education       Title: PT OT SLP Therapies (Done)       Topic: Occupational Therapy (Done)       Point: ADL training (Done)       Description:   Instruct learner(s) on proper safety adaptation and  remediation techniques during self care or transfers.   Instruct in proper use of assistive devices.                  Learning Progress Summary             Patient Acceptance, E, VU,NR by BF at 10/9/2023 1429    Acceptance, E, VU,NR by BF at 10/6/2023 1234    Acceptance, E, VU,NR by BF at 10/5/2023 1448    Acceptance, E, VU,NR by HB at 10/4/2023 1450    Acceptance, E, VU,NR by BF at 10/3/2023 1436    Acceptance, E, VU,NR by BF at 10/2/2023 1317    Acceptance, TB, NR by DL at 9/24/2023 2200    Acceptance, E, VU,NR by HB at 9/16/2023 1145    Acceptance, E, VU,NR by BF at 9/15/2023 1232                         Point: Precautions (Done)       Description:   Instruct learner(s) on prescribed precautions during self-care and functional transfers.                  Learning Progress Summary             Patient Acceptance, E, VU,NR by BF at 10/9/2023 1429    Acceptance, E, VU,NR by BF at 10/6/2023 1234    Acceptance, E, VU,NR by BF at 10/5/2023 1448    Acceptance, E, VU,NR by HB at 10/4/2023 1450    Acceptance, E, VU,NR by BF at 10/3/2023 1436    Acceptance, E, VU,NR by BF at 10/2/2023 1317    Acceptance, TB, NR by DL at 9/24/2023 2200    Acceptance, E, VU,NR by HB at 9/16/2023 1145    Acceptance, E, VU,NR by BF at 9/15/2023 1232                                         User Key       Initials Effective Dates Name Provider Type Discipline    BF 07/11/23 -  Pam Peck OT Occupational Therapist OT    HB 05/25/21 -  Xena Chinchilla OT Occupational Therapist OT    DL 03/16/22 -  Carolyn Ramirez, RN Registered Nurse Nurse                        OT Recommendation and Plan                         Time Calculation:      Time Calculation- OT       Row Name 10/11/23 1402             Time Calculation- OT    OT Start Time 0915  -HB      OT Stop Time 1045  -HB      OT Time Calculation (min) 90 min  -HB      Total Timed Code Minutes- OT 90 minute(s)  -HB                User Key  (r) = Recorded By, (t) = Taken By, (c) = Cosigned  By      Initials Name Provider Type     Xena Chinchilla OT Occupational Therapist                  Therapy Charges for Today       Code Description Service Date Service Provider Modifiers Qty    49599929094 HC OT SELF CARE/MGMT/TRAIN EA 15 MIN 10/11/2023 Xena Chinchilla OT GO 2    88299033526 HC OT THERAPEUTIC ACT EA 15 MIN 10/11/2023 Xena Chinchilla OT GO 2    35277936668 HC OT NEUROMUSC RE EDUCATION EA 15 MIN 10/11/2023 Xena Chinchilla OT GO 2                     Xena Chinchilla OT  10/11/2023

## 2023-10-11 NOTE — PLAN OF CARE
Problem: Rehabilitation (IRF) Plan of Care  Goal: Absence of New-Onset Illness or Injury  Intervention: Prevent Fall and Fall Injury  Recent Flowsheet Documentation  Taken 10/11/2023 1000 by Renee Matute, RN  Safety Promotion/Fall Prevention:   fall prevention program maintained   nonskid shoes/slippers when out of bed   safety round/check completed  Taken 10/11/2023 0759 by Renee Matute, RN  Safety Promotion/Fall Prevention:   fall prevention program maintained   nonskid shoes/slippers when out of bed   safety round/check completed  Taken 10/11/2023 0723 by Renee Matute, RN  Safety Promotion/Fall Prevention:   fall prevention program maintained   nonskid shoes/slippers when out of bed   safety round/check completed   Goal Outcome Evaluation:

## 2023-10-11 NOTE — PROGRESS NOTES
Patient was seen under COVID isolation.  No new complaints, states she is feeling well, plans for discharge tomorrow when she is out of isolation.  Vital signs are good, afebrile, room air saturation 99%  labs are pending

## 2023-10-11 NOTE — THERAPY TREATMENT NOTE
Inpatient Rehabilitation - Speech Language Pathology Treatment Note  [facility]     Patient Name: Ofelia Knox  : 1945  MRN: [id number]  Today's Date: 10/11/2023             Admit Date: 2023     Ms. Knox was seen this pm in pt room per enhanced droplet/contact isolation as she tested positive for COVID-19 on 10/2/23.        Long Term Goal:  Patient will demonstrate adequate language skills to fully participate in adls at premorbid baseline level of function.       Short Term Goals:  1. GOAL MET:       2. GOAL MET:       3. GOAL MET:       4. GOAL MET:    5. GOAL MET:    6. Patient will identify(receptively/expressively) the appropriate object/picture/word following a verbal description w/ 80% accuracy over three consecutive sessions.   Expressively ID a common object when given verbal description w/ 80% accuracy. Prior to naming the specified object, she is noted w/ increasing jargon (Wernicke's like). This increases w/ items she has more difficulty recalling name of during session.      7. GOAL MET:    8. Patient will complete complex oe sentences w/ 90% accuracy and min cues over three consecutive sessions.   Completed complex oe sentences w/ 90% accuracy; use of conversational skills preferred by patient.     9. Patient will provide verbal 3+ descriptors of an object/picture/word w/ 80% accuracy and min cues over three consecutive sessions.   Deferred this date.       10. Patient will perform divergent naming tasks w/ 5+ objects named of a given category in < 2 min over three consecutive sessions.   Deferred this date.       11.  GOAL MET:    12.  GOAL MET:       13.  GOAL MET:       14.  Pt will increase verbal expression to 90% accuracy, independently, over three consecutive sessions, for sentence formulation when given a target word to incorporate in the sentence.   Some difficulty noted w/ verbs continues. 100% w/ nouns. 90% w/ verbs. Pt prefers conversational tasks involving preferred interests  such as family, ADLs, past job experience, living situation, etc.          Visit Dx:  No diagnosis found.  Patient Active Problem List   Diagnosis    CVA (cerebral vascular accident)    Cytokine release syndrome, grade 1     Past Medical History:   Diagnosis Date    AMS (altered mental status)     Aphasia     CKD (chronic kidney disease)     COPD (chronic obstructive pulmonary disease)     CVA (cerebral vascular accident)     DM2 (diabetes mellitus, type 2)     HTN (hypertension)     Restrictive lung disease     Urinary tract infection due to ESBL Klebsiella      Past Surgical History:   Procedure Laterality Date    HYSTERECTOMY      JOINT REPLACEMENT       EDUCATION  The patient has been educated in the following areas:   Communication Impairment.    SLP Recommendation and Plan   Continue plan of care to allow for maximal opportunities for improvements.     Thank you   Karishma Pagan M.A., RUBEN/SLP        Time Calculation:      Time Calculation- SLP       Row Name 10/11/23 1816             Time Calculation- SLP    SLP Start Time 1740  -KB      SLP Stop Time 1805  -KB      SLP Time Calculation (min) 25 min  -KB      SLP - Next Appointment 10/12/23  -KB                User Key  (r) = Recorded By, (t) = Taken By, (c) = Cosigned By      Initials Name Provider Type    Madi Lee MA,CCC-SLP Speech and Language Pathologist                    Therapy Charges for Today       Code Description Service Date Service Provider Modifiers Qty    69684231458  ST TREATMENT SPEECH 2 10/11/2023 Madi Pagan MA,CCC-SLP GN 1                       Madi Pagan MA,RUBEN-SLP  10/11/2023

## 2023-10-11 NOTE — PLAN OF CARE
Problem: Rehabilitation (IRF) Plan of Care  Goal: Patient-Specific Goal (Individualized)  10/11/2023 1104 by Renee Matute, RN  Outcome: Ongoing, Progressing  10/11/2023 1028 by Renee Matute, RN  Outcome: Ongoing, Progressing   Goal Outcome Evaluation:

## 2023-10-11 NOTE — DISCHARGE SUMMARY
Date of admission: 9/11/2023  Date of discharge: 10/12/2023    Principal diagnosis: Left putamen and internal capsule CVA  Secondary diagnosis:  Diabetes type 2  Hypertension  COVID-19  COPD  ESBL UTI prior to coming to the rehab unit  HFrEF.  EF 44%  Urinary retention resolved  Depression, controlled on current dose of Effexor and Wellbutrin    Procedures:  Last chest x-ray 10/3 negative  Echo 10/12 EF 44% and grade 2 impaired relaxation    Consultants:  Infectious disease  Urology    Exam: She is pleasant and in no distress.  Speech much improved over admission.  She realizes she is going to nursing home today son is coming to pick her up.  No new complaints.  No shortness of breath, no significant cough, vital signs last, 148/82, 16, 61, 94% saturated on room air, afebrile., lungs are clear this morning heart regular rate and rhythm no edema mood is good strength is symmetric    Hospital course: Patient was admitted to the acute inpatient rehab facility after the above CVA.  She was seen by speech therapy for cognitive and expressive aphasia.  She was evaluated also by her speech therapy for swallowing was thought to be doing well and no further SLP was warranted for swallowing.  With OT initially, patient was max assist with bathing, min assist upper body dressing, max assist lower body dressing, dependent for toileting hygiene, set up for self-feeding and min assist for grooming.  With PT initially patient was min assist for transfers and was able to walk 160 feet contact-guard 1 person front wheeled walker.  Now with OT, patient is set up for upper body dressing, contact-guard lower body dressing, contact-guard to standby assist for bathing and contact-guard for toileting hygiene.  She has made significant progress with OT.  With PT, patient is supervision for transfers now and walked 225 feet front wheeled walker supervision.  From a speech standpoint, patient has improved dramatically and now can get full  thoughts across.  She is still occasionally has some word finding problems or says the wrong thing but does catch herself.  Patient did develop COVID-19 while here.  She was isolated, seen by ID and did receive 3 days of remdesivir.  She never had any hypoxic difficulties and has completed her 10 days of isolation and doing well.  Patient is diabetic however glucoses have remained good off medication on a diabetic diet, A1c was 6.8.  This can be monitored intermittently at the nursing facility but no medications are required currently.  Patient does have HFrEF and is compensated.  She was on Entresto and Lasix however creatinine did elevate slightly, this was held at this time.  Creatinine is improved on discharge.  Could consider while at the nursing facility trying to add back low-dose Entresto and monitoring renal function but will leave this to attending discretion.  Patient is compensated on discharge.    Disposition: Skilled nursing facility    Condition: Stable/improved    Follow-up:  Patient is to have OT and PT at the nursing facility, BMP in 3 days and call to attending, fall precautions at the nursing facility    Medication:  Tylenol 650 every 4 hours as needed  Albuterol HFA 2 puffs every 4 hours as needed for wheezing  Aspirin 81 mg a day  Wellbutrin  daily  Zyrtec 5 mg daily  Imdur 30 mg daily  Prevacid 30 mg daily  Toprol-XL 25 mg daily  Singulair 10 mg every night  Sublingual nitroglycerin as needed  MiraLAX 17 g daily  Pravastatin 40 mg daily  Spiriva 2 puffs daily  Effexor Exar 75 mg tablet take 3 tablets daily.    Greater than 30-minute discharge

## 2023-10-11 NOTE — THERAPY TREATMENT NOTE
Inpatient Rehabilitation - Physical Therapy Treatment Note       ABENA Quiroz     Patient Name: Ofelia Knox  : 1945  MRN: [id number]    Today's Date: 10/11/2023                    Admit Date: 2023      Visit Dx:   No diagnosis found.    Patient Active Problem List   Diagnosis    CVA (cerebral vascular accident)    Cytokine release syndrome, grade 1       Past Medical History:   Diagnosis Date    AMS (altered mental status)     Aphasia     CKD (chronic kidney disease)     COPD (chronic obstructive pulmonary disease)     CVA (cerebral vascular accident)     DM2 (diabetes mellitus, type 2)     HTN (hypertension)     Restrictive lung disease     Urinary tract infection due to ESBL Klebsiella        Past Surgical History:   Procedure Laterality Date    HYSTERECTOMY      JOINT REPLACEMENT         PT ASSESSMENT (last 12 hours)       IRF PT Evaluation and Treatment       Row Name 10/11/23 1502          PT Time and Intention    Document Type daily treatment  -LL     Mode of Treatment individual therapy;physical therapy  -LL     Patient/Family/Caregiver Comments/Observations Patient had no new c/o this date and was agreeable to PT participation.  -LL       Row Name 10/11/23 1502          General Information    Existing Precautions/Restrictions fall  confusion  -LL       Row Name 10/11/23 1502          Pain Scale: FACES Pre/Post-Treatment    Pain: FACES Scale, Pretreatment 0-->no hurt  -LL     Posttreatment Pain Rating 0-->no hurt  -LL       Row Name 10/11/23 1502          Cognition/Psychosocial    Affect/Mental Status (Cognition) WFL  -LL     Orientation Status (Cognition) oriented to;person;place;situation  -LL     Follows Commands (Cognition) physical/tactile prompts required  -LL     Personal Safety Interventions gait belt;nonskid shoes/slippers when out of bed;supervised activity  -LL       Row Name 10/11/23 1502          Bed Mobility    Supine-Sit-Supine [place] (Bed Mobility) modified independence  -LL      Assistive Device (Bed Mobility) bed rails  -LL       Row Name 10/11/23 1502          Bed-Chair Transfer    Bed-Chair [place] (Transfers) verbal cues;nonverbal cues (demo/gesture);supervision  -LL     Assistive Device (Bed-Chair Transfers) wheelchair  -LL       Row Name 10/11/23 1502          Chair-Bed Transfer    Chair-Bed [place] (Transfers) verbal cues;nonverbal cues (demo/gesture);supervision  -LL     Assistive Device (Chair-Bed Transfers) wheelchair  -LL       Row Name 10/11/23 1502          Sit-Stand Transfer    Sit-Stand [place] (Transfers) verbal cues;nonverbal cues (demo/gesture);supervision  -LL     Assistive Device (Sit-Stand Transfers) walker, front-wheeled  -LL       Row Name 10/11/23 1502          Stand-Sit Transfer    Stand-Sit [place] (Transfers) verbal cues;nonverbal cues (demo/gesture);supervision  -LL     Assistive Device (Stand-Sit Transfers) walker, front-wheeled  -LL       Row Name 10/11/23 1502          Stand Pivot/Stand Step Transfer    Stand Pivot/Stand Step [place] (Transfers) supervision;verbal cues;nonverbal cues (demo/gesture)  -LL     Assistive Device (Stand Pivot Stand Step Transfer) walker, front-wheeled  -LL       Row Name 10/11/23 1502          Gait/Stairs (Locomotion)    [place] Level (Gait) verbal cues;nonverbal cues (demo/gesture);supervision  -LL     Assistive Device (Gait) walker, front-wheeled  -LL     Distance in Feet (Gait) 225'  -LL     Deviations/Abnormal Patterns (Gait) beatrice decreased;gait speed decreased;stride length decreased  -LL     Bilateral Gait Deviations forward flexed posture  -LL       Row Name 10/11/23 1502          Balance    Comment, Balance Standing dynamic balance reaching outside LETHA and crossing midline; side stepping  -LL       Row Name 10/11/23 1502          Hip (Therapeutic Exercise)    Hip Strengthening (Therapeutic Exercise) bilateral;flexion;extension;aBduction;aDduction;marching while seated;marching while  standing;mini squats;sitting;standing;resistance band;green  -LL       Row Name 10/11/23 1502          Knee (Therapeutic Exercise)    Knee Strengthening (Therapeutic Exercise) bilateral;flexion;extension;marching while seated;LAQ (long arc quad);hamstring curls;sitting;standing;resistance band;green  -LL       Row Name 10/11/23 1502          Ankle (Therapeutic Exercise)    Ankle Strengthening (Therapeutic Exercise) bilateral;dorsiflexion;plantarflexion;sitting;standing  -LL       Row Name 10/11/23 1502          Positioning and Restraints    Pre-Treatment Position in bed  -LL     Post Treatment Position bed  -LL     In Bed sitting EOB;call light within reach;encouraged to call for assist;exit alarm on  With tray table in front of her  -LL       Row Name 10/11/23 1502          Bed Mobility Goal 1 (PT-IRF)    Progress/Outcomes (Bed Mobility Goal 1, PT-IRF) goal met  -LL       Row Name 10/11/23 1502          Transfer Goal 1 (PT-IRF)    Progress/Outcomes (Transfer Goal 1, PT-IRF) goal met  -LL       Row Name 10/11/23 1502          Gait/Walking Locomotion Goal 1 (PT-IRF)    Progress/Outcomes (Gait/Walking Locomotion Goal 1, PT-IRF) goal ongoing  -LL               User Key  (r) = Recorded By, (t) = Taken By, (c) = Cosigned By      Initials Name Provider Type    Eliza Ha PTA Physical Therapist Assistant                     Physical Therapy Education       Title: PT OT SLP Therapies (Done)       Topic: Physical Therapy (Done)       Point: Mobility training (Done)       Learning Progress Summary             Patient Acceptance, E,D, VU,NR by LL at 10/11/2023 1508    Acceptance, E,D, VU,NR by LL at 10/10/2023 1426    Acceptance, E, VU,NR by LB at 10/9/2023 1537    Acceptance, E, VU,NR by LB at 10/6/2023 1430    Acceptance, E, VU,NR by LB at 10/4/2023 1536    Acceptance, E, VU,NR by LB at 10/3/2023 1502    Acceptance, E,D, VU,NR by RG at 9/29/2023 1403    Acceptance, E, VU,NR by LB at 9/28/2023 1501    Acceptance, E,  VU,NR by LB at 9/27/2023 1532    Acceptance, E, VU,NR by LB at 9/26/2023 1548    Acceptance, E,D, VU,NR by RG at 9/25/2023 1423    Acceptance, TB, NR by DL at 9/24/2023 2200    Acceptance, E,D, NR,VU by RG at 9/22/2023 1315    Acceptance, E,D, VU,NR by RG at 9/21/2023 1307    Acceptance, E,D, VU,NR by RG at 9/20/2023 1258    Acceptance, E,D, VU,NR by RG at 9/19/2023 1258    Acceptance, E,D, VU,NR by RG at 9/18/2023 1338    Acceptance, E,TB, VU by RM at 9/15/2023 1532    Acceptance, E,TB, VU by RM at 9/14/2023 1555    Acceptance, E,D, VU,NR by RG at 9/13/2023 1528    Acceptance, TB, NR by DL at 9/12/2023 2003    Acceptance, E, VU,NR by LB at 9/12/2023 1135                         Point: Home exercise program (Done)       Learning Progress Summary             Patient Acceptance, E,D, VU,NR by LL at 10/11/2023 1508    Acceptance, E,D, VU,NR by LL at 10/10/2023 1426    Acceptance, E, VU,NR by LB at 10/9/2023 1537    Acceptance, E, VU,NR by LB at 10/6/2023 1430    Acceptance, E, VU,NR by LB at 10/4/2023 1536    Acceptance, E, VU,NR by LB at 10/3/2023 1502    Acceptance, E,D, VU,NR by RG at 9/29/2023 1403    Acceptance, E, VU,NR by LB at 9/28/2023 1501    Acceptance, E, VU,NR by LB at 9/27/2023 1532    Acceptance, E, VU,NR by LB at 9/26/2023 1548    Acceptance, E,D, VU,NR by RG at 9/25/2023 1423    Acceptance, TB, NR by DL at 9/24/2023 2200    Acceptance, E,D, NR,VU by RG at 9/22/2023 1315    Acceptance, E,D, VU,NR by RG at 9/21/2023 1307    Acceptance, E,D, VU,NR by RG at 9/20/2023 1258    Acceptance, E,D, VU,NR by RG at 9/19/2023 1258    Acceptance, E,D, VU,NR by RG at 9/18/2023 1338    Acceptance, E,TB, VU by RM at 9/15/2023 1532    Acceptance, E,TB, VU by RM at 9/14/2023 1555    Acceptance, E,D, VU,NR by RG at 9/13/2023 1528    Acceptance, TB, NR by DL at 9/12/2023 2003    Acceptance, E, VU,NR by LB at 9/12/2023 1135                         Point: Body mechanics (Done)       Learning Progress Summary              Patient Acceptance, E,D, VU,NR by LL at 10/11/2023 1508    Acceptance, E,D, VU,NR by LL at 10/10/2023 1426    Acceptance, E, VU,NR by LB at 10/9/2023 1537    Acceptance, E, VU,NR by LB at 10/6/2023 1430    Acceptance, E, VU,NR by LB at 10/4/2023 1536    Acceptance, E, VU,NR by LB at 10/3/2023 1502    Acceptance, E,D, VU,NR by RG at 9/29/2023 1403    Acceptance, E, VU,NR by LB at 9/28/2023 1501    Acceptance, E, VU,NR by LB at 9/27/2023 1532    Acceptance, E, VU,NR by LB at 9/26/2023 1548    Acceptance, E,D, VU,NR by RG at 9/25/2023 1423    Acceptance, TB, NR by DL at 9/24/2023 2200    Acceptance, E,D, NR,VU by RG at 9/22/2023 1315    Acceptance, E,D, VU,NR by RG at 9/21/2023 1307    Acceptance, E,D, VU,NR by RG at 9/20/2023 1258    Acceptance, E,D, VU,NR by RG at 9/19/2023 1258    Acceptance, E,D, VU,NR by RG at 9/18/2023 1338    Acceptance, E,TB, VU by RM at 9/15/2023 1532    Acceptance, E,TB, VU by RM at 9/14/2023 1555    Acceptance, E,D, VU,NR by RG at 9/13/2023 1528    Acceptance, TB, NR by DL at 9/12/2023 2003    Acceptance, E, VU,NR by LB at 9/12/2023 1135                         Point: Precautions (Done)       Learning Progress Summary             Patient Acceptance, E,D, VU,NR by LL at 10/11/2023 1508    Acceptance, E,D, VU,NR by LL at 10/10/2023 1426    Acceptance, E, VU,NR by LB at 10/9/2023 1537    Acceptance, E, VU,NR by LB at 10/6/2023 1430    Acceptance, E, VU,NR by LB at 10/4/2023 1536    Acceptance, E, VU,NR by LB at 10/3/2023 1502    Acceptance, E,D, VU,NR by RG at 9/29/2023 1403    Acceptance, E, VU,NR by LB at 9/28/2023 1501    Acceptance, E, VU,NR by LB at 9/27/2023 1532    Acceptance, E, VU,NR by LB at 9/26/2023 1548    Acceptance, E,D, VU,NR by RG at 9/25/2023 1423    Acceptance, TB, NR by DL at 9/24/2023 2200    Acceptance, E,D, NR,VU by RG at 9/22/2023 1315    Acceptance, E,D, VU,NR by RG at 9/21/2023 1307    Acceptance, E,D, VU,NR by RG at 9/20/2023 1258    Acceptance, E,D, VU,NR by RG at  9/19/2023 1258    Acceptance, E,D, VU,NR by RG at 9/18/2023 1338    Acceptance, E,TB, VU by RM at 9/15/2023 1532    Acceptance, E,TB, VU by RM at 9/14/2023 1555    Acceptance, E,D, VU,NR by RG at 9/13/2023 1528    Acceptance, TB, NR by DL at 9/12/2023 2003    Acceptance, E, VU,NR by LB at 9/12/2023 1135                                         User Key       Initials Effective Dates Name Provider Type Discipline    LB 06/16/21 -  Andra Stewart, PT Physical Therapist PT    LL 05/02/16 -  Eliza Ramirez, ESSIE Physical Therapist Assistant PT    RM 02/17/23 -  Karis Obrien, PTA Physical Therapist Assistant PT    RG 06/16/21 -  Twan Cunningham PTA Physical Therapist Assistant PT    DL 03/16/22 -  Carolyn Ramirez, RN Registered Nurse Nurse                    PT Recommendation and Plan                          Time Calculation:      PT Charges       Row Name 10/11/23 1508             Time Calculation    Start Time 0745  -LL      Stop Time 0915  -LL      Time Calculation (min) 90 min  -LL      PT Received On 10/11/23  -LL         Time Calculation- PT    Total Timed Code Minutes- PT 90 minute(s)  -LL                User Key  (r) = Recorded By, (t) = Taken By, (c) = Cosigned By      Initials Name Provider Type    LL Eliza Ramirez, ESSIE Physical Therapist Assistant                    Therapy Charges for Today       Code Description Service Date Service Provider Modifiers Qty    58503989019 HC GAIT TRAINING EA 15 MIN 10/10/2023 Eliza Ramirez PTA GP, CQ 1    69399567632 HC PT NEUROMUSC RE EDUCATION EA 15 MIN 10/10/2023 Eliza Ramirez PTA GP, CQ 1    37005392792 HC PT THERAPEUTIC ACT EA 15 MIN 10/10/2023 Eliza Ramirez, PTA GP, CQ 1    37986980226 HC PT THER PROC EA 15 MIN 10/10/2023 Eliza Ramirez, PTA GP, CQ 3    71309460730 HC GAIT TRAINING EA 15 MIN 10/11/2023 Eliza Ramirez, PTA GP, CQ 1    31512659216 HC PT NEUROMUSC RE EDUCATION EA 15 MIN 10/11/2023 RamirezEliza reed PTA GP, CQ 1    40331719394 HC  PT THERAPEUTIC ACT EA 15 MIN 10/11/2023 Eliza Ramirez PTA GP, CQ 1    35689549239 HC PT THER PROC EA 15 MIN 10/11/2023 Eliza Ramirez PTA GP, CQ 3              PT G-Codes  AM-PAC 6 Clicks Score (PT): 17      Silvana Ramirez PTA  10/11/2023

## 2023-10-12 VITALS
WEIGHT: 248 LBS | HEART RATE: 62 BPM | SYSTOLIC BLOOD PRESSURE: 134 MMHG | BODY MASS INDEX: 39.86 KG/M2 | OXYGEN SATURATION: 94 % | HEIGHT: 66 IN | DIASTOLIC BLOOD PRESSURE: 78 MMHG | TEMPERATURE: 98.7 F | RESPIRATION RATE: 18 BRPM

## 2023-10-12 LAB
ANION GAP SERPL CALCULATED.3IONS-SCNC: 7.7 MMOL/L (ref 5–15)
BASOPHILS # BLD AUTO: 0.05 10*3/MM3 (ref 0–0.2)
BASOPHILS NFR BLD AUTO: 0.5 % (ref 0–1.5)
BUN SERPL-MCNC: 25 MG/DL (ref 8–23)
BUN/CREAT SERPL: 23.6 (ref 7–25)
CALCIUM SPEC-SCNC: 9.1 MG/DL (ref 8.6–10.5)
CHLORIDE SERPL-SCNC: 106 MMOL/L (ref 98–107)
CO2 SERPL-SCNC: 27.3 MMOL/L (ref 22–29)
CREAT SERPL-MCNC: 1.06 MG/DL (ref 0.57–1)
DEPRECATED RDW RBC AUTO: 50.6 FL (ref 37–54)
EGFRCR SERPLBLD CKD-EPI 2021: 54.6 ML/MIN/1.73
EOSINOPHIL # BLD AUTO: 0.44 10*3/MM3 (ref 0–0.4)
EOSINOPHIL NFR BLD AUTO: 4.3 % (ref 0.3–6.2)
ERYTHROCYTE [DISTWIDTH] IN BLOOD BY AUTOMATED COUNT: 14.7 % (ref 12.3–15.4)
GLUCOSE SERPL-MCNC: 121 MG/DL (ref 65–99)
HCT VFR BLD AUTO: 45.8 % (ref 34–46.6)
HGB BLD-MCNC: 13.7 G/DL (ref 12–15.9)
IMM GRANULOCYTES # BLD AUTO: 0.04 10*3/MM3 (ref 0–0.05)
IMM GRANULOCYTES NFR BLD AUTO: 0.4 % (ref 0–0.5)
LYMPHOCYTES # BLD AUTO: 2.52 10*3/MM3 (ref 0.7–3.1)
LYMPHOCYTES NFR BLD AUTO: 24.7 % (ref 19.6–45.3)
MCH RBC QN AUTO: 27.9 PG (ref 26.6–33)
MCHC RBC AUTO-ENTMCNC: 29.9 G/DL (ref 31.5–35.7)
MCV RBC AUTO: 93.3 FL (ref 79–97)
MONOCYTES # BLD AUTO: 0.98 10*3/MM3 (ref 0.1–0.9)
MONOCYTES NFR BLD AUTO: 9.6 % (ref 5–12)
NEUTROPHILS NFR BLD AUTO: 6.17 10*3/MM3 (ref 1.7–7)
NEUTROPHILS NFR BLD AUTO: 60.5 % (ref 42.7–76)
NRBC BLD AUTO-RTO: 0 /100 WBC (ref 0–0.2)
PLATELET # BLD AUTO: 249 10*3/MM3 (ref 140–450)
PMV BLD AUTO: 11.2 FL (ref 6–12)
POTASSIUM SERPL-SCNC: 4 MMOL/L (ref 3.5–5.2)
RBC # BLD AUTO: 4.91 10*6/MM3 (ref 3.77–5.28)
SODIUM SERPL-SCNC: 141 MMOL/L (ref 136–145)
WBC NRBC COR # BLD: 10.2 10*3/MM3 (ref 3.4–10.8)

## 2023-10-12 PROCEDURE — 85025 COMPLETE CBC W/AUTO DIFF WBC: CPT | Performed by: INTERNAL MEDICINE

## 2023-10-12 PROCEDURE — 97530 THERAPEUTIC ACTIVITIES: CPT

## 2023-10-12 PROCEDURE — 94799 UNLISTED PULMONARY SVC/PX: CPT

## 2023-10-12 PROCEDURE — 80048 BASIC METABOLIC PNL TOTAL CA: CPT | Performed by: INTERNAL MEDICINE

## 2023-10-12 RX ORDER — VENLAFAXINE HYDROCHLORIDE 75 MG/1
75 CAPSULE, EXTENDED RELEASE ORAL DAILY
Start: 2023-10-12

## 2023-10-12 RX ORDER — POLYETHYLENE GLYCOL 3350 17 G/17G
17 POWDER, FOR SOLUTION ORAL DAILY
Start: 2023-10-12

## 2023-10-12 RX ORDER — ASPIRIN 81 MG/1
81 TABLET ORAL DAILY
Start: 2023-10-12

## 2023-10-12 RX ORDER — MONTELUKAST SODIUM 10 MG/1
10 TABLET ORAL NIGHTLY
Start: 2023-10-12

## 2023-10-12 RX ORDER — CETIRIZINE HYDROCHLORIDE 5 MG/1
5 TABLET ORAL DAILY
Start: 2023-10-12

## 2023-10-12 RX ORDER — ACETAMINOPHEN 325 MG/1
650 TABLET ORAL EVERY 4 HOURS PRN
Start: 2023-10-12

## 2023-10-12 RX ORDER — NITROGLYCERIN 0.4 MG/1
0.4 TABLET SUBLINGUAL
Start: 2023-10-12

## 2023-10-12 RX ADMIN — Medication 10 ML: at 09:35

## 2023-10-12 RX ADMIN — PANTOPRAZOLE SODIUM 40 MG: 40 TABLET, DELAYED RELEASE ORAL at 06:33

## 2023-10-12 RX ADMIN — METOPROLOL SUCCINATE 25 MG: 25 TABLET, EXTENDED RELEASE ORAL at 09:34

## 2023-10-12 RX ADMIN — VENLAFAXINE HYDROCHLORIDE 75 MG: 75 CAPSULE, EXTENDED RELEASE ORAL at 09:33

## 2023-10-12 RX ADMIN — TIOTROPIUM BROMIDE INHALATION SPRAY 2 PUFF: 3.12 SPRAY, METERED RESPIRATORY (INHALATION) at 07:40

## 2023-10-12 RX ADMIN — POLYETHYLENE GLYCOL (3350) 17 G: 17 POWDER, FOR SOLUTION ORAL at 09:33

## 2023-10-12 RX ADMIN — CETIRIZINE HYDROCHLORIDE 5 MG: 10 TABLET, FILM COATED ORAL at 09:33

## 2023-10-12 RX ADMIN — NYSTATIN 1 APPLICATION: 100000 CREAM TOPICAL at 09:35

## 2023-10-12 RX ADMIN — PRAVASTATIN SODIUM 40 MG: 40 TABLET ORAL at 09:34

## 2023-10-12 RX ADMIN — ASPIRIN 81 MG: 81 TABLET, COATED ORAL at 09:33

## 2023-10-12 RX ADMIN — BUPROPION HYDROCHLORIDE 150 MG: 150 TABLET, EXTENDED RELEASE ORAL at 09:33

## 2023-10-12 RX ADMIN — ISOSORBIDE MONONITRATE 30 MG: 30 TABLET, EXTENDED RELEASE ORAL at 09:34

## 2023-10-12 NOTE — PAYOR COMM NOTE
"Annalisa Garcia RN   Utilization Review Nurse for Inpatient Rehab   Phone: [phone]  Fax: [phone]  Email: [email or web address]  [facility] NPI: [id number]    DISCHARGE NOTIFICATION WITH DISCHARGE SUMMARY  Member:  Ofelia Knox  :  1945  Auth# [id number]   ID# [id number]   Admission Date:  23  Discharge Date:  10/12/23  Disposition:  [facility] in [place], [place].    Ofeila Knox (78 y.o. Female)       Date of Birth   1945    Social Security Number       Address   [address] [place] [address]    Home Phone   [phone]    MRN   [id number]       Mosque   None    Marital Status                               Admission Date   23    Admission Type   Elective    Admitting Provider   Jim Appiah MD    Attending Provider   Jim Appiah MD    Department, Room/Bed   [facility], 104/2S       Discharge Date       Discharge Disposition   Skilled Nursing Facility (DC - External)    Discharge Destination                                 Attending Provider: Jim Appiah MD    Allergies: Codeine, Albuterol, Amoxicillin, Cephalexin, Clavulanic Acid, Levofloxacin    Isolation: None   Infection: None   Code Status: CPR    Ht: 167.6 cm (66\")   Wt: 112 kg (248 lb)    Admission Cmt: None   Principal Problem: CVA (cerebral vascular accident) [I63.9]                   Annalisa Garcia RN  Registered Nurse  Nursing     Significant Note      Signed     Date of Service: 10/12/23 1012  Creation Time: 10/12/23 1012     Signed              10/12/23 101   Plan   Plan Faxed discharge summary and AVS to [facility] [phone].  Spoke to Radha at [facility] with admission confirmation.  Son Avinash will provide transportation to [facility] around 12:00pm.  RN will call report to Belinda prior to discharge.   Final Discharge Disposition Code 03 - skilled nursing facility (SNF)   Final Note Pt is being discharged to [facility] " today with son Avinash providing transportation today around 12:00pm.                         Jim Appiah MD   Physician  Medicine     Discharge Summary      Signed     Date of Service: 10/12/23 0800  Creation Time: 10/11/23 1603     Signed         Date of admission: 9/11/2023  Date of discharge: 10/12/2023     Principal diagnosis: Left putamen and internal capsule CVA  Secondary diagnosis:  Diabetes type 2  Hypertension  COVID-19  COPD  ESBL UTI prior to coming to the rehab unit  HFrEF.  EF 44%  Urinary retention resolved  Depression, controlled on current dose of Effexor and Wellbutrin     Procedures:  Last chest x-ray 10/3 negative  Echo 10/12 EF 44% and grade 2 impaired relaxation     Consultants:  Infectious disease  Urology     Exam: She is pleasant and in no distress.  Speech much improved over admission.  She realizes she is going to nursing home today son is coming to pick her up.  No new complaints.  No shortness of breath, no significant cough, vital signs last, 148/82, 16, 61, 94% saturated on room air, afebrile., lungs are clear this morning heart regular rate and rhythm no edema mood is good strength is symmetric     Hospital course: Patient was admitted to the acute inpatient rehab facility after the above CVA.  She was seen by speech therapy for cognitive and expressive aphasia.  She was evaluated also by her speech therapy for swallowing was thought to be doing well and no further SLP was warranted for swallowing.  With OT initially, patient was max assist with bathing, min assist upper body dressing, max assist lower body dressing, dependent for toileting hygiene, set up for self-feeding and min assist for grooming.  With PT initially patient was min assist for transfers and was able to walk 160 feet contact-guard 1 person front wheeled walker.  Now with OT, patient is set up for upper body dressing, contact-guard lower body dressing, contact-guard to standby assist for bathing and contact-guard  for toileting hygiene.  She has made significant progress with OT.  With PT, patient is supervision for transfers now and walked 225 feet front wheeled walker supervision.  From a speech standpoint, patient has improved dramatically and now can get full thoughts across.  She is still occasionally has some word finding problems or says the wrong thing but does catch herself.  Patient did develop COVID-19 while here.  She was isolated, seen by ID and did receive 3 days of remdesivir.  She never had any hypoxic difficulties and has completed her 10 days of isolation and doing well.  Patient is diabetic however glucoses have remained good off medication on a diabetic diet, A1c was 6.8.  This can be monitored intermittently at the nursing facility but no medications are required currently.  Patient does have HFrEF and is compensated.  She was on Entresto and Lasix however creatinine did elevate slightly, this was held at this time.  Creatinine is improved on discharge.  Could consider while at the nursing facility trying to add back low-dose Entresto and monitoring renal function but will leave this to attending discretion.  Patient is compensated on discharge.     Disposition: Skilled nursing facility     Condition: Stable/improved     Follow-up:  Patient is to have OT and PT at the nursing facility, BMP in 3 days and call to attending, fall precautions at the nursing facility     Medication:  Tylenol 650 every 4 hours as needed  Albuterol HFA 2 puffs every 4 hours as needed for wheezing  Aspirin 81 mg a day  Wellbutrin  daily  Zyrtec 5 mg daily  Imdur 30 mg daily  Prevacid 30 mg daily  Toprol-XL 25 mg daily  Singulair 10 mg every night  Sublingual nitroglycerin as needed  MiraLAX 17 g daily  Pravastatin 40 mg daily  Spiriva 2 puffs daily  Effexor Exar 75 mg tablet take 3 tablets daily.     Greater than 30-minute discharge

## 2023-10-12 NOTE — THERAPY DISCHARGE NOTE
Inpatient Rehabilitation - Physical Therapy Treatment Note/Discharge  ABENA Quiroz     Patient Name: Ofelia Knox  : 1945  MRN: [id number]  Today's Date: 10/12/2023                Admit Date: 2023    Visit Dx:  No diagnosis found.  Patient Active Problem List   Diagnosis    CVA (cerebral vascular accident)    Cytokine release syndrome, grade 1     Past Medical History:   Diagnosis Date    AMS (altered mental status)     Aphasia     CKD (chronic kidney disease)     COPD (chronic obstructive pulmonary disease)     CVA (cerebral vascular accident)     DM2 (diabetes mellitus, type 2)     HTN (hypertension)     Restrictive lung disease     Urinary tract infection due to ESBL Klebsiella      Past Surgical History:   Procedure Laterality Date    HYSTERECTOMY      JOINT REPLACEMENT         PT ASSESSMENT (last 12 hours)       IRF PT Evaluation and Treatment       Row Name 10/12/23 1400          PT Time and Intention    Document Type --  Discharge Treatment  -LL     Mode of Treatment individual therapy;physical therapy  -LL     Patient/Family/Caregiver Comments/Observations Patient discharged to SNF this date.  Family procided transportation.  -LL       Row Name 10/12/23 1400          General Information    Existing Precautions/Restrictions fall  confusion  -LL       Row Name 10/12/23 1400          Pain Scale: FACES Pre/Post-Treatment    Pain: FACES Scale, Pretreatment 0-->no hurt  -LL     Posttreatment Pain Rating 0-->no hurt  -LL       Row Name 10/12/23 1400          Cognition/Psychosocial    Affect/Mental Status (Cognition) WFL  -LL     Orientation Status (Cognition) oriented to;person;place;situation  -LL     Follows Commands (Cognition) physical/tactile prompts required  -LL     Personal Safety Interventions gait belt;nonskid shoes/slippers when out of bed;supervised activity  -LL       Row Name 10/12/23 1400          Bed Mobility Goal 1 (PT-IRF)    Progress/Outcomes (Bed Mobility Goal 1, PT-IRF) goal met  -LL        Row Name 10/12/23 1400          Transfer Goal 1 (PT-IRF)    Progress/Outcomes (Transfer Goal 1, PT-IRF) goal met  -LL       Row Name 10/12/23 1400          Gait/Walking Locomotion Goal 1 (PT-IRF)    Progress/Outcomes (Gait/Walking Locomotion Goal 1, PT-IRF) goal partially met  -LL               User Key  (r) = Recorded By, (t) = Taken By, (c) = Cosigned By      Initials Name Provider Type    Eliza Ha PTA Physical Therapist Assistant                    Physical Therapy Education       Title: PT OT SLP Therapies (Resolved)       Topic: Physical Therapy (Resolved)       Point: Mobility training (Resolved)       Learning Progress Summary             Patient Acceptance, E,D, VU,NR by LL at 10/11/2023 1508    Acceptance, E,D, VU,NR by LL at 10/10/2023 1426    Acceptance, E, VU,NR by LB at 10/9/2023 1537    Acceptance, E, VU,NR by LB at 10/6/2023 1430    Acceptance, E, VU,NR by LB at 10/4/2023 1536    Acceptance, E, VU,NR by LB at 10/3/2023 1502    Acceptance, E,D, VU,NR by RG at 9/29/2023 1403    Acceptance, E, VU,NR by LB at 9/28/2023 1501    Acceptance, E, VU,NR by LB at 9/27/2023 1532    Acceptance, E, VU,NR by LB at 9/26/2023 1548    Acceptance, E,D, VU,NR by RG at 9/25/2023 1423    Acceptance, TB, NR by DL at 9/24/2023 2200    Acceptance, E,D, NR,VU by RG at 9/22/2023 1315    Acceptance, E,D, VU,NR by RG at 9/21/2023 1307    Acceptance, E,D, VU,NR by RG at 9/20/2023 1258    Acceptance, E,D, VU,NR by RG at 9/19/2023 1258    Acceptance, E,D, VU,NR by RG at 9/18/2023 1338    Acceptance, E,TB, VU by RM at 9/15/2023 1532    Acceptance, E,TB, VU by RM at 9/14/2023 1555    Acceptance, E,D, VU,NR by RG at 9/13/2023 1528    Acceptance, TB, NR by DL at 9/12/2023 2003    Acceptance, E, VU,NR by LB at 9/12/2023 1135                         Point: Home exercise program (Resolved)       Learning Progress Summary             Patient Acceptance, E,D, VU,NR by LL at 10/11/2023 1508    Acceptance, E,D, VU,NR by LL at  10/10/2023 1426    Acceptance, E, VU,NR by LB at 10/9/2023 1537    Acceptance, E, VU,NR by LB at 10/6/2023 1430    Acceptance, E, VU,NR by LB at 10/4/2023 1536    Acceptance, E, VU,NR by LB at 10/3/2023 1502    Acceptance, E,D, VU,NR by RG at 9/29/2023 1403    Acceptance, E, VU,NR by LB at 9/28/2023 1501    Acceptance, E, VU,NR by LB at 9/27/2023 1532    Acceptance, E, VU,NR by LB at 9/26/2023 1548    Acceptance, E,D, VU,NR by RG at 9/25/2023 1423    Acceptance, TB, NR by DL at 9/24/2023 2200    Acceptance, E,D, NR,VU by RG at 9/22/2023 1315    Acceptance, E,D, VU,NR by RG at 9/21/2023 1307    Acceptance, E,D, VU,NR by RG at 9/20/2023 1258    Acceptance, E,D, VU,NR by RG at 9/19/2023 1258    Acceptance, E,D, VU,NR by RG at 9/18/2023 1338    Acceptance, E,TB, VU by RM at 9/15/2023 1532    Acceptance, E,TB, VU by RM at 9/14/2023 1555    Acceptance, E,D, VU,NR by RG at 9/13/2023 1528    Acceptance, TB, NR by DL at 9/12/2023 2003    Acceptance, E, VU,NR by LB at 9/12/2023 1135                         Point: Body mechanics (Resolved)       Learning Progress Summary             Patient Acceptance, E,D, VU,NR by LL at 10/11/2023 1508    Acceptance, E,D, VU,NR by LL at 10/10/2023 1426    Acceptance, E, VU,NR by LB at 10/9/2023 1537    Acceptance, E, VU,NR by LB at 10/6/2023 1430    Acceptance, E, VU,NR by LB at 10/4/2023 1536    Acceptance, E, VU,NR by LB at 10/3/2023 1502    Acceptance, E,D, VU,NR by RG at 9/29/2023 1403    Acceptance, E, VU,NR by LB at 9/28/2023 1501    Acceptance, E, VU,NR by LB at 9/27/2023 1532    Acceptance, E, VU,NR by LB at 9/26/2023 1548    Acceptance, E,D, VU,NR by RG at 9/25/2023 1423    Acceptance, TB, NR by DL at 9/24/2023 2200    Acceptance, E,D, NR,VU by RG at 9/22/2023 1315    Acceptance, E,D, VU,NR by RG at 9/21/2023 1307    Acceptance, E,D, VU,NR by RG at 9/20/2023 1258    Acceptance, E,D, VU,NR by RG at 9/19/2023 1258    Acceptance, E,D, VU,NR by RG at 9/18/2023 1338    Acceptance, E,TB,  VU by RM at 9/15/2023 1532    Acceptance, E,TB, VU by RM at 9/14/2023 1555    Acceptance, E,D, VU,NR by RG at 9/13/2023 1528    Acceptance, TB, NR by DL at 9/12/2023 2003    Acceptance, E, VU,NR by LB at 9/12/2023 1135                         Point: Precautions (Resolved)       Learning Progress Summary             Patient Acceptance, E,D, VU,NR by LL at 10/11/2023 1508    Acceptance, E,D, VU,NR by LL at 10/10/2023 1426    Acceptance, E, VU,NR by LB at 10/9/2023 1537    Acceptance, E, VU,NR by LB at 10/6/2023 1430    Acceptance, E, VU,NR by LB at 10/4/2023 1536    Acceptance, E, VU,NR by LB at 10/3/2023 1502    Acceptance, E,D, VU,NR by RG at 9/29/2023 1403    Acceptance, E, VU,NR by LB at 9/28/2023 1501    Acceptance, E, VU,NR by LB at 9/27/2023 1532    Acceptance, E, VU,NR by LB at 9/26/2023 1548    Acceptance, E,D, VU,NR by RG at 9/25/2023 1423    Acceptance, TB, NR by DL at 9/24/2023 2200    Acceptance, E,D, NR,VU by RG at 9/22/2023 1315    Acceptance, E,D, VU,NR by RG at 9/21/2023 1307    Acceptance, E,D, VU,NR by RG at 9/20/2023 1258    Acceptance, E,D, VU,NR by RG at 9/19/2023 1258    Acceptance, E,D, VU,NR by RG at 9/18/2023 1338    Acceptance, E,TB, VU by RM at 9/15/2023 1532    Acceptance, E,TB, VU by RM at 9/14/2023 1555    Acceptance, E,D, VU,NR by RG at 9/13/2023 1528    Acceptance, TB, NR by DL at 9/12/2023 2003    Acceptance, E, VU,NR by LB at 9/12/2023 1135                                         User Key       Initials Effective Dates Name Provider Type Discipline    LB 06/16/21 -  Andra Stewart, PT Physical Therapist PT    LL 05/02/16 -  Eliza Ramirez, PTA Physical Therapist Assistant PT    RM 02/17/23 -  Karis Obrien, PTA Physical Therapist Assistant PT    RG 06/16/21 -  Twan Cunningham PTA Physical Therapist Assistant PT    DL 03/16/22 -  Carolyn Ramirez, RN Registered Nurse Nurse                    PT Recommendation and Plan                  Time Calculation:    PT Charges        Row Name 10/12/23 1459             Time Calculation- PT    Total Timed Code Minutes- PT 10 minute(s)  -LL                User Key  (r) = Recorded By, (t) = Taken By, (c) = Cosigned By      Initials Name Provider Type    LL Eliza Ramirez PTA Physical Therapist Assistant                    Therapy Charges for Today       Code Description Service Date Service Provider Modifiers Qty    23865903494 HC GAIT TRAINING EA 15 MIN 10/11/2023 Eliza Ramirez, ESSIE GP, CQ 1    00275165384 HC PT NEUROMUSC RE EDUCATION EA 15 MIN 10/11/2023 Eliza Ramirez, ESSIE GP, CQ 1    54354913952 HC PT THERAPEUTIC ACT EA 15 MIN 10/11/2023 Eliza Ramirez, ESSIE GP, CQ 1    45500064846 HC PT THER PROC EA 15 MIN 10/11/2023 Eliza Ramirez PTA GP, CQ 3    31714138920 HC PT THERAPEUTIC ACT EA 15 MIN 10/12/2023 Eliza Ramirez, ESSIE GP, CQ 1            PT G-Codes  AM-PAC 6 Clicks Score (PT): 17    PT Discharge Summary  Outcomes Achieved: Patient able to partially acheive established goals  Discharge Destination: SNF    Silvana Ramirez PTA  10/12/2023

## 2023-10-12 NOTE — PROGRESS NOTES
"Patient Assessment Instrument  Quality Indicators - Discharge FY 2023    Section A. Transportation      Section A. Medication List  Subsequent Provider:  03 - Skilled Nursing Facility (SNF)  Medication List to Subsequent Provider at Discharge:  Yes - Current reconciled  medication list provided to the subsequent provider  Route(s) of Medication List Transmission to Subsequent Provider:  Paper-based  (e.g., fax, copies, printouts)  Medication List to Patient:  Not applicable.  Patient discharged to a subsequent  provider as defined in 44D    Section B. Health Literacy  Frequency of Needing Assistance Reading:  Often    Section C. BIMS  Brief Interview for Mental Status (BIMS) was conducted.  Repetition of Three Words: Three words  Able to report correct year: Missed by 1 year  Able to report correct month: Accurate within 5 days  Able to report correct day of the week: Incorrect or no answer  Able to recall \"sock\": Yes, after cuing  Able to recall \"blue\": Yes, after cuing  Able to recall \"bed\": Yes, no cue required    BIMS SUMMARY SCORE: 11 Moderately impaired    Section C. Signs and Symptoms of Delirium (from CAM)  Acute Change in Mental Status:   No  Inattention:   Behavior not present  Disorganized Thinking:   Behavior not present  Altered Level of Consciousness:   Behavior not present    Section D. Mood  Presence of little interest or pleasure in doing things:   No  Frequency of having little interest or pleasure in doing things:   Never or 1  day  Presence of feeling down, depressed, or hopeless:   No  Frequency of feeling down, depressed, or hopeless:   Never or 1 day   Interview Ended. Above responses do not meet criteria to continue  Total Severity Score:   00    Section D. Social Isolation  Frequency of Feeling Lonely or Isolated:  Never    Section GG. Self-Care Performance      Section GG. Mobility Performance      Section J. Health Conditions Discharge      Section J. Health Conditions (Pain)  Pain Effect " on Sleep:   Does not apply - Patient has not had any pain or hurting  in the past 5 days    Section K. Swallowing/Nutritional Status  Nutritional Approaches Past 7 Days:  Nutritional Approaches at Discharge:    Section M. Skin Conditions Discharge  Unhealed Pressure Ulcer(s)/Injurie(s) at Stage 1 or Higher:  No    . Current Number of Unhealed Pressure Ulcers    Number of Unhealed Stage 1 Pressure Injuries: 0  Number of Unhealed Stage 2: 0  Number of Unhealed Stage 3: 0  Number of Unhealed Stage 4: 0  Number of Unhealed Unstageable Due to Non-removable Dressing/Device: 0  Number of Unhealed Unstageable Due to Slough/Eschar: 0  Number of Unhealed Unstageable Injuries Presenting as Deep Tissue Injury: 0    Section N. Medication    Medication Intervention: Not applicable - There were no potential clinically  significant medication issues identified since admission or patient is not  taking any medications.      Section O. Special Treatments, Procedures, and Programs  IV Access: Midline    Signed by: Nurse Moises

## 2023-10-12 NOTE — PROGRESS NOTES
Occupational Therapy:    Physical Therapy:    Speech Language Pathology: Individual: 17 minutes.    Signed by: JUSTO Schwartz

## 2023-10-12 NOTE — SIGNIFICANT NOTE
10/12/23 1459   PT Discharge Summary   Outcomes Achieved Patient able to partially acheive established goals   Discharge Destination SNF

## 2023-10-12 NOTE — THERAPY DISCHARGE NOTE
"Inpatient Rehabilitation - Speech Language Pathology  Discharge /Discharge  [facility]     Patient Name: Ofelia Knox  : 1945  MRN: [id number]  Today's Date: 10/12/2023     Admit Date: 2023  Visit Dx:  No diagnosis found.  Patient Active Problem List   Diagnosis    CVA (cerebral vascular accident)    Cytokine release syndrome, grade 1     Past Medical History:   Diagnosis Date    AMS (altered mental status)     Aphasia     CKD (chronic kidney disease)     COPD (chronic obstructive pulmonary disease)     CVA (cerebral vascular accident)     DM2 (diabetes mellitus, type 2)     HTN (hypertension)     Restrictive lung disease     Urinary tract infection due to ESBL Klebsiella      Past Surgical History:   Procedure Laterality Date    HYSTERECTOMY      JOINT REPLACEMENT       Ofelia Knox was admitted from an outside hospital facility to [facility]'s Inpatient Physical Rehabilitation Unit on 23 for CVA. Pt has a medical hx significant for diabetes, HTN, and COPD w/ O2 dependence at premorbid baseline .     During admission to the outside facility she was evidenced w/ \"significant aphasia\".     Upon admission to [facility]'s IPR, pt participated in a Speech Language Cognitive Evaluation w/ noted concerns for receptive aphasia initially relating to auditory comprehension deficits resembling transcortical sensory aphasia. Following significant improvement she was re-evaluated on 23 at which time she was felt to present w/ a trace receptive deficit w/ continued difficulties w/ multi-step directives when not provided visual cues, and features representative of a fluent aphasia as well as anomia w/ impairments primarily evidenced w phrases/sentences of greater than 3 words. Goals were modified accordingly.     Ms Knox participated in formal therapy tasks to address these noted deficits w/ significant progress made toward goals. She is currently noted w/ continued Wernicke's-like jargon intermittently, but primarily in " long conversational exchanges or when attempting to locate a specific word. She continues to make progress toward providing verbal descriptors of a given target, divergent naming tasks, and sentence formulation when given a target word to include.     SLP Recommendation and Plan      Recommendations:   She is recommended to continue formal speech/language/cognitive tx to address continued deficits upon discharge from this facility.     Thank you for allowing me to participate in the care of your patient-  Verona Sarmiento M.S., CCC-SLP        EDUCATION  The patient has been educated in the following areas:   Communication Impairment.    Time Calculation:          Verona Sarmiento MS CCC-SLP  10/12/2023

## 2023-10-12 NOTE — PROGRESS NOTES
Patient Assessment Instrument  Quality Indicators - Discharge FY 2023    Section A. Transportation      Section A. Medication List        Section B. Health Literacy      Section C. BIMS      Section C. Signs and Symptoms of Delirium (from CAM)      Section D. Mood      Section D. Social Isolation      Section GG. Self-Care Performance      Section GG. Mobility Performance     Roll Left and Right: Patient completed the activities by themself with no  assistance from a helper.   Sit to Lying: Patient completed the activities by themself with no assistance  from a helper.   Lying to Sitting on Side of Bed: Patient completed the activities by themself  with no assistance from a helper.   Sit to Stand: [place] provides verbal cues and/or touching/steadying and/or  contact guard assistance as patient completes activity. Assistance may be  provided throughout the activity or intermittently.   Chair/Bed to Chair Transfer: [place] provides verbal cues and/or  touching/steadying and/or contact guard assistance as patient completes  activity. Assistance may be provided throughout the activity or intermittently.   Toilet Transfer [place] provides verbal cues and/or touching/steadying and/or  contact guard assistance as patient completes activity. Assistance may be  provided throughout the activity or intermittently.   Car Transfer: [place] provides verbal cues and/or touching/steadying and/or  contact guard assistance as patient completes activity. Assistance may be  provided throughout the activity or intermittently.   Walk 10 Feet:   [place] provides verbal cues and/or touching/steadying and/or  contact guard assistance as patient completes activity. Assistance may be  provided throughout the activity or intermittently.  Walk 50 Feet with 2 Turns:   [place] provides verbal cues and/or  touching/steadying and/or contact guard assistance as patient completes  activity. Assistance may be provided throughout the activity or  intermittently.  Walk 150 Feet:   [place] provides verbal cues and/or touching/steadying and/or  contact guard assistance as patient completes activity. Assistance may be  provided throughout the activity or intermittently.  Walking 10 Feet on Uneven Surfaces:   [place] provides verbal cues and/or  touching/steadying and/or contact guard assistance as patient completes  activity. Assistance may be provided throughout the activity or intermittently.  1 Step Over Curb or Up/Down Stair:   [place] provides verbal cues and/or  touching/steadying and/or contact guard assistance as patient completes  activity. Assistance may be provided throughout the activity or intermittently.  4 Steps Up and Down, With/Without Rail:   [place] provides verbal cues and/or  touching/steadying and/or contact guard assistance as patient completes  activity. Assistance may be provided throughout the activity or intermittently.  12 Steps Up and Down, With/Without Rail:   [place] provides verbal cues and/or  touching/steadying and/or contact guard assistance as patient completes  activity. Assistance may be provided throughout the activity or intermittently.  Picking up an Object:   Not attempted due to medical or safety concerns. Uses  Wheelchair and/or Scooter: No    Section J. Health Conditions Discharge      Section J. Health Conditions (Pain)      Section K. Swallowing/Nutritional Status  Nutritional Approaches Past 7 Days:  Nutritional Approaches at Discharge:    Section M. Skin Conditions Discharge      . Current Number of Unhealed Pressure Ulcers      Section N. Medication        Section O. Special Treatments, Procedures, and Programs    Signed by: Eliza Ramirez PTA

## 2023-10-12 NOTE — PROGRESS NOTES
Patient Assessment Instrument  Quality Indicators - Discharge FY 2023    Section A. Transportation  Issues Due to Lack of Transportation:  No    Section A. Medication List        Section B. Health Literacy      Section C. BIMS      Section C. Signs and Symptoms of Delirium (from CAM)      Section D. Mood      Section D. Social Isolation      Section GG. Self-Care Performance      Section GG. Mobility Performance      Section J. Health Conditions Discharge  Fall(s) Since Admission:  No    Section J. Health Conditions (Pain)      Section K. Swallowing/Nutritional Status  Nutritional Approaches Past 7 Days:  Therapeutic diet (e.g., low salt, diabetic,  low cholesterol)  Nutritional Approaches at Discharge:  Therapeutic diet (e.g., low salt,  diabetic, low cholesterol)    Section M. Skin Conditions Discharge      . Current Number of Unhealed Pressure Ulcers      Section N. Medication    Medication Intervention: Not applicable - There were no potential clinically  significant medication issues identified since admission or patient is not  taking any medications.  Patient is taking medications in the following pharmacological classification:  E. Anticoagulant An indication is noted for all medications in the Anticoagulant  drug class. I. Antiplatelet An indication is NOT noted for all medications in  the Antiplatelet drug class.    Section O. Special Treatments, Procedures, and Programs    Signed by: Annalisa Garcia, Supervisor

## 2023-10-12 NOTE — SIGNIFICANT NOTE
10/12/23 1010   Plan   Plan Faxed discharge summary and AVS to [facility] [phone].  Spoke to Radha at [facility] with admission confirmation.  Dong Da Silva will provide transportation to [facility] around 12:00pm.  RN will call report to Belinda prior to discharge.   Final Discharge Disposition Code 03 - skilled nursing facility (SNF)   Final Note Pt is being discharged to [facility] today with dong Da Silva providing transportation today around 12:00pm.

## 2023-10-12 NOTE — SIGNIFICANT NOTE
10/12/23 1341   OT Discharge Summary   Reason for Discharge Discharge from facility   Outcomes Achieved Able to achieve all goals within established timeline   Discharge Destination SNF     Pt d/c'd to SNF on this date for continued therapy.

## 2023-10-13 ENCOUNTER — LAB REQUISITION (OUTPATIENT)
Dept: LAB | Facility: HOSPITAL | Age: 78
End: 2023-10-13
Payer: MEDICARE

## 2023-10-13 DIAGNOSIS — I10 ESSENTIAL (PRIMARY) HYPERTENSION: ICD-10-CM

## 2023-10-13 LAB
ALBUMIN SERPL-MCNC: 3.3 G/DL (ref 3.5–5.2)
ALBUMIN/GLOB SERPL: 1 G/DL
ALP SERPL-CCNC: 62 U/L (ref 39–117)
ALT SERPL W P-5'-P-CCNC: 20 U/L (ref 1–33)
ANION GAP SERPL CALCULATED.3IONS-SCNC: 9.8 MMOL/L (ref 5–15)
AST SERPL-CCNC: 13 U/L (ref 1–32)
BASOPHILS # BLD AUTO: 0.05 10*3/MM3 (ref 0–0.2)
BASOPHILS NFR BLD AUTO: 0.6 % (ref 0–1.5)
BILIRUB SERPL-MCNC: 0.3 MG/DL (ref 0–1.2)
BUN SERPL-MCNC: 21 MG/DL (ref 8–23)
BUN/CREAT SERPL: 21.4 (ref 7–25)
CALCIUM SPEC-SCNC: 9.2 MG/DL (ref 8.6–10.5)
CHLORIDE SERPL-SCNC: 105 MMOL/L (ref 98–107)
CHOLEST SERPL-MCNC: 122 MG/DL (ref 0–200)
CO2 SERPL-SCNC: 27.2 MMOL/L (ref 22–29)
CREAT SERPL-MCNC: 0.98 MG/DL (ref 0.57–1)
DEPRECATED RDW RBC AUTO: 50.7 FL (ref 37–54)
EGFRCR SERPLBLD CKD-EPI 2021: 59.9 ML/MIN/1.73
EOSINOPHIL # BLD AUTO: 0.33 10*3/MM3 (ref 0–0.4)
EOSINOPHIL NFR BLD AUTO: 3.6 % (ref 0.3–6.2)
ERYTHROCYTE [DISTWIDTH] IN BLOOD BY AUTOMATED COUNT: 14.8 % (ref 12.3–15.4)
GLOBULIN UR ELPH-MCNC: 3.3 GM/DL
GLUCOSE SERPL-MCNC: 93 MG/DL (ref 65–99)
HBA1C MFR BLD: 7.2 % (ref 4.8–5.6)
HCT VFR BLD AUTO: 46 % (ref 34–46.6)
HDLC SERPL-MCNC: 33 MG/DL (ref 40–60)
HGB BLD-MCNC: 14.2 G/DL (ref 12–15.9)
IMM GRANULOCYTES # BLD AUTO: 0.04 10*3/MM3 (ref 0–0.05)
IMM GRANULOCYTES NFR BLD AUTO: 0.4 % (ref 0–0.5)
LDLC SERPL CALC-MCNC: 63 MG/DL (ref 0–100)
LDLC/HDLC SERPL: 1.78 {RATIO}
LYMPHOCYTES # BLD AUTO: 2.44 10*3/MM3 (ref 0.7–3.1)
LYMPHOCYTES NFR BLD AUTO: 26.8 % (ref 19.6–45.3)
MCH RBC QN AUTO: 28.5 PG (ref 26.6–33)
MCHC RBC AUTO-ENTMCNC: 30.9 G/DL (ref 31.5–35.7)
MCV RBC AUTO: 92.2 FL (ref 79–97)
MONOCYTES # BLD AUTO: 0.99 10*3/MM3 (ref 0.1–0.9)
MONOCYTES NFR BLD AUTO: 10.9 % (ref 5–12)
NEUTROPHILS NFR BLD AUTO: 5.24 10*3/MM3 (ref 1.7–7)
NEUTROPHILS NFR BLD AUTO: 57.7 % (ref 42.7–76)
NRBC BLD AUTO-RTO: 0 /100 WBC (ref 0–0.2)
PLATELET # BLD AUTO: 277 10*3/MM3 (ref 140–450)
PMV BLD AUTO: 11.8 FL (ref 6–12)
POTASSIUM SERPL-SCNC: 4.6 MMOL/L (ref 3.5–5.2)
PROT SERPL-MCNC: 6.6 G/DL (ref 6–8.5)
RBC # BLD AUTO: 4.99 10*6/MM3 (ref 3.77–5.28)
SODIUM SERPL-SCNC: 142 MMOL/L (ref 136–145)
TRIGL SERPL-MCNC: 152 MG/DL (ref 0–150)
VLDLC SERPL-MCNC: 26 MG/DL (ref 5–40)
WBC NRBC COR # BLD: 9.09 10*3/MM3 (ref 3.4–10.8)

## 2023-10-13 PROCEDURE — 80061 LIPID PANEL: CPT | Performed by: INTERNAL MEDICINE

## 2023-10-13 PROCEDURE — 80053 COMPREHEN METABOLIC PANEL: CPT | Performed by: INTERNAL MEDICINE

## 2023-10-13 PROCEDURE — 83036 HEMOGLOBIN GLYCOSYLATED A1C: CPT | Performed by: INTERNAL MEDICINE

## 2023-10-13 PROCEDURE — 85025 COMPLETE CBC W/AUTO DIFF WBC: CPT | Performed by: INTERNAL MEDICINE

## 2023-10-13 NOTE — PROGRESS NOTES
Occupational Therapy:    Physical Therapy:    Speech Language Pathology: Individual: 45 minutes.    Signed by: Annalisa Garcia, Supervisor     Stable

## 2023-10-17 ENCOUNTER — LAB REQUISITION (OUTPATIENT)
Dept: LAB | Facility: HOSPITAL | Age: 78
End: 2023-10-17
Payer: MEDICARE

## 2023-10-17 DIAGNOSIS — I10 ESSENTIAL (PRIMARY) HYPERTENSION: ICD-10-CM

## 2023-10-17 LAB
ANION GAP SERPL CALCULATED.3IONS-SCNC: 8.9 MMOL/L (ref 5–15)
BUN SERPL-MCNC: 19 MG/DL (ref 8–23)
BUN/CREAT SERPL: 21.3 (ref 7–25)
CALCIUM SPEC-SCNC: 9.1 MG/DL (ref 8.6–10.5)
CHLORIDE SERPL-SCNC: 106 MMOL/L (ref 98–107)
CO2 SERPL-SCNC: 25.1 MMOL/L (ref 22–29)
CREAT SERPL-MCNC: 0.89 MG/DL (ref 0.57–1)
EGFRCR SERPLBLD CKD-EPI 2021: 67.3 ML/MIN/1.73
GLUCOSE SERPL-MCNC: 111 MG/DL (ref 65–99)
POTASSIUM SERPL-SCNC: 5 MMOL/L (ref 3.5–5.2)
SODIUM SERPL-SCNC: 140 MMOL/L (ref 136–145)

## 2023-10-17 PROCEDURE — 80048 BASIC METABOLIC PNL TOTAL CA: CPT | Performed by: INTERNAL MEDICINE

## 2024-03-27 NOTE — NURSING NOTE
PHYSICAL THERAPY EVALUATION - INPATIENT     Room Number: 3607/3607-A  Evaluation Date: 3/27/2024  Type of Evaluation: Initial  Physician Order: PT Eval and Treat    Presenting Problem: GIB, hematochezia s/p colonoscopy 3/21  Co-Morbidities : HTN, HLD, COPD, CKD3, anxiety/depression  Reason for Therapy: Mobility Dysfunction and Discharge Planning    PHYSICAL THERAPY ASSESSMENT   Patient is currently functioning near baseline with bed mobility, transfers, gait, and standing prolonged periods.  Prior to admission, patient's baseline is independent.  Patient is requiring contact guard assist as a result of the following impairments: decreased functional strength, decreased endurance/aerobic capacity, impaired standing balance, decreased muscular endurance, and medical status.  Physical Therapy will continue to follow for duration of hospitalization.    Patient will benefit from continued skilled PT Services at discharge to promote functional independence and safety with additional support and return home with home health PT. Pt may benefit from caregiver assistance.     PLAN  PT Treatment Plan: Bed mobility;Body mechanics;Endurance;Energy conservation;Patient education;Family education;Gait training;Transfer training;Balance training;Strengthening  Rehab Potential : Fair  Frequency (Obs): 3x/week  Number of Visits to Meet Established Goals: 4      CURRENT GOALS  Goal #1 Patient is able to demonstrate supine - sit EOB @ level: modified independent  MET   Goal #2 Patient is able to demonstrate transfers EOB to/from Chair/Wheelchair at assistance level: modified independent     Goal #3 Patient is able to ambulate 150 feet with assist device:  LRAD  at assistance level: supervision     Goal #4    Goal #5    Goal #6    Goal Comments: Goals established on 3/27/2024      PHYSICAL THERAPY MEDICAL/SOCIAL HISTORY  History related to current admission: Patient is a 81 year old female admitted on 3/20/2024 from home for  Patient with re-check on blood sugar with noted response to D50 with result of 174, patient denies feeling of low glucose episode, will continue to monitor.   black/tarry stools.  Pt diagnosed with GIB, hematochezia.      HOME SITUATION  Type of Home: Apartment   Home Layout: One level  Stairs to Enter : 0             Lives With: Alone (Reports 2 nieces in [place] may be able to help)  Drives: No ([facility] provides transportation)  Patient Owned Equipment: None  Patient Regularly Uses: Glasses    Prior Level of [place]: Pt typically indep with ADLs and mobility.     SUBJECTIVE  \"I feel hot and like I'm going to pass out\"       OBJECTIVE  Precautions: Bed/chair alarm  Fall Risk: High fall risk    WEIGHT BEARING RESTRICTION  Weight Bearing Restriction: None                PAIN ASSESSMENT  Ratin          COGNITION  Overall Cognitive Status:  WFL - within functional limits    RANGE OF MOTION AND STRENGTH ASSESSMENT  See OT note for UE assessment    Lower extremity ROM is within functional limits      Lower extremity strength is within functional limits       BALANCE  Static Sitting: Fair +  Dynamic Sitting: Fair +  Static Standing: Poor +  Dynamic Standing: Poor +    ADDITIONAL TESTS                                    ACTIVITY TOLERANCE  Pulse: 65  Heart Rate Source: Monitor     BP: 101/58  BP Location: Right arm          O2 WALK  Oxygen Therapy  SPO2% on Room Air at Rest: 98    NEUROLOGICAL FINDINGS                        AM-PAC '6-Clicks' INPATIENT SHORT FORM - BASIC MOBILITY  How much difficulty does the patient currently have...  Patient Difficulty: Turning over in bed (including adjusting bedclothes, sheets and blankets)?: None   Patient Difficulty: Sitting down on and standing up from a chair with arms (e.g., wheelchair, bedside commode, etc.): A Little   Patient Difficulty: Moving from lying on back to sitting on the side of the bed?: None   How much help from another person does the patient currently need...   Help from Another: Moving to and from a bed to a chair (including a wheelchair)?: A Little   Help from Another: Need to walk in hospital room?: A  Little   Help from Another: Climbing 3-5 steps with a railing?: A Little       AM-PAC Score:  Raw Score: 20   Approx Degree of Impairment: 35.83%   Standardized Score (AM-PAC Scale): 47.67   CMS Modifier (G-Code): CJ    FUNCTIONAL ABILITY STATUS  Gait Assessment   Functional Mobility/Gait Assessment  Gait Assistance: Contact guard assist  Distance (ft): 15  Assistive Device: Rolling walker  Pattern:  (unsteady)    Skilled Therapy Provided     Bed Mobility:  Rolling: NT  Supine to sit: ind   Sit to supine: NT     Transfer Mobility:  Sit to stand: supervision   Stand to sit: supervision  Gait = CGA    Therapist's Comments: RN cleared for session. Pt agreeable for therapy, received supine. Pt reporting desire to utilize bathroom, pt ambulated to bathroom with CGA, once in sitting and attempting BM, pt reports feeling hot and lightheaded, dizzy, RN called to room. Donned EKG and BP cuff, HR WNL, BP not taking. Pt transferred to toilet>chair with modA pivot tx, then transferred modA chair>bed. BP in supine 101/58, pt reports feeling improved symptoms. Deferred further activity at this time. Instructed to call for nursing staff for any needs and OOB mobility.     Exercise/Education Provided:  Bed mobility  Body mechanics  Energy conservation  Functional activity tolerated  Gait training  Neuromuscular re-educate  Posture  Strengthening  Transfer training    Patient End of Session: In bed;With  staff;RN aware of session/findings;Call light within reach;Needs met;All patient questions and concerns addressed;Alarm set;Discussed recommendations with /      Patient Evaluation Complexity Level:  History High - 3 or more personal factors and/or co-morbidities   Examination of body systems Moderate - addressing a total of 3 or more elements   Clinical Presentation Moderate - Evolving   Clinical Decision Making Moderate - Evolving       PT Session Time: 25 minutes  Gait Trainin minutes  Therapeutic  Activity: 10 minutes